# Patient Record
Sex: MALE | Race: WHITE | NOT HISPANIC OR LATINO | Employment: FULL TIME | URBAN - METROPOLITAN AREA
[De-identification: names, ages, dates, MRNs, and addresses within clinical notes are randomized per-mention and may not be internally consistent; named-entity substitution may affect disease eponyms.]

---

## 2019-07-09 ENCOUNTER — TELEPHONE (OUTPATIENT)
Dept: ONCOLOGY | Facility: CLINIC | Age: 49
End: 2019-07-09

## 2019-07-09 NOTE — TELEPHONE ENCOUNTER
ONCOLOGY INTAKE: Records Information      APPT INFORMATION:  Referring provider:  Dr. 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 yes    ADDITIONAL INFORMATION:  Left message for patient, provided hours/phone number.  Will try again on 7/10/2019 if appointment is not scheduled.    Please forward records/referral to Oncology CSS team upon scheduling.

## 2019-07-09 NOTE — TELEPHONE ENCOUNTER
Patient returned call and requested we call his wife to schedule the appointment.  Spoke with patient's wife and she is going to research the doctors we would be scheduling with.  Once they have decided who they would like to see then they will contact our office to schedule.  Clinic number and hours were provided to wife along with physicians names.

## 2019-07-09 NOTE — TELEPHONE ENCOUNTER
ONCOLOGY INTAKE: Records Information      APPT INFORMATION: [id number] Dr. Redding  Referring provider:  Dr. 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 yes    Has patient been seen for any external appt for this diagnosis? Yes    If yes, where? [facility]    Has patient had any imaging or procedures outside of [place] for this condition? Yes      If Yes, where? [facility]    ADDITIONAL INFORMATION:  Records at [facility] were faxed to Onc New Pt Scheduling

## 2019-07-10 NOTE — TELEPHONE ENCOUNTER
RECORDS STATUS - ALL OTHER DIAGNOSIS      RECORDS RECEIVED FROM: Adis  Patrick   DATE RECEIVED:    NOTES STATUS DETAILS   OFFICE NOTE from referring provider Colby Nava, Adis, most recent OV - 7/2/19   OFFICE NOTE from medical oncologist Asif/KENNETH Nava   DISCHARGE SUMMARY from hospital NA    DISCHARGE REPORT from the ER NA    OPERATIVE REPORT CE 6/24/19: Upper GI Endoscopy  5/28/19: Colonoscopy   MEDICATION LIST CE [place]   CLINICAL TRIAL TREATMENTS TO DATE     LABS     PATHOLOGY REPORTS Adis, Report in CE, Requested 5/28/19: [id number]   ANYTHING RELATED TO DIAGNOSIS Epic 7/2/19   GENONOMIC TESTING     TYPE:     IMAGING (NEED IMAGES & REPORT)     CT SCANS NA    MRI NA    MAMMO NA    ULTRASOUND NA    PET Adis, Report in CE, Requested 6/18/19   Upper GI Endoscopy Report in CE, Adis 6/24/19   Colonoscopy Report in CE, [place] 5/28/19

## 2019-07-19 NOTE — TELEPHONE ENCOUNTER
Received a call from  Hematopath [facility] - the slides from [place] have pt's first named spelled Slava with 1 L, we have in Epic with 2 L's.    Called pt, clarified spelling of first name is with 1 L. Epic updated. Advised  Lab of clarification  12:38 PM

## 2019-07-22 LAB — COPATH REPORT: NORMAL

## 2019-07-22 PROCEDURE — 00000346 ZZHCL STATISTIC REVIEW OUTSIDE SLIDES TC 88321: Performed by: INTERNAL MEDICINE

## 2019-08-01 ENCOUNTER — OFFICE VISIT (OUTPATIENT)
Dept: TRANSPLANT | Facility: CLINIC | Age: 49
End: 2019-08-01
Attending: INTERNAL MEDICINE
Payer: COMMERCIAL

## 2019-08-01 ENCOUNTER — PRE VISIT (OUTPATIENT)
Dept: TRANSPLANT | Facility: CLINIC | Age: 49
End: 2019-08-01

## 2019-08-01 VITALS
OXYGEN SATURATION: 98 % | RESPIRATION RATE: 16 BRPM | TEMPERATURE: 97 F | WEIGHT: 168 LBS | HEART RATE: 70 BPM | SYSTOLIC BLOOD PRESSURE: 102 MMHG | DIASTOLIC BLOOD PRESSURE: 66 MMHG

## 2019-08-01 DIAGNOSIS — C83.18 MANTLE CELL LYMPHOMA OF LYMPH NODES OF MULTIPLE SITES (H): Primary | ICD-10-CM

## 2019-08-01 LAB
ALBUMIN SERPL-MCNC: 3.9 G/DL (ref 3.4–5)
ALP SERPL-CCNC: 67 U/L (ref 40–150)
ALT SERPL W P-5'-P-CCNC: 23 U/L (ref 0–70)
ANION GAP SERPL CALCULATED.3IONS-SCNC: 4 MMOL/L (ref 3–14)
AST SERPL W P-5'-P-CCNC: 18 U/L (ref 0–45)
BASOPHILS # BLD AUTO: 0.1 10E9/L (ref 0–0.2)
BASOPHILS NFR BLD AUTO: 1 %
BILIRUB SERPL-MCNC: 0.9 MG/DL (ref 0.2–1.3)
BUN SERPL-MCNC: 18 MG/DL (ref 7–30)
CALCIUM SERPL-MCNC: 9 MG/DL (ref 8.5–10.1)
CHLORIDE SERPL-SCNC: 105 MMOL/L (ref 94–109)
CO2 SERPL-SCNC: 30 MMOL/L (ref 20–32)
CREAT SERPL-MCNC: 1.15 MG/DL (ref 0.66–1.25)
DIFFERENTIAL METHOD BLD: NORMAL
EOSINOPHIL # BLD AUTO: 0.5 10E9/L (ref 0–0.7)
EOSINOPHIL NFR BLD AUTO: 7.3 %
ERYTHROCYTE [DISTWIDTH] IN BLOOD BY AUTOMATED COUNT: 13.2 % (ref 10–15)
GFR SERPL CREATININE-BSD FRML MDRD: 73 ML/MIN/{1.73_M2}
GLUCOSE SERPL-MCNC: 75 MG/DL (ref 70–99)
HCT VFR BLD AUTO: 46.3 % (ref 40–53)
HGB BLD-MCNC: 15.5 G/DL (ref 13.3–17.7)
IMM GRANULOCYTES # BLD: 0 10E9/L (ref 0–0.4)
IMM GRANULOCYTES NFR BLD: 0.3 %
LDH SERPL L TO P-CCNC: 174 U/L (ref 85–227)
LYMPHOCYTES # BLD AUTO: 2.5 10E9/L (ref 0.8–5.3)
LYMPHOCYTES NFR BLD AUTO: 39.9 %
MCH RBC QN AUTO: 31.3 PG (ref 26.5–33)
MCHC RBC AUTO-ENTMCNC: 33.5 G/DL (ref 31.5–36.5)
MCV RBC AUTO: 93 FL (ref 78–100)
MONOCYTES # BLD AUTO: 0.7 10E9/L (ref 0–1.3)
MONOCYTES NFR BLD AUTO: 10.5 %
NEUTROPHILS # BLD AUTO: 2.6 10E9/L (ref 1.6–8.3)
NEUTROPHILS NFR BLD AUTO: 41 %
NRBC # BLD AUTO: 0 10*3/UL
NRBC BLD AUTO-RTO: 0 /100
PLATELET # BLD AUTO: 192 10E9/L (ref 150–450)
POTASSIUM SERPL-SCNC: 4.6 MMOL/L (ref 3.4–5.3)
PROT SERPL-MCNC: 7.6 G/DL (ref 6.8–8.8)
RBC # BLD AUTO: 4.96 10E12/L (ref 4.4–5.9)
SODIUM SERPL-SCNC: 138 MMOL/L (ref 133–144)
WBC # BLD AUTO: 6.3 10E9/L (ref 4–11)

## 2019-08-01 PROCEDURE — 80053 COMPREHEN METABOLIC PANEL: CPT | Performed by: INTERNAL MEDICINE

## 2019-08-01 PROCEDURE — G0463 HOSPITAL OUTPT CLINIC VISIT: HCPCS | Mod: ZF

## 2019-08-01 PROCEDURE — G0463 HOSPITAL OUTPT CLINIC VISIT: HCPCS

## 2019-08-01 PROCEDURE — 83615 LACTATE (LD) (LDH) ENZYME: CPT | Performed by: INTERNAL MEDICINE

## 2019-08-01 PROCEDURE — 36415 COLL VENOUS BLD VENIPUNCTURE: CPT

## 2019-08-01 PROCEDURE — 85025 COMPLETE CBC W/AUTO DIFF WBC: CPT | Performed by: INTERNAL MEDICINE

## 2019-08-01 RX ORDER — SILDENAFIL 100 MG/1
100 TABLET, FILM COATED ORAL DAILY PRN
COMMUNITY
Start: 2019-04-25

## 2019-08-01 ASSESSMENT — PAIN SCALES - GENERAL: PAINLEVEL: NO PAIN (0)

## 2019-08-01 NOTE — NURSING NOTE
Oncology Rooming Note    August 1, 2019 4:13 PM   Slava Lane is a 48 year old male who presents for:    Chief Complaint   Patient presents with     Oncology Clinic Visit     New pt eval- Mantle Cell Lymphoma      Initial Vitals: /66 (BP Location: Right arm, Patient Position: Sitting, Cuff Size: Adult Regular)   Pulse 70   Temp 97  F (36.1  C) (Oral)   Resp 16   Wt 76.2 kg (168 lb)   SpO2 98%  There is no height or weight on file to calculate BMI. There is no height or weight on file to calculate BSA.  No Pain (0) Comment: Data Unavailable   No LMP for male patient.  Allergies reviewed: Yes  Medications reviewed: Yes    Medications: Medication refills not needed today.  Pharmacy name entered into EPIC: Data Unavailable    Clinical concerns: N/A     Machelle Bellamy CMA

## 2019-08-01 NOTE — PROGRESS NOTES
[facility] Consultation       Slava Lane is a 48 year old male referred by Dr. Engel of [facility] for second opinion regarding newly diagnosed mantle cell lymphoma from colon polyps.      Hematologic history:  Mantle cell lymphoma.  Diagnosed on routine screening colonoscopy polyp at age 48 (5/28/2019)  5/5 polyps with evidence of mantle cell lymphoma.  Ki-67 20%. PET CT for initial staging notable for LAD above and below diaphragm in multiple regions.  No splenomegaly. Stage IV due to colonic involvement, no B symptoms or bulky disease at diagnosis.  EGD completed for staging with no additional findings reported.  MIPI at diagnosis 5.6 (low risk).  Bone marrow biopsy and SOCS11/TP53 pending, with plan for observation vs chemotherapy based on molecular results.     Date Treatment Response Toxicities/Complications   N/A                               HPI:  Please see my entry above for hematologic history.  Pt is feeling at baseline currently.  Diagnosed with mantle cell lymphoma incidentally after routine screening colonoscopy noted 5/5 polyps with mantle cell lymphoma.  Pt is at baseline health.  No B-symtpoms.  Did note occasional small LN in cervical and axillary regions.  No fevers, chills, night sweats, nausea, vomiting, CP with exertion or otherwise, palpitations, abdominal pain, early satiety.  No new rashes or wounds. 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  Weight fluctuates seasonally, but notes he is losing weight a little more easily this summer than past.  Energy remains good and denies fatigue. He notes persistent sore throat and cough after EGD in addition to voice hoarseness.  He is intermittently SOB with cough productive of clear sputum only.  He otherwise denies complaints.        ASSESSMENT AND PLAN:    49 y/o previously healthy male with no significant PMH presenting for second opinion regarding incidentally found mantle cell lymphoma after routine  screening colonoscopy.  He is otherwise asymptomatic and has relatively low disease burden.  Of particular concern, though is the presence of 5/5 colon polyps with mantle cell lymphoma present.  We discussed with the patient and his wife the typical presentation of mantle cell lymphoma.  We discussed that treatment is not emergent and based on pending pathologic evaluation, observation may be the best option for him at this time.  We discussed that early versus delayed treatment in mantle cell lymphoma does not change overall survival.  We discussed that his disease would be considered Stage IV and that his disease is potentially curable unlike stage IV in most solid tumors.  During out appointment it was discussed that this disease can often be followed for years without treatment, depending on the rate of growth and molecular studies.  We discussed that we did not have enough information currently to provide a good insight into whether his disease would require observation or treatment.  We discussed the need to obtain a bone marrow biopsy at [facility] in addition to obtaining blood work today and biopsy blocks from [facility] for additional pathologic evaluation/molecular studies. We will see him back in clinic in 1 month after these results are finalized to discuss the next steps in plan.  He and his wife are aware and understood when to contact the care team, including if fevers, chills, night sweats, worsening fatigue, or new LAD develop and then we would see him earlier.  We deferred discussion of chemotherapy side effects and regimens at this time as the treatment plan is pending additional workup.      # Mantle cell lymphoma, Stage IV.    -Obtain blocks from colonoscopy dated 5/28/19 for additional testing: SOX11, TP53 studies.   -Bone marrow biopsy week of 8/5.    -Labs today including: LDH, CBC, CMP  -RTC in 1 month to discuss results and treatment plan.      # tobacco  "abuse. Recommended cessation of chewing tobacco and cigarettes.      # EtOH.  3 beers nightly, every night. Recommended EtOH reduction or cessation.  Pt is hesitant at this time, but will think about it.  Discussed that EtOH can interact with chemotherapy and that 3 drinks daily is considered high risk drinking.      ROS:    10 point ROS neg other than the symptoms noted above in the HPI.        Past Medical History:   Diagnosis Date     Mantle cell lymphoma (H) 06/2019    noted incidentally on colon polyp pathology        Past Surgical History:   Procedure Laterality Date     AS SKIN PINCH GRAFT PROCEDURE <2CM Right 2017     LITHOTRIPSY N/A 06/2019    unknown side       Family History   Problem Relation Age of Onset     Cancer No family hx of      Bleeding Disorder No family hx of      Clotting Disorder No family hx of        Social History     Tobacco Use     Smoking status: Current Every Day Smoker     Packs/day: 0.25     Types: Cigarettes     Smokeless tobacco: Former User     Types: Chew   Substance Use Topics     Alcohol use: Yes     Alcohol/week: 12.6 oz     Types: 21 Cans of beer per week     Drug use: Never          No Known Allergies     Current Outpatient Medications   Medication Sig Dispense Refill     sildenafil (VIAGRA) 100 MG tablet Take 100 mg by mouth           Physical Exam:     Vital Signs: /66 (BP Location: Right arm, Patient Position: Sitting, Cuff Size: Adult Regular)   Pulse 70   Temp 97  F (36.1  C) (Oral)   Resp 16   Wt 76.2 kg (168 lb)   SpO2 98%     Pathology ([facility] 6/5/2019)  Multiple colon polyps with mantle cell lymphoma.    -Colonic mucosa with dense underlying lymphoid infiltrate, small to intermediate midly irregular cells/mature chromatin.  Increased vasculature in background and \"pink histiocytes\"  -Ki-67 proliferative index ~20%  -Positive for Cd20, CD5, , BCL2, CD43, and cyclin D1  -Negative CD10 and BCL6 in neoplastic lymphoid cells.   -CD23 " highlights disrupted follicular dendritic networks    PET [facility] 6/18/19    PET CT, SKULL BASE TO THIGHS    INDICATION: ICD-10 C83.19 Mantle cell lymphoma of extranodal site excluding spleen and other solid organs   Lymphoma Cancer, Initial Staging  COMPARISON: No prior examinations available for comparison.    RADIOPHARMACEUTICAL:  13.3 mCi F-18 FDG intravenously.    TECHNIQUE: Patient blood glucose level at time of injection was 82 mg/dL. Following IV infusion of the above radiopharmaceutical, PET imaging from the skull base to thigh performed. Low-dose noncontrast CT images were also acquired through this area for CT attenuation correction and lesion localization.    Findings:  No gross hypermetabolic activity within the neck.    There are multiple mildly prominent lymph nodes within the axillary region bilaterally with increased metabolic activity and maximum SUV of 3.3 on the right and 3.6 on the left.  Several prominent upper mediastinal lymph nodes also noted.  Enlarged and metabolically active nodes within the AP window and subcarinal regions noted with maximum SUV of 4.8 and 5.6 respectively  Mildly increased metabolic activity associated with the teena.  Metabolically active lymph node within the lower thoracic paraspinal region adjacent the aorta with SUV of 3.1.    Enlarged metabolically active retroperitoneal lymph nodes also noted.  The most pronounced is a left periaortic node with an SUV of 4.2.  Hypermetabolic ilioinguinal nodes noted bilaterally with maximum SUV upwards of 4.6.  Additionally, there is a hypermetabolic perirectal lesion which also likely represents an enlarged lymph node within SUV of 4.2.    IMPRESSION:    Diffuse hypermetabolic lymphadenopathy compatible with lymphoma.    KPS:  0    General Appearance: healthy, alert and no distress  Eyes: PERRL, conjunctiva and lids normal, sclera nonicteric  Ears/Nose/M/Throat: Oral mucosa and posterior oropharynx normal, moist mucous  membranes  Neck supple, non-tender, free range of motion, single subcentimeter posterior cervical LN on L side   Cardio/Vascular:regular rate and rhythm, normal S1 and S2, no murmur  Resp Effort And Auscultation: Reduced air movement diffusely, prolonged expiration phase- Clear to auscultation without rales, rhonchi, or wheezing. Re  GI: soft, nontender, bowel sounds present in all four quadrants, no hepatosplenomegaly  Lymphatics: single L posterior cervical LN as above, single R subcentimeter axillary LN, no supraclavicular or preauricular LAD   Musculoskeletal: Musculoskeletal normal  Edema: none  Skin: Skin color, texture, turgor normal. No rashes or lesions.  Neurologic: Gait normal. CNII-XII grossly intact, normal speech and mentation. Linear thought processes   Psych/Affect: Mood and affect are appropriate.  Vascular Access:  None      Slava and his wife understood the above assessment and recommendations.  Multiple questions answered.  No barriers to learning identified.     Patient seen and discussed with Dr. Redding.    Ari Monson MD  Heme/Onc fellow    The patient was discussed with the clinical fellow, seen and examined by me. All labs and imaging were reviewed. I  I agree with the fellows note and have been responsible for the care plan and interpretation of progress. Any additional information to the fellows note has been documented below.      49 yr old with newly diagnosed MCL Stage IV , overall asymptomatic, discovered during colonoscopy screening. Mr. Bhagat and his wife had multiple questions which were answered. In essence I think we should get a bone marrow and stain for p53 mutations. His Ki67 is 30% (I misquoted this to him as 20% which I will correct during the next visit) in his colon polyps.   Based on these tests the 3 options would be  1. Observation  2. R-CHOP/R-DHAP + ASCT+ R- maintenance  3. BR + R-maintenance    Mark Redding MD  Assistant  Professor          ------------------------------------------------------------------------------------------------------------------------------------------------    Patient Care Team    No active team members.

## 2019-08-09 ENCOUNTER — OFFICE VISIT (OUTPATIENT)
Dept: ONCOLOGY | Facility: CLINIC | Age: 49
End: 2019-08-09
Attending: PHYSICIAN ASSISTANT
Payer: COMMERCIAL

## 2019-08-09 VITALS
SYSTOLIC BLOOD PRESSURE: 104 MMHG | WEIGHT: 166.1 LBS | OXYGEN SATURATION: 96 % | HEART RATE: 62 BPM | RESPIRATION RATE: 16 BRPM | TEMPERATURE: 98 F | DIASTOLIC BLOOD PRESSURE: 64 MMHG

## 2019-08-09 DIAGNOSIS — C83.19 MANTLE CELL LYMPHOMA, EXTRANODAL AND SOLID ORGAN SITES (H): Primary | ICD-10-CM

## 2019-08-09 LAB
BASOPHILS # BLD AUTO: 0 10E9/L (ref 0–0.2)
BASOPHILS NFR BLD AUTO: 0.8 %
DIFFERENTIAL METHOD BLD: NORMAL
EOSINOPHIL # BLD AUTO: 0.4 10E9/L (ref 0–0.7)
EOSINOPHIL NFR BLD AUTO: 7.6 %
ERYTHROCYTE [DISTWIDTH] IN BLOOD BY AUTOMATED COUNT: 13.1 % (ref 10–15)
HCT VFR BLD AUTO: 44.1 % (ref 40–53)
HGB BLD-MCNC: 14.8 G/DL (ref 13.3–17.7)
IMM GRANULOCYTES # BLD: 0 10E9/L (ref 0–0.4)
IMM GRANULOCYTES NFR BLD: 0.4 %
LYMPHOCYTES # BLD AUTO: 2 10E9/L (ref 0.8–5.3)
LYMPHOCYTES NFR BLD AUTO: 37.6 %
MCH RBC QN AUTO: 30.9 PG (ref 26.5–33)
MCHC RBC AUTO-ENTMCNC: 33.6 G/DL (ref 31.5–36.5)
MCV RBC AUTO: 92 FL (ref 78–100)
MONOCYTES # BLD AUTO: 0.6 10E9/L (ref 0–1.3)
MONOCYTES NFR BLD AUTO: 10.4 %
NEUTROPHILS # BLD AUTO: 2.3 10E9/L (ref 1.6–8.3)
NEUTROPHILS NFR BLD AUTO: 43.2 %
NRBC # BLD AUTO: 0 10*3/UL
NRBC BLD AUTO-RTO: 0 /100
PLATELET # BLD AUTO: 170 10E9/L (ref 150–450)
RBC # BLD AUTO: 4.79 10E12/L (ref 4.4–5.9)
WBC # BLD AUTO: 5.3 10E9/L (ref 4–11)

## 2019-08-09 PROCEDURE — G0463 HOSPITAL OUTPT CLINIC VISIT: HCPCS | Mod: ZF

## 2019-08-09 PROCEDURE — 85025 COMPLETE CBC W/AUTO DIFF WBC: CPT | Performed by: PHYSICIAN ASSISTANT

## 2019-08-09 PROCEDURE — 88275 CYTOGENETICS 100-300: CPT | Performed by: PHYSICIAN ASSISTANT

## 2019-08-09 PROCEDURE — 81320 PLCG2 GENE COMMON VARIANTS: CPT | Performed by: INTERNAL MEDICINE

## 2019-08-09 PROCEDURE — 88185 FLOWCYTOMETRY/TC ADD-ON: CPT | Performed by: PHYSICIAN ASSISTANT

## 2019-08-09 PROCEDURE — 88313 SPECIAL STAINS GROUP 2: CPT | Performed by: PHYSICIAN ASSISTANT

## 2019-08-09 PROCEDURE — 36592 COLLECT BLOOD FROM PICC: CPT

## 2019-08-09 PROCEDURE — 88237 TISSUE CULTURE BONE MARROW: CPT | Performed by: PHYSICIAN ASSISTANT

## 2019-08-09 PROCEDURE — 00000058 ZZHCL STATISTIC BONE MARROW ASP PERF TC 38220: Performed by: PHYSICIAN ASSISTANT

## 2019-08-09 PROCEDURE — 88341 IMHCHEM/IMCYTCHM EA ADD ANTB: CPT | Performed by: PHYSICIAN ASSISTANT

## 2019-08-09 PROCEDURE — 81405 MOPATH PROCEDURE LEVEL 6: CPT | Performed by: INTERNAL MEDICINE

## 2019-08-09 PROCEDURE — 88264 CHROMOSOME ANALYSIS 20-25: CPT | Performed by: PHYSICIAN ASSISTANT

## 2019-08-09 PROCEDURE — 38222 DX BONE MARROW BX & ASPIR: CPT | Mod: ZF | Performed by: PHYSICIAN ASSISTANT

## 2019-08-09 PROCEDURE — 40000611 ZZHCL STATISTIC MORPHOLOGY W/INTERP HEMEPATH TC 85060: Performed by: PHYSICIAN ASSISTANT

## 2019-08-09 PROCEDURE — 40000424 ZZHCL STATISTIC BONE MARROW CORE PERF TC 38221: Performed by: PHYSICIAN ASSISTANT

## 2019-08-09 PROCEDURE — 40001004 ZZHCL STATISTIC FLOW INT 9-15 ABY TC 88188: Performed by: PHYSICIAN ASSISTANT

## 2019-08-09 PROCEDURE — 88271 CYTOGENETICS DNA PROBE: CPT | Performed by: PHYSICIAN ASSISTANT

## 2019-08-09 PROCEDURE — 88280 CHROMOSOME KARYOTYPE STUDY: CPT | Performed by: PHYSICIAN ASSISTANT

## 2019-08-09 PROCEDURE — 88311 DECALCIFY TISSUE: CPT | Performed by: PHYSICIAN ASSISTANT

## 2019-08-09 PROCEDURE — 88161 CYTOPATH SMEAR OTHER SOURCE: CPT | Performed by: PHYSICIAN ASSISTANT

## 2019-08-09 PROCEDURE — 00000161 ZZHCL STATISTIC H-SPHEME PROCESS B/S: Performed by: PHYSICIAN ASSISTANT

## 2019-08-09 PROCEDURE — 88184 FLOWCYTOMETRY/ TC 1 MARKER: CPT | Performed by: PHYSICIAN ASSISTANT

## 2019-08-09 PROCEDURE — 81233 BTK GENE COMMON VARIANTS: CPT | Performed by: INTERNAL MEDICINE

## 2019-08-09 PROCEDURE — 88305 TISSUE EXAM BY PATHOLOGIST: CPT | Performed by: PHYSICIAN ASSISTANT

## 2019-08-09 PROCEDURE — 88342 IMHCHEM/IMCYTCHM 1ST ANTB: CPT | Performed by: PHYSICIAN ASSISTANT

## 2019-08-09 PROCEDURE — 40000795 ZZHCL STATISTIC DNA PROCESS AND HOLD: Performed by: PHYSICIAN ASSISTANT

## 2019-08-09 PROCEDURE — 40000951 ZZHCL STATISTIC BONE MARROW INTERP TC 85097: Performed by: PHYSICIAN ASSISTANT

## 2019-08-09 PROCEDURE — 25000128 H RX IP 250 OP 636: Mod: ZF | Performed by: PHYSICIAN ASSISTANT

## 2019-08-09 RX ADMIN — MIDAZOLAM HYDROCHLORIDE 2 MG: 1 INJECTION, SOLUTION INTRAMUSCULAR; INTRAVENOUS at 15:21

## 2019-08-09 ASSESSMENT — PAIN SCALES - GENERAL
PAINLEVEL: NO PAIN (0)
PAINLEVEL: NO PAIN (0)

## 2019-08-09 NOTE — NURSING NOTE
Chief Complaint   Patient presents with     Blood Draw     labs drawn via piv start by rn. vs taken      Labs drawn via PIV start by rn in lab. Flushed with saline. Vitals taken. Pt checked in for next appointment.   Mirian Ghosh RN

## 2019-08-09 NOTE — NURSING NOTE
Oncology Rooming Note    August 9, 2019 2:45 PM   Slava Lane is a 49 year old male who presents for:    Chief Complaint   Patient presents with     Blood Draw     labs drawn via piv start by rn. vs taken      Oncology Clinic Visit     BMBX, Labs      Initial Vitals: /77 (BP Location: Right arm, Patient Position: Sitting, Cuff Size: Adult Regular)   Pulse 55   Temp 98  F (36.7  C) (Oral)   Resp 16   Wt 75.3 kg (166 lb 1.6 oz)   SpO2 100%  There is no height or weight on file to calculate BMI. There is no height or weight on file to calculate BSA.  No Pain (0) Comment: Data Unavailable   No LMP for male patient.  Allergies reviewed: Yes  Medications reviewed: Yes    Medications: Medication refills not needed today.  Pharmacy name entered into EPIC: Data Unavailable    Clinical concerns: no  Tatsumi  was notified.      Ya De Santiago MA

## 2019-08-09 NOTE — NURSING NOTE
BMT Teaching Flowsheet   Teaching Topic: post biopsy instructions    Person(s) involved in teaching: Patient  Motivation Level  Asks Questions: Yes  Eager to Learn: Yes  Cooperative: Yes  Receptive (willing/able to accept information): Yes    Patient demonstrates understanding of the following:   - Reason for the appointment, diagnosis and treatment plan: Yes  - Knowledge of proper use of medications and conditions for which they are ordered (with special attention to potential side effects or drug interactions): Yes  - Which situations necessitate calling provider and whom to contact: Yes    Teaching concerns addressed: reviewed activity restrictions if received premeds, potential for bleeding and actions to take if develops any of the issues below    Proper use and care of (medical equipment, care aids, etc.) Yes  Pain management techniques: Yes  Patient instructed on hand hygiene: Yes  How and/when to access community resources: Yes    Infection Control:  Patient demonstrates understanding of the following:   Surgical procedure site care taught NA  Signs and symptoms of infection taught Yes  Wound care taught Yes  Central venous catheter care taught NA    Instructional Materials Used/Given: verbal, print out of post biopsy instructions.     Time spent with patient: 0 minutes.    Specific Concerns: NA

## 2019-08-09 NOTE — NURSING NOTE
Post biopsy vitals completed while pt laid in supine position, IV removed successfully, pt instructed to leave coban on for 30 minutes, biopsy dressing dried and intact. Pt discharged to caregiver. Pt tolerated well.  Machelle Bellamy MA

## 2019-08-09 NOTE — PROGRESS NOTES
BMT ONC Adult Bone Marrow Biopsy Procedure Note  August 9, 2019  /77 (BP Location: Right arm, Patient Position: Sitting, Cuff Size: Adult Regular)   Pulse 55   Temp 98  F (36.7  C) (Oral)   Resp 16   Wt 75.3 kg (166 lb 1.6 oz)   SpO2 100%      Learning needs assessment complete within 12 months? YES    DIAGNOSIS: Mantle Cell Lymphoma     PROCEDURE: Unilateral Bone Marrow Biopsy and Unilateral Aspirate    LOCATION: [facility] [place] 2nd Floor    Patient s identification was positively verified by verbal identification and invasive procedure safety checklist was completed. Informed consent was obtained. Following the administration of Midazolam as pre-medication, patient was placed in the prone position and prepped and draped in a sterile manner. Approximately 8 cc of 1% Lidocaine was used over the left posterior iliac spine. Following this a 3 mm incision was made. Trephine bone marrow core(s) was (were) obtained from the [facility]. Bone marrow aspirates were obtained from the [facility]. Aspirates were sent for morphology, immunophenotyping, cytogenetics and process and hold. A total of approximately 20 ml of marrow was aspirated. Following this procedure a sterile dressing was applied to the bone marrow biopsy site(s). The patient was placed in the supine position to maintain pressure on the biopsy site. Post-procedure wound care instructions were given.     Complications: NO    Post-procedural pain assessment: 0 out of 10 on the numeric pain rating scale.     Interventions: NO    Length of procedure:21 minutes to 45 minutes    Procedure performed by: Dominique Parekh PA-C

## 2019-08-12 LAB
COPATH REPORT: NORMAL

## 2019-08-20 ENCOUNTER — TELEPHONE (OUTPATIENT)
Dept: ONCOLOGY | Facility: CLINIC | Age: 49
End: 2019-08-20

## 2019-08-20 NOTE — TELEPHONE ENCOUNTER
Pt is wondering if his biopsy results (as well as other tests) can be released to [facility]. OK to release completed tests?

## 2019-08-21 DIAGNOSIS — C83.19 MANTLE CELL LYMPHOMA, EXTRANODAL AND SOLID ORGAN SITES (H): Primary | ICD-10-CM

## 2019-08-22 DIAGNOSIS — C83.19 MANTLE CELL LYMPHOMA, EXTRANODAL AND SOLID ORGAN SITES (H): ICD-10-CM

## 2019-08-29 LAB — COPATH REPORT: NORMAL

## 2019-09-05 LAB — COPATH REPORT: NORMAL

## 2019-09-06 LAB — COPATH REPORT: NORMAL

## 2019-09-10 DIAGNOSIS — C83.18 MANTLE CELL LYMPHOMA OF LYMPH NODES OF MULTIPLE SITES (H): ICD-10-CM

## 2019-09-10 RX ORDER — DIPHENHYDRAMINE HYDROCHLORIDE 50 MG/ML
50 INJECTION INTRAMUSCULAR; INTRAVENOUS
Status: CANCELLED
Start: 2019-09-11

## 2019-09-10 RX ORDER — ALBUTEROL SULFATE 0.83 MG/ML
2.5 SOLUTION RESPIRATORY (INHALATION)
Status: CANCELLED | OUTPATIENT
Start: 2019-09-11

## 2019-09-10 RX ORDER — DOXORUBICIN HYDROCHLORIDE 2 MG/ML
50 INJECTION, SOLUTION INTRAVENOUS ONCE
Status: CANCELLED | OUTPATIENT
Start: 2019-09-11

## 2019-09-10 RX ORDER — METHYLPREDNISOLONE SODIUM SUCCINATE 125 MG/2ML
125 INJECTION, POWDER, LYOPHILIZED, FOR SOLUTION INTRAMUSCULAR; INTRAVENOUS
Status: CANCELLED
Start: 2019-09-11

## 2019-09-10 RX ORDER — ACETAMINOPHEN 325 MG/1
650 TABLET ORAL ONCE
Status: CANCELLED | OUTPATIENT
Start: 2019-09-11

## 2019-09-10 RX ORDER — MEPERIDINE HYDROCHLORIDE 25 MG/ML
25 INJECTION INTRAMUSCULAR; INTRAVENOUS; SUBCUTANEOUS EVERY 30 MIN PRN
Status: CANCELLED | OUTPATIENT
Start: 2019-09-11

## 2019-09-10 RX ORDER — NALOXONE HYDROCHLORIDE 0.4 MG/ML
.1-.4 INJECTION, SOLUTION INTRAMUSCULAR; INTRAVENOUS; SUBCUTANEOUS
Status: CANCELLED | OUTPATIENT
Start: 2019-09-11

## 2019-09-10 RX ORDER — EPINEPHRINE 0.3 MG/.3ML
0.3 INJECTION SUBCUTANEOUS EVERY 5 MIN PRN
Status: CANCELLED | OUTPATIENT
Start: 2019-09-11

## 2019-09-10 RX ORDER — EPINEPHRINE 1 MG/ML
0.3 INJECTION, SOLUTION INTRAMUSCULAR; SUBCUTANEOUS EVERY 5 MIN PRN
Status: CANCELLED | OUTPATIENT
Start: 2019-09-11

## 2019-09-10 RX ORDER — PALONOSETRON 0.05 MG/ML
0.25 INJECTION, SOLUTION INTRAVENOUS ONCE
Status: CANCELLED | OUTPATIENT
Start: 2019-09-11

## 2019-09-10 RX ORDER — ALBUTEROL SULFATE 90 UG/1
1-2 AEROSOL, METERED RESPIRATORY (INHALATION)
Status: CANCELLED
Start: 2019-09-11

## 2019-09-10 RX ORDER — SODIUM CHLORIDE 9 MG/ML
1000 INJECTION, SOLUTION INTRAVENOUS CONTINUOUS PRN
Status: CANCELLED
Start: 2019-09-11

## 2019-09-10 RX ORDER — MEPERIDINE HYDROCHLORIDE 25 MG/ML
25 INJECTION INTRAMUSCULAR; INTRAVENOUS; SUBCUTANEOUS
Status: CANCELLED
Start: 2019-09-11

## 2019-09-10 RX ORDER — LORAZEPAM 2 MG/ML
0.5 INJECTION INTRAMUSCULAR EVERY 4 HOURS PRN
Status: CANCELLED | OUTPATIENT
Start: 2019-09-11

## 2019-09-10 RX ORDER — DIPHENHYDRAMINE HCL 25 MG
50 CAPSULE ORAL ONCE
Status: CANCELLED
Start: 2019-09-11

## 2019-09-11 ENCOUNTER — OFFICE VISIT (OUTPATIENT)
Dept: TRANSPLANT | Facility: CLINIC | Age: 49
End: 2019-09-11
Attending: INTERNAL MEDICINE
Payer: COMMERCIAL

## 2019-09-11 ENCOUNTER — INFUSION THERAPY VISIT (OUTPATIENT)
Dept: ONCOLOGY | Facility: CLINIC | Age: 49
End: 2019-09-11
Attending: INTERNAL MEDICINE
Payer: COMMERCIAL

## 2019-09-11 VITALS
RESPIRATION RATE: 18 BRPM | SYSTOLIC BLOOD PRESSURE: 126 MMHG | HEART RATE: 68 BPM | DIASTOLIC BLOOD PRESSURE: 74 MMHG | BODY MASS INDEX: 26.05 KG/M2 | HEIGHT: 66 IN | WEIGHT: 162.1 LBS | OXYGEN SATURATION: 97 %

## 2019-09-11 VITALS
HEART RATE: 76 BPM | DIASTOLIC BLOOD PRESSURE: 77 MMHG | OXYGEN SATURATION: 97 % | SYSTOLIC BLOOD PRESSURE: 116 MMHG | TEMPERATURE: 97.8 F

## 2019-09-11 DIAGNOSIS — C83.18 MANTLE CELL LYMPHOMA OF LYMPH NODES OF MULTIPLE SITES (H): Primary | ICD-10-CM

## 2019-09-11 LAB
ALBUMIN SERPL-MCNC: 4.2 G/DL (ref 3.4–5)
ALP SERPL-CCNC: 62 U/L (ref 40–150)
ALT SERPL W P-5'-P-CCNC: 21 U/L (ref 0–70)
ANION GAP SERPL CALCULATED.3IONS-SCNC: 4 MMOL/L (ref 3–14)
AST SERPL W P-5'-P-CCNC: 19 U/L (ref 0–45)
BASOPHILS # BLD AUTO: 0.1 10E9/L (ref 0–0.2)
BASOPHILS NFR BLD AUTO: 1.2 %
BILIRUB SERPL-MCNC: 1.6 MG/DL (ref 0.2–1.3)
BUN SERPL-MCNC: 13 MG/DL (ref 7–30)
CALCIUM SERPL-MCNC: 9.3 MG/DL (ref 8.5–10.1)
CHLORIDE SERPL-SCNC: 103 MMOL/L (ref 94–109)
CO2 SERPL-SCNC: 28 MMOL/L (ref 20–32)
CREAT SERPL-MCNC: 0.84 MG/DL (ref 0.66–1.25)
DIFFERENTIAL METHOD BLD: NORMAL
EOSINOPHIL # BLD AUTO: 0.5 10E9/L (ref 0–0.7)
EOSINOPHIL NFR BLD AUTO: 7.2 %
ERYTHROCYTE [DISTWIDTH] IN BLOOD BY AUTOMATED COUNT: 13.5 % (ref 10–15)
GFR SERPL CREATININE-BSD FRML MDRD: >90 ML/MIN/{1.73_M2}
GLUCOSE SERPL-MCNC: 91 MG/DL (ref 70–99)
HCT VFR BLD AUTO: 47.1 % (ref 40–53)
HGB BLD-MCNC: 15.9 G/DL (ref 13.3–17.7)
IMM GRANULOCYTES # BLD: 0 10E9/L (ref 0–0.4)
IMM GRANULOCYTES NFR BLD: 0.1 %
LYMPHOCYTES # BLD AUTO: 2.4 10E9/L (ref 0.8–5.3)
LYMPHOCYTES NFR BLD AUTO: 34.6 %
MCH RBC QN AUTO: 31.3 PG (ref 26.5–33)
MCHC RBC AUTO-ENTMCNC: 33.8 G/DL (ref 31.5–36.5)
MCV RBC AUTO: 93 FL (ref 78–100)
MONOCYTES # BLD AUTO: 0.8 10E9/L (ref 0–1.3)
MONOCYTES NFR BLD AUTO: 11.1 %
NEUTROPHILS # BLD AUTO: 3.1 10E9/L (ref 1.6–8.3)
NEUTROPHILS NFR BLD AUTO: 45.8 %
NRBC # BLD AUTO: 0 10*3/UL
NRBC BLD AUTO-RTO: 0 /100
PLATELET # BLD AUTO: 196 10E9/L (ref 150–450)
POTASSIUM SERPL-SCNC: 4.3 MMOL/L (ref 3.4–5.3)
PROT SERPL-MCNC: 8.1 G/DL (ref 6.8–8.8)
RBC # BLD AUTO: 5.08 10E12/L (ref 4.4–5.9)
SODIUM SERPL-SCNC: 135 MMOL/L (ref 133–144)
WBC # BLD AUTO: 6.9 10E9/L (ref 4–11)

## 2019-09-11 PROCEDURE — G0463 HOSPITAL OUTPT CLINIC VISIT: HCPCS | Mod: 25

## 2019-09-11 PROCEDURE — 96413 CHEMO IV INFUSION 1 HR: CPT

## 2019-09-11 PROCEDURE — 96415 CHEMO IV INFUSION ADDL HR: CPT

## 2019-09-11 PROCEDURE — 93010 ELECTROCARDIOGRAM REPORT: CPT | Mod: 59 | Performed by: INTERNAL MEDICINE

## 2019-09-11 PROCEDURE — 96376 TX/PRO/DX INJ SAME DRUG ADON: CPT

## 2019-09-11 PROCEDURE — 25000128 H RX IP 250 OP 636: Mod: ZF | Performed by: INTERNAL MEDICINE

## 2019-09-11 PROCEDURE — 40000556 ZZH STATISTIC PERIPHERAL IV START W US GUIDANCE: Mod: ZF

## 2019-09-11 PROCEDURE — 93005 ELECTROCARDIOGRAM TRACING: CPT

## 2019-09-11 PROCEDURE — 25800030 ZZH RX IP 258 OP 636: Mod: ZF | Performed by: INTERNAL MEDICINE

## 2019-09-11 PROCEDURE — 80053 COMPREHEN METABOLIC PANEL: CPT | Performed by: INTERNAL MEDICINE

## 2019-09-11 PROCEDURE — 85025 COMPLETE CBC W/AUTO DIFF WBC: CPT | Performed by: INTERNAL MEDICINE

## 2019-09-11 PROCEDURE — 25000125 ZZHC RX 250: Mod: ZF | Performed by: INTERNAL MEDICINE

## 2019-09-11 PROCEDURE — 25000132 ZZH RX MED GY IP 250 OP 250 PS 637: Mod: ZF | Performed by: INTERNAL MEDICINE

## 2019-09-11 PROCEDURE — 96375 TX/PRO/DX INJ NEW DRUG ADDON: CPT

## 2019-09-11 PROCEDURE — 96367 TX/PROPH/DG ADDL SEQ IV INF: CPT

## 2019-09-11 RX ORDER — DOXORUBICIN HYDROCHLORIDE 2 MG/ML
50 INJECTION, SOLUTION INTRAVENOUS ONCE
Status: DISCONTINUED | OUTPATIENT
Start: 2019-09-11 | End: 2019-09-11 | Stop reason: HOSPADM

## 2019-09-11 RX ORDER — PALONOSETRON 0.05 MG/ML
0.25 INJECTION, SOLUTION INTRAVENOUS ONCE
Status: COMPLETED | OUTPATIENT
Start: 2019-09-11 | End: 2019-09-11

## 2019-09-11 RX ORDER — NICOTINE POLACRILEX 4 MG/1
20 GUM, CHEWING ORAL DAILY
Qty: 30 TABLET | Refills: 0 | Status: ON HOLD | OUTPATIENT
Start: 2019-09-11 | End: 2019-10-05

## 2019-09-11 RX ORDER — PREDNISONE 50 MG/1
50 TABLET ORAL 2 TIMES DAILY
Qty: 10 TABLET | Refills: 3 | Status: ON HOLD | OUTPATIENT
Start: 2019-09-11 | End: 2019-10-04

## 2019-09-11 RX ORDER — METHYLPREDNISOLONE SODIUM SUCCINATE 125 MG/2ML
125 INJECTION, POWDER, LYOPHILIZED, FOR SOLUTION INTRAMUSCULAR; INTRAVENOUS
Status: DISCONTINUED | OUTPATIENT
Start: 2019-09-11 | End: 2019-09-11 | Stop reason: HOSPADM

## 2019-09-11 RX ORDER — PROCHLORPERAZINE MALEATE 10 MG
10 TABLET ORAL EVERY 6 HOURS PRN
Qty: 30 TABLET | Refills: 7 | Status: SHIPPED | OUTPATIENT
Start: 2019-09-11 | End: 2019-09-11

## 2019-09-11 RX ORDER — PROCHLORPERAZINE MALEATE 10 MG
10 TABLET ORAL EVERY 6 HOURS PRN
Qty: 30 TABLET | Refills: 7 | Status: ON HOLD | OUTPATIENT
Start: 2019-09-11 | End: 2019-11-16

## 2019-09-11 RX ORDER — ACETAMINOPHEN 325 MG/1
650 TABLET ORAL ONCE
Status: COMPLETED | OUTPATIENT
Start: 2019-09-11 | End: 2019-09-11

## 2019-09-11 RX ORDER — LORAZEPAM 0.5 MG/1
0.5 TABLET ORAL EVERY 4 HOURS PRN
Qty: 30 TABLET | Refills: 5 | Status: ON HOLD | OUTPATIENT
Start: 2019-09-11 | End: 2019-11-15 | Stop reason: SINTOL

## 2019-09-11 RX ORDER — ALLOPURINOL 300 MG/1
300 TABLET ORAL DAILY
Qty: 60 TABLET | Refills: 0 | Status: CANCELLED | OUTPATIENT
Start: 2019-09-11

## 2019-09-11 RX ORDER — EPINEPHRINE 0.3 MG/.3ML
0.3 INJECTION SUBCUTANEOUS EVERY 5 MIN PRN
Status: DISCONTINUED | OUTPATIENT
Start: 2019-09-11 | End: 2019-09-11 | Stop reason: HOSPADM

## 2019-09-11 RX ORDER — DIPHENHYDRAMINE HYDROCHLORIDE 50 MG/ML
50 INJECTION INTRAMUSCULAR; INTRAVENOUS
Status: COMPLETED | OUTPATIENT
Start: 2019-09-11 | End: 2019-09-11

## 2019-09-11 RX ORDER — ALLOPURINOL 300 MG/1
300 TABLET ORAL DAILY
Qty: 60 TABLET | Refills: 0 | Status: ON HOLD | OUTPATIENT
Start: 2019-09-11 | End: 2019-10-05

## 2019-09-11 RX ORDER — ALLOPURINOL 300 MG/1
300 TABLET ORAL DAILY
Qty: 14 TABLET | Refills: 7 | Status: SHIPPED | OUTPATIENT
Start: 2019-09-11 | End: 2019-09-11

## 2019-09-11 RX ORDER — LORAZEPAM 2 MG/ML
0.5 INJECTION INTRAMUSCULAR EVERY 4 HOURS PRN
Status: DISCONTINUED | OUTPATIENT
Start: 2019-09-11 | End: 2019-09-11 | Stop reason: HOSPADM

## 2019-09-11 RX ORDER — PREDNISONE 50 MG/1
50 TABLET ORAL 2 TIMES DAILY
Qty: 10 TABLET | Refills: 0 | Status: SHIPPED | OUTPATIENT
Start: 2019-09-11 | End: 2019-09-11

## 2019-09-11 RX ORDER — DOXORUBICIN HYDROCHLORIDE 2 MG/ML
25 INJECTION, SOLUTION INTRAVENOUS ONCE
Status: CANCELLED | OUTPATIENT
Start: 2019-09-11

## 2019-09-11 RX ORDER — DOXORUBICIN HYDROCHLORIDE 2 MG/ML
50 INJECTION, SOLUTION INTRAVENOUS ONCE
Status: DISCONTINUED | OUTPATIENT
Start: 2019-09-11 | End: 2019-09-11

## 2019-09-11 RX ADMIN — METHYLPREDNISOLONE SODIUM SUCCINATE 125 MG: 125 INJECTION, POWDER, FOR SOLUTION INTRAMUSCULAR; INTRAVENOUS at 12:50

## 2019-09-11 RX ADMIN — DIPHENHYDRAMINE HYDROCHLORIDE 50 MG: 50 INJECTION INTRAMUSCULAR; INTRAVENOUS at 12:52

## 2019-09-11 RX ADMIN — RITUXIMAB 700 MG: 10 INJECTION, SOLUTION INTRAVENOUS at 11:01

## 2019-09-11 RX ADMIN — ACETAMINOPHEN 650 MG: 325 TABLET ORAL at 10:29

## 2019-09-11 RX ADMIN — DIPHENHYDRAMINE HYDROCHLORIDE 50 MG: 50 INJECTION INTRAMUSCULAR; INTRAVENOUS at 10:29

## 2019-09-11 RX ADMIN — SODIUM CHLORIDE 150 MG: 9 INJECTION, SOLUTION INTRAVENOUS at 10:05

## 2019-09-11 RX ADMIN — SODIUM CHLORIDE 250 ML: 9 INJECTION, SOLUTION INTRAVENOUS at 10:02

## 2019-09-11 RX ADMIN — FAMOTIDINE 20 MG: 10 INJECTION INTRAVENOUS at 12:55

## 2019-09-11 RX ADMIN — PALONOSETRON HYDROCHLORIDE 0.25 MG: 0.25 INJECTION, SOLUTION INTRAVENOUS at 10:03

## 2019-09-11 ASSESSMENT — MIFFLIN-ST. JEOR: SCORE: 1533.03

## 2019-09-11 ASSESSMENT — PAIN SCALES - GENERAL: PAINLEVEL: NO PAIN (0)

## 2019-09-11 NOTE — NURSING NOTE
Vitals done, labs drawn by venipuncture.  See doc flow sheets for details.  Linnette Valerio, VIKASH September 11, 2019

## 2019-09-11 NOTE — NURSING NOTE
Slava Patch to start R-CHOP today for Mantle Cell Lymphoma. Written information given to them on R-CHOP. Went over drugs, how they work and possible side effects. Reviewed how chemotherapy works on rapidly dividing cells, thus why it works on cancer cells but why we have the associated side effects. Reviewed neutropenic precautions, including good handwashing, staying away from sick people/crowds and calling temperature over 100.4 day or night.  How/when  we monitor labs. [place] out an cycle and when to expect his labs to fall if they do. Reviewed other side effects including peripheral neuropathy with Vincristine, heart complications with Doxorubicin, bladder irritation with cytoxan and steroid related side effects with prednisone. Reviewed Rituxan and how most of the side effects are experienced when the drug is infused. How it is important to tell your nurse if anything feels different, from a itch to respiratory distress. We have emergency medications to handle any type of reaction. You are premedication to prevent a reaction. The drug is started slowly and then rate is increased every 30 minutes as tolerated. The main side effect is flu-like symptoms.     Answered questions, provided information and support.   White binder given to them with additional resources and information.    Writer will continue to follow.     Slava is interested in a port. Ok 'd by Doctor Redding. Will place immediately prior to next cycle. Information given to them on toño cath.

## 2019-09-11 NOTE — PATIENT INSTRUCTIONS
Contact Numbers    [facility] [place]/[facility]: [phone]    Call with chills and/or temperature greater than or equal to 100.5, uncontrolled nausea/vomiting, diarrhea, constipation, dizziness, shortness of breath, chest pain, bleeding, unexplained bruising, or any new/concerning symptoms, questions/concerns.     If you are having any concerning symptoms or wish to speak to a provider before your next infusion visit, please call your care coordinator or triage to notify them so we can adequately serve you.       After Hours: [phone]    If after hours, weekends, or holidays, call main hospital  and ask for Oncology doctor on call.             Lab Results:  Recent Results (from the past 12 hour(s))   CBC with platelets differential    Collection Time: 09/11/19  8:33 AM   Result Value Ref Range    WBC 6.9 4.0 - 11.0 10e9/L    RBC Count 5.08 4.4 - 5.9 10e12/L    Hemoglobin 15.9 13.3 - 17.7 g/dL    Hematocrit 47.1 40.0 - 53.0 %    MCV 93 78 - 100 fl    MCH 31.3 26.5 - 33.0 pg    MCHC 33.8 31.5 - 36.5 g/dL    RDW 13.5 10.0 - 15.0 %    Platelet Count 196 150 - 450 10e9/L    Diff Method Automated Method     % Neutrophils 45.8 %    % Lymphocytes 34.6 %    % Monocytes 11.1 %    % Eosinophils 7.2 %    % Basophils 1.2 %    % Immature Granulocytes 0.1 %    Nucleated RBCs 0 0 /100    Absolute Neutrophil 3.1 1.6 - 8.3 10e9/L    Absolute Lymphocytes 2.4 0.8 - 5.3 10e9/L    Absolute Monocytes 0.8 0.0 - 1.3 10e9/L    Absolute Eosinophils 0.5 0.0 - 0.7 10e9/L    Absolute Basophils 0.1 0.0 - 0.2 10e9/L    Abs Immature Granulocytes 0.0 0 - 0.4 10e9/L    Absolute Nucleated RBC 0.0    Comprehensive metabolic panel    Collection Time: 09/11/19  8:33 AM   Result Value Ref Range    Sodium 135 133 - 144 mmol/L    Potassium 4.3 3.4 - 5.3 mmol/L    Chloride 103 94 - 109 mmol/L    Carbon Dioxide 28 20 - 32 mmol/L    Anion Gap 4 3 - 14 mmol/L    Glucose 91 70 - 99 mg/dL    Urea Nitrogen 13 7 - 30 mg/dL    Creatinine 0.84 0.66 - 1.25  mg/dL    GFR Estimate >90 >60 mL/min/[1.73_m2]    GFR Estimate If Black >90 >60 mL/min/[1.73_m2]    Calcium 9.3 8.5 - 10.1 mg/dL    Bilirubin Total 1.6 (H) 0.2 - 1.3 mg/dL    Albumin 4.2 3.4 - 5.0 g/dL    Protein Total 8.1 6.8 - 8.8 g/dL    Alkaline Phosphatase 62 40 - 150 U/L    ALT 21 0 - 70 U/L    AST 19 0 - 45 U/L   EKG 12-lead, complete    Collection Time: 09/11/19  9:52 AM   Result Value Ref Range    Interpretation ECG Click View Image link to view waveform and result

## 2019-09-11 NOTE — PROGRESS NOTES
"Infusion Nursing Note:  Slava Lane presents today for Cycle 1, day 1 Rituxan, Adriamycin, Vincristine and Cytoxan.    Patient seen by provider today: Yes: Dr. Redding    Note: Patient new to infusion, received first chemotherapy today.  Pt oriented to infusion room, location of bathrooms, nutrition stations, and call light. New patient teaching done previously.  All medications reviewed prior to administration and all patient's questions answered.  Patient instructed to call triage with chills and/or temperature greater than or equal to 100.5, uncontrolled nausea/vomiting, diarrhea, constipation, dizziness, shortness of breath, chest pain, bleeding, unexplained bruising, or any new/concerning symptoms, questions/concerns.  Pt did not request or require any intervention for pain today.    [facility] Dr. Redding/SOCO Naylor RN: OK to proceed with [facility] today, 9/11, without completing ECHO prior.  Order ECG and page once resulted for review.    Dr. Redding reviewed EKG prior to administration of chemotherapy.  Elevated bilirubin indicated reduced dose of Adriamycin and Vincristine.  Because of time taken to enter and approve new orders, Rituxan administered first.  Approximately 1.75 hours into infusion of Rituxan, at rate of 200cc/hr, pt reports feeling his throat had been \"going crazy\" and \"trying to swell\" for approximately 30 minutes.  Pt also reported feeling chilled and having an upset stomach.  Pt and pt's wife stated did not want to bother nurses as all appeared to be \"running about\".  Re-educated and encouraged pt to call with any questions or concerns.  Re-educated and instructed pt to use call light to report any symptoms during infusion.    Did patient have a hypersensitivity reaction? : Yes  Drug or Product name: Rituxan  Were pre-meds administered?: Yes  If Yes, what pre-meds were administered?: Acetaminophen (Tylenol);Diphenhydramine (Benadryl)  First or Subsequent treatment: First " "time receiving  Rate of infusion when patient had hypersensitivity reaction: 200cc/hr(though pt states has been feeling for 30 minutes; approx 150)  Time the hypersensitivity reaction was first recognized: 1248  Symptoms observed or reported (select all that apply): Chills;Nausea;Other: (Comment)(throat \"going nuts\" \"swelling\")  Interventions/treatment following reaction: Infusion stopped;Hypersensitivity medications administered;Additional observation period added;Reduced rate of medication administration  What hypersensitivity medications were administered?: DiphendydrAMINE (benadryl);Methylprednisolone  Name of provider notified: Dr. Redding  Time provider notified: 1300  Type of notification (select all that apply): Paged/Phone  Was the patient re-challenged today?: Yes - tolerated well    VORB 9/11/2019 1350 Dr. Redding/SOCO Naylor RN: Re-challenge Rituxan starting at half the tolerated rate.  Administer Solu-medrol only if reaction occurs again.  Hold CHOP today.  Will obtain abdominal US and ECHO tomorrow morning and decide if reduced dose or full dose should be administered.      Pt will return to infusion clinic tomorrow following tests for CHOP.  Pt and pt's wife verbalized understanding of POC.    Intravenous Access:  Peripheral IV placed.    Treatment Conditions:  Lab Results   Component Value Date    HGB 15.9 09/11/2019     Lab Results   Component Value Date    WBC 6.9 09/11/2019      Lab Results   Component Value Date    ANEU 3.1 09/11/2019     Lab Results   Component Value Date     09/11/2019      Lab Results   Component Value Date     09/11/2019                   Lab Results   Component Value Date    POTASSIUM 4.3 09/11/2019           No results found for: MAG         Lab Results   Component Value Date    CR 0.84 09/11/2019                   Lab Results   Component Value Date    PIEDAD 9.3 09/11/2019                Lab Results   Component Value Date    BILITOTAL 1.6 09/11/2019       "     Lab Results   Component Value Date    ALBUMIN 4.2 09/11/2019                    Lab Results   Component Value Date    ALT 21 09/11/2019           Lab Results   Component Value Date    AST 19 09/11/2019     Results reviewed, labs MET treatment parameters, ok to proceed with treatment.  Dose adjusted 2/2 elevated bilirubin.    Post Infusion Assessment:  Patient tolerated infusion without incident following re-challenge.  Blood return noted pre and post infusion.    Site patent and intact, free from redness, edema or discomfort.  No evidence of extravasations.  Access discontinued per protocol.    Discharge Plan:   Prescriptions given for Ativan, allopurinol, compazine and prednisone; teaching completed by pharmacist.  Instructed pt and pt's wife to take Prednisone only once chemotherapy (CHOP) is initiated tomorrow or Friday.    Discharge instructions reviewed with: Patient.  Patient and/or family verbalized understanding of discharge instructions and all questions answered.  Copy of AVS reviewed with patient and/or family.  Patient will return 9/12/2019 for next appointment.  Patient discharged in stable condition accompanied by: wife.  Departure Mode: Ambulatory.  Face to Face time: 40 minutes.    Larisa Naylor RN

## 2019-09-12 ENCOUNTER — ANCILLARY PROCEDURE (OUTPATIENT)
Dept: CARDIOLOGY | Facility: CLINIC | Age: 49
End: 2019-09-12
Attending: INTERNAL MEDICINE
Payer: COMMERCIAL

## 2019-09-12 ENCOUNTER — INFUSION THERAPY VISIT (OUTPATIENT)
Dept: ONCOLOGY | Facility: CLINIC | Age: 49
End: 2019-09-12
Attending: INTERNAL MEDICINE
Payer: COMMERCIAL

## 2019-09-12 ENCOUNTER — HOSPITAL ENCOUNTER (OUTPATIENT)
Dept: ULTRASOUND IMAGING | Facility: CLINIC | Age: 49
Discharge: HOME | End: 2019-09-12
Attending: INTERNAL MEDICINE | Admitting: INTERNAL MEDICINE
Payer: COMMERCIAL

## 2019-09-12 VITALS
HEART RATE: 82 BPM | DIASTOLIC BLOOD PRESSURE: 53 MMHG | SYSTOLIC BLOOD PRESSURE: 126 MMHG | TEMPERATURE: 98.1 F | RESPIRATION RATE: 20 BRPM

## 2019-09-12 DIAGNOSIS — C83.18 MANTLE CELL LYMPHOMA OF LYMPH NODES OF MULTIPLE SITES (H): Primary | ICD-10-CM

## 2019-09-12 DIAGNOSIS — C83.18 MANTLE CELL LYMPHOMA OF LYMPH NODES OF MULTIPLE SITES (H): ICD-10-CM

## 2019-09-12 LAB
ALBUMIN SERPL-MCNC: 3.7 G/DL (ref 3.4–5)
ALP SERPL-CCNC: 70 U/L (ref 40–150)
ALT SERPL W P-5'-P-CCNC: 20 U/L (ref 0–70)
ANION GAP SERPL CALCULATED.3IONS-SCNC: 10 MMOL/L (ref 3–14)
AST SERPL W P-5'-P-CCNC: 18 U/L (ref 0–45)
BILIRUB SERPL-MCNC: 0.7 MG/DL (ref 0.2–1.3)
BUN SERPL-MCNC: 14 MG/DL (ref 7–30)
CALCIUM SERPL-MCNC: 8.8 MG/DL (ref 8.5–10.1)
CHLORIDE SERPL-SCNC: 105 MMOL/L (ref 94–109)
CO2 SERPL-SCNC: 24 MMOL/L (ref 20–32)
CREAT SERPL-MCNC: 0.78 MG/DL (ref 0.66–1.25)
GFR SERPL CREATININE-BSD FRML MDRD: >90 ML/MIN/{1.73_M2}
GLUCOSE SERPL-MCNC: 163 MG/DL (ref 70–99)
INTERPRETATION ECG - MUSE: NORMAL
POTASSIUM SERPL-SCNC: 4 MMOL/L (ref 3.4–5.3)
PROT SERPL-MCNC: 7.2 G/DL (ref 6.8–8.8)
SODIUM SERPL-SCNC: 140 MMOL/L (ref 133–144)

## 2019-09-12 PROCEDURE — 25000128 H RX IP 250 OP 636: Mod: ZF | Performed by: INTERNAL MEDICINE

## 2019-09-12 PROCEDURE — 25000125 ZZHC RX 250: Mod: ZF | Performed by: INTERNAL MEDICINE

## 2019-09-12 PROCEDURE — 80053 COMPREHEN METABOLIC PANEL: CPT | Performed by: INTERNAL MEDICINE

## 2019-09-12 PROCEDURE — 25800030 ZZH RX IP 258 OP 636: Mod: ZF | Performed by: INTERNAL MEDICINE

## 2019-09-12 PROCEDURE — G0463 HOSPITAL OUTPT CLINIC VISIT: HCPCS | Mod: 25

## 2019-09-12 PROCEDURE — 96375 TX/PRO/DX INJ NEW DRUG ADDON: CPT

## 2019-09-12 PROCEDURE — 96413 CHEMO IV INFUSION 1 HR: CPT

## 2019-09-12 PROCEDURE — 96411 CHEMO IV PUSH ADDL DRUG: CPT

## 2019-09-12 PROCEDURE — 76705 ECHO EXAM OF ABDOMEN: CPT

## 2019-09-12 RX ORDER — DIPHENHYDRAMINE HYDROCHLORIDE 50 MG/ML
50 INJECTION INTRAMUSCULAR; INTRAVENOUS
Status: CANCELLED
Start: 2019-09-12

## 2019-09-12 RX ORDER — MEPERIDINE HYDROCHLORIDE 25 MG/ML
25 INJECTION INTRAMUSCULAR; INTRAVENOUS; SUBCUTANEOUS
Status: CANCELLED
Start: 2019-09-12

## 2019-09-12 RX ORDER — SODIUM CHLORIDE 9 MG/ML
1000 INJECTION, SOLUTION INTRAVENOUS CONTINUOUS PRN
Status: CANCELLED
Start: 2019-09-12

## 2019-09-12 RX ORDER — ALBUTEROL SULFATE 90 UG/1
1-2 AEROSOL, METERED RESPIRATORY (INHALATION)
Status: CANCELLED
Start: 2019-09-12

## 2019-09-12 RX ORDER — NALOXONE HYDROCHLORIDE 0.4 MG/ML
.1-.4 INJECTION, SOLUTION INTRAMUSCULAR; INTRAVENOUS; SUBCUTANEOUS
Status: CANCELLED | OUTPATIENT
Start: 2019-09-12

## 2019-09-12 RX ORDER — METHYLPREDNISOLONE SODIUM SUCCINATE 125 MG/2ML
125 INJECTION, POWDER, LYOPHILIZED, FOR SOLUTION INTRAMUSCULAR; INTRAVENOUS
Status: CANCELLED
Start: 2019-09-12

## 2019-09-12 RX ORDER — EPINEPHRINE 1 MG/ML
0.3 INJECTION, SOLUTION INTRAMUSCULAR; SUBCUTANEOUS EVERY 5 MIN PRN
Status: CANCELLED | OUTPATIENT
Start: 2019-09-12

## 2019-09-12 RX ORDER — DOXORUBICIN HYDROCHLORIDE 2 MG/ML
90 INJECTION, SOLUTION INTRAVENOUS ONCE
Status: COMPLETED | OUTPATIENT
Start: 2019-09-12 | End: 2019-09-12

## 2019-09-12 RX ORDER — DOXORUBICIN HYDROCHLORIDE 2 MG/ML
25 INJECTION, SOLUTION INTRAVENOUS ONCE
Status: CANCELLED | OUTPATIENT
Start: 2019-09-12

## 2019-09-12 RX ORDER — PALONOSETRON 0.05 MG/ML
0.25 INJECTION, SOLUTION INTRAVENOUS ONCE
Status: CANCELLED | OUTPATIENT
Start: 2019-09-12

## 2019-09-12 RX ORDER — MEPERIDINE HYDROCHLORIDE 25 MG/ML
25 INJECTION INTRAMUSCULAR; INTRAVENOUS; SUBCUTANEOUS EVERY 30 MIN PRN
Status: CANCELLED | OUTPATIENT
Start: 2019-09-12

## 2019-09-12 RX ORDER — EPINEPHRINE 0.3 MG/.3ML
0.3 INJECTION SUBCUTANEOUS EVERY 5 MIN PRN
Status: CANCELLED | OUTPATIENT
Start: 2019-09-12

## 2019-09-12 RX ORDER — ACETAMINOPHEN 325 MG/1
650 TABLET ORAL ONCE
Status: CANCELLED | OUTPATIENT
Start: 2019-09-12

## 2019-09-12 RX ORDER — DOXORUBICIN HYDROCHLORIDE 2 MG/ML
50 INJECTION, SOLUTION INTRAVENOUS ONCE
Status: CANCELLED | OUTPATIENT
Start: 2019-09-12

## 2019-09-12 RX ORDER — LORAZEPAM 2 MG/ML
0.5 INJECTION INTRAMUSCULAR EVERY 4 HOURS PRN
Status: CANCELLED | OUTPATIENT
Start: 2019-09-12

## 2019-09-12 RX ORDER — ALBUTEROL SULFATE 0.83 MG/ML
2.5 SOLUTION RESPIRATORY (INHALATION)
Status: CANCELLED | OUTPATIENT
Start: 2019-09-12

## 2019-09-12 RX ADMIN — VINCRISTINE SULFATE 2 MG: 1 INJECTION, SOLUTION INTRAVENOUS at 14:56

## 2019-09-12 RX ADMIN — DOXORUBICIN HYDROCHLORIDE 90 MG: 2 INJECTION, SOLUTION INTRAVENOUS at 14:46

## 2019-09-12 RX ADMIN — CYCLOPHOSPHAMIDE 1500 MG: 1 INJECTION, POWDER, FOR SOLUTION INTRAVENOUS; ORAL at 15:02

## 2019-09-12 RX ADMIN — FAMOTIDINE 20 MG: 10 INJECTION INTRAVENOUS at 14:43

## 2019-09-12 RX ADMIN — SODIUM CHLORIDE 250 ML: 9 INJECTION, SOLUTION INTRAVENOUS at 14:43

## 2019-09-12 ASSESSMENT — PAIN SCALES - GENERAL: PAINLEVEL: NO PAIN (0)

## 2019-09-12 NOTE — PATIENT INSTRUCTIONS
Contact Numbers    [facility] [place] [facility]: [phone]    Call with chills and/or temperature greater than or equal to 100.5, uncontrolled nausea/vomiting, diarrhea, constipation, dizziness, shortness of breath, chest pain, bleeding, unexplained bruising, or any new/concerning symptoms, questions/concerns.     If you are having any concerning symptoms or wish to speak to a provider before your next infusion visit, please call your care coordinator or triage to notify them so we can adequately serve you.       After Hours: [phone]    If after hours, weekends, or holidays, call main hospital  and ask for Oncology doctor on call.       September 2019 Sunday Monday Tuesday Wednesday Thursday Friday Saturday   1     2     3     4     5     6     7       8     9     10     11    [facility] DRAW   8:30 AM   (15 min.)    [facility] DRAW   [facility] Lab Draw    [facility] ONC INFUSION 360   9:00 AM   (360 min.)    ONCOLOGY INFUSION   [facility] ONC RETURN   9:00 AM   (30 min.)   Mark Redding MD   [facility] 12    US ABDOMEN LIMITED   8:00 AM   (30 min.)   [id number]   [facility] [place], Ultrasound    [facility] ONC INFUSION 120   2:00 PM   (120 min.)    ONCOLOGY INFUSION   [facility]    ECHO COMPLETE   3:30 PM   (60 min.)   [id number]   [facility] 13     14       15     16     17     18     19     20     21       22     23     24     25     26     27     28       29     30                                          October 2019 Sunday Monday Tuesday Wednesday Thursday Friday Saturday             1     2     3     4     5       6     7     8     9     10     11     12       13     14     15     16     17     18     19       20     21     22     23     24     25     26       27     28     29     30     31                               Lab Results:  Recent Results (from the past 12 hour(s))   Comprehensive metabolic  panel    Collection Time: 09/12/19 12:15 PM   Result Value Ref Range    Sodium 140 133 - 144 mmol/L    Potassium 4.0 3.4 - 5.3 mmol/L    Chloride 105 94 - 109 mmol/L    Carbon Dioxide 24 20 - 32 mmol/L    Anion Gap 10 3 - 14 mmol/L    Glucose 163 (H) 70 - 99 mg/dL    Urea Nitrogen 14 7 - 30 mg/dL    Creatinine 0.78 0.66 - 1.25 mg/dL    GFR Estimate >90 >60 mL/min/[1.73_m2]    GFR Estimate If Black >90 >60 mL/min/[1.73_m2]    Calcium 8.8 8.5 - 10.1 mg/dL    Bilirubin Total 0.7 0.2 - 1.3 mg/dL    Albumin 3.7 3.4 - 5.0 g/dL    Protein Total 7.2 6.8 - 8.8 g/dL    Alkaline Phosphatase 70 40 - 150 U/L    ALT 20 0 - 70 U/L    AST 18 0 - 45 U/L

## 2019-09-12 NOTE — PROGRESS NOTES
Infusion Nursing Note:  Slava Lane presents today for Cycle 1, day 2 Adriamycin, Vincristine and Cytoxan--Delayed from day 1.    Patient seen by provider today: No    Note: Patient presents to clinic today feeling well with no questions.  Pt did not request or require any intervention for pain today.    TORB 9/12/2019 1036 Dr. Redding/SOCO Naylor, RN: Proceed with chemotherapy today as ordered.  Ok to complete ECHO following chemotherapy.  Draw CMP prior to chemotherapy and page with results.  TORB 9/12/2019 1043 Dr. Redding via Olive Perrin RNCC/SOCO Naylor, RN: Hold Adriamycin until after ECHO complete, ok to administer Vincristine and Cytoxan prior to ECHO    Bilirubin=0.7 today, LFTs WNL.  Notified MD.  TORB 9/12/2019 1343 Dr. Redding/SOCO Naylor, RN: In conjunction with normal US and asymptomatic pt, will proceed with full dose CHOP today.  Do not redraw CMP once again to confirm.    Coordinated with imaging for ECHO to be complete prior to infusion.  Reviewed by Edyta Caro; TOR 9/12/2019 1350 KEITH Whitfield/SOCO Naylor: ECHO normal    Intravenous Access:  Peripheral IV placed.    Treatment Conditions:  Lab Results   Component Value Date    HGB 15.9 09/11/2019     Lab Results   Component Value Date    WBC 6.9 09/11/2019      Lab Results   Component Value Date    ANEU 3.1 09/11/2019     Lab Results   Component Value Date     09/11/2019      Lab Results   Component Value Date     09/12/2019                   Lab Results   Component Value Date    POTASSIUM 4.0 09/12/2019           No results found for: MAG         Lab Results   Component Value Date    CR 0.78 09/12/2019                   Lab Results   Component Value Date    PIEDAD 8.8 09/12/2019                Lab Results   Component Value Date    BILITOTAL 0.7 09/12/2019           Lab Results   Component Value Date    ALBUMIN 3.7 09/12/2019                    Lab Results   Component Value Date    ALT 20 09/12/2019           Lab  Results   Component Value Date    AST 18 09/12/2019     Results reviewed, labs MET treatment parameters, ok to proceed with treatment.  ECHO/MUGA completed 9/12/2019 EF 60-65%    Post Infusion Assessment:  Patient tolerated infusion without incident.  Blood return noted pre and post infusion.  Blood return noted during Adriamycin administration every 2 cc.  Blood return noted prior to/during/after Vincristine administration.  Site patent and intact, free from redness, edema or discomfort.  No evidence of extravasations.  Access discontinued per protocol.    Discharge Plan:   Patient declined prescription refills.  Discharge instructions reviewed with: Patient and Family.  Patient and/or family verbalized understanding of discharge instructions and all questions answered..  AVS to patient via [employer].  Patient will return in approximately 3 weeks for next appointment.  ABLE Henderson will coordinate follow up, including local labs and provider visit prior to admission for next cycle.  Pt and pt's wife verbalized understanding of POC.  Patient discharged in stable condition accompanied by: wife.  Departure Mode: Ambulatory.  Face to Face time: 60 minutes    Larisa Naylor RN

## 2019-09-13 ENCOUNTER — CARE COORDINATION (OUTPATIENT)
Dept: ONCOLOGY | Facility: CLINIC | Age: 49
End: 2019-09-13

## 2019-09-13 DIAGNOSIS — C83.18 MANTLE CELL LYMPHOMA OF LYMPH NODES OF MULTIPLE SITES (H): Primary | ICD-10-CM

## 2019-09-13 NOTE — PROGRESS NOTES
Writer called Slava Lane today post initial chemotherapy. His main compliant is heartburn. Is taking the Omeprazole as ordered. Better than it had been initially. Encouraged him to take the antiemetics also. Drinking well, eating OK. He has labs next week locally. He has resource numbers to call if needed.     9/20/2019-This weeks labs called to [place]. NA/LM on identified VM. Labs unremarkable, except bili slight elevated  at 1.3    Writers resource number left on VM if questions.     Writer will continue to follow.

## 2019-09-18 NOTE — PROGRESS NOTES
[facility] Consultation       Slava Lane is a 49 year old male referred by Dr. Engel of [facility] for second opinion regarding newly diagnosed mantle cell lymphoma from colon polyps.      Hematologic history:  Mantle cell lymphoma.  Mantle cell lymphoma, Stage IV with colon, BM involvement Ki67 -30%, TP53 mutations - Negative.  MCL MIPI - 5.9- Intermediate   Considering all this a wait and watch approach is not feasible and he is feeling that his tonsils are also growing. Hence a curative approach needs to be employed.    Date Treatment Response Toxicities/Complications   9/18/19 R-CHOP                              HPI:  Please see my entry above for hematologic history.  Pt is feeling at baseline currently.  Diagnosed with mantle cell lymphoma incidentally after routine screening colonoscopy noted 5/5 polyps with mantle cell lymphoma.      Pt is at baseline health.  No B-symtpoms.    No fevers, chills, night sweats, nausea, vomiting, CP with exertion or otherwise, palpitations, abdominal pain, early satiety.  No new rashes or wounds. 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      ASSESSMENT AND PLAN:    49 y/o with MCL Stage IV here for further treatment options.    # Mantle cell lymphoma, Stage IV with colon, BM involvement Ki67 -30%, TP53 mutations - Negative    MCL MIPI - 5.9- Intermediate   Considering all this a wait and watch approach is not feasible and he is feeling that his tonsils are also growing. Hence a curative approach needs to be employed.    Treatment :  Since the patient would meet the criteria for young fit the best course of plan would be  RCHOP*3  alternating with R-DHAP*3  ASCT  Maintenance Rituxan for 2 years.    The rationale for each is explained below :    Initial treatment :  RCHOP/RDHAP*6 -Addition of high-dose cytarabine to immunochemotherapy before autologous stem-cell transplantation in patients aged 65 years or younger with mantle cell lymphoma  (MCL Younger): a randomised, open-label, phase 3 trial of the [employer] [facility].  Of 497 patients (median age 55 years [IQR 49-60]) randomised from July 20, 2004, to March 18, 2010, 234 of 249 in the control group and 232 of 248 in the cytarabine group were included in the primary analysis. After a median follow-up of 6.1 years (95% CI 5.4-6.4), time to treatment failure was significantly longer in the cytarabine group (median 9.1 years [95% CI 6.3-not reached], 5 year rate 65% [95% CI 57-71]) than in the control group (3.9 years [3.2-4.4], 40% [33-46]; hazard ratio 0.56; p=0.038). During induction immunochemotherapy, patients who received high-dose cytarabine had increased grade 3 or 4 haematological toxicity (haemoglobin 71 [29%] of 241m vs 19 [8%] of 227 controls; platelets 176 [73%] of 240 vs 21 [9%] of 225), grade 3 or 4 febrile neutropenia (39 [17%] of 230 vs 19 [8%] of 224), and grade 1 or 2 renal toxicity (creatinine 102 [43%] of 236 vs 22 [10%] of 224). The number of ASCT-related deaths was similar (eight [3.4%]) in both groups.    After 4 cycles, we will do repeat PET/CT scan along with a bone marrow biopsy to determine disease status.     Stem cell mobilization: We will mobilize his autologous stem cells primed with cytarabine, rituximab, as cytarabine is well known to have CNS penetration in MCL. In the era of bendamustine, I still think cytarabine is important as a part of the treatment regimen even though there has been debates about this.    ASCT: Once he achieve a CR, we will proceed with high dose chemotherapy -BEAM regimen (BCNU, etoposide, Carlie?C, and melphalan) followed by auto-transplant. We explained to the patient regarding the benefit of ASCT for patients with mantle cell lymphoma including prolonged remission. Long-term progression-free survival of mantle cell lymphoma after intensive front-line immunochemotherapy with in vivo-purged stem cell rescue: a nonrandomized phase  2 multicenter study by the [facility]. In the 2nd [facility] MCL trial, we treated 160 consecutive, untreated patients younger than 66 years in a phase 2 protocol with dose-intensified induction immunochemotherapy with rituximab (R) + cyclophosphamide, vincristine, doxorubicin, prednisone (maxi-CHOP), alternating with R + high-dose cytarabine. Responders received high-dose chemotherapy with BEAM or BEAC (carmustine, etoposide, cytarabine, and melphalan/cyclophosphamide) with R-in vivo purged autologous stem cell support. Overall and complete response was achieved in 96% and 54%, respectively. The 6-year overall, event-free, and progression-free survival were 70%, 56%, and 66%, respectively, with no relapses occurring after 5 years. Multivariate analysis showed Ki-67 to be the sole independent predictor of event-free survival. The nonrelapse mortality was 5%. The majority of stem cell products and patients assessed with polymerase chain reaction (PCR) after transplantation were negative. Compared with our historical control, the [facility] MCL-1 trial, the event-free, overall, and progression-free survival, the duration of molecular remission, and the proportion of PCR-negative stem cell products were significantly increased (P < .001). Intensive immunochemotherapy with in vivo purged stem cell support can lead to long-term progression-free survival of MCL and perhaps cure.  Recently, a retrospective data showed that mantle cell lymphoma patients who are older than age 65 had good clinical outcomes including prolonged overall survival with median OS 7.9 years. (Blood 2017 130:4536). Age was not associated with DFS, but disease status at transplant dictated clinical outcomes. We also informed the patient regarding the mortality of auto-transplant up to 5%, risks including but not limited to infection, heart, lung, kidney injury form high dose chemotherapy, and diarrhea. We outlined the logistics of  auto-transplantation including hospital stay for chemotherapy followed by stem cell infusion, close monitoring until full count recovery for 2-3 weeks at minimum.    Maintenance: Two studies address this. After ASCT if the patient continues to be in CR, we would recommend maintenance rituximab for 2 years. Rituximab after Autologous Stem-Cell Transplantation in Mantle-Cell Lymphoma. In this Phase 3 trial transplantation was performed in 257 patients under 66 years. The total number of planned doses of rituximab was 23 (4 doses administered with induction therapy, 1 dose with the preparative regimen for transplantation, and 18 doses over the 3 years of maintenance therapy). The median follow-up from randomization after transplantation was 50.2 months (range, 46.4 to 54.2). Starting from randomization, the rate of event-free survival at 4 years was 79% (95% confidence interval [CI], 70 to 86) in the rituximab group versus 61% (95% CI, 51 to 70) in the observation group (P=0.001). The rate of progression-free survival at 4 years was 83% (95% CI, 73 to 88) in the rituximab group versus 64% (95% CI, 55 to 73) in the observation group (P<0.001). The rate of overall survival was 89% (95% CI, 81 to 94) in the rituximab group versus 80% (95% CI, 72 to 88) in the observation group (P=0.04). According to a Barton regression unadjusted analysis, the rate of overall survival at 4 years was higher in the rituximab group than in the observation group (hazard ratio for death, 0.50; 95% CI, 0.26 to 0.99; P=0.04).  Treatment of older patients with mantle-cell lymphoma. In older patients who were not eligible for ASCT again rituxan maintenance showed an OS benefit though the induction chemotherapy was R-CHOP. Of the 560 patients enrolled, 532 were included in the intention-to-treat analysis for response, and 485 in the primary analysis for response. The median age was 70 years. Although complete-remission rates were similar with R-FC and  R-CHOP (40% and 34%, respectively; P=0.10), progressive disease was more frequent with R-FC (14%, vs. 5% with R-CHOP). Overall survival was significantly shorter with R-FC than with R-CHOP (4-year survival rate, 47% vs. 62%; P=0.005), and more patients in the R-FC group  during the first remission (10% vs. 4%). Hematologic toxic effects occurred more frequently in the R-FC group than in the R-CHOP group, but the frequency of grade 3 or 4 infections was balanced (17% and 14%, respectively). In 274 of the 316 patients who were randomly assigned to maintenance therapy, rituximab reduced the risk of progression or death by 45% (in remission after 4 years, 58%, vs. 29% with interferon pamela; hazard ratio for progression or death, 0.55; 95% confidence interval, 0.36 to 0.87; P=0.01). Among patients who had a response to R-CHOP, maintenance therapy with rituximab significantly improved overall survival (4-year survival rate, 87%, vs. 63% with interferon pamela; P=0.005).          ROS:    10 point ROS neg other than the symptoms noted above in the HPI.        Past Medical History:   Diagnosis Date     Mantle cell lymphoma (H) 2019    noted incidentally on colon polyp pathology        Past Surgical History:   Procedure Laterality Date     AS SKIN PINCH GRAFT PROCEDURE <2CM Right 2017     LITHOTRIPSY N/A 2019    unknown side       Family History   Problem Relation Age of Onset     Cancer No family hx of      Bleeding Disorder No family hx of      Clotting Disorder No family hx of        Social History     Tobacco Use     Smoking status: Current Every Day Smoker     Packs/day: 0.25     Types: Cigarettes     Smokeless tobacco: Former User     Types: Chew   Substance Use Topics     Alcohol use: Yes     Alcohol/week: 12.6 oz     Types: 21 Cans of beer per week     Drug use: Never          No Known Allergies     Current Outpatient Medications   Medication Sig Dispense Refill     allopurinol (ZYLOPRIM) 300 MG tablet Take 1  "tablet (300 mg) by mouth daily 60 tablet 0     omeprazole 20 MG tablet Take 1 tablet (20 mg) by mouth daily 30 tablet 0     predniSONE (DELTASONE) 50 MG tablet Take 1 tablet (50 mg) by mouth 2 times daily Take on Days 1 through 5. Take first dose in AM prior to chemotherapy. 10 tablet 3     prochlorperazine (COMPAZINE) 10 MG tablet Take 1 tablet (10 mg) by mouth every 6 hours as needed (Nausea/Vomiting) 30 tablet 7     LORazepam (ATIVAN) 0.5 MG tablet Take 1 tablet (0.5 mg) by mouth every 4 hours as needed (Anxiety, Nausea/Vomiting or Sleep) 30 tablet 5     sildenafil (VIAGRA) 100 MG tablet Take 100 mg by mouth           Physical Exam:     Vital Signs: /77 (BP Location: Left arm, Patient Position: Sitting, Cuff Size: Adult Regular)   Pulse 76   Temp 97.8  F (36.6  C) (Oral)   SpO2 97%     Pathology ([facility] 6/5/2019)  Multiple colon polyps with mantle cell lymphoma.    -Colonic mucosa with dense underlying lymphoid infiltrate, small to intermediate midly irregular cells/mature chromatin.  Increased vasculature in background and \"pink histiocytes\"  -Ki-67 proliferative index -30%  -Positive for Cd20, CD5, , BCL2, CD43, and cyclin D1  -Negative CD10 and BCL6 in neoplastic lymphoid cells.   -CD23 highlights disrupted follicular dendritic networks    PET [facility] 6/18/19    PET CT, SKULL BASE TO THIGHS    INDICATION: ICD-10 C83.19 Mantle cell lymphoma of extranodal site excluding spleen and other solid organs   Lymphoma Cancer, Initial Staging  COMPARISON: No prior examinations available for comparison.    RADIOPHARMACEUTICAL:  13.3 mCi F-18 FDG intravenously.    TECHNIQUE: Patient blood glucose level at time of injection was 82 mg/dL. Following IV infusion of the above radiopharmaceutical, PET imaging from the skull base to thigh performed. Low-dose noncontrast CT images were also acquired through this area for CT attenuation correction and lesion localization.    Findings:  No gross " hypermetabolic activity within the neck.    There are multiple mildly prominent lymph nodes within the axillary region bilaterally with increased metabolic activity and maximum SUV of 3.3 on the right and 3.6 on the left.  Several prominent upper mediastinal lymph nodes also noted.  Enlarged and metabolically active nodes within the AP window and subcarinal regions noted with maximum SUV of 4.8 and 5.6 respectively  Mildly increased metabolic activity associated with the teena.  Metabolically active lymph node within the lower thoracic paraspinal region adjacent the aorta with SUV of 3.1.    Enlarged metabolically active retroperitoneal lymph nodes also noted.  The most pronounced is a left periaortic node with an SUV of 4.2.  Hypermetabolic ilioinguinal nodes noted bilaterally with maximum SUV upwards of 4.6.  Additionally, there is a hypermetabolic perirectal lesion which also likely represents an enlarged lymph node within SUV of 4.2.    IMPRESSION:    Diffuse hypermetabolic lymphadenopathy compatible with lymphoma.    KPS:  0    General Appearance: healthy, alert and no distress  Eyes: PERRL, conjunctiva and lids normal, sclera nonicteric  Ears/Nose/M/Throat: Oral mucosa and posterior oropharynx normal, moist mucous membranes  Neck supple, non-tender, free range of motion, single subcentimeter posterior cervical LN on L side   Cardio/Vascular:regular rate and rhythm, normal S1 and S2, no murmur  Resp Effort And Auscultation: Reduced air movement diffusely, prolonged expiration phase- Clear to auscultation without rales, rhonchi, or wheezing. Re  GI: soft, nontender, bowel sounds present in all four quadrants, no hepatosplenomegaly  Lymphatics: single L posterior cervical LN as above, single R subcentimeter axillary LN, no supraclavicular or preauricular LAD   Musculoskeletal: Musculoskeletal normal  Edema: none  Skin: Skin color, texture, turgor normal. No rashes or lesions.  Neurologic: Gait normal. CNII-XII  grossly intact, normal speech and mentation. Linear thought processes   Psych/Affect: Mood and affect are appropriate.  Vascular Access:  None      Slava and his wife understood the above assessment and recommendations.  Multiple questions answered.  No barriers to learning identified.     Patient seen and discussed with Dr. Redding.    Mark Redding MD  Heme/Onc fellow    The patient was discussed with the clinical fellow, seen and examined by me. All labs and imaging were reviewed. I  I agree with the fellows note and have been responsible for the care plan and interpretation of progress. Any additional information to the fellows note has been documented below.      49 yr old with newly diagnosed MCL Stage IV , overall asymptomatic, discovered during colonoscopy screening. Mr. Bhagat and his wife had multiple questions which were answered. In essence I think we should get a bone marrow and stain for p53 mutations. His Ki67 is 30% (I misquoted this to him as 20% which I will correct during the next visit) in his colon polyps.   Based on these tests the 3 options would be  1. Observation  2. R-CHOP/R-DHAP + ASCT+ R- maintenance  3. BR + R-maintenance    Mark Redding MD            ------------------------------------------------------------------------------------------------------------------------------------------------    Patient Care Team    No active team members.

## 2019-09-23 ENCOUNTER — TELEPHONE (OUTPATIENT)
Dept: ONCOLOGY | Facility: CLINIC | Age: 49
End: 2019-09-23

## 2019-09-23 ENCOUNTER — CARE COORDINATION (OUTPATIENT)
Dept: ONCOLOGY | Facility: CLINIC | Age: 49
End: 2019-09-23

## 2019-09-23 NOTE — PROGRESS NOTES
Slava Lnae reports fevers last night to 99.4/99.9 for a short while. None today. No other symptoms. Last labs good. Is staying active to counteract the affects of the chemotherapy.     He is not sleeping well. Took the Ativan to help this but did not like the dreams he was having with this. Then did not sleep last night due to being worried about a fever. Will ask local oncologist to prescribe something.     He is doing well over-all. Very little nausea. Has not needed much Zofran or Compazine. He states the first days were the worse. Heart burn and all over discomfort.     Sees the local oncologist this week with repeat labs. Will plan of admission next week for R-DHAP.      Writer will continue to follow.

## 2019-09-24 NOTE — TELEPHONE ENCOUNTER
Responded to patient's call regarding fever. He notes fever of 100.9 today. He thinks he had a fever last night as well. He is on his way to the nearest ED, [facility] in [place]. He endorses cough and sore throat over the last week. He denies dysuria, skin rash, back pain, palpitations, headache, neck stiffness. He does not have a port or a PICC. Not neutropenic on recent labs.    I called [facility] and spoke to the ED physician. Recommended infectious workup including CBC, CMP, UA, CXR, blood cultures, influenza. If neutropenic will require admission.     NICK Harp  Hematology, Oncology & Transplantation fellow  Pager: [phone]  9/23/2019

## 2019-09-24 NOTE — PROGRESS NOTES
"When writer called patient to coordinate next cycle and admission and port, wife Ya told writer Brannon was in the hospital. He spiked a fever again last night and went into the ER. His ANC is at 0.0. Labs are in \"Care Everywhere\". Ya says they did a CT scan also, but the results are not back yet. His other counts, Hgb and Platelets are good. She says they called here to the [place] and also is glad that writer and Doctor Miladis is aware of the admission and will follow. She will keep us updated on what is happening.    Writer explained to her that this is normal. Nothing Brannon did to make his ANC 0.0  A side effect, which writer told her about of the chemotherapy.     They gave him of shot of Neupogen, explained to her what this did.     Appreciated he call and concern.     Will follow. Once counts recover can plan for next cycle IP with port prior.        "

## 2019-09-30 ENCOUNTER — CARE COORDINATION (OUTPATIENT)
Dept: ONCOLOGY | Facility: CLINIC | Age: 49
End: 2019-09-30

## 2019-09-30 DIAGNOSIS — C83.18 MANTLE CELL LYMPHOMA OF LYMPH NODES OF MULTIPLE SITES (H): Primary | ICD-10-CM

## 2019-10-02 ENCOUNTER — ONCOLOGY VISIT (OUTPATIENT)
Dept: ONCOLOGY | Facility: CLINIC | Age: 49
End: 2019-10-02
Attending: PHYSICIAN ASSISTANT
Payer: COMMERCIAL

## 2019-10-02 ENCOUNTER — APPOINTMENT (OUTPATIENT)
Dept: LAB | Facility: CLINIC | Age: 49
End: 2019-10-02
Attending: INTERNAL MEDICINE
Payer: COMMERCIAL

## 2019-10-02 ENCOUNTER — ANESTHESIA EVENT (OUTPATIENT)
Dept: SURGERY | Facility: AMBULATORY SURGERY CENTER | Age: 49
End: 2019-10-02

## 2019-10-02 ENCOUNTER — CARE COORDINATION (OUTPATIENT)
Dept: ONCOLOGY | Facility: CLINIC | Age: 49
End: 2019-10-02

## 2019-10-02 VITALS
HEIGHT: 66 IN | BODY MASS INDEX: 25.97 KG/M2 | WEIGHT: 161.6 LBS | TEMPERATURE: 98.3 F | OXYGEN SATURATION: 98 % | HEART RATE: 77 BPM | DIASTOLIC BLOOD PRESSURE: 74 MMHG | RESPIRATION RATE: 20 BRPM | SYSTOLIC BLOOD PRESSURE: 109 MMHG

## 2019-10-02 DIAGNOSIS — C83.18 MANTLE CELL LYMPHOMA OF LYMPH NODES OF MULTIPLE SITES (H): Primary | ICD-10-CM

## 2019-10-02 PROBLEM — H52.6 DISORDER OF REFRACTION AND ACCOMMODATION: Status: ACTIVE | Noted: 2019-10-02

## 2019-10-02 PROBLEM — H17.9 CORNEAL OPACITY: Status: ACTIVE | Noted: 2019-10-02

## 2019-10-02 LAB
ALBUMIN SERPL-MCNC: 3.4 G/DL (ref 3.4–5)
ALP SERPL-CCNC: 63 U/L (ref 40–150)
ALT SERPL W P-5'-P-CCNC: 19 U/L (ref 0–70)
ANION GAP SERPL CALCULATED.3IONS-SCNC: 5 MMOL/L (ref 3–14)
AST SERPL W P-5'-P-CCNC: 12 U/L (ref 0–45)
BASOPHILS # BLD AUTO: 0 10E9/L (ref 0–0.2)
BASOPHILS NFR BLD AUTO: 0.6 %
BILIRUB SERPL-MCNC: 0.3 MG/DL (ref 0.2–1.3)
BUN SERPL-MCNC: 21 MG/DL (ref 7–30)
CALCIUM SERPL-MCNC: 8.9 MG/DL (ref 8.5–10.1)
CHLORIDE SERPL-SCNC: 103 MMOL/L (ref 94–109)
CO2 SERPL-SCNC: 29 MMOL/L (ref 20–32)
CREAT SERPL-MCNC: 0.77 MG/DL (ref 0.66–1.25)
DIFFERENTIAL METHOD BLD: ABNORMAL
EOSINOPHIL # BLD AUTO: 0 10E9/L (ref 0–0.7)
EOSINOPHIL NFR BLD AUTO: 0.5 %
ERYTHROCYTE [DISTWIDTH] IN BLOOD BY AUTOMATED COUNT: 13.3 % (ref 10–15)
GFR SERPL CREATININE-BSD FRML MDRD: >90 ML/MIN/{1.73_M2}
GLUCOSE SERPL-MCNC: 91 MG/DL (ref 70–99)
HCT VFR BLD AUTO: 39.8 % (ref 40–53)
HGB BLD-MCNC: 13.1 G/DL (ref 13.3–17.7)
IMM GRANULOCYTES # BLD: 0.1 10E9/L (ref 0–0.4)
IMM GRANULOCYTES NFR BLD: 1.8 %
LYMPHOCYTES # BLD AUTO: 1.5 10E9/L (ref 0.8–5.3)
LYMPHOCYTES NFR BLD AUTO: 21.9 %
MCH RBC QN AUTO: 30.5 PG (ref 26.5–33)
MCHC RBC AUTO-ENTMCNC: 32.9 G/DL (ref 31.5–36.5)
MCV RBC AUTO: 93 FL (ref 78–100)
MONOCYTES # BLD AUTO: 0.9 10E9/L (ref 0–1.3)
MONOCYTES NFR BLD AUTO: 13 %
NEUTROPHILS # BLD AUTO: 4.1 10E9/L (ref 1.6–8.3)
NEUTROPHILS NFR BLD AUTO: 62.2 %
NRBC # BLD AUTO: 0 10*3/UL
NRBC BLD AUTO-RTO: 0 /100
PLATELET # BLD AUTO: 315 10E9/L (ref 150–450)
POTASSIUM SERPL-SCNC: 4.4 MMOL/L (ref 3.4–5.3)
PROT SERPL-MCNC: 7.8 G/DL (ref 6.8–8.8)
RBC # BLD AUTO: 4.3 10E12/L (ref 4.4–5.9)
SODIUM SERPL-SCNC: 137 MMOL/L (ref 133–144)
WBC # BLD AUTO: 6.6 10E9/L (ref 4–11)

## 2019-10-02 PROCEDURE — 99214 OFFICE O/P EST MOD 30 MIN: CPT | Mod: ZP | Performed by: PHYSICIAN ASSISTANT

## 2019-10-02 PROCEDURE — 36415 COLL VENOUS BLD VENIPUNCTURE: CPT

## 2019-10-02 PROCEDURE — 85025 COMPLETE CBC W/AUTO DIFF WBC: CPT | Performed by: INTERNAL MEDICINE

## 2019-10-02 PROCEDURE — 80053 COMPREHEN METABOLIC PANEL: CPT | Performed by: INTERNAL MEDICINE

## 2019-10-02 PROCEDURE — G0463 HOSPITAL OUTPT CLINIC VISIT: HCPCS | Mod: ZF

## 2019-10-02 ASSESSMENT — MIFFLIN-ST. JEOR: SCORE: 1530.51

## 2019-10-02 ASSESSMENT — PAIN SCALES - GENERAL: PAINLEVEL: NO PAIN (0)

## 2019-10-02 NOTE — NURSING NOTE
Chief Complaint   Patient presents with     Oncology Clinic Visit     Return visit related to Mantle Cell Lymphoma     Blood Draw     Venipuncture labs collected by [facility]. JAKE INR collected.

## 2019-10-02 NOTE — PROGRESS NOTES
Writer received a phone call from Brannon. By the time she connected with him they are already on their way down to [facility] for his scheduled appointments tomorrow. His message was he had developed a rash and a swollen something.     He reports his left ear is swollen. He woke up this morning with it like this. He says that he does have this problem with his left ear at times with sinus issues but reports none now. He denies any fevers, headaches or sinus congestion.     The other symptoms he is reporting is a dull red rash. Rash on both sides from his thighs to his arm pits. Raised, non itching, non painful. Some also on his abdomen. He is on no drugs at present, although he was recently in the hospital for neutropenic fevers and received multiple antibiotics and drugs.     He feels well, offers no complaints. Only thing new for him is that he had his hair cut off by a cox yesterday.    Is due to have a port placement, then see Edyta with labs for C2 chemotherapy iP.     Writer will see if he can be seen today for the above symptoms so port placement can be done.     They are currently in [place], [place] about 3 hours from the [facility]. They will stay overnight at [facility].     Awaiting confirmation of availability of clinic appointment [facility] at 6 PM.

## 2019-10-02 NOTE — PROGRESS NOTES
[facility]  MEDICAL ONCOLOGY PROGRESS NOTE  Oct 2, 2019    CHIEF COMPLAINT: mantle cell, add on for rash, being admitted tomorrow for     ONCOLOGY HPI:   Slava Lane is a 49 year old male with Mantle cell lymphoma.  He has Mantle cell lymphoma, Stage IV with colon, BM involvement Ki67 -30%, TP53 mutations - Negative. MCL MIPI - 5.9- Intermediate   He was on watch and wait though this was not considered feasible since he felt like his tonsils were growing. Met with Dr. Redding and decided to pursue curative intent.     He started R-CHOP/RDHAP on 9/18/19.     Treatment History:  RCHOP: 9/18  RDHAP: (10/3)    INTERVAL HISTORY:   Slava presents today with his wife as an add on for rash and prior to being admitted for RDHAP tomorrow.     This morning he noticed that he had a rash on his abdomen.  He has never had a rash like this previously.  He denies any new or different medications in the last couple days.  He states he got a lot of medications while he was in the hospital last week though he is not sure what.  He denies any new lotions, soaps, detergents or new environments.  He is not eating or drinking anything new.  The rash does not itch or burn.  He has no other symptoms.  Denies any fevers or chills.  Breathing has been good.  He is eating and drinking well.  No nausea vomiting or change in stools.  He feels like himself.  He feels like his tonsils have improved after the last chemo.  Nothing else to report.    ROS: 10 point ROS neg other than the symptoms noted above in the HPI.      No Known Allergies    Current Outpatient Medications   Medication     allopurinol (ZYLOPRIM) 300 MG tablet     LORazepam (ATIVAN) 0.5 MG tablet     omeprazole 20 MG tablet     predniSONE (DELTASONE) 50 MG tablet     prochlorperazine (COMPAZINE) 10 MG tablet     sildenafil (VIAGRA) 100 MG tablet     No current facility-administered medications for this visit.        EXAM:  /74 (BP Location: Right arm, Patient  "Position: Sitting)   Pulse 77   Temp 98.3  F (36.8  C) (Oral)   Resp 20   Ht 1.676 m (5' 5.98\")   Wt 73.3 kg (161 lb 9.6 oz)   SpO2 98%   BMI 26.10 kg/m    Wt Readings from Last 4 Encounters:   10/02/19 73.3 kg (161 lb 9.6 oz)   09/11/19 73.5 kg (162 lb 1.6 oz)   08/09/19 75.3 kg (166 lb 1.6 oz)   08/01/19 76.2 kg (168 lb)      General: No acute distress  HEENT: EOMI, PERRLA, no scleral icterus, mucus membranes moist and no lesions or thrush. Tonsils slightly enlarged and erythematous L>R  Lymph: Neck is supple and shows no nodes in cervical or supraclavicular. Some pain with palpation on the neck.   Heart:  RRR, no murmur, gallop or rub  Lungs: CTA-B, no wheezes, rhonchi or rales  Abdomen: Normal bowel sounds, soft, non tender, not distended, no rebound or guarding, no palpable masses  Extremities: No pitting edema  Skin: Erythematous maculopapular rash on flanks. No warmth or tenderness to palpation (pictured below)  Neuro: CN II-XII are intact          LABS:    10/2/2019 17:24   Sodium 137   Potassium 4.4   Chloride 103   Carbon Dioxide 29   Urea Nitrogen 21   Creatinine 0.77   GFR Estimate >90   GFR Estimate If Black >90   Calcium 8.9   Anion Gap 5   Albumin 3.4   Protein Total 7.8   Bilirubin Total 0.3   Alkaline Phosphatase 63   ALT 19   AST 12   Glucose 91   WBC 6.6   Hemoglobin 13.1 (L)   Hematocrit 39.8 (L)   Platelet Count 315   RBC Count 4.30 (L)   MCV 93   MCH 30.5   MCHC 32.9   RDW 13.3   Diff Method Automated Method   % Neutrophils 62.2   % Lymphocytes 21.9   % Monocytes 13.0   % Eosinophils 0.5   % Basophils 0.6   % Immature Granulocytes 1.8   Nucleated RBCs 0   Absolute Neutrophil 4.1   Absolute Lymphocytes 1.5   Absolute Monocytes 0.9   Absolute Eosinophils 0.0   Absolute Basophils 0.0   Abs Immature Granulocytes 0.1   Absolute Nucleated RBC 0.0     IMAGING:   No new imaging     IMPRESSION/PLAN:  # Mantle cell lymphoma, Stage IV with colon, BM involvement   -plan is for 6 total cycles of " RCHOP/RDHAP followed by ASCT and then maintenance Rituxan for 2 years  -continue allopurinol   -ok for port placement tomorrow  -discussed side effects associated with chemo tomorrow.   -will admit for RDHAP tomorrow. Has baseline hearing loss. Cisplatin vs Carboplatin. Discussed with Dr. Redding. He is inpatient currently and will discuss with patient tomorrow    #Neutropenic Fever  -was admitted on 9/23 for neutropenic fever of unknown origin. Fever workup was negative. Given empiric antibiotics with vancomycin and cefepime. Was also given daily Neupogen x 2  -will need Neulasta after chemo. Discussed use of Claritin     #Rash  -unknown cause. Started today. No pain or pruritis. Otherwise feeling fine. Appears to be allergic/contact dermatitis. No new medications, supplements or detergents/lotions/soap. Only new recent change was the vanc and cefepime, though that was about a week ago. Has not taken any antihistamines or used any topical therapies. Recommended Benadryl tonight and then Zytrec tomorrow and daily until rash resolves. No need for topical steroids at this point as it is asymptomatic, though can use tomorrow if needed    #Elevated Bilirubin  -other LFTs are WNL. U/S was negative. Likely Gilbert's syndrome    Nichelle Ivory PA-C  [facility]  [address] [place] [address]  [phone]

## 2019-10-02 NOTE — LETTER
RE: Slava Lane  [address]  [facility] [address]       [facility]  MEDICAL ONCOLOGY PROGRESS NOTE  Oct 2, 2019    CHIEF COMPLAINT: mantle cell, add on for rash, being admitted tomorrow for     ONCOLOGY HPI:   Slava Lane is a 49 year old male with Mantle cell lymphoma.   He has Mantle cell lymphoma, Stage IV with colon, BM involvement Ki67 -30%, TP53 mutations - Negative. MCL MIPI - 5.9- Intermediate   He was on watch and wait though this was not considered feasible since he felt like his tonsils were growing.  Met with Dr. Redding and decided to pursue curative intent.     He started R-CHOP/RDHAP on 9/18/19.     Treatment History:  RCHOP: 9/18  RDHAP: (10/3)    INTERVAL HISTORY:   Slava presents today with his wife as an add on for rash and prior to being admitted for RDHAP tomorrow.     This morning he noticed that he had a rash on his abdomen.  He has never had a rash like this previously.  He denies any new or different medications in the last couple days.  He states he got a lot of medications while he was in the hospital last week though he is not sure what.  He denies any new lotions, soaps, detergents or new environments.  He is not eating or drinking anything new.  The rash does not itch or burn.  He has no other symptoms.  Denies any fevers or chills.  Breathing has been good.  He is eating and drinking well.  No nausea vomiting or change in stools.  He feels like himself.  He feels like his tonsils have improved after the last chemo.  Nothing else to report.    ROS: 10 point ROS neg other than the symptoms noted above in the HPI.      No Known Allergies    Current Outpatient Medications   Medication     allopurinol (ZYLOPRIM) 300 MG tablet     LORazepam (ATIVAN) 0.5 MG tablet     omeprazole 20 MG tablet     predniSONE (DELTASONE) 50 MG tablet     prochlorperazine (COMPAZINE) 10 MG tablet     sildenafil (VIAGRA) 100 MG tablet     No current facility-administered medications for this  "visit.        EXAM:  /74 (BP Location: Right arm, Patient Position: Sitting)   Pulse 77   Temp 98.3  F (36.8  C) (Oral)   Resp 20   Ht 1.676 m (5' 5.98\")   Wt 73.3 kg (161 lb 9.6 oz)   SpO2 98%   BMI 26.10 kg/m     Wt Readings from Last 4 Encounters:   10/02/19 73.3 kg (161 lb 9.6 oz)   09/11/19 73.5 kg (162 lb 1.6 oz)   08/09/19 75.3 kg (166 lb 1.6 oz)   08/01/19 76.2 kg (168 lb)      General: No acute distress  HEENT: EOMI, PERRLA, no scleral icterus, mucus membranes moist and no lesions or thrush. Tonsils slightly enlarged and erythematous L>R  Lymph: Neck is supple and shows no nodes in cervical or supraclavicular. Some pain with palpation on the neck.   Heart:  RRR, no murmur, gallop or rub  Lungs: CTA-B, no wheezes, rhonchi or rales  Abdomen: Normal bowel sounds, soft, non tender, not distended, no rebound or guarding, no palpable masses  Extremities: No pitting edema  Skin: Erythematous maculopapular rash on flanks. No warmth or tenderness to palpation (pictured below)  Neuro: CN II-XII are intact          LABS:    10/2/2019 17:24   Sodium 137   Potassium 4.4   Chloride 103   Carbon Dioxide 29   Urea Nitrogen 21   Creatinine 0.77   GFR Estimate >90   GFR Estimate If Black >90   Calcium 8.9   Anion Gap 5   Albumin 3.4   Protein Total 7.8   Bilirubin Total 0.3   Alkaline Phosphatase 63   ALT 19   AST 12   Glucose 91   WBC 6.6   Hemoglobin 13.1 (L)   Hematocrit 39.8 (L)   Platelet Count 315   RBC Count 4.30 (L)   MCV 93   MCH 30.5   MCHC 32.9   RDW 13.3   Diff Method Automated Method   % Neutrophils 62.2   % Lymphocytes 21.9   % Monocytes 13.0   % Eosinophils 0.5   % Basophils 0.6   % Immature Granulocytes 1.8   Nucleated RBCs 0   Absolute Neutrophil 4.1   Absolute Lymphocytes 1.5   Absolute Monocytes 0.9   Absolute Eosinophils 0.0   Absolute Basophils 0.0   Abs Immature Granulocytes 0.1   Absolute Nucleated RBC 0.0     IMAGING:   No new imaging     IMPRESSION/PLAN:  # Mantle cell lymphoma, Stage IV " with colon, BM involvement   -plan is for 6 total cycles of RCHOP/RDHAP followed by ASCT and then maintenance Rituxan for 2 years  -continue allopurinol   -ok for port placement tomorrow  -discussed side effects associated with chemo tomorrow.   -will admit for RDHAP tomorrow. Has baseline hearing loss. Cisplatin vs Carboplatin. Discussed with Dr. Redding. He is inpatient currently and will discuss with patient tomorrow    #Neutropenic Fever  -was admitted on 9/23 for neutropenic fever of unknown origin. Fever workup was negative. Given empiric antibiotics with vancomycin and cefepime. Was also given daily Neupogen x 2  -will need Neulasta after chemo. Discussed use of Claritin     #Rash  -unknown cause. Started today. No pain or pruritis. Otherwise feeling fine. Appears to be allergic/contact dermatitis. No new medications, supplements or detergents/lotions/soap. Only new recent change was the vanc and cefepime, though that was about a week ago. Has not taken any antihistamines or used any topical therapies. Recommended Benadryl tonight and then Zytrec tomorrow and daily until rash resolves. No need for topical steroids at this point as it is asymptomatic, though can use tomorrow if needed    #Elevated Bilirubin  -other LFTs are WNL. U/S was negative. Likely Gilbert's syndrome    Nichelle Ivory PA-C  [facility]  [address] [place] [address]  [phone]

## 2019-10-03 ENCOUNTER — ANCILLARY PROCEDURE (OUTPATIENT)
Dept: RADIOLOGY | Facility: AMBULATORY SURGERY CENTER | Age: 49
End: 2019-10-03
Attending: INTERNAL MEDICINE
Payer: COMMERCIAL

## 2019-10-03 ENCOUNTER — ANESTHESIA (OUTPATIENT)
Dept: SURGERY | Facility: AMBULATORY SURGERY CENTER | Age: 49
End: 2019-10-03

## 2019-10-03 ENCOUNTER — HOSPITAL ENCOUNTER (INPATIENT)
Facility: CLINIC | Age: 49
LOS: 2 days | Discharge: HOME | End: 2019-10-05
Attending: INTERNAL MEDICINE | Admitting: INTERNAL MEDICINE
Payer: COMMERCIAL

## 2019-10-03 ENCOUNTER — HOSPITAL ENCOUNTER (OUTPATIENT)
Facility: AMBULATORY SURGERY CENTER | Age: 49
End: 2019-10-03
Attending: PHYSICIAN ASSISTANT
Payer: COMMERCIAL

## 2019-10-03 VITALS
RESPIRATION RATE: 16 BRPM | SYSTOLIC BLOOD PRESSURE: 100 MMHG | DIASTOLIC BLOOD PRESSURE: 68 MMHG | OXYGEN SATURATION: 96 % | TEMPERATURE: 98 F | HEART RATE: 79 BPM

## 2019-10-03 DIAGNOSIS — Z51.11 ADMISSION FOR CHEMOTHERAPY: ICD-10-CM

## 2019-10-03 DIAGNOSIS — C83.18 MANTLE CELL LYMPHOMA OF LYMPH NODES OF MULTIPLE SITES (H): Primary | ICD-10-CM

## 2019-10-03 DIAGNOSIS — R21 RASH: ICD-10-CM

## 2019-10-03 DIAGNOSIS — C83.18 MANTLE CELL LYMPHOMA OF LYMPH NODES OF MULTIPLE SITES (H): ICD-10-CM

## 2019-10-03 LAB
ALBUMIN SERPL-MCNC: 3.1 G/DL (ref 3.4–5)
ALP SERPL-CCNC: 59 U/L (ref 40–150)
ALT SERPL W P-5'-P-CCNC: 16 U/L (ref 0–70)
ANION GAP SERPL CALCULATED.3IONS-SCNC: 7 MMOL/L (ref 3–14)
AST SERPL W P-5'-P-CCNC: 13 U/L (ref 0–45)
BASOPHILS # BLD AUTO: 0.1 10E9/L (ref 0–0.2)
BASOPHILS NFR BLD AUTO: 1.4 %
BILIRUB SERPL-MCNC: 0.3 MG/DL (ref 0.2–1.3)
BUN SERPL-MCNC: 14 MG/DL (ref 7–30)
CALCIUM SERPL-MCNC: 8.6 MG/DL (ref 8.5–10.1)
CHLORIDE SERPL-SCNC: 105 MMOL/L (ref 94–109)
CO2 SERPL-SCNC: 26 MMOL/L (ref 20–32)
CREAT SERPL-MCNC: 0.65 MG/DL (ref 0.66–1.25)
DIFFERENTIAL METHOD BLD: ABNORMAL
EOSINOPHIL # BLD AUTO: 0 10E9/L (ref 0–0.7)
EOSINOPHIL NFR BLD AUTO: 1 %
ERYTHROCYTE [DISTWIDTH] IN BLOOD BY AUTOMATED COUNT: 13.2 % (ref 10–15)
GFR SERPL CREATININE-BSD FRML MDRD: >90 ML/MIN/{1.73_M2}
GLUCOSE SERPL-MCNC: 124 MG/DL (ref 70–99)
HCT VFR BLD AUTO: 40.1 % (ref 40–53)
HGB BLD-MCNC: 13 G/DL (ref 13.3–17.7)
IMM GRANULOCYTES # BLD: 0.1 10E9/L (ref 0–0.4)
IMM GRANULOCYTES NFR BLD: 1.4 %
INR PPP: 1.07 (ref 0.86–1.14)
LYMPHOCYTES # BLD AUTO: 1.3 10E9/L (ref 0.8–5.3)
LYMPHOCYTES NFR BLD AUTO: 31.4 %
MAGNESIUM SERPL-MCNC: 2.1 MG/DL (ref 1.6–2.3)
MCH RBC QN AUTO: 30.3 PG (ref 26.5–33)
MCHC RBC AUTO-ENTMCNC: 32.4 G/DL (ref 31.5–36.5)
MCV RBC AUTO: 94 FL (ref 78–100)
MONOCYTES # BLD AUTO: 0.7 10E9/L (ref 0–1.3)
MONOCYTES NFR BLD AUTO: 15.7 %
NEUTROPHILS # BLD AUTO: 2.1 10E9/L (ref 1.6–8.3)
NEUTROPHILS NFR BLD AUTO: 49.1 %
NRBC # BLD AUTO: 0 10*3/UL
NRBC BLD AUTO-RTO: 0 /100
PHOSPHATE SERPL-MCNC: 3.7 MG/DL (ref 2.5–4.5)
PLATELET # BLD AUTO: 316 10E9/L (ref 150–450)
POTASSIUM SERPL-SCNC: 3.8 MMOL/L (ref 3.4–5.3)
PROT SERPL-MCNC: 7.3 G/DL (ref 6.8–8.8)
RBC # BLD AUTO: 4.29 10E12/L (ref 4.4–5.9)
SODIUM SERPL-SCNC: 138 MMOL/L (ref 133–144)
WBC # BLD AUTO: 4.2 10E9/L (ref 4–11)

## 2019-10-03 PROCEDURE — 80053 COMPREHEN METABOLIC PANEL: CPT | Performed by: PHYSICIAN ASSISTANT

## 2019-10-03 PROCEDURE — 25000132 ZZH RX MED GY IP 250 OP 250 PS 637: Performed by: PHYSICIAN ASSISTANT

## 2019-10-03 PROCEDURE — 84100 ASSAY OF PHOSPHORUS: CPT | Performed by: PHYSICIAN ASSISTANT

## 2019-10-03 PROCEDURE — 25000131 ZZH RX MED GY IP 250 OP 636 PS 637: Performed by: INTERNAL MEDICINE

## 2019-10-03 PROCEDURE — 36415 COLL VENOUS BLD VENIPUNCTURE: CPT | Performed by: PHYSICIAN ASSISTANT

## 2019-10-03 PROCEDURE — 12000001 ZZH R&B MED SURG/OB UMMC

## 2019-10-03 PROCEDURE — 25000128 H RX IP 250 OP 636: Performed by: INTERNAL MEDICINE

## 2019-10-03 PROCEDURE — 3E04305 INTRODUCTION OF OTHER ANTINEOPLASTIC INTO CENTRAL VEIN, PERCUTANEOUS APPROACH: ICD-10-PCS | Performed by: INTERNAL MEDICINE

## 2019-10-03 PROCEDURE — 83735 ASSAY OF MAGNESIUM: CPT | Performed by: PHYSICIAN ASSISTANT

## 2019-10-03 PROCEDURE — 25800030 ZZH RX IP 258 OP 636: Performed by: INTERNAL MEDICINE

## 2019-10-03 PROCEDURE — 25000132 ZZH RX MED GY IP 250 OP 250 PS 637: Performed by: INTERNAL MEDICINE

## 2019-10-03 PROCEDURE — 85025 COMPLETE CBC W/AUTO DIFF WBC: CPT | Performed by: PHYSICIAN ASSISTANT

## 2019-10-03 DEVICE — CATH PORT POWERPORT CLEARVUE SLIM 6FR 5616000: Type: IMPLANTABLE DEVICE | Status: FUNCTIONAL

## 2019-10-03 RX ORDER — GABAPENTIN 300 MG/1
300 CAPSULE ORAL ONCE
Status: COMPLETED | OUTPATIENT
Start: 2019-10-03 | End: 2019-10-03

## 2019-10-03 RX ORDER — PROCHLORPERAZINE MALEATE 10 MG
10 TABLET ORAL EVERY 6 HOURS PRN
Status: DISCONTINUED | OUTPATIENT
Start: 2019-10-03 | End: 2019-10-05 | Stop reason: HOSPADM

## 2019-10-03 RX ORDER — SODIUM CHLORIDE, SODIUM LACTATE, POTASSIUM CHLORIDE, CALCIUM CHLORIDE 600; 310; 30; 20 MG/100ML; MG/100ML; MG/100ML; MG/100ML
INJECTION, SOLUTION INTRAVENOUS CONTINUOUS
Status: DISCONTINUED | OUTPATIENT
Start: 2019-10-03 | End: 2019-10-04 | Stop reason: HOSPADM

## 2019-10-03 RX ORDER — DEXTROSE MONOHYDRATE 25 G/50ML
25-50 INJECTION, SOLUTION INTRAVENOUS
Status: DISCONTINUED | OUTPATIENT
Start: 2019-10-03 | End: 2019-10-04 | Stop reason: HOSPADM

## 2019-10-03 RX ORDER — ALLOPURINOL 300 MG/1
300 TABLET ORAL DAILY
Status: DISCONTINUED | OUTPATIENT
Start: 2019-10-03 | End: 2019-10-03

## 2019-10-03 RX ORDER — HEPARIN SODIUM,PORCINE 10 UNIT/ML
VIAL (ML) INTRAVENOUS PRN
Status: DISCONTINUED | OUTPATIENT
Start: 2019-10-03 | End: 2019-10-03 | Stop reason: HOSPADM

## 2019-10-03 RX ORDER — LIDOCAINE HYDROCHLORIDE 20 MG/ML
INJECTION, SOLUTION INFILTRATION; PERINEURAL PRN
Status: DISCONTINUED | OUTPATIENT
Start: 2019-10-03 | End: 2019-10-03

## 2019-10-03 RX ORDER — HEPARIN SODIUM,PORCINE 10 UNIT/ML
5 VIAL (ML) INTRAVENOUS EVERY 24 HOURS
Status: DISCONTINUED | OUTPATIENT
Start: 2019-10-03 | End: 2019-10-04 | Stop reason: HOSPADM

## 2019-10-03 RX ORDER — ONDANSETRON 8 MG/1
16 TABLET, FILM COATED ORAL ONCE
Status: COMPLETED | OUTPATIENT
Start: 2019-10-03 | End: 2019-10-03

## 2019-10-03 RX ORDER — ONDANSETRON 2 MG/ML
4 INJECTION INTRAMUSCULAR; INTRAVENOUS EVERY 30 MIN PRN
Status: DISCONTINUED | OUTPATIENT
Start: 2019-10-03 | End: 2019-10-04 | Stop reason: HOSPADM

## 2019-10-03 RX ORDER — OXYCODONE HYDROCHLORIDE 5 MG/1
5 TABLET ORAL EVERY 4 HOURS PRN
Status: DISCONTINUED | OUTPATIENT
Start: 2019-10-03 | End: 2019-10-04 | Stop reason: HOSPADM

## 2019-10-03 RX ORDER — ONDANSETRON 8 MG/1
8 TABLET, FILM COATED ORAL EVERY 12 HOURS
Status: COMPLETED | OUTPATIENT
Start: 2019-10-04 | End: 2019-10-05

## 2019-10-03 RX ORDER — DIPHENHYDRAMINE HCL 50 MG
50 CAPSULE ORAL EVERY 6 HOURS PRN
COMMUNITY
End: 2021-09-22

## 2019-10-03 RX ORDER — SODIUM CHLORIDE 9 MG/ML
INJECTION, SOLUTION INTRAVENOUS CONTINUOUS
Status: ACTIVE | OUTPATIENT
Start: 2019-10-03 | End: 2019-10-03

## 2019-10-03 RX ORDER — ALBUTEROL SULFATE 0.83 MG/ML
2.5 SOLUTION RESPIRATORY (INHALATION)
Status: DISCONTINUED | OUTPATIENT
Start: 2019-10-03 | End: 2019-10-05 | Stop reason: HOSPADM

## 2019-10-03 RX ORDER — SODIUM CHLORIDE 9 MG/ML
INJECTION, SOLUTION INTRAVENOUS CONTINUOUS
Status: DISCONTINUED | OUTPATIENT
Start: 2019-10-03 | End: 2019-10-04 | Stop reason: HOSPADM

## 2019-10-03 RX ORDER — CEFAZOLIN SODIUM 2 G/50ML
2 SOLUTION INTRAVENOUS
Status: COMPLETED | OUTPATIENT
Start: 2019-10-03 | End: 2019-10-03

## 2019-10-03 RX ORDER — NALOXONE HYDROCHLORIDE 0.4 MG/ML
.1-.4 INJECTION, SOLUTION INTRAMUSCULAR; INTRAVENOUS; SUBCUTANEOUS
Status: DISCONTINUED | OUTPATIENT
Start: 2019-10-03 | End: 2019-10-03

## 2019-10-03 RX ORDER — ONDANSETRON 4 MG/1
4 TABLET, ORALLY DISINTEGRATING ORAL EVERY 30 MIN PRN
Status: DISCONTINUED | OUTPATIENT
Start: 2019-10-03 | End: 2019-10-04 | Stop reason: HOSPADM

## 2019-10-03 RX ORDER — SODIUM CHLORIDE 9 MG/ML
1000 INJECTION, SOLUTION INTRAVENOUS CONTINUOUS PRN
Status: DISCONTINUED | OUTPATIENT
Start: 2019-10-03 | End: 2019-10-05 | Stop reason: HOSPADM

## 2019-10-03 RX ORDER — METHYLPREDNISOLONE SODIUM SUCCINATE 125 MG/2ML
125 INJECTION, POWDER, LYOPHILIZED, FOR SOLUTION INTRAMUSCULAR; INTRAVENOUS
Status: DISCONTINUED | OUTPATIENT
Start: 2019-10-03 | End: 2019-10-05 | Stop reason: HOSPADM

## 2019-10-03 RX ORDER — ALLOPURINOL 300 MG/1
300 TABLET ORAL DAILY
Status: DISCONTINUED | OUTPATIENT
Start: 2019-10-03 | End: 2019-10-05 | Stop reason: HOSPADM

## 2019-10-03 RX ORDER — EPINEPHRINE 1 MG/ML
0.3 INJECTION, SOLUTION, CONCENTRATE INTRAVENOUS EVERY 5 MIN PRN
Status: DISCONTINUED | OUTPATIENT
Start: 2019-10-03 | End: 2019-10-05 | Stop reason: HOSPADM

## 2019-10-03 RX ORDER — ACETAMINOPHEN 325 MG/1
650 TABLET ORAL ONCE
Status: COMPLETED | OUTPATIENT
Start: 2019-10-03 | End: 2019-10-03

## 2019-10-03 RX ORDER — TRIAMCINOLONE ACETONIDE 1 MG/G
OINTMENT TOPICAL 2 TIMES DAILY PRN
Status: DISCONTINUED | OUTPATIENT
Start: 2019-10-03 | End: 2019-10-05 | Stop reason: HOSPADM

## 2019-10-03 RX ORDER — SODIUM CHLORIDE, SODIUM LACTATE, POTASSIUM CHLORIDE, CALCIUM CHLORIDE 600; 310; 30; 20 MG/100ML; MG/100ML; MG/100ML; MG/100ML
INJECTION, SOLUTION INTRAVENOUS CONTINUOUS PRN
Status: DISCONTINUED | OUTPATIENT
Start: 2019-10-03 | End: 2019-10-03

## 2019-10-03 RX ORDER — MEPERIDINE HYDROCHLORIDE 25 MG/ML
12.5 INJECTION INTRAMUSCULAR; INTRAVENOUS; SUBCUTANEOUS
Status: DISCONTINUED | OUTPATIENT
Start: 2019-10-03 | End: 2019-10-04 | Stop reason: HOSPADM

## 2019-10-03 RX ORDER — NICOTINE POLACRILEX 4 MG
15-30 LOZENGE BUCCAL
Status: DISCONTINUED | OUTPATIENT
Start: 2019-10-03 | End: 2019-10-04 | Stop reason: HOSPADM

## 2019-10-03 RX ORDER — LORAZEPAM 2 MG/ML
.5-1 INJECTION INTRAMUSCULAR EVERY 6 HOURS PRN
Status: DISCONTINUED | OUTPATIENT
Start: 2019-10-03 | End: 2019-10-05 | Stop reason: HOSPADM

## 2019-10-03 RX ORDER — HEPARIN SODIUM (PORCINE) LOCK FLUSH IV SOLN 100 UNIT/ML 100 UNIT/ML
5 SOLUTION INTRAVENOUS
Status: DISCONTINUED | OUTPATIENT
Start: 2019-10-03 | End: 2019-10-04 | Stop reason: HOSPADM

## 2019-10-03 RX ORDER — FENTANYL CITRATE 50 UG/ML
25-50 INJECTION, SOLUTION INTRAMUSCULAR; INTRAVENOUS
Status: DISCONTINUED | OUTPATIENT
Start: 2019-10-03 | End: 2019-10-04 | Stop reason: HOSPADM

## 2019-10-03 RX ORDER — ACETAMINOPHEN 325 MG/1
650 TABLET ORAL EVERY 4 HOURS PRN
Status: DISCONTINUED | OUTPATIENT
Start: 2019-10-03 | End: 2019-10-05 | Stop reason: HOSPADM

## 2019-10-03 RX ORDER — PROPOFOL 10 MG/ML
INJECTION, EMULSION INTRAVENOUS PRN
Status: DISCONTINUED | OUTPATIENT
Start: 2019-10-03 | End: 2019-10-03

## 2019-10-03 RX ORDER — DIPHENHYDRAMINE HCL 50 MG
50 CAPSULE ORAL ONCE
Status: COMPLETED | OUTPATIENT
Start: 2019-10-03 | End: 2019-10-03

## 2019-10-03 RX ORDER — PREDNISOLONE ACETATE 10 MG/ML
2 SUSPENSION/ DROPS OPHTHALMIC 4 TIMES DAILY
Status: DISCONTINUED | OUTPATIENT
Start: 2019-10-04 | End: 2019-10-05 | Stop reason: HOSPADM

## 2019-10-03 RX ORDER — DIPHENHYDRAMINE HYDROCHLORIDE 50 MG/ML
50 INJECTION INTRAMUSCULAR; INTRAVENOUS
Status: DISCONTINUED | OUTPATIENT
Start: 2019-10-03 | End: 2019-10-05 | Stop reason: HOSPADM

## 2019-10-03 RX ORDER — PROPOFOL 10 MG/ML
INJECTION, EMULSION INTRAVENOUS CONTINUOUS PRN
Status: DISCONTINUED | OUTPATIENT
Start: 2019-10-03 | End: 2019-10-03

## 2019-10-03 RX ORDER — LORAZEPAM 0.5 MG/1
.5-1 TABLET ORAL EVERY 6 HOURS PRN
Status: DISCONTINUED | OUTPATIENT
Start: 2019-10-03 | End: 2019-10-05 | Stop reason: HOSPADM

## 2019-10-03 RX ORDER — ALBUTEROL SULFATE 90 UG/1
1-2 AEROSOL, METERED RESPIRATORY (INHALATION)
Status: DISCONTINUED | OUTPATIENT
Start: 2019-10-03 | End: 2019-10-05 | Stop reason: HOSPADM

## 2019-10-03 RX ORDER — NALOXONE HYDROCHLORIDE 0.4 MG/ML
.1-.4 INJECTION, SOLUTION INTRAMUSCULAR; INTRAVENOUS; SUBCUTANEOUS
Status: DISCONTINUED | OUTPATIENT
Start: 2019-10-03 | End: 2019-10-04 | Stop reason: HOSPADM

## 2019-10-03 RX ORDER — MEPERIDINE HYDROCHLORIDE 25 MG/ML
25 INJECTION INTRAMUSCULAR; INTRAVENOUS; SUBCUTANEOUS EVERY 30 MIN PRN
Status: DISCONTINUED | OUTPATIENT
Start: 2019-10-03 | End: 2019-10-05 | Stop reason: HOSPADM

## 2019-10-03 RX ORDER — ACETAMINOPHEN 325 MG/1
975 TABLET ORAL ONCE
Status: COMPLETED | OUTPATIENT
Start: 2019-10-03 | End: 2019-10-03

## 2019-10-03 RX ORDER — DEXAMETHASONE 4 MG/1
40 TABLET ORAL EVERY 24 HOURS
Status: DISCONTINUED | OUTPATIENT
Start: 2019-10-03 | End: 2019-10-05 | Stop reason: HOSPADM

## 2019-10-03 RX ADMIN — CEFAZOLIN SODIUM 2 G: 2 SOLUTION INTRAVENOUS at 09:28

## 2019-10-03 RX ADMIN — GABAPENTIN 300 MG: 300 CAPSULE ORAL at 08:35

## 2019-10-03 RX ADMIN — SODIUM CHLORIDE 150 MG: 900 INJECTION, SOLUTION INTRAVENOUS at 23:36

## 2019-10-03 RX ADMIN — ALLOPURINOL 300 MG: 300 TABLET ORAL at 18:10

## 2019-10-03 RX ADMIN — PROPOFOL 70 MG: 10 INJECTION, EMULSION INTRAVENOUS at 09:30

## 2019-10-03 RX ADMIN — SODIUM CHLORIDE: 9 INJECTION, SOLUTION INTRAVENOUS at 20:10

## 2019-10-03 RX ADMIN — SODIUM CHLORIDE, SODIUM LACTATE, POTASSIUM CHLORIDE, CALCIUM CHLORIDE: 600; 310; 30; 20 INJECTION, SOLUTION INTRAVENOUS at 08:54

## 2019-10-03 RX ADMIN — DIPHENHYDRAMINE HYDROCHLORIDE 50 MG: 50 CAPSULE ORAL at 18:10

## 2019-10-03 RX ADMIN — PROPOFOL 200 MCG/KG/MIN: 10 INJECTION, EMULSION INTRAVENOUS at 09:30

## 2019-10-03 RX ADMIN — RITUXIMAB 700 MG: 10 INJECTION, SOLUTION INTRAVENOUS at 18:52

## 2019-10-03 RX ADMIN — ACETAMINOPHEN 650 MG: 325 TABLET, FILM COATED ORAL at 15:21

## 2019-10-03 RX ADMIN — LIDOCAINE HYDROCHLORIDE 50 MG: 20 INJECTION, SOLUTION INFILTRATION; PERINEURAL at 09:30

## 2019-10-03 RX ADMIN — ONDANSETRON HYDROCHLORIDE 16 MG: 8 TABLET, FILM COATED ORAL at 23:38

## 2019-10-03 RX ADMIN — ACETAMINOPHEN 975 MG: 325 TABLET ORAL at 08:34

## 2019-10-03 RX ADMIN — DEXAMETHASONE 40 MG: 4 TABLET ORAL at 18:09

## 2019-10-03 RX ADMIN — ACETAMINOPHEN 650 MG: 325 TABLET, FILM COATED ORAL at 18:09

## 2019-10-03 ASSESSMENT — ACTIVITIES OF DAILY LIVING (ADL)
ADLS_ACUITY_SCORE: 12
ADLS_ACUITY_SCORE: 16

## 2019-10-03 ASSESSMENT — MIFFLIN-ST. JEOR: SCORE: 1517.75

## 2019-10-03 NOTE — ANESTHESIA PREPROCEDURE EVALUATION
Anesthesia Pre-Procedure Evaluation    Patient: Slava Lane   MRN:     [id number] Gender:   male   Age:    49 year old :      1970        Preoperative Diagnosis: Mantle cell lymphoma of lymph nodes of multiple sites (H) [C83.18]   Procedure(s):  Chest Port Placement     Past Medical History:   Diagnosis Date     Mantle cell lymphoma (H) 2019    noted incidentally on colon polyp pathology      Uncomplicated asthma     When exposed to enviromental allergies      Past Surgical History:   Procedure Laterality Date     AS SKIN PINCH GRAFT PROCEDURE <2CM Right 2017     LITHOTRIPSY N/A 2019    unknown side          Anesthesia Evaluation     .             ROS/MED HX    ENT/Pulmonary:     (+)asthma , . .    Neurologic:  - neg neurologic ROS     Cardiovascular:  - neg cardiovascular ROS       METS/Exercise Tolerance:  >4 METS   Hematologic:  - neg hematologic  ROS       Musculoskeletal:  - neg musculoskeletal ROS       GI/Hepatic:  - neg GI/hepatic ROS       Renal/Genitourinary:  - ROS Renal section negative       Endo:  - neg endo ROS       Psychiatric:  - neg psychiatric ROS       Infectious Disease:  - neg infectious disease ROS       Malignancy:   (+) Malignancy History of Lymphoma/Leukemia  Lymph CA Active status post,         Other:                         PHYSICAL EXAM:   Mental Status/Neuro: A/A/O   Airway: Facies: Feasible  Mallampati: I  Mouth/Opening: Full  TM distance: > 6 cm  Neck ROM: Full   Respiratory: Auscultation: CTAB     Resp. Rate: Normal     Resp. Effort: Normal      CV: Rhythm: Regular  Rate: Age appropriate  Heart: Normal Sounds  Edema: None   Comments:      Dental: Normal Dentition                LABS:  CBC:   Lab Results   Component Value Date    WBC 6.6 10/02/2019    WBC 6.9 2019    HGB 13.1 (L) 10/02/2019    HGB 15.9 2019    HCT 39.8 (L) 10/02/2019    HCT 47.1 2019     10/02/2019     2019     BMP:   Lab Results   Component Value Date      "10/02/2019     09/12/2019    POTASSIUM 4.4 10/02/2019    POTASSIUM 4.0 09/12/2019    CHLORIDE 103 10/02/2019    CHLORIDE 105 09/12/2019    CO2 29 10/02/2019    CO2 24 09/12/2019    BUN 21 10/02/2019    BUN 14 09/12/2019    CR 0.77 10/02/2019    CR 0.78 09/12/2019    GLC 91 10/02/2019     (H) 09/12/2019     COAGS: No results found for: PTT, INR, FIBR  POC: No results found for: BGM, HCG, HCGS  OTHER:   Lab Results   Component Value Date    PIEDAD 8.9 10/02/2019    ALBUMIN 3.4 10/02/2019    PROTTOTAL 7.8 10/02/2019    ALT 19 10/02/2019    AST 12 10/02/2019    ALKPHOS 63 10/02/2019    BILITOTAL 0.3 10/02/2019        Preop Vitals    BP Readings from Last 3 Encounters:   10/02/19 109/74   09/12/19 126/53   09/11/19 (P) 116/70    Pulse Readings from Last 3 Encounters:   10/02/19 77   09/12/19 82   09/11/19 (P) 70      Resp Readings from Last 3 Encounters:   10/02/19 20   09/12/19 20   09/11/19 18    SpO2 Readings from Last 3 Encounters:   10/02/19 98%   09/11/19 (P) 96%   09/11/19 97%      Temp Readings from Last 1 Encounters:   10/02/19 36.8  C (98.3  F) (Oral)    Ht Readings from Last 1 Encounters:   10/02/19 1.676 m (5' 5.98\")      Wt Readings from Last 1 Encounters:   10/02/19 73.3 kg (161 lb 9.6 oz)    Estimated body mass index is 26.1 kg/m  as calculated from the following:    Height as of 10/2/19: 1.676 m (5' 5.98\").    Weight as of 10/2/19: 73.3 kg (161 lb 9.6 oz).     LDA:        Assessment:   ASA SCORE: 3    H&P: History and physical reviewed and following examination; no interval change.   Smoking Status:  Non-Smoker/Unknown   NPO Status: Will be NPO Appropriate at ... 10/3/2019 9:30 AM     Plan:   Anes. Type:  MAC   Pre-Medication: None   Induction:  IV (Standard)   Airway: Native Airway   Access/Monitoring: PIV   Maintenance: Propofol Sedation     Postop Plan:   Postop Pain: None  Postop Sedation/Airway: Not planned     PONV Management:   Adult Risk Factors:, Non-Smoker   Prevention: Ondansetron, " Propofol     CONSENT: Direct conversation   Plan and risks discussed with: Patient                      Presley House MD

## 2019-10-03 NOTE — DISCHARGE INSTRUCTIONS
A collaboration between [facility] and [facility]  Experts in minimally invasive, targeted treatments performed using imaging guidance    Venous Access Device,  Port Catheter or Tunneled or Non-Tunneled Central Line Placement    Today you had a procedure today to install a venous access device; either a tunneled central vein catheter or a subcutaneous port catheter.    One of our Radiology PAs performed this procedure for you today:  ? Amarjit Gupta PA-C  ? DIAMOND Marcus PA-C  ? Joe Garza PA-C  ? July Simpson PA-C   ? Julian Smith PA-C    After you go home:  - Drink plenty of fluids.  Generally 6-8 (8 ounce) glasses a day is recommended.  - Resume your regular diet unless otherwise ordered by a medical provider.  - Keep any applied tape/gauze dressings clean and dry.  Change tape/gauze dressings if they get wet or soiled.  - You may shower the following day after procedure, however cover and protect from moisture any tape/gauze dressings.  You may let water hit and run over dried skin glue, but do not scrub.  Pat the area dry after showering.  - Port placement incisions are closed with absorbable suture, meaning they do not need to be removed at a later date, and a topical skin adhesive (skin glue).  This glue will wear off in 7-14 days.  Do not remove before this time.  If 14 days have passed and residual glue is present, you may gently remove it.  - Do not apply gels, lotions, or ointments to the glue site for the first 10 days as this may cause the glue to prematurely soften and fail.  - Do not perform strenuous activities or lift greater than 10 pounds for the next three days.  - If there is bleeding or oozing from the procedure site, apply firm pressure to the area for 5-10 minutes.  If the bleeding continues seek medical advice at the numbers below.  - Mild procedure site discomfort can be treated with an ice pack and over-the-counter pain  relievers.        For 24 hours after any sedation used:  - Relax and take it easy.  No strenuous activities.  - Do not drive or operate machines at home or at work.  - No alcohol consumption.  - Do not make any important or legal decisions.    Call our Interventional Radiology (IR) service if:  - If you start bleeding from the procedure site.  If you do start to bleed from the site, lie down and hold some pressure on the site.  Our radiology provider can help you decide if you need to return to the hospital.  - If you have new or worsening pain related to the procedure.  - If you have concerning swelling at the procedure site.  - If you develop persistent nausea or vomiting.  - If you develop hives or a rash or any unexplained itching.  - If you have a fever of greater than 100.5  F and chills in the first 5 days after procedure.  - Any other concerns related to your procedure.      [facility]  Interventional Radiology (IR)  [address] [place] [address]    Contact Number:  [phone]  (IR control desk)  - Monday - Friday 8:00 am - 4:30 pm    After hours for urgent concerns:  [phone]  - After 4:30 pm Monday - Friday, Weekends and Holidays.   - Ask for Interventional Radiology on-call.  Someone is available 24 hours a day.  - [facility] toll free number:  [phone]

## 2019-10-03 NOTE — CONSULTS
"[facility] Inpatient Consult Dermatology Note    Impression/Plan:  1. Morbilliform eruption starting 10/2--trunk and proximal extremities. Drug vs. viral most likely. Morbilliform drug eruptions commonly 4-14 days after exposure to the culprit medication.  Allopurinol is a usual offender and was started 21 days prior, which is still in an acceptable timeframe.  Additionally, given cefepime and other medications in hospital 9/23-9/26 (7-10 days prior to eruption).  Given risk/benefit, it may be necessary to \"treat through\" the eruption if allopurinol is again needed.  He would need to be monitored for DRESS/DIHS, which may involve malaise, fever, lymphadenopathy, progressing/worsening rash, facial edema, elevated LFTs, or rising SCr.    Rash currently asymptomatic, so no treatment is necessary at this time.  It may be expected to spread, which is acceptable.    May give Triamcinolone 0.1% ointment BID if needed    Monitor CBC w/ diff, CMP while in hospital    HHV6, CMV, EBV PCRs (ordered)    Follow-up with Dr. Mary Jasso in [place] if further assistance/monitoring needed on discharge    Thank you for the dermatology consultation. Please do not hesitate to contact the dermatology resident/faculty on call for any additional questions or concerns. We will continue to follow.     Jhon Guillermo MD  PGY-4 Dermatology  (p) [phone]    I have seen and examined this patient and agree with the assessment and plan as documented in the resident's note.    Idris Ivory MD  Dermatology Attending      Date of Admission: Sep 30, 2019   Encounter Date: 10/3/2019     Reason for Consultation:   Rash on trunk    History of Present Illness:  Mr. 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 Dermatology was consulted for truncal rash.     Patient has already been through R-CHOP therapy.  On 9/11 " started on allopurinol for 2 weeks (as well as given lorazepam, omeprazole and prochlorperazine).  9/23-9/26 admitted to OSH for neutropenic fever and given cefepime and vancomycin.  Asymptomatic truncal rash noted yesterday.  Denies malaise, facial edema, lymphadenopathy or mucous membrane involvement.  He is admitted today for R-DHAP x 3 days.    Past Medical History:   Patient Active Problem List   Diagnosis     Mantle cell lymphoma of lymph nodes of multiple sites (H)     Corneal opacity     Disorder of refraction and accommodation     Personal history of tobacco use, presenting hazards to health     Admission for chemotherapy     Past Medical History:   Diagnosis Date     Mantle cell lymphoma (H) 06/2019    noted incidentally on colon polyp pathology      Uncomplicated asthma     When exposed to enviromental allergies     Past Surgical History:   Procedure Laterality Date     AS SKIN PINCH GRAFT PROCEDURE <2CM Right 2017     IR CHEST PORT PLACEMENT > 5 YRS OF AGE  10/3/2019     LITHOTRIPSY N/A 06/2019    unknown side         Social History:  Patient reports that he has been smoking cigarettes. He has been smoking about 0.25 packs per day. He has quit using smokeless tobacco.  His smokeless tobacco use included chew. He reports current alcohol use of about 21.0 standard drinks of alcohol per week. He reports that he does not use drugs.    Family History:  Family History   Problem Relation Age of Onset     Cancer No family hx of      Bleeding Disorder No family hx of      Clotting Disorder No family hx of        Medications:  Current Facility-Administered Medications   Medication     - MEDICATION INSTRUCTIONS -     acetaminophen (TYLENOL) tablet 650 mg     acetaminophen (TYLENOL) tablet 650 mg     albuterol (PROAIR HFA/PROVENTIL HFA/VENTOLIN HFA) 108 (90 Base) MCG/ACT inhaler 1-2 puff     albuterol (PROVENTIL) neb solution 2.5 mg     allopurinol (ZYLOPRIM) tablet 300 mg     allopurinol (ZYLOPRIM) tablet 300 mg      Chemotherapy Infusing-Continuous Infusion     CISplatin (PLATINOL) 185 mg, mannitol 25 g in sodium chloride 0.9 % 2,285 mL CHEMOTHERAPY     [START ON 10/4/2019] cytarabine (PF) (CYTOSAR) 3,700 mg in D5W 287 mL CHEMOTHERAPY     dexamethasone (DECADRON) tablet 40 mg     diphenhydrAMINE (BENADRYL) capsule 50 mg     diphenhydrAMINE (BENADRYL) injection 50 mg     enoxaparin (LOVENOX) injection 40 mg     EPINEPHrine PF (ADRENALIN) injection 0.3 mg     [START ON 10/5/2019] filgrastim (NEUPOGEN) injection 480 mcg     fosaprepitant (EMEND) 150 mg in sodium chloride 0.9 % intermittent infusion     LORazepam (ATIVAN) injection 0.5-1 mg     LORazepam (ATIVAN) tablet 0.5-1 mg     Medication Instruction     meperidine (DEMEROL) injection 25 mg     methylPREDNISolone sodium succinate (solu-MEDROL) injection 125 mg     naloxone (NARCAN) injection 0.1-0.4 mg     ondansetron (ZOFRAN) tablet 16 mg     [START ON 10/4/2019] ondansetron (ZOFRAN) tablet 8 mg     [START ON 10/4/2019] prednisoLONE acetate (PRED FORTE) 1 % ophthalmic susp 2 drop     prochlorperazine (COMPAZINE) injection 10 mg     prochlorperazine (COMPAZINE) tablet 10 mg     riTUXimab (RITUXAN) 700 mg in sodium chloride 0.9 % 700 mL     sodium chloride 0.9 % 1,000 mL with potassium chloride 20 mEq/L, magnesium sulfate 8 mEq/L infusion     sodium chloride 0.9% infusion     sodium chloride 0.9% infusion     Facility-Administered Medications Ordered in Other Encounters   Medication     glucose gel 15-30 g    Or     dextrose 50 % injection 25-50 mL    Or     glucagon injection 1 mg     fentaNYL (PF) (SUBLIMAZE) injection 25-50 mcg     heparin 100 UNIT/ML injection 5 mL     heparin lock flush 10 UNIT/ML injection 5 mL     HOLD: AM insulin or oral hypoglycemics [glipiZIDE (GLUCOTROL), glyBURIDE (DIABETA/MICRONASE/GLYNASE), glimepiride (AMARYL), gliclazide, metFORMIN (GLUCOPHAGE), any metFORMIN-containing medication (GLUCOVANCE/METAGLIP/XIGDUO XR), rosiglitazone (AVANDIA),  "pioglitazone (ACTOS),  sitagliptin (JANUVIA), saxagliptin (ONGLYZA) and linagliptin (TRAJENTA)] pre-procedure     lactated ringers infusion     medication instruction     meperidine (DEMEROL) injection 12.5 mg     naloxone (NARCAN) injection 0.1-0.4 mg     ondansetron (ZOFRAN-ODT) ODT tab 4 mg    Or     ondansetron (ZOFRAN) injection 4 mg     ORAL Pain Medications -  may administer as ordered by surgeon for take home use     oxyCODONE (ROXICODONE) tablet 5 mg     prochlorperazine (COMPAZINE) injection 10 mg     sodium chloride (PF) 0.9% PF flush 10-20 mL     sodium chloride (PF) 0.9% PF flush 3 mL     sodium chloride (PF) 0.9% PF flush 3 mL     sodium chloride 0.9% infusion          No Known Allergies      Review of Systems:  -As per HPI      Physical exam:  Vitals: /63 (BP Location: Left arm)   Pulse 79   Temp 97.1  F (36.2  C) (Oral)   Resp 16   Ht 1.676 m (5' 6\")   Wt 72 kg (158 lb 11.7 oz)   SpO2 96%   BMI 25.62 kg/m    GEN: This is a well developed, well-nourished male in no acute distress, in a pleasant mood.    SKIN: Waist-up skin, which includes the head/face, neck, both arms, chest, back, abdomen, digits and/or nails was examined.  -pink papules and plaques over the trunk and proximal extremities with accentuation on the flanks  -no conjunctival injection  -no orolabial abnormality  -No other lesions of concern on areas examined.     Laboratory:  Results for orders placed or performed during the hospital encounter of 10/03/19 (from the past 24 hour(s))   CBC with platelets differential   Result Value Ref Range    WBC 4.2 4.0 - 11.0 10e9/L    RBC Count 4.29 (L) 4.4 - 5.9 10e12/L    Hemoglobin 13.0 (L) 13.3 - 17.7 g/dL    Hematocrit 40.1 40.0 - 53.0 %    MCV 94 78 - 100 fl    MCH 30.3 26.5 - 33.0 pg    MCHC 32.4 31.5 - 36.5 g/dL    RDW 13.2 10.0 - 15.0 %    Platelet Count 316 150 - 450 10e9/L    Diff Method Automated Method     % Neutrophils 49.1 %    % Lymphocytes 31.4 %    % Monocytes 15.7 %    " % Eosinophils 1.0 %    % Basophils 1.4 %    % Immature Granulocytes 1.4 %    Nucleated RBCs 0 0 /100    Absolute Neutrophil 2.1 1.6 - 8.3 10e9/L    Absolute Lymphocytes 1.3 0.8 - 5.3 10e9/L    Absolute Monocytes 0.7 0.0 - 1.3 10e9/L    Absolute Eosinophils 0.0 0.0 - 0.7 10e9/L    Absolute Basophils 0.1 0.0 - 0.2 10e9/L    Abs Immature Granulocytes 0.1 0 - 0.4 10e9/L    Absolute Nucleated RBC 0.0    Comprehensive metabolic panel   Result Value Ref Range    Sodium 138 133 - 144 mmol/L    Potassium 3.8 3.4 - 5.3 mmol/L    Chloride 105 94 - 109 mmol/L    Carbon Dioxide 26 20 - 32 mmol/L    Anion Gap 7 3 - 14 mmol/L    Glucose 124 (H) 70 - 99 mg/dL    Urea Nitrogen 14 7 - 30 mg/dL    Creatinine 0.65 (L) 0.66 - 1.25 mg/dL    GFR Estimate >90 >60 mL/min/[1.73_m2]    GFR Estimate If Black >90 >60 mL/min/[1.73_m2]    Calcium 8.6 8.5 - 10.1 mg/dL    Bilirubin Total 0.3 0.2 - 1.3 mg/dL    Albumin 3.1 (L) 3.4 - 5.0 g/dL    Protein Total 7.3 6.8 - 8.8 g/dL    Alkaline Phosphatase 59 40 - 150 U/L    ALT 16 0 - 70 U/L    AST 13 0 - 45 U/L   Magnesium   Result Value Ref Range    Magnesium 2.1 1.6 - 2.3 mg/dL   Phosphorus   Result Value Ref Range    Phosphorus 3.7 2.5 - 4.5 mg/dL       Dr. Ivory staffed the patient.    Staff Involved:  Resident/Staff

## 2019-10-03 NOTE — ANESTHESIA CARE TRANSFER NOTE
Patient: Slava Patch    Procedure(s):  Chest Port Placement    Diagnosis: Mantle cell lymphoma of lymph nodes of multiple sites (H) [C83.18]  Diagnosis Additional Information: No value filed.    Anesthesia Type:   MAC     Note:  Airway :Room Air  Patient transferred to:Phase II  Comments: VSS/WNL. Responds well.Handoff Report: Identifed the Patient, Identified the Reponsible Provider, Reviewed the pertinent medical history, Discussed the surgical course, Reviewed Intra-OP anesthesia mangement and issues during anesthesia, Set expectations for post-procedure period and Allowed opportunity for questions and acknowledgement of understanding      Vitals: (Last set prior to Anesthesia Care Transfer)    CRNA VITALS  10/3/2019 0930 - 10/3/2019 1004      10/3/2019             NIBP:  103/58    NIBP Mean:  73    Resp Rate (set):  10                Electronically Signed By: BEBO Clement CRNA  October 3, 2019  10:04 AM

## 2019-10-03 NOTE — PROGRESS NOTES
D/I:  Patient admitted to 7D after Port placement.  Chemo orders not written upon admission; waiting for Dr Redding to sign orders.  Port insertion site is tender.  Per patient & his wife, patient reacted to Rituxan with R-CHOP (will get R-DHAP this admission).  Patient is familiar with call light, menu & 7D routines.    A:  Patient appears to be resting comfortably in bed.    P:  Initiate plan of care.  Notify MD with any changes in patient's status.        Per Mobility Level Guideline;  Bed/chair alarm No.  Patient may ambulate Yes  Patient requires the following assistive equipment: N/A    PT/OT consult orders in place No

## 2019-10-03 NOTE — PROVIDER NOTIFICATION
DATE/TIME  (DOT-TD, DOT-NOW) CHEMO CHECK ACTIVITY (REGIMEN & DOSE CHECK, DAY, DOSE #, NAME OF CHEMO #1)  CHEMO DRUG #2  CHEMO DRUG #3 NAME OF RN #1 (USE DOT-ME HERE) NAME OF RN#2 (2ND RN TO LOG IN SEPARATELY)   10/3/2019  4:46 PM   R-DHAP Protocol double check   GIGI Cherry RN     10/03/19  7:22 PM   Rituxan   GIGI Cherry RN       10/3/2019  10:56 PM   Dose #1 of cisplatin/mannitol   GIGI Cherry RN             10/5/2019  12:00 AM Dose #1 HD Cytarabine   GIGI Minor RN     10/5/2019  1:55 PM   Dose #2 HD Cytarabine   Reji Lazaro RN   Viraphet Candace Palmer RN

## 2019-10-03 NOTE — H&P
The patient has been seen and evaluated by me, and I have reviewed today's vital signs, medications, labs, and imaging results independently. I have discussed the patient and plan with the team, and agree with the findings and plan outlined in this note. Key aspects of my evaluation, impression, and plan are as follows.      # Mantle cell lymphoma, Stage IV with colon, BM involvement   -Diagnosed on routine colonoscopy screening 6/2019, follows with Dr. Redding.  His Mantle cell lymphoma is stage IV with colon, BM involvement Ki67 -30%, TP53 mutations - Negative. MCL MIPI - 5.9- Intermediate.    Here for C2 DHAP.  Doing well  Monitor for ototoxicity  Rash - looks like a drug rash , will ask for dermatology consultation  Neulasta as an outpatient.      Mark ROMERO MS  Attending Physician  Pager - [phone]  Email - [email or web address]    History and Physical  Hematology / Oncology     Date of Admission:  10/3/2019    Assessment & Plan      Slava Lane is a 49 year old male with Mantle cell lymphoma discovered on routine colonoscopy screening in June of this year who presents for R-DHAP.    # Mantle cell lymphoma, Stage IV with colon, BM involvement   -Diagnosed on routine colonoscopy screening 6/2019, follows with Dr. Redding.  His Mantle cell lymphoma is stage IV with colon, BM involvement Ki67 -30%, TP53 mutations - Negative. MCL MIPI - 5.9- Intermediate.  He follows with Dr. Redding, treatment plan includes RCHOP*3  alternating with R-DHAP*3, ASCT then maintenance Rituxan for 2 years.   -continue allopurinol   -port was placed this am  -Has baseline hearing loss, discussed with Dr. Redding, will monitor for now, wife and patient advised to let us know if worsens/changes  -patient had mild reaction to Rituxan on 9/11, will plan to pre-treat with benadryl/steroids and infuse slowly     Treatment plan: today is D1  C1B    Premedications  Tylenol 650mg oral  Benadryl 50mg oral  zofran 16mg oral   Emend 150mg IV, give 30-60 minutes before Cisplatin    Chemotherapy  Rituxan 700mg IV, start at rate of 50mg/hour, increase if no reaction by 50mg/hour up to 400mg/hour, starting 30 minutes after treatment start time  Decadron 40mg oral every 24 hours Day 1-4, give 30-60 minutes before Rituxan  Cisplatin 185mg IV, administer over 24 hours  Cytarabine 3,700mg IV every 12 hours starting 28 hours after treatment start time    Supportive care  Allopurinol 300mg daily  Pred forte ophthalmic drops, start befor Cytarabine on day 2  Filgrastim 480 mcg daily starting day 3    #Neutropenic Fever  -was admitted on 9/23-9/26 for neutropenic fever.  Fever workup was negative. Given empiric antibiotics with vancomycin and cefepime. Was also given daily Neupogen x 2  -will need Neulasta after chemo  -he denies any fever or localizing symptoms since admission, he is not currently on any antibiotics as an outpatient     #Rash  -Unknown cause, started yesterday am.  He denies that it itches.  He hasn't had a rash like this before.  He wonders if it could be secondary to medications he received in the hospital last month.  He otherwise feels fine and denies any facial/tongue swelling, wheezing.  He has been using benadryl and zyrtec at home which don't seem to help.  -dermatology consult     #Elevated Bilirubin  Had total bili of 1.6 on 9/11/19, previous level was normal and subsequent levels have been within normal limits.  -other LFTs are WNL and U/S was negative, likely Gilbert's syndrome    Code Status   Full Code    Disposition:  Pending completion of chemotherapy and any associated toxicities, likely 2-3 days.    The plan was staffed with Dr. Redding.    Kaela Torres PA-C      Chief Complaint   Mr. Lane is a 48 yo male with new diagnosis of Mantel cell lymphoma who presents for scheduled chemotherapy.    History of Present Illness      Slava Lane is a 49 year old male with Mantle cell lymphoma discovered on routine colonoscopy screening in June of this year who presents for chemotherapy.  His Mantle cell lymphoma is stage IV with colon, BM involvement Ki67 -30%, TP53 mutations - Negative. MCL MIPI - 5.9- Intermediate.  He follows with Dr. Redding, treatment plan includes RCHOP*3  alternating with R-DHAP*3, ASCT then maintenance Rituxan for 2 years.       He started R-CHOP/RDHAP on 9/18/19, he did develop a reaction to Rituxan that resolved with benadryl and steroids.  He was hospitalized for neutropenic fever at [facility] in [place] 9/23/19-9/26/19.  Work-up including CXR, neck CT, blood cultures, strep screen, influenza and tick-borne illness was negative.  He received cefepime and vancomycin while in the hospital.  He was also given two doses of filgrastim.  He was afebrile the day of discharge.  He has been fever-free since his hospitalization.  He denies any new complaints except for a truncal rash that started yesterday.  It is primarily on his bilateral flanks but started to involve proximal inner upper extremities today.  It does not itch.  He has never had a rash like this before.  He denies any wheezing, sob or facial swelling.  He otherwise feels well.      Past Medical History    I have reviewed this patient's medical history and updated it with pertinent information if needed.   Past Medical History:   Diagnosis Date     Mantle cell lymphoma (H) 06/2019    noted incidentally on colon polyp pathology      Uncomplicated asthma     When exposed to enviromental allergies       Past Surgical History   I have reviewed this patient's surgical history and updated it with pertinent information if needed.  Past Surgical History:   Procedure Laterality Date     AS SKIN PINCH GRAFT PROCEDURE <2CM Right 2017     IR CHEST PORT PLACEMENT > 5 YRS OF AGE  10/3/2019     LITHOTRIPSY N/A 06/2019    unknown side       Prior to Admission  Medications   Prior to Admission Medications   Prescriptions Last Dose Informant Patient Reported? Taking?   LORazepam (ATIVAN) 0.5 MG tablet   No No   Sig: Take 1 tablet (0.5 mg) by mouth every 4 hours as needed (Anxiety, Nausea/Vomiting or Sleep)   Patient not taking: Reported on 10/2/2019   allopurinol (ZYLOPRIM) 300 MG tablet   No No   Sig: Take 1 tablet (300 mg) by mouth daily   Patient not taking: Reported on 10/2/2019   diphenhydrAMINE (BENADRYL) 50 MG capsule   Yes No   Sig: Take 50 mg by mouth every 6 hours as needed for itching or allergies   omeprazole 20 MG tablet   No No   Sig: Take 1 tablet (20 mg) by mouth daily   Patient not taking: Reported on 10/2/2019   predniSONE (DELTASONE) 50 MG tablet   No No   Sig: Take 1 tablet (50 mg) by mouth 2 times daily Take on Days 1 through 5. Take first dose in AM prior to chemotherapy.   Patient not taking: Reported on 10/2/2019   prochlorperazine (COMPAZINE) 10 MG tablet   No No   Sig: Take 1 tablet (10 mg) by mouth every 6 hours as needed (Nausea/Vomiting)   Patient not taking: Reported on 10/2/2019   sildenafil (VIAGRA) 100 MG tablet   Yes No   Sig: Take 100 mg by mouth      Facility-Administered Medications: None     Allergies   No Known Allergies    Social History   I have reviewed this patient's social history and updated it with pertinent information if needed. Slava Lane  reports that he has been smoking cigarettes. He has been smoking about 0.25 packs per day. He has quit using smokeless tobacco.  His smokeless tobacco use included chew. He reports current alcohol use of about 21.0 standard drinks of alcohol per week. He reports that he does not use drugs.    Family History   I have reviewed this patient's family history and updated it with pertinent information if needed.   Family History   Problem Relation Age of Onset     Cancer No family hx of      Bleeding Disorder No family hx of      Clotting Disorder No family hx of        Review of Systems    CONSTITUTIONAL:  positive for  fevers, chills and sweats  HEENT:  negative for  sore mouth, tongue swelling/throat swelling, he does endorse painful tonsils, unchanged  RESPIRATORY:  negative for  dyspnea and wheezing  CARDIOVASCULAR:  negative for  chest pain, edema  GASTROINTESTINAL:  negative for abdominal pain, vomiting, diarrhea  GENITOURINARY:  negative for dysuria  INTEGUMENT/BREAST:  positive for rash  HEMATOLOGIC/LYMPHATIC:  negative for bleeding  ALLERGIC/IMMUNOLOGIC:  negative for anaphylaxis  MUSCULOSKELETAL:  negative for  joint swelling  NEUROLOGICAL:  negative for headaches, dizziness    Physical Exam   Temp: 97.1  F (36.2  C) Temp src: Oral BP: 102/67 Pulse: 79   Resp: 16 SpO2: 95 % O2 Device: None (Room air)    Vital Signs with Ranges  Temp:  [97.1  F (36.2  C)-98.3  F (36.8  C)] 97.1  F (36.2  C)  Pulse:  [77-79] 79  Resp:  [14-20] 16  BP: ()/(64-79) 102/67  SpO2:  [95 %-98 %] 95 %  158 lbs 11.7 oz    Constitutional: very pleasant, in NAD  ENT: mmm, neck is supple  Respiratory: CTAB, normal effort  Cardiovascular: nl s1/s2 no MRGs  GI: abdomen is soft, NT, +BS  Skin: warm, dry, raised erythematous rash on bilateral flanks and proximal upper extremities, no excoriation  Musculoskeletal: no edema bilat lower extremities  Neurologic: awake/alert, speech is clear, answers questions appropriately        Data   Results for orders placed or performed in visit on 10/03/19 (from the past 24 hour(s))   IR Chest Port Placement > 5 Yrs of Age    Narrative    PRE-PROCEDURE DIAGNOSIS:  Mantle cell lymphoma of lymph nodes of  multiple sites     POST-PROCEDURE DIAGNOSIS:  Same    PROCEDURE: Chest port placement    Impression    IMPRESSION: Completed image-guided placement of 6 Maltese, 26.5 cm  single lumen power-injectable central venous port via right internal  jugular vein. Catheter tip in the high right atrium. Aspirates and  flushes freely, heparin locked and is ready for immediate use.  No  complication.    ----------    CLINICAL HISTORY:  Mantle cell lymphoma of lymph nodes of multiple  sites. Chest port placement requested.    PERFORMED BY:  July Simpson PA-C    CONSENT:  The patient understood the limitations, alternatives, and  risks of the procedure and agreed to the procedure.  Written informed  consent was obtained and is documented in the patient record.    SEDATION: Monitored anesthesia care    MEDICATIONS: A 10:1 volume mixture of 1% lidocaine without epinephrine  buffered with 8.4% bicarbonate solution was available for local  anesthesia. The port was heparin locked upon completion of placement.    FLUOROSCOPY TIME: 1.1 minutes    DESCRIPTION:  The right neck and upper chest were prepped and draped  in the usual sterile fashion.      Under ultrasound guidance, the right internal jugular vein was  identified and the overlying skin was anesthetized and skin  dermatotomy was made.  Under ultrasound guidance, right internal  jugular venipuncture was made with needle.  Image saved documenting  venipuncture and patency.    Needle was exchanged over guidewire for a dilator under fluoroscopic  guidance.  Length to right atrium was measured with guidewire.   Guidewire and inner dilator were removed.  Wire was advanced into  inferior vena cava under fluoroscopic guidance and secured.     The anterior right chest skin was anesthetized and incision was made.   A subcutaneous port pocket was created using a combination of sharp  and blunt dissection.  The pocket was irrigated with saline and packed  with gauze to obtain hemostasis.  The gauze was removed.      Port catheter was subcutaneously tunneled from the anterior chest  pocket to the internal jugular venipuncture site after path of tunnel  was anesthetized.  Catheter cut to length. The dilator was exchanged  over guidewire for a peel-away sheath.  Guidewire was removed.  Under  fluoroscopic guidance, the catheter was placed through the  peel-away  sheath and positioned with its tip in the right atrium.  Peel-away  sheath was removed.      Final port and catheter position saved.  Port aspirated and flushed  freely.  The chest incision was closed with three 3-0 Vicryl  interrupted sutures, a running 4-0 Monocryl subcuticular suture, and  topical adhesive.  The skin dermatotomy site overlying the internal  jugular venipuncture was closed with topical adhesive. Port accessed  with 3/4 inch access needle. Aspirated and flushed freely and was  heparin locked before sterile dressing applied.    COMPLICATIONS:  No immediate complication. The patient remained stable  throughout the procedure and tolerated it well.    ESTIMATED BLOOD LOSS:  Minimal    SPECIMENS:  None    HORACIO AUGUSTINE PA-C

## 2019-10-03 NOTE — ANESTHESIA POSTPROCEDURE EVALUATION
Anesthesia POST Procedure Evaluation    Patient: Slava Lane   MRN:     [id number] Gender:   male   Age:    49 year old :      1970        Preoperative Diagnosis: Mantle cell lymphoma of lymph nodes of multiple sites (H) [C83.18]   Procedure(s):  Chest Port Placement   Postop Comments: No value filed.       Anesthesia Type:  Not documented  MAC    Reportable Event: NO     PAIN: Uncomplicated   Sign Out status: Comfortable, Well controlled pain     PONV: No PONV   Sign Out status:  No Nausea or Vomiting     Neuro/Psych: Uneventful perioperative course   Sign Out Status: Preoperative baseline; Age appropriate mentation     Airway/Resp.: Uneventful perioperative course   Sign Out Status: Non labored breathing, age appropriate RR; Resp. Status within EXPECTED Parameters     CV: Uneventful perioperative course   Sign Out status: Appropriate BP and perfusion indices; Appropriate HR/Rhythm     Disposition:   Sign Out in:  PACU  Disposition:  Phase II; Home  Recovery Course: Uneventful  Follow-Up: Not required           Last Anesthesia Record Vitals:  CRNA VITALS  10/3/2019 0930 - 10/3/2019 1030      10/3/2019             Resp Rate (observed):  16          Last PACU Vitals:  Vitals Value Taken Time   BP 98/65 10/3/2019 10:03 AM   Temp 36.8  C (98.3  F) 10/3/2019 10:03 AM   Pulse     Resp 14 10/3/2019 10:03 AM   SpO2 95 % 10/3/2019 10:03 AM   Temp src     NIBP 103/58 10/3/2019 10:01 AM   Pulse 74 10/3/2019 10:00 AM   SpO2 97 % 10/3/2019 10:00 AM   Resp     Temp     Ht Rate 74 10/3/2019  9:57 AM   Temp 2           Electronically Signed By: Presley House MD, October 3, 2019, 2:03 PM

## 2019-10-04 LAB
ALBUMIN SERPL-MCNC: 2.7 G/DL (ref 3.4–5)
ALP SERPL-CCNC: 55 U/L (ref 40–150)
ALT SERPL W P-5'-P-CCNC: 13 U/L (ref 0–70)
ANION GAP SERPL CALCULATED.3IONS-SCNC: 8 MMOL/L (ref 3–14)
AST SERPL W P-5'-P-CCNC: 9 U/L (ref 0–45)
BASOPHILS # BLD AUTO: 0 10E9/L (ref 0–0.2)
BASOPHILS NFR BLD AUTO: 0 %
BILIRUB SERPL-MCNC: 0.4 MG/DL (ref 0.2–1.3)
BUN SERPL-MCNC: 15 MG/DL (ref 7–30)
BURR CELLS BLD QL SMEAR: SLIGHT
CALCIUM SERPL-MCNC: 8.5 MG/DL (ref 8.5–10.1)
CHLORIDE SERPL-SCNC: 109 MMOL/L (ref 94–109)
CMV DNA SPEC NAA+PROBE-ACNC: NORMAL [IU]/ML
CMV DNA SPEC NAA+PROBE-LOG#: NORMAL {LOG_IU}/ML
CO2 SERPL-SCNC: 21 MMOL/L (ref 20–32)
CREAT SERPL-MCNC: 0.62 MG/DL (ref 0.66–1.25)
DIFFERENTIAL METHOD BLD: ABNORMAL
EBV DNA # SPEC NAA+PROBE: NORMAL {COPIES}/ML
EBV DNA SPEC NAA+PROBE-LOG#: NORMAL {LOG_COPIES}/ML
EOSINOPHIL # BLD AUTO: 0 10E9/L (ref 0–0.7)
EOSINOPHIL NFR BLD AUTO: 0 %
ERYTHROCYTE [DISTWIDTH] IN BLOOD BY AUTOMATED COUNT: 13.1 % (ref 10–15)
GFR SERPL CREATININE-BSD FRML MDRD: >90 ML/MIN/{1.73_M2}
GLUCOSE SERPL-MCNC: 185 MG/DL (ref 70–99)
HCT VFR BLD AUTO: 36.8 % (ref 40–53)
HGB BLD-MCNC: 11.9 G/DL (ref 13.3–17.7)
HHV6 DNA # SPEC NAA+PROBE: NORMAL COPIES/ML
HHV6 DNA SPEC NAA+PROBE-LOG#: NORMAL LOG COPIES/ML
LYMPHOCYTES # BLD AUTO: 0.3 10E9/L (ref 0.8–5.3)
LYMPHOCYTES NFR BLD AUTO: 9.6 %
MCH RBC QN AUTO: 30.1 PG (ref 26.5–33)
MCHC RBC AUTO-ENTMCNC: 32.3 G/DL (ref 31.5–36.5)
MCV RBC AUTO: 93 FL (ref 78–100)
MONOCYTES # BLD AUTO: 0 10E9/L (ref 0–1.3)
MONOCYTES NFR BLD AUTO: 0.9 %
NEUTROPHILS # BLD AUTO: 3.1 10E9/L (ref 1.6–8.3)
NEUTROPHILS NFR BLD AUTO: 89.5 %
PHOSPHATE SERPL-MCNC: 2 MG/DL (ref 2.5–4.5)
PLATELET # BLD AUTO: 285 10E9/L (ref 150–450)
POIKILOCYTOSIS BLD QL SMEAR: SLIGHT
POTASSIUM SERPL-SCNC: 4.3 MMOL/L (ref 3.4–5.3)
PROT SERPL-MCNC: 6.5 G/DL (ref 6.8–8.8)
RBC # BLD AUTO: 3.95 10E12/L (ref 4.4–5.9)
SODIUM SERPL-SCNC: 138 MMOL/L (ref 133–144)
SPECIMEN SOURCE: NORMAL
SPECIMEN SOURCE: NORMAL
URATE SERPL-MCNC: 2.6 MG/DL (ref 3.5–7.2)
WBC # BLD AUTO: 3.5 10E9/L (ref 4–11)

## 2019-10-04 PROCEDURE — 25000128 H RX IP 250 OP 636: Performed by: INTERNAL MEDICINE

## 2019-10-04 PROCEDURE — 80053 COMPREHEN METABOLIC PANEL: CPT | Performed by: PHYSICIAN ASSISTANT

## 2019-10-04 PROCEDURE — 84550 ASSAY OF BLOOD/URIC ACID: CPT | Performed by: PHYSICIAN ASSISTANT

## 2019-10-04 PROCEDURE — 25000132 ZZH RX MED GY IP 250 OP 250 PS 637: Performed by: PHYSICIAN ASSISTANT

## 2019-10-04 PROCEDURE — 84100 ASSAY OF PHOSPHORUS: CPT | Performed by: PHYSICIAN ASSISTANT

## 2019-10-04 PROCEDURE — 12000001 ZZH R&B MED SURG/OB UMMC

## 2019-10-04 PROCEDURE — 25800030 ZZH RX IP 258 OP 636: Performed by: INTERNAL MEDICINE

## 2019-10-04 PROCEDURE — 25800030 ZZH RX IP 258 OP 636: Performed by: NURSE PRACTITIONER

## 2019-10-04 PROCEDURE — 25000132 ZZH RX MED GY IP 250 OP 250 PS 637: Performed by: INTERNAL MEDICINE

## 2019-10-04 PROCEDURE — 25000131 ZZH RX MED GY IP 250 OP 636 PS 637: Performed by: INTERNAL MEDICINE

## 2019-10-04 PROCEDURE — 85025 COMPLETE CBC W/AUTO DIFF WBC: CPT | Performed by: PHYSICIAN ASSISTANT

## 2019-10-04 PROCEDURE — 36415 COLL VENOUS BLD VENIPUNCTURE: CPT | Performed by: PHYSICIAN ASSISTANT

## 2019-10-04 PROCEDURE — 87533 HHV-6 DNA QUANT: CPT | Performed by: PHYSICIAN ASSISTANT

## 2019-10-04 PROCEDURE — 87799 DETECT AGENT NOS DNA QUANT: CPT | Performed by: PHYSICIAN ASSISTANT

## 2019-10-04 PROCEDURE — 25000125 ZZHC RX 250: Performed by: INTERNAL MEDICINE

## 2019-10-04 PROCEDURE — 25000125 ZZHC RX 250: Performed by: NURSE PRACTITIONER

## 2019-10-04 RX ORDER — PANTOPRAZOLE SODIUM 40 MG/1
40 TABLET, DELAYED RELEASE ORAL
Status: DISCONTINUED | OUTPATIENT
Start: 2019-10-05 | End: 2019-10-04

## 2019-10-04 RX ORDER — POTASSIUM CHLORIDE 7.45 MG/ML
10 INJECTION INTRAVENOUS
Status: DISCONTINUED | OUTPATIENT
Start: 2019-10-04 | End: 2019-10-05 | Stop reason: HOSPADM

## 2019-10-04 RX ORDER — POTASSIUM CHLORIDE 750 MG/1
20-40 TABLET, EXTENDED RELEASE ORAL
Status: DISCONTINUED | OUTPATIENT
Start: 2019-10-04 | End: 2019-10-05 | Stop reason: HOSPADM

## 2019-10-04 RX ORDER — POTASSIUM CHLORIDE 1.5 G/1.58G
20-40 POWDER, FOR SOLUTION ORAL
Status: DISCONTINUED | OUTPATIENT
Start: 2019-10-04 | End: 2019-10-05 | Stop reason: HOSPADM

## 2019-10-04 RX ORDER — ACYCLOVIR 400 MG/1
400 TABLET ORAL
Qty: 50 TABLET | Refills: 0 | Status: SHIPPED | OUTPATIENT
Start: 2019-10-08 | End: 2019-10-05

## 2019-10-04 RX ORDER — FLUCONAZOLE 200 MG/1
200 TABLET ORAL DAILY
Qty: 10 TABLET | Refills: 0 | Status: SHIPPED | OUTPATIENT
Start: 2019-10-08 | End: 2019-10-14

## 2019-10-04 RX ORDER — CIPROFLOXACIN 500 MG/1
500 TABLET, FILM COATED ORAL 2 TIMES DAILY
Qty: 20 TABLET | Refills: 0 | Status: SHIPPED | OUTPATIENT
Start: 2019-10-08 | End: 2019-10-25

## 2019-10-04 RX ORDER — POTASSIUM CHLORIDE 29.8 MG/ML
20 INJECTION INTRAVENOUS
Status: DISCONTINUED | OUTPATIENT
Start: 2019-10-04 | End: 2019-10-05 | Stop reason: HOSPADM

## 2019-10-04 RX ORDER — POTASSIUM CL/LIDO/0.9 % NACL 10MEQ/0.1L
10 INTRAVENOUS SOLUTION, PIGGYBACK (ML) INTRAVENOUS
Status: DISCONTINUED | OUTPATIENT
Start: 2019-10-04 | End: 2019-10-05 | Stop reason: HOSPADM

## 2019-10-04 RX ORDER — PANTOPRAZOLE SODIUM 40 MG/1
40 TABLET, DELAYED RELEASE ORAL
Status: DISCONTINUED | OUTPATIENT
Start: 2019-10-04 | End: 2019-10-05 | Stop reason: HOSPADM

## 2019-10-04 RX ORDER — TRIAMCINOLONE ACETONIDE 1 MG/G
OINTMENT TOPICAL 2 TIMES DAILY PRN
Qty: 30 G | Refills: 0 | Status: ON HOLD | OUTPATIENT
Start: 2019-10-04 | End: 2019-11-15

## 2019-10-04 RX ORDER — MAGNESIUM SULFATE HEPTAHYDRATE 40 MG/ML
4 INJECTION, SOLUTION INTRAVENOUS EVERY 4 HOURS PRN
Status: DISCONTINUED | OUTPATIENT
Start: 2019-10-04 | End: 2019-10-05 | Stop reason: HOSPADM

## 2019-10-04 RX ADMIN — PREDNISOLONE ACETATE 2 DROP: 10 SUSPENSION/ DROPS OPHTHALMIC at 22:12

## 2019-10-04 RX ADMIN — ACETAMINOPHEN 650 MG: 325 TABLET, FILM COATED ORAL at 00:13

## 2019-10-04 RX ADMIN — POTASSIUM CHLORIDE: 2 INJECTION, SOLUTION, CONCENTRATE INTRAVENOUS at 00:01

## 2019-10-04 RX ADMIN — PANTOPRAZOLE SODIUM 40 MG: 40 TABLET, DELAYED RELEASE ORAL at 10:35

## 2019-10-04 RX ADMIN — CISPLATIN 185 MG: 1 INJECTION, SOLUTION INTRAVENOUS at 00:53

## 2019-10-04 RX ADMIN — POTASSIUM CHLORIDE: 2 INJECTION, SOLUTION, CONCENTRATE INTRAVENOUS at 13:36

## 2019-10-04 RX ADMIN — POTASSIUM CHLORIDE: 2 INJECTION, SOLUTION, CONCENTRATE INTRAVENOUS at 06:57

## 2019-10-04 RX ADMIN — POTASSIUM CHLORIDE: 2 INJECTION, SOLUTION, CONCENTRATE INTRAVENOUS at 20:14

## 2019-10-04 RX ADMIN — DEXAMETHASONE 40 MG: 4 TABLET ORAL at 17:00

## 2019-10-04 RX ADMIN — ALLOPURINOL 300 MG: 300 TABLET ORAL at 09:05

## 2019-10-04 RX ADMIN — SODIUM PHOSPHATE, MONOBASIC, MONOHYDRATE AND SODIUM PHOSPHATE, DIBASIC, ANHYDROUS 15 MMOL: 276; 142 INJECTION, SOLUTION INTRAVENOUS at 13:36

## 2019-10-04 ASSESSMENT — ACTIVITIES OF DAILY LIVING (ADL)
ADLS_ACUITY_SCORE: 12

## 2019-10-04 ASSESSMENT — MIFFLIN-ST. JEOR: SCORE: 1543.01

## 2019-10-04 NOTE — DISCHARGE INSTRUCTIONS
You will be scheduled for Neulasta in the clinic on Monday 10/7.    You will be scheduled for labs in the clinic on Monday and Thursday of next week.    You will be scheduled for a follow-up visit with an BRANDY late next week.    As your immune system is suppressed from chemotherapy you are to take a regimen of medications to prevent infection starting on 10/8 for 10 days: Cipro 500mg twice daily, Acyclovir 400mg twice daily & Fluconzole 200mg per day.

## 2019-10-04 NOTE — PLAN OF CARE
Patient started cisplatinin  after midnight,Continued good UOP.Denies pain and nausea.Rash present and team watching / and derm watching rash/Continue to monitor

## 2019-10-04 NOTE — PROGRESS NOTES
The patient has been seen and evaluated by me, and I have reviewed today's vital signs, medications, labs, and imaging results independently. I have discussed the patient and plan with the team, and agree with the findings and plan outlined in this note. Key aspects of my evaluation, impression, and plan are as follows.        # Mantle cell lymphoma, Stage IV with colon, BM involvement   -Diagnosed on routine colonoscopy screening 2019, follows with Dr. Redding.  His Mantle cell lymphoma is stage IV with colon, BM involvement Ki67 -30%, TP53 mutations - Negative. MCL MIPI - 5.9- Intermediate.     Started C2 DHAP.  Doing well  Monitor for ototoxicity  Rash - likely drug rash ,appreciatedermatology consultation  Neulasta as an outpatient.  discharge home in AM        Mark ROMERO MS  Attending Physician  Pager - [phone]  Email - [email or web address]      Hematology/Oncology Progress Note  Pt. Name/Age/: Slava Lane 49 year old 1970   Med. Record Number: [id number]   Date of admission: 10/3/2019   Hospital Day: 2   Date: 10/4/2019   Author: Aurora Hull CNP   Overnight Events/Interval History:  No acute overnight events    Plan for today:  Continue R-DHAP  Anticipated to discharge 10/5 after complete of chemotherapy    Slava Lane is a 49 year old male with Mantle cell lymphoma discovered on routine colonoscopy screening in  of this year who presents for R-DHAP.     # Mantle cell lymphoma, Stage IV with colon, BM involvement   -Diagnosed on routine colonoscopy screening 2019, follows with Dr. Redding.  His Mantle cell lymphoma is stage IV with colon, BM involvement Ki67 -30%, TP53 mutations - Negative. MCL MIPI - 5.9- Intermediate.  He follows with Dr. Redding, treatment plan includes RCHOP*3  alternating with R-DHAP*3, ASCT then maintenance Rituxan for 2 years.   -continue allopurinol   -port was placed this am  -Has baseline hearing loss, discussed with Dr. Redding, will monitor  for now, wife and patient advised to let us know if worsens/changes  -patient had mild reaction to Rituxan on 9/11, will plan to pre-treat with benadryl/steroids and infuse slowly      Treatment plan: today is D1 C1B     Premedications  Tylenol 650mg oral  Benadryl 50mg oral  zofran 16mg oral   Emend 150mg IV, give 30-60 minutes before Cisplatin     Chemotherapy  Rituxan 700mg IV, start at rate of 50mg/hour, increase if no reaction by 50mg/hour up to 400mg/hour, starting 30 minutes after treatment start time  Decadron 40mg oral every 24 hours Day 1-4, give 30-60 minutes before Rituxan  Cisplatin 185mg IV, administer over 24 hours  Cytarabine 3,700mg IV every 12 hours starting 28 hours after treatment start time     Supportive care  Allopurinol 300mg daily  Pred forte ophthalmic drops, start befor Cytarabine on day 2  Filgrastim 480 mcg daily starting day 3     #Neutropenic Fever  -was admitted on 9/23-9/26 for neutropenic fever.  Fever workup was negative. Given empiric antibiotics with vancomycin and cefepime. Was also given daily Neupogen x 2  -will need Neulasta after chemo  -he denies any fever or localizing symptoms since admission, he is not currently on any antibiotics as an outpatient     #Rash  -Unknown cause, started yesterday am.  He denies that it itches.  He hasn't had a rash like this before.  He wonders if it could be secondary to medications he received in the hospital last month.  He otherwise feels fine and denies any facial/tongue swelling, wheezing.  He has been using benadryl and zyrtec at home which don't seem to help.  -dermatology consult     #Elevated Bilirubin  Had total bili of 1.6 on 9/11/19, previous level was normal and subsequent levels have been within normal limits.  -other LFTs are WNL and U/S was negative, likely Gilbert's syndrome    # Dispo   Anticipated to discharge 10/5. Neulasta requested for Monday.  Labs requested for Monday & Thursday.  BRANDY follow-up requested for  Thursday/Friday of next wee.  Prescriptions sent to pharmacy.        SUBJECTIVE:   Overall doing well. He had some difficulty sleeping overnight due to shared room. No N/V/D. No CP/SOB. No cough. Rash is not itchy or bothersome. All other ROS negative.     OBJECTIVE:   Vital Signs:   Vitals Current 24 Hour Min / Max   Temp 96.7  F (35.9  C) Temp  Min: 95.8  F (35.4  C)  Max: 97.1  F (36.2  C)   /61 BP  Min: 97/57  Max: 105/58   HR 95 Pulse  Min: 70  Max: 95   RR 16 Resp  Min: 16  Max: 16   Sats 95 % on RA SpO2  Min: 94 %  Max: 99 %   Weight 74.5 kg (164 lb 4.8 oz) Body mass index is 26.52 kg/m . Admit: 72 kg (158 lb 11.7 oz)     Intake/Output Summary (Last 24 hours) at 10/4/2019 1355  Last data filed at 10/4/2019 1130  Gross per 24 hour   Intake 4074 ml   Output 3475 ml   Net 599 ml      Exam:   Constitutional: very pleasant, in NAD  ENT: mmm, neck is supple  Respiratory: CTAB, normal effort  Cardiovascular: nl s1/s2 no MRGs  GI: abdomen is soft, NT, +BS  Skin: warm, dry, raised erythematous rash on bilateral flanks and proximal upper extremities, no excoriation  Musculoskeletal: no edema bilat lower extremities  Neurologic: awake/alert, speech is clear, answers questions appropriately  Labs:   Recent Labs   Lab Test 10/04/19  0447 10/03/19  1446 10/03/19  0815 10/02/19  1724 09/11/19  0833 08/09/19  1432   WBC 3.5* 4.2  --  6.6 6.9 5.3   HGB 11.9* 13.0*  --  13.1* 15.9 14.8   HCT 36.8* 40.1  --  39.8* 47.1 44.1    316  --  315 196 170   INR  --   --  1.07  --   --   --       Recent Labs   Lab Test 10/04/19  0447 10/03/19  1446 10/02/19  1724 09/12/19  1215 09/11/19  0833    138 137 140 135   CO2 21 26 29 24 28   BUN 15 14 21 14 13   PHOS 2.0* 3.7  --   --   --       Recent Labs   Lab Test 10/04/19  0447 10/03/19  1446 10/02/19  1724 09/12/19  1215 09/11/19  0833   ALT 13 16 19 20 21   AST 9 13 12 18 19   ALKPHOS 55 59 63 70 62   ALBUMIN 2.7* 3.1* 3.4 3.7 4.2

## 2019-10-04 NOTE — PROGRESS NOTES
Per Mobility Level Guideline;  Bed/chair alarm No.  Patient may ambulate independently  Patient requires the following assistive equipment: none   PT/OT consult orders in place No

## 2019-10-04 NOTE — PLAN OF CARE
[id number]: AVSS on RA. CIVI cisplatin infusing via port with brisk blood return noted q4h, tolerating well. MIVF + KClm + Mg infusing at 150 ml/hr. Denies pain or nausea. Phosphorus replaced, recheck with AM labs. Voiding adequately. Up independently. Will discharge tomorrow evening upon completion of chemo. Continue with POC.

## 2019-10-04 NOTE — PLAN OF CARE
"Nursing Focus: Chemotherapy  D: Positive blood return via Port. Insertion site is clean/dry/intact, dressing intact with no complaints of pain.  Urine output is recorded in intake in Doc Flowsheet.    I: Premedications given per order (see electronic medical administration record). Dose #1 of Rituxan infused over ramp up rate, was able to tolerate and reach max of 400cc/hr until finished. Reviewed pt teaching on chemotherapy side effects.  Pt denies need for further teaching. Chemotherapy double checked per protocol by two chemotherapy competent RN's.   A: Tolerating procedure well. Denies nausea and or pain.   P: Continue to monitor urine output and symptoms of nausea. Screen for symptoms of toxicity.    VSS, afebrile. C/o port site pain (port placed today), tylenol given, w/o much relief; pt \"tolerating\" the discomfort declining further interventions at this time. Rituxan given w/o incident. Premedications given for Cisplatin (PO zofran & Emend); 1L pre-chemo bolus to finish around 0000. Pt resting comfortably. Wife left to [place], will be back in the am. Continue to monitor & w/ POC.    Per Mobility Level Guideline;  Bed/chair alarm No.  Patient may ambulate independently  Patient requires the following assistive equipment: none  PT/OT consult orders in place No    "

## 2019-10-04 NOTE — PROGRESS NOTES
"Brief Dermatology Note:    1. Morbilliform eruption starting 10/2--trunk and proximal extremities. Drug vs. viral most likely. Morbilliform drug eruptions commonly 4-14 days after exposure to the culprit medication.  Allopurinol is a usual offender and was started 21 days prior, which is still in an acceptable timeframe.  Additionally, given cefepime and other medications in hospital 9/23-9/26 (7-10 days prior to eruption).  Given risk/benefit, it may be necessary to \"treat through\" the eruption if allopurinol is again needed.  He would need to be monitored for DRESS/DIHS, which may involve malaise, fever, lymphadenopathy, progressing/worsening rash, facial edema, elevated LFTs, or rising SCr.    Rash currently asymptomatic, so no treatment is necessary at this time.  It may be expected to spread, which is acceptable.    May give Triamcinolone 0.1% ointment BID if needed    Monitor CBC w/ diff, CMP while in hospital    HHV6, CMV, EBV PCRs (pending)    Follow-up with Dr. Mary Jasso in [place] if further assistance/monitoring needed on discharge     S: Patient up and walking the halls.  Not bothered by rash.  No extension of it.  Wife feels like it may be resolving.  O: Similar to yesterday, but may slightly be \"breaking up\" into macules  A/P: As above.  We will monitor remotely over the weekend.  Please do not hesitate to page the on-call dermatologist if any questions arise.     Jhon Guillermo MD  PGY-4 Dermatology  "

## 2019-10-04 NOTE — PROGRESS NOTES
Nursing Focus: Chemotherapy  D: Positive blood return via port. Insertion site is clean/dry/intact, dressing intact with no complaints of pain.  Urine output is recorded in intake in Doc Flowsheet.    I: Premedications given per order (see electronic medical administration record). Dose #1 of cisplatin started to infuse over 24 hours. Reviewed pt teaching on chemotherapy side effects.  Pt denies need for further teaching. Chemotherapy double checked per protocol by two chemotherapy competent RN's.   A: Tolerating procedure well. Denies nausea and or pain.   P: Continue to monitor urine output and symptoms of nausea. Screen for symptoms of toxicity.

## 2019-10-05 VITALS
OXYGEN SATURATION: 96 % | HEIGHT: 66 IN | DIASTOLIC BLOOD PRESSURE: 57 MMHG | RESPIRATION RATE: 16 BRPM | SYSTOLIC BLOOD PRESSURE: 94 MMHG | WEIGHT: 170.7 LBS | TEMPERATURE: 96.1 F | BODY MASS INDEX: 27.43 KG/M2 | HEART RATE: 66 BPM

## 2019-10-05 LAB
ALBUMIN SERPL-MCNC: 2.6 G/DL (ref 3.4–5)
ALP SERPL-CCNC: 49 U/L (ref 40–150)
ALT SERPL W P-5'-P-CCNC: 15 U/L (ref 0–70)
ANION GAP SERPL CALCULATED.3IONS-SCNC: 5 MMOL/L (ref 3–14)
AST SERPL W P-5'-P-CCNC: 10 U/L (ref 0–45)
BASOPHILS # BLD AUTO: 0 10E9/L (ref 0–0.2)
BASOPHILS NFR BLD AUTO: 0 %
BILIRUB SERPL-MCNC: 0.3 MG/DL (ref 0.2–1.3)
BUN SERPL-MCNC: 12 MG/DL (ref 7–30)
CALCIUM SERPL-MCNC: 8 MG/DL (ref 8.5–10.1)
CHLORIDE SERPL-SCNC: 110 MMOL/L (ref 94–109)
CO2 SERPL-SCNC: 24 MMOL/L (ref 20–32)
CREAT SERPL-MCNC: 0.56 MG/DL (ref 0.66–1.25)
DIFFERENTIAL METHOD BLD: ABNORMAL
EOSINOPHIL # BLD AUTO: 0 10E9/L (ref 0–0.7)
EOSINOPHIL NFR BLD AUTO: 0 %
ERYTHROCYTE [DISTWIDTH] IN BLOOD BY AUTOMATED COUNT: 13.6 % (ref 10–15)
GFR SERPL CREATININE-BSD FRML MDRD: >90 ML/MIN/{1.73_M2}
GLUCOSE SERPL-MCNC: 156 MG/DL (ref 70–99)
HCT VFR BLD AUTO: 32.8 % (ref 40–53)
HGB BLD-MCNC: 10.6 G/DL (ref 13.3–17.7)
LYMPHOCYTES # BLD AUTO: 0.2 10E9/L (ref 0.8–5.3)
LYMPHOCYTES NFR BLD AUTO: 1.8 %
MCH RBC QN AUTO: 30.6 PG (ref 26.5–33)
MCHC RBC AUTO-ENTMCNC: 32.3 G/DL (ref 31.5–36.5)
MCV RBC AUTO: 95 FL (ref 78–100)
MONOCYTES # BLD AUTO: 0.1 10E9/L (ref 0–1.3)
MONOCYTES NFR BLD AUTO: 0.9 %
NEUTROPHILS # BLD AUTO: 10.7 10E9/L (ref 1.6–8.3)
NEUTROPHILS NFR BLD AUTO: 97.3 %
PHOSPHATE SERPL-MCNC: 2.9 MG/DL (ref 2.5–4.5)
PLATELET # BLD AUTO: 295 10E9/L (ref 150–450)
PLATELET # BLD EST: ABNORMAL 10*3/UL
POTASSIUM SERPL-SCNC: 4.1 MMOL/L (ref 3.4–5.3)
PROT SERPL-MCNC: 5.7 G/DL (ref 6.8–8.8)
RBC # BLD AUTO: 3.46 10E12/L (ref 4.4–5.9)
RBC MORPH BLD: NORMAL
SODIUM SERPL-SCNC: 139 MMOL/L (ref 133–144)
URATE SERPL-MCNC: 2.1 MG/DL (ref 3.5–7.2)
WBC # BLD AUTO: 11 10E9/L (ref 4–11)

## 2019-10-05 PROCEDURE — 84100 ASSAY OF PHOSPHORUS: CPT | Performed by: PHYSICIAN ASSISTANT

## 2019-10-05 PROCEDURE — 25000131 ZZH RX MED GY IP 250 OP 636 PS 637: Performed by: INTERNAL MEDICINE

## 2019-10-05 PROCEDURE — 25000128 H RX IP 250 OP 636: Performed by: INTERNAL MEDICINE

## 2019-10-05 PROCEDURE — 25000132 ZZH RX MED GY IP 250 OP 250 PS 637: Performed by: PHYSICIAN ASSISTANT

## 2019-10-05 PROCEDURE — 36592 COLLECT BLOOD FROM PICC: CPT | Performed by: PHYSICIAN ASSISTANT

## 2019-10-05 PROCEDURE — 25000132 ZZH RX MED GY IP 250 OP 250 PS 637: Performed by: INTERNAL MEDICINE

## 2019-10-05 PROCEDURE — 84550 ASSAY OF BLOOD/URIC ACID: CPT | Performed by: PHYSICIAN ASSISTANT

## 2019-10-05 PROCEDURE — 85025 COMPLETE CBC W/AUTO DIFF WBC: CPT | Performed by: PHYSICIAN ASSISTANT

## 2019-10-05 PROCEDURE — 25800030 ZZH RX IP 258 OP 636: Performed by: INTERNAL MEDICINE

## 2019-10-05 PROCEDURE — 80053 COMPREHEN METABOLIC PANEL: CPT | Performed by: PHYSICIAN ASSISTANT

## 2019-10-05 PROCEDURE — 25000128 H RX IP 250 OP 636: Performed by: STUDENT IN AN ORGANIZED HEALTH CARE EDUCATION/TRAINING PROGRAM

## 2019-10-05 RX ORDER — HEPARIN SODIUM (PORCINE) LOCK FLUSH IV SOLN 100 UNIT/ML 100 UNIT/ML
5 SOLUTION INTRAVENOUS
Status: DISCONTINUED | OUTPATIENT
Start: 2019-10-05 | End: 2019-10-05 | Stop reason: HOSPADM

## 2019-10-05 RX ORDER — HEPARIN SODIUM,PORCINE 10 UNIT/ML
5-10 VIAL (ML) INTRAVENOUS
Status: DISCONTINUED | OUTPATIENT
Start: 2019-10-05 | End: 2019-10-05 | Stop reason: HOSPADM

## 2019-10-05 RX ORDER — POLYETHYLENE GLYCOL 3350 17 G/17G
17 POWDER, FOR SOLUTION ORAL DAILY
Status: DISCONTINUED | OUTPATIENT
Start: 2019-10-05 | End: 2019-10-05 | Stop reason: HOSPADM

## 2019-10-05 RX ORDER — ACYCLOVIR 400 MG/1
400 TABLET ORAL 2 TIMES DAILY
Qty: 60 TABLET | Refills: 0 | Status: SHIPPED | OUTPATIENT
Start: 2019-10-05 | End: 2019-11-06

## 2019-10-05 RX ORDER — LIDOCAINE 40 MG/G
CREAM TOPICAL
Status: DISCONTINUED | OUTPATIENT
Start: 2019-10-05 | End: 2019-10-05 | Stop reason: HOSPADM

## 2019-10-05 RX ORDER — PREDNISOLONE ACETATE 10 MG/ML
2 SUSPENSION/ DROPS OPHTHALMIC 4 TIMES DAILY
Qty: 1 ML | Refills: 0 | Status: SHIPPED | OUTPATIENT
Start: 2019-10-05 | End: 2019-10-25

## 2019-10-05 RX ORDER — DEXAMETHASONE 4 MG/1
40 TABLET ORAL EVERY 24 HOURS
Qty: 1 TABLET | Refills: 0 | Status: SHIPPED | OUTPATIENT
Start: 2019-10-06 | End: 2019-10-25

## 2019-10-05 RX ORDER — FUROSEMIDE 10 MG/ML
20 INJECTION INTRAMUSCULAR; INTRAVENOUS ONCE
Status: COMPLETED | OUTPATIENT
Start: 2019-10-05 | End: 2019-10-05

## 2019-10-05 RX ORDER — ALLOPURINOL 300 MG/1
300 TABLET ORAL DAILY
Qty: 60 TABLET | Refills: 0 | Status: ON HOLD | OUTPATIENT
Start: 2019-10-05 | End: 2020-01-03

## 2019-10-05 RX ORDER — ONDANSETRON 4 MG/1
4 TABLET, FILM COATED ORAL EVERY 8 HOURS PRN
Qty: 30 TABLET | Refills: 0 | Status: SHIPPED | OUTPATIENT
Start: 2019-10-05 | End: 2019-10-25

## 2019-10-05 RX ORDER — HEPARIN SODIUM,PORCINE 10 UNIT/ML
5-10 VIAL (ML) INTRAVENOUS EVERY 24 HOURS
Status: DISCONTINUED | OUTPATIENT
Start: 2019-10-05 | End: 2019-10-05 | Stop reason: HOSPADM

## 2019-10-05 RX ORDER — NICOTINE POLACRILEX 4 MG/1
20 GUM, CHEWING ORAL DAILY
Qty: 30 TABLET | Refills: 0 | Status: ON HOLD | OUTPATIENT
Start: 2019-10-05 | End: 2019-11-16

## 2019-10-05 RX ADMIN — CYTARABINE 3700 MG: 100 INJECTION, SOLUTION INTRATHECAL; INTRAVENOUS; SUBCUTANEOUS at 14:11

## 2019-10-05 RX ADMIN — POTASSIUM CHLORIDE: 2 INJECTION, SOLUTION, CONCENTRATE INTRAVENOUS at 12:08

## 2019-10-05 RX ADMIN — Medication 5 ML: at 18:09

## 2019-10-05 RX ADMIN — CYTARABINE 3700 MG: 100 INJECTION, SOLUTION INTRATHECAL; INTRAVENOUS; SUBCUTANEOUS at 01:55

## 2019-10-05 RX ADMIN — ONDANSETRON HYDROCHLORIDE 8 MG: 8 TABLET, FILM COATED ORAL at 00:43

## 2019-10-05 RX ADMIN — PREDNISOLONE ACETATE 2 DROP: 10 SUSPENSION/ DROPS OPHTHALMIC at 08:07

## 2019-10-05 RX ADMIN — DEXAMETHASONE 40 MG: 4 TABLET ORAL at 17:08

## 2019-10-05 RX ADMIN — PROCHLORPERAZINE MALEATE 10 MG: 10 TABLET ORAL at 14:06

## 2019-10-05 RX ADMIN — ALLOPURINOL 300 MG: 300 TABLET ORAL at 08:06

## 2019-10-05 RX ADMIN — POTASSIUM CHLORIDE: 2 INJECTION, SOLUTION, CONCENTRATE INTRAVENOUS at 03:08

## 2019-10-05 RX ADMIN — PREDNISOLONE ACETATE 2 DROP: 10 SUSPENSION/ DROPS OPHTHALMIC at 12:11

## 2019-10-05 RX ADMIN — ONDANSETRON HYDROCHLORIDE 8 MG: 8 TABLET, FILM COATED ORAL at 09:30

## 2019-10-05 RX ADMIN — PREDNISOLONE ACETATE 2 DROP: 10 SUSPENSION/ DROPS OPHTHALMIC at 17:08

## 2019-10-05 RX ADMIN — PANTOPRAZOLE SODIUM 40 MG: 40 TABLET, DELAYED RELEASE ORAL at 08:06

## 2019-10-05 RX ADMIN — SODIUM CHLORIDE 500 ML: 9 INJECTION, SOLUTION INTRAVENOUS at 06:16

## 2019-10-05 RX ADMIN — FUROSEMIDE 20 MG: 10 INJECTION, SOLUTION INTRAVENOUS at 12:11

## 2019-10-05 ASSESSMENT — ACTIVITIES OF DAILY LIVING (ADL)
ADLS_ACUITY_SCORE: 12

## 2019-10-05 ASSESSMENT — MIFFLIN-ST. JEOR: SCORE: 1572.04

## 2019-10-05 NOTE — PLAN OF CARE
[id number]: BP 91/48. Asymptomatic. OVSS. 500cc bolus given. Full body rash more prominent and pink this evening per pt's wife. Will continue to monitor. Trace swelling in ankles and feet. HD Cytarabine dose #1 given. See chemo note. No complaints. Continue POC.

## 2019-10-05 NOTE — PROGRESS NOTES
Nursing Focus: Chemotherapy    D: Positive blood return via PICC. Insertion site is clean/dry/intact, dressing intact with no complaints of pain.  Urine output is recorded in intake in Doc Flowsheet.      I: Zofran premedications given per order (see electronic medical administration record). Cerebellar exam WNL. Dose #2 of cytarabine started to infuse over 2 hours. Reviewed pt teaching on chemotherapy side effects.  Pt denies need for further teaching. Chemotherapy double checked per protocol by two chemotherapy competent RN's.     A: Tolerating chemo well. Compazine given for nausea. Denies pain.     P: Continue to monitor urine output and symptoms of nausea. Screen for symptoms of toxicity.

## 2019-10-05 NOTE — DISCHARGE SUMMARY
Discharge Summary  Hematology / Oncology    Date of Admission:  10/3/2019  Date of Discharge:  10/05/2019  Discharging Provider: Idris Aguilar  Date of Service (when I saw the patient): 10/05/2019    Discharge Diagnoses   Mantle cell lymphoma, Stage IV with colon, BM involvement   Morbiliform rash, drug vs viral exanthem    History of Present Illness   Slava Lane is an 49 year old male with history of mantle cell lymphoma, stage IV with colon and bone marrow involvement (Ki67 ~30%, TP53 negative, MCL MIPI 5.9 - intermediate.  He presented for D1 C1B R-DHAP on 10/3/19,    Hospital Course   Slava Lane was admitted on 10/3/2019.  The following problems were addressed during his hospitalization:    # Mantle cell lymphoma, Stage IV with colon, BM involvement   Treatment plan includes RCHOP*3  alternating with R-DHAP*3, ASCT then maintenance Rituxan for 2 years.  Patient tolerated treatment without complication this admission aside from mild rash described below.  Previously did have a mild reaction to rituximab (9/11/19) and so was pre-treated with benadryl/steroids and slower infusion.  He will follow up in oncology clinic 10/7 and 10/11.    # Morbiliform rash, drug vs viral exanthem  Mild asymptomatic diffuse rash that began improving without treatment.  Evaluated by dermatology this admission, felt more likely to be drug eruption.  Please see their note 10/4/19 for detailed recommendations.  If the patient has future rashes (with drugs considered medically necessary) would need to monitor for DRESS/DIHS.      Idris Aguilar MD  Hematology/Oncology  Pager: [phone]    Code Status   Full Code    Primary Care Physician   ADRIANO HUNTER    Physical Exam   Vital Signs with Ranges  Temp:  [95.4  F (35.2  C)-98.6  F (37  C)] 97.1  F (36.2  C)  Pulse:  [83-88] 85  Heart Rate:  [76-78] 78  Resp:  [16] 16  BP: ()/(48-65) 101/56  SpO2:  [93 %-96 %] 94 %  170 lbs 11.2 oz    General Appearance: Sitting up in bed, no  acute distress  Eyes: No jaundice, no injection  HEENT: Head atraumatic, oropharynx unremarkable  Respiratory: Clear to auscultation bilaterally, no crackles/wheezes  Cardiovascular: Regular rate and rhythm, normal S1/S2  GI: Non-tender, non-distended, normoactive bowel sounds  Lymph/Hematologic: No supraclavicular/cervical lymphadenopathy, no bleeding  Skin: Diffuse morbiliform red rash along chest, abdomen, back and forearms.  Musculoskeletal: No lower extremity edema, no visible trauma      Discharge Disposition   Discharged to home  Condition at discharge: Stable    Consultations This Hospital Stay   DERMATOLOGY IP CONSULT    Discharge Orders      CBC with platelets and differential    Last Lab Result: Hemoglobin (g/dL)       Date                     Value                 10/04/2019               11.9 (L)         ----------     **Comprehensive metabolic panel FUTURE anytime     CBC with platelets and differential    Last Lab Result: Hemoglobin (g/dL)       Date                     Value                 10/04/2019               11.9 (L)         ----------     **Comprehensive metabolic panel FUTURE anytime     Magnesium     Phosphorus     Reason for your hospital stay    You were admitted to the hospital for chemotherapy.     Follow Up and recommended labs and tests    You will be scheduled for Neulasta in the clinic on Monday 10/7 as well as for labs on 10/7.  You will be scheduled for labs on 10/10 and a follow-up with an BRANDY on 10/10.     Activity    Your activity upon discharge: activity as tolerated     When to contact your care team    [facility] cancer clinic triage line at [phone] for temp equal to or >100.4, uncontrolled nausea/vomiting/diarrhea/constipation, unrelieved pain, bleeding not relieved with pressure, dizziness, chest pain, shortness of breath, loss of consciousness, and any new or concerning symptoms.     Full Code     Diet    Follow this diet upon discharge: General     Discharge  Medications   Current Discharge Medication List      START taking these medications    Details   acyclovir (ZOVIRAX) 400 MG tablet Take 1 tablet (400 mg) by mouth 5 times daily for 10 days  Qty: 50 tablet, Refills: 0    Associated Diagnoses: Mantle cell lymphoma of lymph nodes of multiple sites (H)      ciprofloxacin (CIPRO) 500 MG tablet Take 1 tablet (500 mg) by mouth 2 times daily for 10 days  Qty: 20 tablet, Refills: 0    Associated Diagnoses: Mantle cell lymphoma of lymph nodes of multiple sites (H)      fluconazole (DIFLUCAN) 200 MG tablet Take 1 tablet (200 mg) by mouth daily for 10 days  Qty: 10 tablet, Refills: 0    Associated Diagnoses: Mantle cell lymphoma of lymph nodes of multiple sites (H)      ondansetron (ZOFRAN) 4 MG tablet Take 1 tablet (4 mg) by mouth every 8 hours as needed for nausea  Qty: 30 tablet, Refills: 0    Associated Diagnoses: Mantle cell lymphoma of lymph nodes of multiple sites (H); Admission for chemotherapy      pegfilgrastim (NEULASTA) 6 MG/0.6ML injection Inject 0.6 mLs (6 mg) Subcutaneous once for 1 dose  Qty: 0.6 mL, Refills: 0    Comments: To be administered in clinic on Monday.  Associated Diagnoses: Mantle cell lymphoma of lymph nodes of multiple sites (H)      triamcinolone (KENALOG) 0.1 % external ointment Apply topically 2 times daily as needed for irritation (rash)  Qty: 30 g, Refills: 0    Associated Diagnoses: Rash         CONTINUE these medications which have NOT CHANGED    Details   diphenhydrAMINE (BENADRYL) 50 MG capsule Take 50 mg by mouth every 6 hours as needed for itching or allergies      LORazepam (ATIVAN) 0.5 MG tablet Take 1 tablet (0.5 mg) by mouth every 4 hours as needed (Anxiety, Nausea/Vomiting or Sleep)  Qty: 30 tablet, Refills: 5    Associated Diagnoses: Mantle cell lymphoma of lymph nodes of multiple sites (H)      omeprazole 20 MG tablet Take 1 tablet (20 mg) by mouth daily  Qty: 30 tablet, Refills: 0    Associated Diagnoses: Mantle cell lymphoma of  lymph nodes of multiple sites (H)      prochlorperazine (COMPAZINE) 10 MG tablet Take 1 tablet (10 mg) by mouth every 6 hours as needed (Nausea/Vomiting)  Qty: 30 tablet, Refills: 7    Associated Diagnoses: Mantle cell lymphoma of lymph nodes of multiple sites (H)      allopurinol (ZYLOPRIM) 300 MG tablet Take 1 tablet (300 mg) by mouth daily  Qty: 60 tablet, Refills: 0    Associated Diagnoses: Mantle cell lymphoma of lymph nodes of multiple sites (H)      sildenafil (VIAGRA) 100 MG tablet Take 100 mg by mouth         STOP taking these medications       predniSONE (DELTASONE) 50 MG tablet Comments:   Reason for Stopping:             Allergies   No Known Allergies    Data   Most Recent 3 CBC's:  Recent Labs   Lab Test 10/05/19  0555 10/04/19  0447 10/03/19  1446   WBC 11.0 3.5* 4.2   HGB 10.6* 11.9* 13.0*   MCV 95 93 94    285 316      Most Recent 3 BMP's:  Recent Labs   Lab Test 10/05/19  0555 10/04/19  0447 10/03/19  1446    138 138   POTASSIUM 4.1 4.3 3.8   CHLORIDE 110* 109 105   CO2 24 21 26   BUN 12 15 14   CR 0.56* 0.62* 0.65*   ANIONGAP 5 8 7   PIEDAD 8.0* 8.5 8.6   * 185* 124*     Most Recent 2 LFT's:  Recent Labs   Lab Test 10/05/19  0555 10/04/19  0447   AST 10 9   ALT 15 13   ALKPHOS 49 55   BILITOTAL 0.3 0.4     Most Recent INR's and Anticoagulation Dosing History:  Anticoagulation Dose History     Recent Dosing and Labs Latest Ref Rng & Units 10/3/2019    INR 0.86 - 1.14 1.07        Most Recent 3 Troponin's:No lab results found.  Most Recent Cholesterol Panel:No lab results found.  Most Recent 6 Bacteria Isolates From Any Culture (See EPIC Reports for Culture Details):No lab results found.  Most Recent TSH, T4 and A1c Labs:No lab results found.

## 2019-10-05 NOTE — PLAN OF CARE
Nursing Focus: Chemotherapy  D: Positive blood return via port. Insertion site is clean/dry/intact, dressing intact with no complaints of pain. Urine output is recorded in intake in Doc Flowsheet.    I: Baseline neuro assessment done. Neuro WNL (pt upright yet unsteady tandem walk at baseline). Zofran given per order (see electronic medical administration record). Dose #1 of HD Cytarabine started to infuse over three hours. Reviewed pt teaching on chemotherapy side effects.  Pt denies need for further teaching. Chemotherapy double checked per protocol by two chemotherapy competent RN's.   A: Tolerating procedure well. Denies nausea and or pain.   P: Continue to monitor urine output and symptoms of nausea. Screen for symptoms of toxicity.

## 2019-10-06 NOTE — PROGRESS NOTES
Discharge    D: Orders for discharge and outpatient medications written. Port de-accessed and locked with high dose heparin flush.     I: Home medications and return to clinic schedule reviewed with patient. Discharge instructions and parameters for calling Health Care Provider reviewed. Sent with thermometer to use while he is at [facility]. Patient left at 1900 accompanied by wife. Security will be taking pt and his wife back to [facility] where they will stay till his appointment on Monday.      A: Patient and his wife verbalized understanding and were ready for discharge. Reviewed new medications. Discussed nausea management with zofran. Pt and his wife aware to begin anti-infective medication on 10/8. Will do last dose of dexamethasone on 10/6. Prednisolone eye drops will continue for the next two days.     P: Patient picked up medications in Pharmacy. Follow up as scheduled for 10/7 for labs and neulasta injection. Will have labs and appointment with Edyta PARKER on 10/11. Discussed flu shot with Dr. Aguilar and decision made to hold flu shot today given active chemo and defer to clinic appointment this week. Please address with him on follow-up.

## 2019-10-06 NOTE — PROGRESS NOTES
7422-0912: BP improved after bolus given on night shift; back to baseline BPs. Experienced nausea and indigestion throughout the day; compazine and zofran given. Body rash improving. Up 12 lbs from admission; lasix given. Constipation resolved. Dose #2 Cytarabine infusing. Continue with plan of care.       Per Mobility Level Guideline;  Bed/chair alarm No.  Patient may ambulate independently  Patient requires the following assistive equipment:none  PT/OT consult orders in place No

## 2019-10-07 ENCOUNTER — APPOINTMENT (OUTPATIENT)
Dept: LAB | Facility: CLINIC | Age: 49
End: 2019-10-07
Attending: INTERNAL MEDICINE
Payer: COMMERCIAL

## 2019-10-07 ENCOUNTER — ALLIED HEALTH/NURSE VISIT (OUTPATIENT)
Dept: ONCOLOGY | Facility: CLINIC | Age: 49
End: 2019-10-07
Attending: INTERNAL MEDICINE
Payer: COMMERCIAL

## 2019-10-07 VITALS
DIASTOLIC BLOOD PRESSURE: 73 MMHG | SYSTOLIC BLOOD PRESSURE: 131 MMHG | WEIGHT: 167 LBS | HEART RATE: 51 BPM | BODY MASS INDEX: 26.84 KG/M2 | TEMPERATURE: 97.4 F | OXYGEN SATURATION: 99 % | HEIGHT: 66 IN | RESPIRATION RATE: 16 BRPM

## 2019-10-07 DIAGNOSIS — C83.18 MANTLE CELL LYMPHOMA OF LYMPH NODES OF MULTIPLE SITES (H): Primary | ICD-10-CM

## 2019-10-07 LAB
ALBUMIN SERPL-MCNC: 3.2 G/DL (ref 3.4–5)
ALP SERPL-CCNC: 47 U/L (ref 40–150)
ALT SERPL W P-5'-P-CCNC: 20 U/L (ref 0–70)
ANION GAP SERPL CALCULATED.3IONS-SCNC: 7 MMOL/L (ref 3–14)
AST SERPL W P-5'-P-CCNC: 25 U/L (ref 0–45)
BASOPHILS # BLD AUTO: 0 10E9/L (ref 0–0.2)
BASOPHILS NFR BLD AUTO: 0 %
BILIRUB SERPL-MCNC: 0.9 MG/DL (ref 0.2–1.3)
BUN SERPL-MCNC: 23 MG/DL (ref 7–30)
CALCIUM SERPL-MCNC: 9 MG/DL (ref 8.5–10.1)
CHLORIDE SERPL-SCNC: 99 MMOL/L (ref 94–109)
CO2 SERPL-SCNC: 28 MMOL/L (ref 20–32)
CREAT SERPL-MCNC: 0.72 MG/DL (ref 0.66–1.25)
DIFFERENTIAL METHOD BLD: ABNORMAL
EOSINOPHIL # BLD AUTO: 0 10E9/L (ref 0–0.7)
EOSINOPHIL NFR BLD AUTO: 0 %
ERYTHROCYTE [DISTWIDTH] IN BLOOD BY AUTOMATED COUNT: 13.2 % (ref 10–15)
GFR SERPL CREATININE-BSD FRML MDRD: >90 ML/MIN/{1.73_M2}
GLUCOSE SERPL-MCNC: 92 MG/DL (ref 70–99)
HCT VFR BLD AUTO: 33.9 % (ref 40–53)
HGB BLD-MCNC: 11.7 G/DL (ref 13.3–17.7)
IMM GRANULOCYTES # BLD: 0 10E9/L (ref 0–0.4)
IMM GRANULOCYTES NFR BLD: 0.8 %
LYMPHOCYTES # BLD AUTO: 0.3 10E9/L (ref 0.8–5.3)
LYMPHOCYTES NFR BLD AUTO: 5.9 %
MAGNESIUM SERPL-MCNC: 2.3 MG/DL (ref 1.6–2.3)
MCH RBC QN AUTO: 31 PG (ref 26.5–33)
MCHC RBC AUTO-ENTMCNC: 34.5 G/DL (ref 31.5–36.5)
MCV RBC AUTO: 90 FL (ref 78–100)
MONOCYTES # BLD AUTO: 0.3 10E9/L (ref 0–1.3)
MONOCYTES NFR BLD AUTO: 5.3 %
NEUTROPHILS # BLD AUTO: 4.2 10E9/L (ref 1.6–8.3)
NEUTROPHILS NFR BLD AUTO: 88 %
NRBC # BLD AUTO: 0 10*3/UL
NRBC BLD AUTO-RTO: 0 /100
PHOSPHATE SERPL-MCNC: 3.9 MG/DL (ref 2.5–4.5)
PLATELET # BLD AUTO: 314 10E9/L (ref 150–450)
POTASSIUM SERPL-SCNC: 4 MMOL/L (ref 3.4–5.3)
PROT SERPL-MCNC: 7 G/DL (ref 6.8–8.8)
RBC # BLD AUTO: 3.77 10E12/L (ref 4.4–5.9)
SODIUM SERPL-SCNC: 134 MMOL/L (ref 133–144)
WBC # BLD AUTO: 4.7 10E9/L (ref 4–11)

## 2019-10-07 PROCEDURE — 25000128 H RX IP 250 OP 636: Mod: ZF | Performed by: PHYSICIAN ASSISTANT

## 2019-10-07 PROCEDURE — 85025 COMPLETE CBC W/AUTO DIFF WBC: CPT | Performed by: INTERNAL MEDICINE

## 2019-10-07 PROCEDURE — 84100 ASSAY OF PHOSPHORUS: CPT | Performed by: NURSE PRACTITIONER

## 2019-10-07 PROCEDURE — 25000128 H RX IP 250 OP 636: Mod: ZF | Performed by: INTERNAL MEDICINE

## 2019-10-07 PROCEDURE — 83735 ASSAY OF MAGNESIUM: CPT | Performed by: NURSE PRACTITIONER

## 2019-10-07 PROCEDURE — 80053 COMPREHEN METABOLIC PANEL: CPT | Performed by: INTERNAL MEDICINE

## 2019-10-07 RX ORDER — HEPARIN SODIUM (PORCINE) LOCK FLUSH IV SOLN 100 UNIT/ML 100 UNIT/ML
5 SOLUTION INTRAVENOUS DAILY PRN
Status: DISCONTINUED | OUTPATIENT
Start: 2019-10-07 | End: 2019-10-07 | Stop reason: HOSPADM

## 2019-10-07 RX ADMIN — PEGFILGRASTIM 6 MG: 6 INJECTION SUBCUTANEOUS at 12:56

## 2019-10-07 RX ADMIN — HEPARIN 5 ML: 100 SYRINGE at 11:52

## 2019-10-07 ASSESSMENT — MIFFLIN-ST. JEOR: SCORE: 1555.26

## 2019-10-07 ASSESSMENT — PAIN SCALES - GENERAL: PAINLEVEL: NO PAIN (0)

## 2019-10-07 NOTE — NURSING NOTE
Patient presents to the [facility] for pegfilgrastim (NEULASTA) injection 6 mg. Order written by Nichelle Ivory PA-C was completed today. Name and  were verified by patient. See MAR for medication details. Patient was asked if they have any new symptoms or questions/concerns. Patient [declined/requested] to speak with an RN today.Medication was given in the following site: Subcutaneous Abdominal Tissue. Patient tolerated injection well and was discharged to home.    Lorna Griffin, CMA

## 2019-10-07 NOTE — NURSING NOTE
Chief Complaint   Patient presents with     Port Draw     Labs drawn via port by RN in lab VS taken.      Labs drawn via Port accessed using 20g gripper needle. Line flushed and Heparin locked. Vital signs taken. Checked into next appointment.       Silvia Dupont RN

## 2019-10-09 ENCOUNTER — CARE COORDINATION (OUTPATIENT)
Dept: ONCOLOGY | Facility: CLINIC | Age: 49
End: 2019-10-09

## 2019-10-10 NOTE — PROGRESS NOTES
Discharged 10-5 after first cycle of R-DHAP. Brannon here in clinic with wife for labs and to [facility]. Last cycle with R-CHOP he went neutropenic with fevers and was hospitalized. He is to have repeat labs with provider appointment this week at [facility]. He is worried about this [place] He will call later in the week to see if there is an alternative plan if he cannot get down. He has all his med's/reviewed. Will start taking the Levaquin and Fluconazole tomorrow, 10/8.     Has resource numbers to call if needed.      Writer will continue to follow.     10/10/19-Labs done locally today due to weather coming in. Labs good. BUN/Bili slightly elevated. Showed Doctor Miladis and decision made not to make Brannon come to the [facility] for his appointments today. He will have labs on Monday and call over the weekend if any problems or concerns. Will decide plan from Mondays labs.

## 2019-10-14 ENCOUNTER — CARE COORDINATION (OUTPATIENT)
Dept: ONCOLOGY | Facility: CLINIC | Age: 49
End: 2019-10-14

## 2019-10-14 DIAGNOSIS — C83.18 MANTLE CELL LYMPHOMA OF LYMPH NODES OF MULTIPLE SITES (H): ICD-10-CM

## 2019-10-18 RX ORDER — FLUCONAZOLE 100 MG/1
200 TABLET ORAL DAILY
Qty: 30 TABLET | Refills: 1 | Status: ON HOLD | COMMUNITY
Start: 2019-10-18 | End: 2019-11-16

## 2019-10-23 DIAGNOSIS — C83.18 MANTLE CELL LYMPHOMA OF LYMPH NODES OF MULTIPLE SITES (H): ICD-10-CM

## 2019-10-23 RX ORDER — EPINEPHRINE 0.3 MG/.3ML
0.3 INJECTION SUBCUTANEOUS EVERY 5 MIN PRN
Status: CANCELLED | OUTPATIENT
Start: 2019-10-25

## 2019-10-23 RX ORDER — MEPERIDINE HYDROCHLORIDE 25 MG/ML
25 INJECTION INTRAMUSCULAR; INTRAVENOUS; SUBCUTANEOUS
Status: CANCELLED
Start: 2019-10-25

## 2019-10-23 RX ORDER — NALOXONE HYDROCHLORIDE 0.4 MG/ML
.1-.4 INJECTION, SOLUTION INTRAMUSCULAR; INTRAVENOUS; SUBCUTANEOUS
Status: CANCELLED | OUTPATIENT
Start: 2019-10-25

## 2019-10-23 RX ORDER — ALBUTEROL SULFATE 90 UG/1
1-2 AEROSOL, METERED RESPIRATORY (INHALATION)
Status: CANCELLED
Start: 2019-10-25

## 2019-10-23 RX ORDER — DIPHENHYDRAMINE HCL 50 MG
50 CAPSULE ORAL ONCE
Status: CANCELLED
Start: 2019-10-25

## 2019-10-23 RX ORDER — DIPHENHYDRAMINE HYDROCHLORIDE 50 MG/ML
50 INJECTION INTRAMUSCULAR; INTRAVENOUS
Status: CANCELLED
Start: 2019-10-25

## 2019-10-23 RX ORDER — EPINEPHRINE 1 MG/ML
0.3 INJECTION, SOLUTION, CONCENTRATE INTRAVENOUS EVERY 5 MIN PRN
Status: CANCELLED | OUTPATIENT
Start: 2019-10-25

## 2019-10-23 RX ORDER — MEPERIDINE HYDROCHLORIDE 25 MG/ML
25 INJECTION INTRAMUSCULAR; INTRAVENOUS; SUBCUTANEOUS EVERY 30 MIN PRN
Status: CANCELLED | OUTPATIENT
Start: 2019-10-25

## 2019-10-23 RX ORDER — ACETAMINOPHEN 325 MG/1
650 TABLET ORAL ONCE
Status: CANCELLED | OUTPATIENT
Start: 2019-10-25

## 2019-10-23 RX ORDER — DOXORUBICIN HYDROCHLORIDE 2 MG/ML
50 INJECTION, SOLUTION INTRAVENOUS ONCE
Status: CANCELLED | OUTPATIENT
Start: 2019-10-25

## 2019-10-23 RX ORDER — SODIUM CHLORIDE 9 MG/ML
1000 INJECTION, SOLUTION INTRAVENOUS CONTINUOUS PRN
Status: CANCELLED
Start: 2019-10-25

## 2019-10-23 RX ORDER — LORAZEPAM 2 MG/ML
0.5 INJECTION INTRAMUSCULAR EVERY 4 HOURS PRN
Status: CANCELLED | OUTPATIENT
Start: 2019-10-25

## 2019-10-23 RX ORDER — PALONOSETRON 0.05 MG/ML
0.25 INJECTION, SOLUTION INTRAVENOUS ONCE
Status: CANCELLED | OUTPATIENT
Start: 2019-10-25

## 2019-10-23 RX ORDER — ALBUTEROL SULFATE 0.83 MG/ML
2.5 SOLUTION RESPIRATORY (INHALATION)
Status: CANCELLED | OUTPATIENT
Start: 2019-10-25

## 2019-10-24 NOTE — PROGRESS NOTES
"[facility]  MEDICAL ONCOLOGY PROGRESS NOTE  October 24, 2019  Oncologist:  Dr. Redding   PA: Edyta Caro  RN: Olive Perrin      CHIEF COMPLAINT: Follow-up mantle cell, here for consideration of cycle 2A R-CHOP    ONCOLOGY HPI:   Slava Lane is a 49 year old male with Mantle cell lymphoma.  He has Mantle cell lymphoma, Stage IV with colon, BM involvement Ki67 -30%, TP53 mutations - Negative. MCL MIPI - 5.9- Intermediate   He was on watch and wait though this was not considered feasible since he felt like his tonsils were growing. Met with Dr. Redding and decided to pursue curative intent treatment.    He started R-CHOP/R-DHAP on 9/18/19.     Treatment History:  1A R-CHOP: 9/18  1B R-DHAP: 10/3  2A R-CHOP: 10/25/19    INTERVAL HISTORY/ROS:   I am seeing Olayinka in the infusion room with his wife. He reports R-DHAP was tougher than R-CHOP in regards to fatigue and nausea. He reports his first R-CHOP went fairly well although he isn't used to being sick and doctoring so that took an adjustment. He noted \"all over\" joint aches, constant 3-5/10. He took claritin for this but nothing else. Nausea was low level, no emesis and he was taking compazine and zofran prn. He did take zofran within in the 72 hours of Aloxi. Ativan gives him disturbing dreams and he doesn't want to take this. The rash has resolved. His wife notices that he twitches a lot at night. Insomnia has been a problem, especially on the steroids. He is interested in medical cannabis. His tonsil swelling/pain is better. No fevers/chills. Takes miralax prn constipation.    ROS: 10 point ROS neg other than the symptoms noted above in the HPI.      No Known Allergies    Current Outpatient Medications   Medication     acyclovir (ZOVIRAX) 400 MG tablet     allopurinol (ZYLOPRIM) 300 MG tablet     dexamethasone (DECADRON) 4 MG tablet     diphenhydrAMINE (BENADRYL) 50 MG capsule     fluconazole (DIFLUCAN) 200 MG tablet     LORazepam (ATIVAN) 0.5 MG " tablet     omeprazole 20 MG tablet     ondansetron (ZOFRAN) 4 MG tablet     pegfilgrastim (NEULASTA) 6 MG/0.6ML injection     prochlorperazine (COMPAZINE) 10 MG tablet     sildenafil (VIAGRA) 100 MG tablet     triamcinolone (KENALOG) 0.1 % external ointment     No current facility-administered medications for this visit.        EXAM:  /79 (BP Location: Left arm, Patient Position: Chair, Cuff Size: Adult Regular)   Pulse 71   Temp 97.3  F (36.3  C) (Oral)   Wt 76.9 kg (169 lb 9.6 oz)   SpO2 99%   BMI 27.37 kg/m    Wt Readings from Last 4 Encounters:   10/25/19 76.9 kg (169 lb 9.6 oz)   10/07/19 75.8 kg (167 lb)   10/05/19 77.4 kg (170 lb 11.2 oz)   10/02/19 73.3 kg (161 lb 9.6 oz)      General: No acute distress  HEENT: EOMI, PERRLA, no scleral icterus, mucus membranes moist and no lesions or thrush.  Lymph: Neck is supple and shows no nodes in cervical or supraclavicular. Some pain with palpation on the neck.   Heart:  RRR, no murmur, gallop or rub  Lungs: CTA-B, no wheezes, rhonchi or rales  Abdomen: Normal bowel sounds, soft, non tender, not distended, no rebound or guarding, no palpable masses  Extremities: No pitting edema  Skin: No rash  Neuro: CN II-XII are intact    LABS:   Results for MIKAYLA WHITFIELD (MRN [id number]) as of 10/25/2019 07:43   Ref. Range 10/25/2019 07:19   WBC Latest Ref Range: 4.0 - 11.0 10e9/L 6.6   Hemoglobin Latest Ref Range: 13.3 - 17.7 g/dL 11.0 (L)   Hematocrit Latest Ref Range: 40.0 - 53.0 % 33.5 (L)   Platelet Count Latest Ref Range: 150 - 450 10e9/L 429   RBC Count Latest Ref Range: 4.4 - 5.9 10e12/L 3.62 (L)   MCV Latest Ref Range: 78 - 100 fl 93   MCH Latest Ref Range: 26.5 - 33.0 pg 30.4   MCHC Latest Ref Range: 31.5 - 36.5 g/dL 32.8   RDW Latest Ref Range: 10.0 - 15.0 % 14.6   Diff Method Unknown Automated Method   % Neutrophils Latest Units: % 59.6   % Lymphocytes Latest Units: % 20.6   % Monocytes Latest Units: % 14.2   % Eosinophils Latest Units: % 3.0   % Basophils  Latest Units: % 1.5   % Immature Granulocytes Latest Units: % 1.1   Nucleated RBCs Latest Ref Range: 0 /100 0   Absolute Neutrophil Latest Ref Range: 1.6 - 8.3 10e9/L 3.9   Absolute Lymphocytes Latest Ref Range: 0.8 - 5.3 10e9/L 1.4   Absolute Monocytes Latest Ref Range: 0.0 - 1.3 10e9/L 0.9   Absolute Eosinophils Latest Ref Range: 0.0 - 0.7 10e9/L 0.2   Absolute Basophils Latest Ref Range: 0.0 - 0.2 10e9/L 0.1   Abs Immature Granulocytes Latest Ref Range: 0 - 0.4 10e9/L 0.1   Absolute Nucleated RBC Unknown 0.0   Results for MIKAYLA WHITFIELD (MRN [id number]) as of 10/25/2019 08:41   Ref. Range 10/25/2019 07:19   Sodium Latest Ref Range: 133 - 144 mmol/L 138   Potassium Latest Ref Range: 3.4 - 5.3 mmol/L 3.8   Chloride Latest Ref Range: 94 - 109 mmol/L 104   Carbon Dioxide Latest Ref Range: 20 - 32 mmol/L 28   Urea Nitrogen Latest Ref Range: 7 - 30 mg/dL 16   Creatinine Latest Ref Range: 0.66 - 1.25 mg/dL 0.76   GFR Estimate Latest Ref Range: >60 mL/min/1.73_m2 >90   GFR Estimate If Black Latest Ref Range: >60 mL/min/1.73_m2 >90   Calcium Latest Ref Range: 8.5 - 10.1 mg/dL 8.6   Anion Gap Latest Ref Range: 3 - 14 mmol/L 6   Albumin Latest Ref Range: 3.4 - 5.0 g/dL 3.1 (L)   Protein Total Latest Ref Range: 6.8 - 8.8 g/dL 6.8   Bilirubin Total Latest Ref Range: 0.2 - 1.3 mg/dL 0.3   Alkaline Phosphatase Latest Ref Range: 40 - 150 U/L 86   ALT Latest Ref Range: 0 - 70 U/L 17   AST Latest Ref Range: 0 - 45 U/L 18     IMAGING:   No new imaging     IMPRESSION/PLAN:  # Mantle cell lymphoma, Stage IV with colon, BM involvement   -plan is for 6 total cycles of RCHOP/RDHAP followed by ASCT and then maintenance Rituxan for 2 years  -Here for cycle 2A R-CHOP today. Feeling well and meets parameters so will proceed. Due to neutropenic fever after cycle 1, neulasta has been added on to the rest of the regimen. He will go home with an onpro today.  -continue allopurinol- will ask Dr. Redding his preferred duration  -EMLA cream  prescribed today for port  -Follow-up in 3 weeks for visit prior to cycle 2B with admission to the hospital. Will ask Dr. Redding his preference for count checking in [place] (weekly with A cycles and twice a week with B cycles?)    # Arthralgias, neulasta vs other drug?   -Asked him to keep a symptom log so we can try to track patterns  -Claritin  -APAP 1 g up to TID prn    #Nausea. He receives aloxi and emend premeds. Advised compazine prn the first 72 hours then adding zofran which was refilled today. Medical cannabis may be something to consider in the future.     #Insomnia. Trial of melatonin.    # Morbilliform eruption, drug vs viral, derm consult. RESOLVED     Molly Curry PA-C  [facility]  [address] [place] [address] [phone]

## 2019-10-25 ENCOUNTER — TELEPHONE (OUTPATIENT)
Dept: ONCOLOGY | Facility: CLINIC | Age: 49
End: 2019-10-25

## 2019-10-25 ENCOUNTER — INFUSION THERAPY VISIT (OUTPATIENT)
Dept: ONCOLOGY | Facility: CLINIC | Age: 49
End: 2019-10-25
Attending: INTERNAL MEDICINE
Payer: COMMERCIAL

## 2019-10-25 ENCOUNTER — ONCOLOGY VISIT (OUTPATIENT)
Dept: ONCOLOGY | Facility: CLINIC | Age: 49
End: 2019-10-25
Attending: PHYSICIAN ASSISTANT
Payer: COMMERCIAL

## 2019-10-25 VITALS
OXYGEN SATURATION: 99 % | WEIGHT: 169.6 LBS | BODY MASS INDEX: 27.37 KG/M2 | HEART RATE: 71 BPM | DIASTOLIC BLOOD PRESSURE: 79 MMHG | SYSTOLIC BLOOD PRESSURE: 119 MMHG | TEMPERATURE: 97.3 F

## 2019-10-25 DIAGNOSIS — C83.18 MANTLE CELL LYMPHOMA OF LYMPH NODES OF MULTIPLE SITES (H): ICD-10-CM

## 2019-10-25 DIAGNOSIS — R11.0 NAUSEA: Primary | ICD-10-CM

## 2019-10-25 DIAGNOSIS — H52.6 DISORDER OF REFRACTION AND ACCOMMODATION: ICD-10-CM

## 2019-10-25 DIAGNOSIS — F19.982 DRUG-INDUCED INSOMNIA (H): ICD-10-CM

## 2019-10-25 DIAGNOSIS — C83.18 MANTLE CELL LYMPHOMA OF LYMPH NODES OF MULTIPLE SITES (H): Primary | ICD-10-CM

## 2019-10-25 DIAGNOSIS — Z51.11 ADMISSION FOR CHEMOTHERAPY: ICD-10-CM

## 2019-10-25 DIAGNOSIS — M25.50 ARTHRALGIA, UNSPECIFIED JOINT: ICD-10-CM

## 2019-10-25 LAB
ALBUMIN SERPL-MCNC: 3.1 G/DL (ref 3.4–5)
ALP SERPL-CCNC: 86 U/L (ref 40–150)
ALT SERPL W P-5'-P-CCNC: 17 U/L (ref 0–70)
ANION GAP SERPL CALCULATED.3IONS-SCNC: 6 MMOL/L (ref 3–14)
AST SERPL W P-5'-P-CCNC: 18 U/L (ref 0–45)
BASOPHILS # BLD AUTO: 0.1 10E9/L (ref 0–0.2)
BASOPHILS NFR BLD AUTO: 1.5 %
BILIRUB SERPL-MCNC: 0.3 MG/DL (ref 0.2–1.3)
BUN SERPL-MCNC: 16 MG/DL (ref 7–30)
CALCIUM SERPL-MCNC: 8.6 MG/DL (ref 8.5–10.1)
CHLORIDE SERPL-SCNC: 104 MMOL/L (ref 94–109)
CO2 SERPL-SCNC: 28 MMOL/L (ref 20–32)
CREAT SERPL-MCNC: 0.76 MG/DL (ref 0.66–1.25)
DIFFERENTIAL METHOD BLD: ABNORMAL
EOSINOPHIL # BLD AUTO: 0.2 10E9/L (ref 0–0.7)
EOSINOPHIL NFR BLD AUTO: 3 %
ERYTHROCYTE [DISTWIDTH] IN BLOOD BY AUTOMATED COUNT: 14.6 % (ref 10–15)
GFR SERPL CREATININE-BSD FRML MDRD: >90 ML/MIN/{1.73_M2}
GLUCOSE SERPL-MCNC: 103 MG/DL (ref 70–99)
HCT VFR BLD AUTO: 33.5 % (ref 40–53)
HGB BLD-MCNC: 11 G/DL (ref 13.3–17.7)
IMM GRANULOCYTES # BLD: 0.1 10E9/L (ref 0–0.4)
IMM GRANULOCYTES NFR BLD: 1.1 %
LYMPHOCYTES # BLD AUTO: 1.4 10E9/L (ref 0.8–5.3)
LYMPHOCYTES NFR BLD AUTO: 20.6 %
MCH RBC QN AUTO: 30.4 PG (ref 26.5–33)
MCHC RBC AUTO-ENTMCNC: 32.8 G/DL (ref 31.5–36.5)
MCV RBC AUTO: 93 FL (ref 78–100)
MONOCYTES # BLD AUTO: 0.9 10E9/L (ref 0–1.3)
MONOCYTES NFR BLD AUTO: 14.2 %
NEUTROPHILS # BLD AUTO: 3.9 10E9/L (ref 1.6–8.3)
NEUTROPHILS NFR BLD AUTO: 59.6 %
NRBC # BLD AUTO: 0 10*3/UL
NRBC BLD AUTO-RTO: 0 /100
PLATELET # BLD AUTO: 429 10E9/L (ref 150–450)
POTASSIUM SERPL-SCNC: 3.8 MMOL/L (ref 3.4–5.3)
PROT SERPL-MCNC: 6.8 G/DL (ref 6.8–8.8)
RBC # BLD AUTO: 3.62 10E12/L (ref 4.4–5.9)
SODIUM SERPL-SCNC: 138 MMOL/L (ref 133–144)
WBC # BLD AUTO: 6.6 10E9/L (ref 4–11)

## 2019-10-25 PROCEDURE — 96415 CHEMO IV INFUSION ADDL HR: CPT

## 2019-10-25 PROCEDURE — 25000132 ZZH RX MED GY IP 250 OP 250 PS 637: Mod: ZF | Performed by: INTERNAL MEDICINE

## 2019-10-25 PROCEDURE — 96375 TX/PRO/DX INJ NEW DRUG ADDON: CPT

## 2019-10-25 PROCEDURE — 99214 OFFICE O/P EST MOD 30 MIN: CPT | Mod: ZP | Performed by: PHYSICIAN ASSISTANT

## 2019-10-25 PROCEDURE — 96417 CHEMO IV INFUS EACH ADDL SEQ: CPT

## 2019-10-25 PROCEDURE — 85025 COMPLETE CBC W/AUTO DIFF WBC: CPT | Performed by: INTERNAL MEDICINE

## 2019-10-25 PROCEDURE — 25000128 H RX IP 250 OP 636: Mod: ZF | Performed by: PHYSICIAN ASSISTANT

## 2019-10-25 PROCEDURE — 96367 TX/PROPH/DG ADDL SEQ IV INF: CPT

## 2019-10-25 PROCEDURE — 96411 CHEMO IV PUSH ADDL DRUG: CPT

## 2019-10-25 PROCEDURE — 25000128 H RX IP 250 OP 636: Mod: ZF | Performed by: INTERNAL MEDICINE

## 2019-10-25 PROCEDURE — 25800030 ZZH RX IP 258 OP 636: Mod: ZF | Performed by: INTERNAL MEDICINE

## 2019-10-25 PROCEDURE — 96413 CHEMO IV INFUSION 1 HR: CPT

## 2019-10-25 PROCEDURE — 96377 APPLICATON ON-BODY INJECTOR: CPT | Mod: 59

## 2019-10-25 PROCEDURE — 80053 COMPREHEN METABOLIC PANEL: CPT | Performed by: INTERNAL MEDICINE

## 2019-10-25 RX ORDER — DIPHENHYDRAMINE HCL 25 MG
50 CAPSULE ORAL ONCE
Status: COMPLETED | OUTPATIENT
Start: 2019-10-25 | End: 2019-10-25

## 2019-10-25 RX ORDER — HEPARIN SODIUM (PORCINE) LOCK FLUSH IV SOLN 100 UNIT/ML 100 UNIT/ML
5 SOLUTION INTRAVENOUS ONCE
Status: COMPLETED | OUTPATIENT
Start: 2019-10-25 | End: 2019-10-25

## 2019-10-25 RX ORDER — PREDNISONE 50 MG/1
50 TABLET ORAL 2 TIMES DAILY
Qty: 10 TABLET | Refills: 0 | Status: ON HOLD | OUTPATIENT
Start: 2019-10-25 | End: 2019-11-16

## 2019-10-25 RX ORDER — LIDOCAINE/PRILOCAINE 2.5 %-2.5%
CREAM (GRAM) TOPICAL PRN
Qty: 30 G | Refills: 1 | Status: SHIPPED | OUTPATIENT
Start: 2019-10-25 | End: 2022-06-14

## 2019-10-25 RX ORDER — DOXORUBICIN HYDROCHLORIDE 2 MG/ML
90 INJECTION, SOLUTION INTRAVENOUS ONCE
Status: COMPLETED | OUTPATIENT
Start: 2019-10-25 | End: 2019-10-25

## 2019-10-25 RX ORDER — PALONOSETRON 0.05 MG/ML
0.25 INJECTION, SOLUTION INTRAVENOUS ONCE
Status: COMPLETED | OUTPATIENT
Start: 2019-10-25 | End: 2019-10-25

## 2019-10-25 RX ORDER — ONDANSETRON 4 MG/1
4 TABLET, FILM COATED ORAL EVERY 8 HOURS PRN
Qty: 30 TABLET | Refills: 0 | Status: ON HOLD | OUTPATIENT
Start: 2019-10-25 | End: 2019-11-16

## 2019-10-25 RX ORDER — HEPARIN SODIUM (PORCINE) LOCK FLUSH IV SOLN 100 UNIT/ML 100 UNIT/ML
500 SOLUTION INTRAVENOUS EVERY 8 HOURS
Status: DISCONTINUED | OUTPATIENT
Start: 2019-10-25 | End: 2019-10-25 | Stop reason: HOSPADM

## 2019-10-25 RX ORDER — ACETAMINOPHEN 325 MG/1
650 TABLET ORAL ONCE
Status: COMPLETED | OUTPATIENT
Start: 2019-10-25 | End: 2019-10-25

## 2019-10-25 RX ADMIN — PALONOSETRON HYDROCHLORIDE 0.25 MG: 0.25 INJECTION, SOLUTION INTRAVENOUS at 08:30

## 2019-10-25 RX ADMIN — ACETAMINOPHEN 650 MG: 325 TABLET ORAL at 09:43

## 2019-10-25 RX ADMIN — HEPARIN 500 UNITS: 100 SYRINGE at 13:57

## 2019-10-25 RX ADMIN — SODIUM CHLORIDE 250 ML: 9 INJECTION, SOLUTION INTRAVENOUS at 08:30

## 2019-10-25 RX ADMIN — DIPHENHYDRAMINE HYDROCHLORIDE 50 MG: 25 CAPSULE ORAL at 09:43

## 2019-10-25 RX ADMIN — PEGFILGRASTIM 6 MG: KIT SUBCUTANEOUS at 10:44

## 2019-10-25 RX ADMIN — DOXORUBICIN HYDROCHLORIDE 90 MG: 2 INJECTION, SOLUTION INTRAVENOUS at 09:21

## 2019-10-25 RX ADMIN — CYCLOPHOSPHAMIDE 1500 MG: 1 INJECTION, POWDER, FOR SOLUTION INTRAVENOUS; ORAL at 09:42

## 2019-10-25 RX ADMIN — HEPARIN 5 ML: 100 SYRINGE at 07:11

## 2019-10-25 RX ADMIN — RITUXIMAB 700 MG: 10 INJECTION, SOLUTION INTRAVENOUS at 10:36

## 2019-10-25 RX ADMIN — VINCRISTINE SULFATE 2 MG: 1 INJECTION, SOLUTION INTRAVENOUS at 09:33

## 2019-10-25 RX ADMIN — SODIUM CHLORIDE 150 MG: 9 INJECTION, SOLUTION INTRAVENOUS at 08:44

## 2019-10-25 ASSESSMENT — PAIN SCALES - GENERAL: PAINLEVEL: NO PAIN (0)

## 2019-10-25 NOTE — PATIENT INSTRUCTIONS
Contact numbers:    Triage/[phone]    Call with chills and/or temperature greater than or equal to 100.5 and questions or concerns.    If after hours, weekends, or holidays, call main hospital  at  [phone] and ask for Oncology doctor on call.

## 2019-10-25 NOTE — PATIENT INSTRUCTIONS
For nausea- The first 72 hours after chemo, take compazine as needed. Then you can take either or both compazine and zofran for nausea.     For joint pains take Tylenol 1g (2 extra strength tabs) up to three times a day    For sleep, try melatonin 5 mg (over the counter) at bedtime. Take the prednisone as early in the morning as possible to interfer less with sleep.

## 2019-10-25 NOTE — LETTER
"10/25/2019      RE: Slava Lane  [address]  [facility] [address]       [facility]  MEDICAL ONCOLOGY PROGRESS NOTE  October 24, 2019  Oncologist:  Dr. Redding   PA: Edyta Caro  RN: Olive Perrin    CHIEF COMPLAINT: Follow-up mantle cell, here for consideration of cycle 2A R-CHOP    ONCOLOGY HPI:   Slava Lane is a 49 year old male with Mantle cell lymphoma.  He has Mantle cell lymphoma, Stage IV with colon, BM involvement Ki67 -30%, TP53 mutations - Negative. MCL MIPI - 5.9- Intermediate   He was on watch and wait though this was not considered feasible since he felt like his tonsils were growing. Met with Dr. Redding and decided to pursue curative intent treatment.    He started R-CHOP/R-DHAP on 9/18/19.     Treatment History:  1A R-CHOP: 9/18  1B R-DHAP: 10/3  2A R-CHOP: 10/25/19    INTERVAL HISTORY/ROS:   I am seeing Olayinka in the infusion room with his wife. He reports R-DHAP was tougher than R-CHOP in regards to fatigue and nausea. He reports his first R-CHOP went fairly well although he isn't used to being sick and doctoring so that took an adjustment. He noted \"all over\" joint aches, constant 3-5/10. He took claritin for this but nothing else. Nausea was low level, no emesis and he was taking compazine and zofran prn. He did take zofran within in the 72 hours of Aloxi. Ativan gives him disturbing dreams and he doesn't want to take this. The rash has resolved. His wife notices that he twitches a lot at night. Insomnia has been a problem, especially on the steroids. He is interested in medical cannabis. His tonsil swelling/pain is better. No fevers/chills. Takes miralax prn constipation.    ROS: 10 point ROS neg other than the symptoms noted above in the HPI.      No Known Allergies    Current Outpatient Medications   Medication     acyclovir (ZOVIRAX) 400 MG tablet     allopurinol (ZYLOPRIM) 300 MG tablet     dexamethasone (DECADRON) 4 MG tablet     diphenhydrAMINE (BENADRYL) 50 MG capsule "     fluconazole (DIFLUCAN) 200 MG tablet     LORazepam (ATIVAN) 0.5 MG tablet     omeprazole 20 MG tablet     ondansetron (ZOFRAN) 4 MG tablet     pegfilgrastim (NEULASTA) 6 MG/0.6ML injection     prochlorperazine (COMPAZINE) 10 MG tablet     sildenafil (VIAGRA) 100 MG tablet     triamcinolone (KENALOG) 0.1 % external ointment     No current facility-administered medications for this visit.        EXAM:  /79 (BP Location: Left arm, Patient Position: Chair, Cuff Size: Adult Regular)   Pulse 71   Temp 97.3  F (36.3  C) (Oral)   Wt 76.9 kg (169 lb 9.6 oz)   SpO2 99%   BMI 27.37 kg/m     Wt Readings from Last 4 Encounters:   10/25/19 76.9 kg (169 lb 9.6 oz)   10/07/19 75.8 kg (167 lb)   10/05/19 77.4 kg (170 lb 11.2 oz)   10/02/19 73.3 kg (161 lb 9.6 oz)      General: No acute distress  HEENT: EOMI, PERRLA, no scleral icterus, mucus membranes moist and no lesions or thrush.  Lymph: Neck is supple and shows no nodes in cervical or supraclavicular. Some pain with palpation on the neck.   Heart:  RRR, no murmur, gallop or rub  Lungs: CTA-B, no wheezes, rhonchi or rales  Abdomen: Normal bowel sounds, soft, non tender, not distended, no rebound or guarding, no palpable masses  Extremities: No pitting edema  Skin: No rash  Neuro: CN II-XII are intact    LABS:   Results for MIKAYLA WHITFIELD (MRN [id number]) as of 10/25/2019 07:43   Ref. Range 10/25/2019 07:19   WBC Latest Ref Range: 4.0 - 11.0 10e9/L 6.6   Hemoglobin Latest Ref Range: 13.3 - 17.7 g/dL 11.0 (L)   Hematocrit Latest Ref Range: 40.0 - 53.0 % 33.5 (L)   Platelet Count Latest Ref Range: 150 - 450 10e9/L 429   RBC Count Latest Ref Range: 4.4 - 5.9 10e12/L 3.62 (L)   MCV Latest Ref Range: 78 - 100 fl 93   MCH Latest Ref Range: 26.5 - 33.0 pg 30.4   MCHC Latest Ref Range: 31.5 - 36.5 g/dL 32.8   RDW Latest Ref Range: 10.0 - 15.0 % 14.6   Diff Method Unknown Automated Method   % Neutrophils Latest Units: % 59.6   % Lymphocytes Latest Units: % 20.6   % Monocytes  Latest Units: % 14.2   % Eosinophils Latest Units: % 3.0   % Basophils Latest Units: % 1.5   % Immature Granulocytes Latest Units: % 1.1   Nucleated RBCs Latest Ref Range: 0 /100 0   Absolute Neutrophil Latest Ref Range: 1.6 - 8.3 10e9/L 3.9   Absolute Lymphocytes Latest Ref Range: 0.8 - 5.3 10e9/L 1.4   Absolute Monocytes Latest Ref Range: 0.0 - 1.3 10e9/L 0.9   Absolute Eosinophils Latest Ref Range: 0.0 - 0.7 10e9/L 0.2   Absolute Basophils Latest Ref Range: 0.0 - 0.2 10e9/L 0.1   Abs Immature Granulocytes Latest Ref Range: 0 - 0.4 10e9/L 0.1   Absolute Nucleated RBC Unknown 0.0   Results for MIKAYLA WHITFIELD (MRN [id number]) as of 10/25/2019 08:41   Ref. Range 10/25/2019 07:19   Sodium Latest Ref Range: 133 - 144 mmol/L 138   Potassium Latest Ref Range: 3.4 - 5.3 mmol/L 3.8   Chloride Latest Ref Range: 94 - 109 mmol/L 104   Carbon Dioxide Latest Ref Range: 20 - 32 mmol/L 28   Urea Nitrogen Latest Ref Range: 7 - 30 mg/dL 16   Creatinine Latest Ref Range: 0.66 - 1.25 mg/dL 0.76   GFR Estimate Latest Ref Range: >60 mL/min/1.73_m2 >90   GFR Estimate If Black Latest Ref Range: >60 mL/min/1.73_m2 >90   Calcium Latest Ref Range: 8.5 - 10.1 mg/dL 8.6   Anion Gap Latest Ref Range: 3 - 14 mmol/L 6   Albumin Latest Ref Range: 3.4 - 5.0 g/dL 3.1 (L)   Protein Total Latest Ref Range: 6.8 - 8.8 g/dL 6.8   Bilirubin Total Latest Ref Range: 0.2 - 1.3 mg/dL 0.3   Alkaline Phosphatase Latest Ref Range: 40 - 150 U/L 86   ALT Latest Ref Range: 0 - 70 U/L 17   AST Latest Ref Range: 0 - 45 U/L 18     IMAGING:   No new imaging     IMPRESSION/PLAN:  # Mantle cell lymphoma, Stage IV with colon, BM involvement   -plan is for 6 total cycles of RCHOP/RDHAP followed by ASCT and then maintenance Rituxan for 2 years  -Here for cycle 2A R-CHOP today. Feeling well and meets parameters so will proceed. Due to neutropenic fever after cycle 1, neulasta has been added on to the rest of the regimen. He will go home with an onpro today.  -continue  allopurinol- will ask Dr. Redding his preferred duration  -EMLA cream prescribed today for port  -Follow-up in 3 weeks for visit prior to cycle 2B with admission to the hospital. Will ask Dr. Redding his preference for count checking in [place] (weekly with A cycles and twice a week with B cycles?)    # Arthralgias, neulasta vs other drug?   -Asked him to keep a symptom log so we can try to track patterns  -Claritin  -APAP 1 g up to TID prn    #Nausea. He receives aloxi and emend premeds. Advised compazine prn the first 72 hours then adding zofran which was refilled today. Medical cannabis may be something to consider in the future.     #Insomnia. Trial of melatonin.    # Morbilliform eruption, drug vs viral, derm consult. RESOLVED     Molly Curry PA-C  [facility]  [address] [place] [address] [phone]

## 2019-10-25 NOTE — PROGRESS NOTES
Infusion Nursing Note:  Slava Lane presents for C2A Rituxan, Adriamycin, Vincristine, Cytoxan, prednisone, neulasta onbody  Met with KEITH Sewell during infusion.    Note: pt had a reaction to rituxan the first cycle and then tolerated it okay inpatient but per MD note, however it was run 'slower.'    Rituxan ran at the following rates today-  50ml/hour X30 minutes  100ml/hour X30 minutes  150ml/hour X30 minutes  200ml/hour X30 minutes  300ml/hour X30 minutes  400ml/hour for the remainder    Pain: denies    Treatment Conditions:  Lab Results   Component Value Date    HGB 11.0 10/25/2019     Lab Results   Component Value Date    WBC 6.6 10/25/2019      Lab Results   Component Value Date    ANEU 3.9 10/25/2019     Lab Results   Component Value Date     10/25/2019      Lab Results   Component Value Date     10/25/2019                   Lab Results   Component Value Date    POTASSIUM 3.8 10/25/2019           Lab Results   Component Value Date    MAG 2.3 10/07/2019            Lab Results   Component Value Date    CR 0.76 10/25/2019                   Lab Results   Component Value Date    PIEDAD 8.6 10/25/2019                Lab Results   Component Value Date    BILITOTAL 0.3 10/25/2019           Lab Results   Component Value Date    ALBUMIN 3.1 10/25/2019                    Lab Results   Component Value Date    ALT 17 10/25/2019           Lab Results   Component Value Date    AST 18 10/25/2019       Results reviewed, labs MET treatment parameters, ok to proceed with treatment.  ECHO/MUGA completed 9/12/19  EF 60-65%.    Intravenous Access:  Implanted Port.    Post Infusion Assessment:  Patient tolerated infusion without incident.  Blood return noted pre and post infusion.  Blood return noted every 2-3cc during administration of adriamycin and vincristine  Site patent and intact, free from redness, edema or discomfort.  No evidence of extravasations.  Access discontinued per protocol.    Neulasta On Pro- On  Body injector applied to patient today at 10:45. Writer discussed with patient when to call, and that injection would start 27 hours after application. Patient's injection scheduled to start at 1:45pm on 10/26. Patient aware that Neulasta On-pro on body should have green flashing light throughout, and if it flashes red to call triage or the on-call number on discharge paperwork. Patient aware to keep electronics 4 inches away from on pro injector and to avoid showering 4 hours prior to injection. Patient given written and verbal instruction on the above and verbalized understanding.   Lot number for Neulasta On Pro: see MAR comments    Discharge Plan:   Prescription refills given for compazine, zofran, prednisone and emla cream.  Discharge instructions reviewed with: Patient and Family.  Patient and/or family verbalized understanding of discharge instructions and all questions answered.  Copy of AVS reviewed with patient and/or family.  Patient will return 11/15 for next appointment.  Patient discharged in stable condition accompanied by: self and wife.    Ruby Shirley, RN, RN

## 2019-10-25 NOTE — NURSING NOTE
Chief Complaint   Patient presents with     Port Draw     labs drawn via port by RN in lab     /79 (BP Location: Left arm, Patient Position: Chair, Cuff Size: Adult Regular)   Pulse 71   Temp 97.3  F (36.3  C) (Oral)   Wt 76.9 kg (169 lb 9.6 oz)   SpO2 99%   BMI 27.37 kg/m      Port accessed by RN in lab. Labs collected and sent. Line flushed with NS & Heparin. Pt tolerated well.   Pt checked in for next appointment.    Guerda Vanegas RN

## 2019-10-25 NOTE — TELEPHONE ENCOUNTER
Prior Authorization Approval    Authorization Effective Date: 10/25/2019  Authorization Expiration Date: 10/25/2020  Medication: Lidocaine-Prilocaine 2.5%-2.5%-PA Approved  Approved Dose/Quantity: 30/30  Reference #: [id number]   Insurance Company: [employer] [place] - Phone [phone] Fax [phone]  Expected CoPay:     $0  CoPay Card Available:    No  Foundation Assistance Needed:  N/A  Which Pharmacy is filling the prescription (Not needed for infusion/clinic administered): [facility] [place], [place] - [address] [place] [address] [phone]  Pharmacy Notified:  YES  Patient Notified:  YES

## 2019-10-25 NOTE — TELEPHONE ENCOUNTER
PA Initiation    Medication: Lidocaine-Prilocaine 2.5%-2.5%  Insurance Company: [employer] [place] - Phone [phone] Fax [phone]  Pharmacy Filling the Rx: [facility] [place], [place] - [address] [place] [address] [phone]  Filling Pharmacy Phone:    Filling Pharmacy Fax:    Start Date: 10/25/2019

## 2019-10-25 NOTE — TELEPHONE ENCOUNTER
Prior Authorization Approval    Authorization Effective Date: 10/25/2019  Authorization Expiration Date: 10/25/2020  Medication: Ondansetron-PA Approved  Approved Dose/Quantity: 30/5ds  Reference #:  Key: [id number]   Insurance Company: [employer] [place] - Phone [phone] Fax [phone]  Expected CoPay:     $0  CoPay Card Available:      Foundation Assistance Needed:    Which Pharmacy is filling the prescription (Not needed for infusion/clinic administered): [facility] [address] [phone]  Pharmacy Notified:  yes  Patient Notified:  Yes

## 2019-10-25 NOTE — TELEPHONE ENCOUNTER
PA Initiation    Medication: Ondansetron-PA initiation  Insurance Company: [employer] [place] - Phone [phone] Fax [phone]  Pharmacy Filling the Rx: [facility] [place], [place] - [address] [place] [address] [phone]  Filling Pharmacy Phone:    Filling Pharmacy Fax:    Start Date: 10/25/2019

## 2019-10-27 RX ORDER — DEXAMETHASONE 4 MG/1
40 TABLET ORAL EVERY 24 HOURS
Status: CANCELLED
Start: 2019-11-15

## 2019-10-27 RX ORDER — ONDANSETRON 8 MG/1
16 TABLET, FILM COATED ORAL ONCE
Status: CANCELLED
Start: 2019-11-15

## 2019-10-27 RX ORDER — MEPERIDINE HYDROCHLORIDE 25 MG/ML
25 INJECTION INTRAMUSCULAR; INTRAVENOUS; SUBCUTANEOUS EVERY 30 MIN PRN
Status: CANCELLED | OUTPATIENT
Start: 2019-11-15

## 2019-10-27 RX ORDER — ALBUTEROL SULFATE 0.83 MG/ML
2.5 SOLUTION RESPIRATORY (INHALATION)
Status: CANCELLED | OUTPATIENT
Start: 2019-11-15

## 2019-10-27 RX ORDER — ONDANSETRON 8 MG/1
8 TABLET, FILM COATED ORAL EVERY 12 HOURS
Status: CANCELLED
Start: 2019-11-16

## 2019-10-27 RX ORDER — DIPHENHYDRAMINE HYDROCHLORIDE 50 MG/ML
50 INJECTION INTRAMUSCULAR; INTRAVENOUS
Status: CANCELLED
Start: 2019-11-15

## 2019-10-27 RX ORDER — ALLOPURINOL 300 MG/1
300 TABLET ORAL DAILY
Status: CANCELLED
Start: 2019-11-15

## 2019-10-27 RX ORDER — ALBUTEROL SULFATE 90 UG/1
1-2 AEROSOL, METERED RESPIRATORY (INHALATION)
Status: CANCELLED
Start: 2019-11-15

## 2019-10-27 RX ORDER — NALOXONE HYDROCHLORIDE 0.4 MG/ML
.1-.4 INJECTION, SOLUTION INTRAMUSCULAR; INTRAVENOUS; SUBCUTANEOUS
Status: CANCELLED | OUTPATIENT
Start: 2019-11-15

## 2019-10-27 RX ORDER — METHYLPREDNISOLONE SODIUM SUCCINATE 125 MG/2ML
125 INJECTION, POWDER, LYOPHILIZED, FOR SOLUTION INTRAMUSCULAR; INTRAVENOUS
Status: CANCELLED
Start: 2019-11-15

## 2019-10-27 RX ORDER — PREDNISOLONE ACETATE 10 MG/ML
2 SUSPENSION/ DROPS OPHTHALMIC 4 TIMES DAILY
Status: CANCELLED
Start: 2019-11-16

## 2019-10-27 RX ORDER — LORAZEPAM 2 MG/ML
.5-1 INJECTION INTRAMUSCULAR EVERY 6 HOURS PRN
Status: CANCELLED | OUTPATIENT
Start: 2019-11-15

## 2019-10-27 RX ORDER — LORAZEPAM 0.5 MG/1
.5-1 TABLET ORAL EVERY 6 HOURS PRN
Status: CANCELLED
Start: 2019-11-15

## 2019-10-27 RX ORDER — SODIUM CHLORIDE 9 MG/ML
INJECTION, SOLUTION INTRAVENOUS CONTINUOUS
Status: CANCELLED
Start: 2019-11-15

## 2019-10-27 RX ORDER — PROCHLORPERAZINE MALEATE 10 MG
10 TABLET ORAL EVERY 6 HOURS PRN
Status: CANCELLED
Start: 2019-11-15

## 2019-10-27 RX ORDER — DIPHENHYDRAMINE HCL 50 MG
50 CAPSULE ORAL ONCE
Status: CANCELLED
Start: 2019-11-15

## 2019-10-27 RX ORDER — EPINEPHRINE 1 MG/ML
0.3 INJECTION, SOLUTION, CONCENTRATE INTRAVENOUS EVERY 5 MIN PRN
Status: CANCELLED | OUTPATIENT
Start: 2019-11-15

## 2019-10-27 RX ORDER — ACETAMINOPHEN 325 MG/1
650 TABLET ORAL ONCE
Status: CANCELLED
Start: 2019-11-15

## 2019-10-27 RX ORDER — SODIUM CHLORIDE 9 MG/ML
1000 INJECTION, SOLUTION INTRAVENOUS CONTINUOUS PRN
Status: CANCELLED
Start: 2019-11-15

## 2019-11-05 ENCOUNTER — HEALTH MAINTENANCE LETTER (OUTPATIENT)
Age: 49
End: 2019-11-05

## 2019-11-06 ENCOUNTER — CARE COORDINATION (OUTPATIENT)
Dept: ONCOLOGY | Facility: CLINIC | Age: 49
End: 2019-11-06

## 2019-11-06 DIAGNOSIS — C83.18 MANTLE CELL LYMPHOMA OF LYMPH NODES OF MULTIPLE SITES (H): ICD-10-CM

## 2019-11-06 RX ORDER — ACYCLOVIR 400 MG/1
400 TABLET ORAL 2 TIMES DAILY
Qty: 60 TABLET | Refills: 3 | Status: SHIPPED | OUTPATIENT
Start: 2019-11-06 | End: 2020-06-16

## 2019-11-08 ENCOUNTER — TELEPHONE (OUTPATIENT)
Dept: ONCOLOGY | Facility: CLINIC | Age: 49
End: 2019-11-08

## 2019-11-08 NOTE — TELEPHONE ENCOUNTER
Tobacco Treatment Program at the [facility] attempted to reach Mr. Lane on 11/8/2019 regarding the tobacco cessation program to help Mr. Lane to quit smoking. We will attempt to reach Mr. Lane another time.     Angelica [facility]icas [facility]  Tobacco Treatment Specialist  PH: [phone]

## 2019-11-15 ENCOUNTER — APPOINTMENT (OUTPATIENT)
Dept: LAB | Facility: CLINIC | Age: 49
End: 2019-11-15
Attending: PHYSICIAN ASSISTANT
Payer: COMMERCIAL

## 2019-11-15 ENCOUNTER — ONCOLOGY VISIT (OUTPATIENT)
Dept: ONCOLOGY | Facility: CLINIC | Age: 49
End: 2019-11-15
Attending: PHYSICIAN ASSISTANT
Payer: COMMERCIAL

## 2019-11-15 ENCOUNTER — HOSPITAL ENCOUNTER (INPATIENT)
Facility: CLINIC | Age: 49
LOS: 2 days | Discharge: HOME | End: 2019-11-17
Attending: INTERNAL MEDICINE | Admitting: INTERNAL MEDICINE
Payer: COMMERCIAL

## 2019-11-15 VITALS
RESPIRATION RATE: 16 BRPM | WEIGHT: 165.1 LBS | SYSTOLIC BLOOD PRESSURE: 111 MMHG | OXYGEN SATURATION: 98 % | DIASTOLIC BLOOD PRESSURE: 71 MMHG | TEMPERATURE: 98 F | BODY MASS INDEX: 26.65 KG/M2 | HEART RATE: 71 BPM

## 2019-11-15 DIAGNOSIS — C83.10 MANTLE CELL LYMPHOMA, UNSPECIFIED BODY REGION (H): ICD-10-CM

## 2019-11-15 DIAGNOSIS — C83.18 MANTLE CELL LYMPHOMA OF LYMPH NODES OF MULTIPLE SITES (H): Primary | ICD-10-CM

## 2019-11-15 DIAGNOSIS — Z51.11 ADMISSION FOR CHEMOTHERAPY: ICD-10-CM

## 2019-11-15 LAB
ALBUMIN SERPL-MCNC: 3.5 G/DL (ref 3.4–5)
ALP SERPL-CCNC: 69 U/L (ref 40–150)
ALT SERPL W P-5'-P-CCNC: 20 U/L (ref 0–70)
ANION GAP SERPL CALCULATED.3IONS-SCNC: 4 MMOL/L (ref 3–14)
AST SERPL W P-5'-P-CCNC: 15 U/L (ref 0–45)
BASOPHILS # BLD AUTO: 0.1 10E9/L (ref 0–0.2)
BASOPHILS NFR BLD AUTO: 1 %
BILIRUB SERPL-MCNC: 0.5 MG/DL (ref 0.2–1.3)
BUN SERPL-MCNC: 16 MG/DL (ref 7–30)
CALCIUM SERPL-MCNC: 9 MG/DL (ref 8.5–10.1)
CHLORIDE SERPL-SCNC: 109 MMOL/L (ref 94–109)
CO2 SERPL-SCNC: 27 MMOL/L (ref 20–32)
CREAT SERPL-MCNC: 0.76 MG/DL (ref 0.66–1.25)
DIFFERENTIAL METHOD BLD: ABNORMAL
EOSINOPHIL # BLD AUTO: 0.1 10E9/L (ref 0–0.7)
EOSINOPHIL NFR BLD AUTO: 2.1 %
ERYTHROCYTE [DISTWIDTH] IN BLOOD BY AUTOMATED COUNT: 18.3 % (ref 10–15)
GFR SERPL CREATININE-BSD FRML MDRD: >90 ML/MIN/{1.73_M2}
GLUCOSE SERPL-MCNC: 83 MG/DL (ref 70–99)
HCT VFR BLD AUTO: 37 % (ref 40–53)
HGB BLD-MCNC: 12.2 G/DL (ref 13.3–17.7)
IMM GRANULOCYTES # BLD: 0 10E9/L (ref 0–0.4)
IMM GRANULOCYTES NFR BLD: 0.4 %
LYMPHOCYTES # BLD AUTO: 1.1 10E9/L (ref 0.8–5.3)
LYMPHOCYTES NFR BLD AUTO: 20 %
MAGNESIUM SERPL-MCNC: 1.9 MG/DL (ref 1.6–2.3)
MCH RBC QN AUTO: 31.7 PG (ref 26.5–33)
MCHC RBC AUTO-ENTMCNC: 33 G/DL (ref 31.5–36.5)
MCV RBC AUTO: 96 FL (ref 78–100)
MONOCYTES # BLD AUTO: 0.8 10E9/L (ref 0–1.3)
MONOCYTES NFR BLD AUTO: 14.3 %
NEUTROPHILS # BLD AUTO: 3.3 10E9/L (ref 1.6–8.3)
NEUTROPHILS NFR BLD AUTO: 62.2 %
NRBC # BLD AUTO: 0 10*3/UL
NRBC BLD AUTO-RTO: 0 /100
PLATELET # BLD AUTO: 260 10E9/L (ref 150–450)
POTASSIUM SERPL-SCNC: 4.2 MMOL/L (ref 3.4–5.3)
PROT SERPL-MCNC: 7 G/DL (ref 6.8–8.8)
RBC # BLD AUTO: 3.85 10E12/L (ref 4.4–5.9)
SODIUM SERPL-SCNC: 140 MMOL/L (ref 133–144)
WBC # BLD AUTO: 5.2 10E9/L (ref 4–11)

## 2019-11-15 PROCEDURE — 36415 COLL VENOUS BLD VENIPUNCTURE: CPT

## 2019-11-15 PROCEDURE — 3E04305 INTRODUCTION OF OTHER ANTINEOPLASTIC INTO CENTRAL VEIN, PERCUTANEOUS APPROACH: ICD-10-PCS | Performed by: INTERNAL MEDICINE

## 2019-11-15 PROCEDURE — 12000001 ZZH R&B MED SURG/OB UMMC

## 2019-11-15 PROCEDURE — 25000128 H RX IP 250 OP 636: Performed by: INTERNAL MEDICINE

## 2019-11-15 PROCEDURE — 85025 COMPLETE CBC W/AUTO DIFF WBC: CPT | Performed by: INTERNAL MEDICINE

## 2019-11-15 PROCEDURE — 80053 COMPREHEN METABOLIC PANEL: CPT | Performed by: INTERNAL MEDICINE

## 2019-11-15 PROCEDURE — 25000132 ZZH RX MED GY IP 250 OP 250 PS 637: Performed by: PHYSICIAN ASSISTANT

## 2019-11-15 PROCEDURE — 25800030 ZZH RX IP 258 OP 636: Performed by: INTERNAL MEDICINE

## 2019-11-15 PROCEDURE — 36592 COLLECT BLOOD FROM PICC: CPT | Performed by: INTERNAL MEDICINE

## 2019-11-15 PROCEDURE — 25000132 ZZH RX MED GY IP 250 OP 250 PS 637: Performed by: INTERNAL MEDICINE

## 2019-11-15 PROCEDURE — 25000128 H RX IP 250 OP 636: Performed by: PHYSICIAN ASSISTANT

## 2019-11-15 PROCEDURE — G0463 HOSPITAL OUTPT CLINIC VISIT: HCPCS | Mod: ZF

## 2019-11-15 PROCEDURE — 83735 ASSAY OF MAGNESIUM: CPT | Performed by: INTERNAL MEDICINE

## 2019-11-15 PROCEDURE — 99215 OFFICE O/P EST HI 40 MIN: CPT | Mod: ZP | Performed by: PHYSICIAN ASSISTANT

## 2019-11-15 PROCEDURE — 25000131 ZZH RX MED GY IP 250 OP 636 PS 637: Performed by: INTERNAL MEDICINE

## 2019-11-15 RX ORDER — AMOXICILLIN 250 MG
2 CAPSULE ORAL 2 TIMES DAILY
Status: DISCONTINUED | OUTPATIENT
Start: 2019-11-15 | End: 2019-11-17 | Stop reason: HOSPADM

## 2019-11-15 RX ORDER — AMOXICILLIN 250 MG
1 CAPSULE ORAL 2 TIMES DAILY
Status: DISCONTINUED | OUTPATIENT
Start: 2019-11-15 | End: 2019-11-17 | Stop reason: HOSPADM

## 2019-11-15 RX ORDER — LORAZEPAM 0.5 MG/1
.5-1 TABLET ORAL EVERY 6 HOURS PRN
Status: DISCONTINUED | OUTPATIENT
Start: 2019-11-15 | End: 2019-11-15

## 2019-11-15 RX ORDER — DIPHENHYDRAMINE HCL 50 MG
50 CAPSULE ORAL ONCE
Status: COMPLETED | OUTPATIENT
Start: 2019-11-15 | End: 2019-11-15

## 2019-11-15 RX ORDER — LIDOCAINE 40 MG/G
CREAM TOPICAL
Status: DISCONTINUED | OUTPATIENT
Start: 2019-11-15 | End: 2019-11-17 | Stop reason: HOSPADM

## 2019-11-15 RX ORDER — LORAZEPAM 2 MG/ML
.5-1 INJECTION INTRAMUSCULAR EVERY 6 HOURS PRN
Status: DISCONTINUED | OUTPATIENT
Start: 2019-11-15 | End: 2019-11-17 | Stop reason: HOSPADM

## 2019-11-15 RX ORDER — SODIUM CHLORIDE 9 MG/ML
1000 INJECTION, SOLUTION INTRAVENOUS CONTINUOUS PRN
Status: DISCONTINUED | OUTPATIENT
Start: 2019-11-15 | End: 2019-11-17 | Stop reason: HOSPADM

## 2019-11-15 RX ORDER — ACETAMINOPHEN 500 MG
1000 TABLET ORAL EVERY 6 HOURS PRN
Status: DISCONTINUED | OUTPATIENT
Start: 2019-11-15 | End: 2019-11-17 | Stop reason: HOSPADM

## 2019-11-15 RX ORDER — SODIUM CHLORIDE 9 MG/ML
INJECTION, SOLUTION INTRAVENOUS CONTINUOUS
Status: ACTIVE | OUTPATIENT
Start: 2019-11-15 | End: 2019-11-15

## 2019-11-15 RX ORDER — ACYCLOVIR 400 MG/1
400 TABLET ORAL 2 TIMES DAILY
Status: DISCONTINUED | OUTPATIENT
Start: 2019-11-15 | End: 2019-11-17 | Stop reason: HOSPADM

## 2019-11-15 RX ORDER — HEPARIN SODIUM (PORCINE) LOCK FLUSH IV SOLN 100 UNIT/ML 100 UNIT/ML
5 SOLUTION INTRAVENOUS
Status: DISCONTINUED | OUTPATIENT
Start: 2019-11-15 | End: 2019-11-17 | Stop reason: HOSPADM

## 2019-11-15 RX ORDER — ACETAMINOPHEN 325 MG/1
650 TABLET ORAL ONCE
Status: COMPLETED | OUTPATIENT
Start: 2019-11-15 | End: 2019-11-15

## 2019-11-15 RX ORDER — METHYLPREDNISOLONE SODIUM SUCCINATE 125 MG/2ML
125 INJECTION, POWDER, LYOPHILIZED, FOR SOLUTION INTRAMUSCULAR; INTRAVENOUS
Status: DISCONTINUED | OUTPATIENT
Start: 2019-11-15 | End: 2019-11-17 | Stop reason: HOSPADM

## 2019-11-15 RX ORDER — EPINEPHRINE 1 MG/ML
0.3 INJECTION, SOLUTION, CONCENTRATE INTRAVENOUS EVERY 5 MIN PRN
Status: DISCONTINUED | OUTPATIENT
Start: 2019-11-15 | End: 2019-11-17 | Stop reason: HOSPADM

## 2019-11-15 RX ORDER — ALBUTEROL SULFATE 0.83 MG/ML
2.5 SOLUTION RESPIRATORY (INHALATION)
Status: DISCONTINUED | OUTPATIENT
Start: 2019-11-15 | End: 2019-11-17 | Stop reason: HOSPADM

## 2019-11-15 RX ORDER — ONDANSETRON 8 MG/1
8 TABLET, ORALLY DISINTEGRATING ORAL EVERY 8 HOURS PRN
Status: DISCONTINUED | OUTPATIENT
Start: 2019-11-15 | End: 2019-11-15

## 2019-11-15 RX ORDER — POTASSIUM CHLORIDE 7.45 MG/ML
10 INJECTION INTRAVENOUS
Status: DISCONTINUED | OUTPATIENT
Start: 2019-11-15 | End: 2019-11-17 | Stop reason: HOSPADM

## 2019-11-15 RX ORDER — POTASSIUM CHLORIDE 1.5 G/1.58G
20-40 POWDER, FOR SOLUTION ORAL
Status: DISCONTINUED | OUTPATIENT
Start: 2019-11-15 | End: 2019-11-17 | Stop reason: HOSPADM

## 2019-11-15 RX ORDER — HEPARIN SODIUM,PORCINE 10 UNIT/ML
5-10 VIAL (ML) INTRAVENOUS EVERY 24 HOURS
Status: DISCONTINUED | OUTPATIENT
Start: 2019-11-15 | End: 2019-11-17 | Stop reason: HOSPADM

## 2019-11-15 RX ORDER — ONDANSETRON 8 MG/1
16 TABLET, FILM COATED ORAL ONCE
Status: COMPLETED | OUTPATIENT
Start: 2019-11-15 | End: 2019-11-15

## 2019-11-15 RX ORDER — DIPHENHYDRAMINE HCL 50 MG
50 CAPSULE ORAL EVERY 6 HOURS PRN
Status: DISCONTINUED | OUTPATIENT
Start: 2019-11-15 | End: 2019-11-17 | Stop reason: HOSPADM

## 2019-11-15 RX ORDER — PREDNISOLONE ACETATE 10 MG/ML
2 SUSPENSION/ DROPS OPHTHALMIC 4 TIMES DAILY
Status: DISCONTINUED | OUTPATIENT
Start: 2019-11-16 | End: 2019-11-17 | Stop reason: HOSPADM

## 2019-11-15 RX ORDER — LORAZEPAM 0.5 MG/1
.5-1 TABLET ORAL EVERY 6 HOURS PRN
Status: DISCONTINUED | OUTPATIENT
Start: 2019-11-15 | End: 2019-11-17 | Stop reason: HOSPADM

## 2019-11-15 RX ORDER — DIPHENHYDRAMINE HYDROCHLORIDE 50 MG/ML
50 INJECTION INTRAMUSCULAR; INTRAVENOUS
Status: DISCONTINUED | OUTPATIENT
Start: 2019-11-15 | End: 2019-11-17 | Stop reason: HOSPADM

## 2019-11-15 RX ORDER — POTASSIUM CL/LIDO/0.9 % NACL 10MEQ/0.1L
10 INTRAVENOUS SOLUTION, PIGGYBACK (ML) INTRAVENOUS
Status: DISCONTINUED | OUTPATIENT
Start: 2019-11-15 | End: 2019-11-17 | Stop reason: HOSPADM

## 2019-11-15 RX ORDER — ONDANSETRON 8 MG/1
8 TABLET, FILM COATED ORAL EVERY 8 HOURS PRN
Status: DISCONTINUED | OUTPATIENT
Start: 2019-11-15 | End: 2019-11-15

## 2019-11-15 RX ORDER — ALLOPURINOL 300 MG/1
300 TABLET ORAL DAILY
Status: DISCONTINUED | OUTPATIENT
Start: 2019-11-16 | End: 2019-11-17 | Stop reason: HOSPADM

## 2019-11-15 RX ORDER — MEPERIDINE HYDROCHLORIDE 25 MG/ML
25 INJECTION INTRAMUSCULAR; INTRAVENOUS; SUBCUTANEOUS EVERY 30 MIN PRN
Status: DISCONTINUED | OUTPATIENT
Start: 2019-11-15 | End: 2019-11-17 | Stop reason: HOSPADM

## 2019-11-15 RX ORDER — PROCHLORPERAZINE MALEATE 10 MG
10 TABLET ORAL EVERY 6 HOURS PRN
Status: DISCONTINUED | OUTPATIENT
Start: 2019-11-15 | End: 2019-11-17 | Stop reason: HOSPADM

## 2019-11-15 RX ORDER — DEXAMETHASONE 4 MG/1
40 TABLET ORAL EVERY 24 HOURS
Status: DISCONTINUED | OUTPATIENT
Start: 2019-11-15 | End: 2019-11-17 | Stop reason: HOSPADM

## 2019-11-15 RX ORDER — ONDANSETRON 2 MG/ML
8 INJECTION INTRAMUSCULAR; INTRAVENOUS EVERY 8 HOURS PRN
Status: DISCONTINUED | OUTPATIENT
Start: 2019-11-15 | End: 2019-11-15

## 2019-11-15 RX ORDER — POTASSIUM CHLORIDE 750 MG/1
20-40 TABLET, EXTENDED RELEASE ORAL
Status: DISCONTINUED | OUTPATIENT
Start: 2019-11-15 | End: 2019-11-17 | Stop reason: HOSPADM

## 2019-11-15 RX ORDER — POTASSIUM CHLORIDE 29.8 MG/ML
20 INJECTION INTRAVENOUS
Status: DISCONTINUED | OUTPATIENT
Start: 2019-11-15 | End: 2019-11-17 | Stop reason: HOSPADM

## 2019-11-15 RX ORDER — HEPARIN SODIUM,PORCINE 10 UNIT/ML
5-10 VIAL (ML) INTRAVENOUS
Status: DISCONTINUED | OUTPATIENT
Start: 2019-11-15 | End: 2019-11-17 | Stop reason: HOSPADM

## 2019-11-15 RX ORDER — ALLOPURINOL 300 MG/1
300 TABLET ORAL DAILY
Status: DISCONTINUED | OUTPATIENT
Start: 2019-11-15 | End: 2019-11-15

## 2019-11-15 RX ORDER — ONDANSETRON 8 MG/1
8 TABLET, FILM COATED ORAL EVERY 12 HOURS
Status: COMPLETED | OUTPATIENT
Start: 2019-11-16 | End: 2019-11-17

## 2019-11-15 RX ORDER — PROCHLORPERAZINE MALEATE 10 MG
10 TABLET ORAL EVERY 6 HOURS PRN
Status: DISCONTINUED | OUTPATIENT
Start: 2019-11-15 | End: 2019-11-15

## 2019-11-15 RX ORDER — ALBUTEROL SULFATE 90 UG/1
1-2 AEROSOL, METERED RESPIRATORY (INHALATION)
Status: DISCONTINUED | OUTPATIENT
Start: 2019-11-15 | End: 2019-11-17 | Stop reason: HOSPADM

## 2019-11-15 RX ORDER — LORAZEPAM 2 MG/ML
.5-1 INJECTION INTRAMUSCULAR EVERY 6 HOURS PRN
Status: DISCONTINUED | OUTPATIENT
Start: 2019-11-15 | End: 2019-11-15

## 2019-11-15 RX ORDER — NALOXONE HYDROCHLORIDE 0.4 MG/ML
.1-.4 INJECTION, SOLUTION INTRAMUSCULAR; INTRAVENOUS; SUBCUTANEOUS
Status: DISCONTINUED | OUTPATIENT
Start: 2019-11-15 | End: 2019-11-17 | Stop reason: HOSPADM

## 2019-11-15 RX ORDER — LORATADINE 10 MG/1
10 CAPSULE, LIQUID FILLED ORAL DAILY PRN
COMMUNITY
End: 2021-09-22

## 2019-11-15 RX ADMIN — ONDANSETRON HYDROCHLORIDE 16 MG: 8 TABLET, FILM COATED ORAL at 21:51

## 2019-11-15 RX ADMIN — FOSAPREPITANT 150 MG: 150 INJECTION, POWDER, LYOPHILIZED, FOR SOLUTION INTRAVENOUS at 21:50

## 2019-11-15 RX ADMIN — CISPLATIN 185 MG: 1 INJECTION, SOLUTION INTRAVENOUS at 22:30

## 2019-11-15 RX ADMIN — SODIUM CHLORIDE: 9 INJECTION, SOLUTION INTRAVENOUS at 17:49

## 2019-11-15 RX ADMIN — DIPHENHYDRAMINE HYDROCHLORIDE 50 MG: 50 CAPSULE ORAL at 17:05

## 2019-11-15 RX ADMIN — SENNOSIDES AND DOCUSATE SODIUM 1 TABLET: 8.6; 5 TABLET ORAL at 19:32

## 2019-11-15 RX ADMIN — DEXAMETHASONE 40 MG: 4 TABLET ORAL at 17:06

## 2019-11-15 RX ADMIN — ACYCLOVIR 400 MG: 400 TABLET ORAL at 19:32

## 2019-11-15 RX ADMIN — ACETAMINOPHEN 650 MG: 325 TABLET, FILM COATED ORAL at 17:06

## 2019-11-15 RX ADMIN — POTASSIUM CHLORIDE: 2 INJECTION, SOLUTION, CONCENTRATE INTRAVENOUS at 22:19

## 2019-11-15 RX ADMIN — OMEPRAZOLE 20 MG: 20 CAPSULE, DELAYED RELEASE ORAL at 16:26

## 2019-11-15 RX ADMIN — Medication 5 ML: at 17:10

## 2019-11-15 RX ADMIN — RITUXIMAB 700 MG: 10 INJECTION, SOLUTION INTRAVENOUS at 17:49

## 2019-11-15 ASSESSMENT — ACTIVITIES OF DAILY LIVING (ADL)
AMBULATION: 0-->INDEPENDENT
ADLS_ACUITY_SCORE: 12
ADLS_ACUITY_SCORE: 11
BATHING: 0-->INDEPENDENT
COGNITION: 0 - NO COGNITION ISSUES REPORTED
TRANSFERRING: 0-->INDEPENDENT
DRESS: 0-->INDEPENDENT
TOILETING: 0-->INDEPENDENT
SWALLOWING: 0-->SWALLOWS FOODS/LIQUIDS WITHOUT DIFFICULTY
FALL_HISTORY_WITHIN_LAST_SIX_MONTHS: NO
RETIRED_COMMUNICATION: 0-->UNDERSTANDS/COMMUNICATES WITHOUT DIFFICULTY
RETIRED_EATING: 0-->INDEPENDENT

## 2019-11-15 ASSESSMENT — MIFFLIN-ST. JEOR: SCORE: 1536.75

## 2019-11-15 ASSESSMENT — PAIN SCALES - GENERAL: PAINLEVEL: NO PAIN (0)

## 2019-11-15 NOTE — PHARMACY-ADMISSION MEDICATION HISTORY
Admission medication history interview status for the 11/15/2019 admission is complete. See Epic admission navigator for allergy information, pharmacy, prior to admission medications and immunization status.     Medication history interview sources:  patient + patient's wife + prescription bottles + pharmacy dispense history.    Changes made to PTA medication list (reason)  Added: none  Deleted:   - Triamcinolone 0.1% ointment - apply topically BID PRN for rash (pt's report)  - Lorazepam 0.5 mg tablet - take 1 tablet by mouth Q4H PRN (anxiety/n/v/ or sleep). The patient said he doesn't like to take lorazepam because it causes him nausea and 'Nils Younger's dreams'     Changed:   - Fluconazole 200 mg tablet - take 200 mg every day. Take when ANC < 1.0 -> fluconazole 100 mg - take 200 mg every day. Take when ANC < 1.0 [the patient said that he had a blood work last week and was advised to stop using fluconazole. He and his wife think they last administered fluconazole 7 days ago]    Additional medication history information (including reliability of information, actions taken by pharmacist):   - The patient and the patient's wife are good historians. They also brought in the prescription bottles.       Prior to Admission medications    Medication Sig Last Dose Taking? Auth Provider   acyclovir (ZOVIRAX) 400 MG tablet Take 1 tablet (400 mg) by mouth 2 times daily 11/15/2019 at AM Yes Mark Redding MD   allopurinol (ZYLOPRIM) 300 MG tablet Take 1 tablet (300 mg) by mouth daily 11/15/2019 at AM Yes Mark Gutierrez MD   diphenhydrAMINE (BENADRYL) 50 MG capsule Take 50 mg by mouth every 6 hours as needed for itching or allergies Past Month Yes Reported, Patient   fluconazole (DIFLUCAN) 100 MG tablet Take 200 mg by mouth daily Take when ANC < 1.0 Past Week Yes Mark Redding MD   lidocaine-prilocaine (EMLA) 2.5-2.5 % external cream Apply topically as needed for moderate pain Apply to port site 30 minutes prior to  port access 11/15/2019 Yes Molly Curry PA-C   loratadine (CLARITIN) 10 MG capsule Take 10 mg by mouth daily as needed (Take 1 day before and daily for 8 days after neulasta administration) Past Month at Unknown time Yes Unknown, Entered By History   omeprazole 20 MG tablet Take 1 tablet (20 mg) by mouth daily 11/14/2019 at AM Yes Mark Gutierrez MD   ondansetron (ZOFRAN) 4 MG tablet Take 1 tablet (4 mg) by mouth every 8 hours as needed for nausea Past Month at Unknown time Yes Molly Curry PA-C   prochlorperazine (COMPAZINE) 10 MG tablet Take 1 tablet (10 mg) by mouth every 6 hours as needed (Nausea/Vomiting)  Yes Mark Redding MD   sildenafil (VIAGRA) 100 MG tablet Take 100 mg by mouth daily as needed  More than a month at Unknown time  Reported, Patient         Medication history completed by: Thank you,     Marlee Franz, 4th year Pharmacy Student.

## 2019-11-15 NOTE — PROGRESS NOTES
[facility]  MEDICAL ONCOLOGY PROGRESS NOTE  Nov 15, 2019  Oncologist:  Dr. Redding   PA: Edyta Caro  RN: Olive Perrin    CHIEF COMPLAINT: Follow-up mantle cell    ONCOLOGY HPI:   Slava Lane is a 49 year old male with Mantle cell lymphoma.  He has Mantle cell lymphoma, Stage IV with colon, BM involvement Ki67 -30%, TP53 mutations - Negative. MCL MIPI - 5.9- Intermediate   He was on watch and wait though this was not considered feasible since he felt like his tonsils were growing. Met with Dr. Redding and decided to pursue curative intent treatment.    He started R-CHOP/R-DHAP on 9/18/19.     Treatment History:  1A R-CHOP: 9/18/2019  1B R-DHAP: 10/3  2A R-CHOP: 10/25  2B R-DHAP: 11/15    INTERVAL HISTORY/ROS:   Slava presents today prior to RDHAP.     Fatigue is better now and he has a lot more energy. He has been very active and feels like he is almost back to normal. He is working FT again. He denies any N/V or stools changes. Eating and drinking well. No f/c. Did have 1 episode of a drenching night sweat last night, otherwise no night sweats. No respiratory complaints. No neuropathy or edema.     His wife has a lot of questions about his diagnosis and treatment.     ROS: 10 point ROS neg other than the symptoms noted above in the HPI.      No Known Allergies    Current Outpatient Medications   Medication     acyclovir (ZOVIRAX) 400 MG tablet     allopurinol (ZYLOPRIM) 300 MG tablet     lidocaine-prilocaine (EMLA) 2.5-2.5 % external cream     dexamethasone (DECADRON) 4 MG tablet     diphenhydrAMINE (BENADRYL) 50 MG capsule     fluconazole (DIFLUCAN) 100 MG tablet     levofloxacin (LEVAQUIN) 250 MG tablet     loratadine (CLARITIN) 10 MG capsule     omeprazole 20 MG tablet     ondansetron (ZOFRAN) 8 MG tablet     prochlorperazine (COMPAZINE) 10 MG tablet     sildenafil (VIAGRA) 100 MG tablet     No current facility-administered medications for this visit.        EXAM:  /71 (BP Location:  Right arm, Patient Position: Sitting, Cuff Size: Adult Regular)   Pulse 71   Temp 98  F (36.7  C) (Oral)   Resp 16   Wt 74.9 kg (165 lb 1.6 oz)   SpO2 98%   BMI 26.65 kg/m    Wt Readings from Last 4 Encounters:   11/17/19 77.4 kg (170 lb 9.6 oz)   11/15/19 74.9 kg (165 lb 1.6 oz)   10/25/19 76.9 kg (169 lb 9.6 oz)   10/07/19 75.8 kg (167 lb)      General: No acute distress  HEENT: EOMI, PERRLA, no scleral icterus, mucus membranes moist and no lesions or thrush.  Lymph: Neck is supple and shows no nodes in cervical or supraclavicular. Some pain with palpation on the neck.   Heart:  RRR, no murmur, gallop or rub  Lungs: CTA-B, no wheezes, rhonchi or rales  Abdomen: Normal bowel sounds, soft, non tender, not distended, no rebound or guarding, no palpable masses  Extremities: No pitting edema  Skin: No rash  Neuro: CN II-XII are intact    LABS:    11/15/2019 10:25   Sodium 140   Potassium 4.2   Chloride 109   Carbon Dioxide 27   Urea Nitrogen 16   Creatinine 0.76   GFR Estimate >90   GFR Estimate If Black >90   Calcium 9.0   Anion Gap 4   Albumin 3.5   Protein Total 7.0   Bilirubin Total 0.5   Alkaline Phosphatase 69   ALT 20   AST 15   Glucose 83   WBC 5.2   Hemoglobin 12.2 (L)   Hematocrit 37.0 (L)   Platelet Count 260   RBC Count 3.85 (L)   MCV 96   MCH 31.7   MCHC 33.0   RDW 18.3 (H)   Diff Method Automated Method   % Neutrophils 62.2   % Lymphocytes 20.0   % Monocytes 14.3   % Eosinophils 2.1   % Basophils 1.0   % Immature Granulocytes 0.4   Nucleated RBCs 0   Absolute Neutrophil 3.3   Absolute Lymphocytes 1.1   Absolute Monocytes 0.8   Absolute Eosinophils 0.1   Absolute Basophils 0.1   Abs Immature Granulocytes 0.0   Absolute Nucleated RBC 0.0       IMAGING:   No new imaging     IMPRESSION/PLAN:  # Mantle cell lymphoma, Stage IV with colon, BM involvement   -plan is for 6 total cycles of RCHOP/RDHAP followed by ASCT and then maintenance Rituxan for 2 years  -added Neulasta due to previous neutropenic  fevers  -currently doing well. Will admit for Cycle 2 of RDHAP today   -continue allopurinol  -will do PET/CT then f/u with Dr. Redding prior to next cycle  -plan is to achieve CR and then proceed with ASCT    Ppx: Continue ACV. No need for fluconazole or levaquin since ANC < 1.0    #Nausea.   -receives aloxi and emend premeds.  -continue zofran and compazine prn. Minimal this last cycle     #Insomnia. Trial of melatonin.    # Morbilliform eruption, drug vs viral, derm consulted. Now resolved and no reoccurrence      #smoking cessation: Cutting back though still smoking. Discussed using patches vs gum. He is going to think about it.     Over 50% of >40 min visit was spent counseling and coordinating care    Nichelle Ivory PA-C  [facility]  [address], [place] [id number] [phone]

## 2019-11-15 NOTE — PROVIDER NOTIFICATION
DATE/TIME  (DOT-TD, DOT-NOW) CHEMO CHECK ACTIVITY (REGIMEN & DOSE CHECK, DAY, DOSE #, NAME OF CHEMO #1)  CHEMO DRUG #2  CHEMO DRUG #3 NAME OF RN #1 (USE DOT-ME HERE) NAME OF RN#2 (2ND RN TO LOG IN SEPARATELY)   11/15/2019  2:35 PM   R-DHAP regimen dose double check.   GIGI Servin RN     11/15/2019  5:14 PM   Rituxan Dose #1 Double Check   GIGI Tinsley RN     11/15/2019  10:09 PM   Cisplatin Dose #1 Double Check   GIGI Tinsley RN   11/16/2019  6:51 PM   Cytarabine Dose #1 Double Check   GIGI Tinsley RN     11/17/2019  10:29 AM   Dose 2 cytarabine dose check   GIGI Madsen

## 2019-11-15 NOTE — H&P
[facility], [place]    History and Physical  Hematology / Oncology     Date of Admission:  (Not on file)  Date of Service (when I saw the patient): 11/15/19    Assessment & Plan   Slava Lane is a 49 year old male with mantle cell lymphoma who presents for cycle 2B R-DHAP.    #Mantle cell lymphoma  Diagnosed on routine screening colonoscopy polyp at age 48 (5/28/2019). Stage IV with colon, BM involvement. Ki67 20%, TP53 negative. Met with Dr. Redding and decided to pursue curative intent treatment. He started R-CHOP/R-DHAP on 9/18/19 with plan for 6 total cycles then ASCT then maintenance Rituxan for 2 years. Last received cycle 2A R-CHOP on 10/25/19. He now presents for cycle 2B R-DHAP.  - Port in place  - Labs stable    Treatment Plan. Cycle 2B R-DHAP. Day 1=11/15/19.  - Rituximab 700 mg D1  - PO Dexamethsone 40 mg D1-4   - Cisplatin 100 mg/m2 D1  - Cytarabine 2 g/m2 q12 D2 x 2 doses  - Pred forte drops QID D2-10   - PRN Zofran/Compazine     Staff note:   I saw Slava Lane on 11-16-19.  Please see my 11-16 note.           #Arthralgias  Secondary to Neulasta.  - Continue Claritin     FEN  - IVF per protocol  - Regular diet    PPx  - VTE: Lovenox  - GI: Omeprazole    Code status: Full code    Dispo: Admission for chemotherapy. Will likely discharge on 11/17 pending completion of chemo.  Follow-up: Scheduled for Neulasta on 11/19 in [place]. Outpatient team to arrange clinic follow-up. Will need scripts for Omeprazole, Compazine, Zofran, Dexamethasone and Pred forte drops on discharge.     Above plan discussed staff physician, Dr. Chet Leggett ([facility]) TACOS Morel  Hematology/Oncology  Pager: [phone]    Code Status   Full Code    Primary Care Physician   ADRIANO HUNTER    Chief Complaint   Admission for chemo    History is obtained from the patient and chart review    History of Present Illness   Slava Lane is a 49 year old male with mantle cell lymphoma who presents  for cycle 2B R-DHAP. He is doing well. No acute complaints. Did have a night sweats night prior to admission. Has remained afebrile. Wife at bedside and supportive. Denies chest pain, SOB, abdominal pain, changes in bowel or bladder, LE edema.     Past Medical History    I have reviewed this patient's medical history and updated it with pertinent information if needed.   Past Medical History:   Diagnosis Date     Mantle cell lymphoma (H) 06/2019    noted incidentally on colon polyp pathology      Uncomplicated asthma     When exposed to enviromental allergies       Past Surgical History   I have reviewed this patient's surgical history and updated it with pertinent information if needed.  Past Surgical History:   Procedure Laterality Date     AS SKIN PINCH GRAFT PROCEDURE <2CM Right 2017     INSERT PORT VASCULAR ACCESS Right 10/3/2019    Procedure: Chest Port Placement;  Surgeon: July Simpson PA-C;  Location: [facility] OR     IR CHEST PORT PLACEMENT > 5 YRS OF AGE  10/3/2019     LITHOTRIPSY N/A 06/2019    unknown side       Prior to Admission Medications   Cannot display prior to admission medications because the patient has not been admitted in this contact.     Allergies   No Known Allergies    Social History   I have reviewed this patient's social history and updated it with pertinent information if needed. Slava Lane  reports that he has been smoking cigarettes. He has been smoking about 0.25 packs per day. He has quit using smokeless tobacco.  His smokeless tobacco use included chew. He reports current alcohol use of about 21.0 standard drinks of alcohol per week. He reports that he does not use drugs.    Family History   I have reviewed this patient's family history and updated it with pertinent information if needed.   Family History   Problem Relation Age of Onset     Cancer No family hx of      Bleeding Disorder No family hx of      Clotting Disorder No family hx of        Review of Systems   The 10 point  Review of Systems is negative other than noted in the HPI or here.     Physical Exam                      Vital Signs with Ranges  Temp:  [98  F (36.7  C)] 98  F (36.7  C)  Pulse:  [71] 71  Resp:  [16] 16  BP: (111)/(71) 111/71  SpO2:  [98 %] 98 %  0 lbs 0 oz    Constitutional: Pleasant male seen laying in bed. No apparent distress, and appears stated age.  Eyes: Lids and lashes normal, sclera clear, conjunctiva normal.  ENT: Normocephalic, oral cavity without erythema or exudates, gums normal and good dentition.   Respiratory: No increased work of breathing, good air exchange, clear to auscultation bilaterally, no crackles or wheezing.  Cardiovascular: Regular rate and rhythm, normal S1 and S2, and no murmur noted.  GI: No masses or scars. +BS. Soft. No tenderness on palpation.  Skin: Limited bruising, no bleeding, no redness, no warmth, no rashes, no lesions, no jaundice.  Extremities: There is no redness, warmth, or swelling of the joints. No lower extremity edema. No cyanosis.  Neurologic: Awake, alert, oriented to name, place and time.    Vascular access: Port    Data   ROUTINE IP LABS (Last four results)  BMP  Recent Labs   Lab 11/15/19  1025      POTASSIUM 4.2   CHLORIDE 109   PIEDAD 9.0   CO2 27   BUN 16   CR 0.76   GLC 83     CBC  Recent Labs   Lab 11/15/19  1025   WBC 5.2   RBC 3.85*   HGB 12.2*   HCT 37.0*   MCV 96   MCH 31.7   MCHC 33.0   RDW 18.3*        INRNo lab results found in last 7 days.

## 2019-11-15 NOTE — LETTER
RE: Slava Lane  [address]  [facility] [address]       [facility]  MEDICAL ONCOLOGY PROGRESS NOTE  Nov 15, 2019  Oncologist:  Dr. Redding   PA: Edyta Caro  RN: Olive Perrin    CHIEF COMPLAINT: Follow-up mantle cell    ONCOLOGY HPI:   Slava Lane is a 49 year old male with Mantle cell lymphoma.  He has Mantle cell lymphoma, Stage IV with colon, BM involvement Ki67 -30%, TP53 mutations - Negative. MCL MIPI - 5.9- Intermediate   He was on watch and wait though this was not considered feasible since he felt like his tonsils were growing. Met with Dr. Redding and decided to pursue curative intent treatment.    He started R-CHOP/R-DHAP on 9/18/19.     Treatment History:  1A R-CHOP: 9/18/2019  1B R-DHAP: 10/3  2A R-CHOP: 10/25  2B R-DHAP: 11/15    INTERVAL HISTORY/ROS:   Slava presents today prior to RDHAP.     Fatigue is better now and he has a lot more energy. He has been very active and feels like he is almost back to normal. He is working FT again. He denies any N/V or stools changes. Eating and drinking well. No f/c. Did have 1 episode of a drenching night sweat last night, otherwise no night sweats. No respiratory complaints. No neuropathy or edema.     His wife has a lot of questions about his diagnosis and treatment.     ROS: 10 point ROS neg other than the symptoms noted above in the HPI.      No Known Allergies    Current Outpatient Medications   Medication     acyclovir (ZOVIRAX) 400 MG tablet     allopurinol (ZYLOPRIM) 300 MG tablet     lidocaine-prilocaine (EMLA) 2.5-2.5 % external cream     dexamethasone (DECADRON) 4 MG tablet     diphenhydrAMINE (BENADRYL) 50 MG capsule     fluconazole (DIFLUCAN) 100 MG tablet     levofloxacin (LEVAQUIN) 250 MG tablet     loratadine (CLARITIN) 10 MG capsule     omeprazole 20 MG tablet     ondansetron (ZOFRAN) 8 MG tablet     prochlorperazine (COMPAZINE) 10 MG tablet     sildenafil (VIAGRA) 100 MG tablet     No current facility-administered  medications for this visit.        EXAM:  /71 (BP Location: Right arm, Patient Position: Sitting, Cuff Size: Adult Regular)   Pulse 71   Temp 98  F (36.7  C) (Oral)   Resp 16   Wt 74.9 kg (165 lb 1.6 oz)   SpO2 98%   BMI 26.65 kg/m     Wt Readings from Last 4 Encounters:   11/17/19 77.4 kg (170 lb 9.6 oz)   11/15/19 74.9 kg (165 lb 1.6 oz)   10/25/19 76.9 kg (169 lb 9.6 oz)   10/07/19 75.8 kg (167 lb)      General: No acute distress  HEENT: EOMI, PERRLA, no scleral icterus, mucus membranes moist and no lesions or thrush.  Lymph: Neck is supple and shows no nodes in cervical or supraclavicular. Some pain with palpation on the neck.   Heart:  RRR, no murmur, gallop or rub  Lungs: CTA-B, no wheezes, rhonchi or rales  Abdomen: Normal bowel sounds, soft, non tender, not distended, no rebound or guarding, no palpable masses  Extremities: No pitting edema  Skin: No rash  Neuro: CN II-XII are intact    LABS:    11/15/2019 10:25   Sodium 140   Potassium 4.2   Chloride 109   Carbon Dioxide 27   Urea Nitrogen 16   Creatinine 0.76   GFR Estimate >90   GFR Estimate If Black >90   Calcium 9.0   Anion Gap 4   Albumin 3.5   Protein Total 7.0   Bilirubin Total 0.5   Alkaline Phosphatase 69   ALT 20   AST 15   Glucose 83   WBC 5.2   Hemoglobin 12.2 (L)   Hematocrit 37.0 (L)   Platelet Count 260   RBC Count 3.85 (L)   MCV 96   MCH 31.7   MCHC 33.0   RDW 18.3 (H)   Diff Method Automated Method   % Neutrophils 62.2   % Lymphocytes 20.0   % Monocytes 14.3   % Eosinophils 2.1   % Basophils 1.0   % Immature Granulocytes 0.4   Nucleated RBCs 0   Absolute Neutrophil 3.3   Absolute Lymphocytes 1.1   Absolute Monocytes 0.8   Absolute Eosinophils 0.1   Absolute Basophils 0.1   Abs Immature Granulocytes 0.0   Absolute Nucleated RBC 0.0       IMAGING:   No new imaging     IMPRESSION/PLAN:  # Mantle cell lymphoma, Stage IV with colon, BM involvement   -plan is for 6 total cycles of RCHOP/RDHAP followed by ASCT and then maintenance  Rituxan for 2 years  -added Neulasta due to previous neutropenic fevers  -currently doing well. Will admit for Cycle 2 of RDHAP today   -continue allopurinol  -will do PET/CT then f/u with Dr. Redding prior to next cycle  -plan is to achieve CR and then proceed with ASCT    Ppx: Continue ACV. No need for fluconazole or levaquin since ANC < 1.0    #Nausea.   -receives aloxi and emend premeds.  -continue zofran and compazine prn. Minimal this last cycle     #Insomnia. Trial of melatonin.    # Morbilliform eruption, drug vs viral, derm consulted. Now resolved and no reoccurrence      #smoking cessation: Cutting back though still smoking. Discussed using patches vs gum. He is going to think about it.     Over 50% of >40 min visit was spent counseling and coordinating care    Nichelle Ivory PA-C  [facility]  [address] [place] [address] [phone]

## 2019-11-15 NOTE — NURSING NOTE
"Oncology Rooming Note    November 15, 2019 10:39 AM   Slava Lane is a 49 year old male who presents for:    Chief Complaint   Patient presents with     Blood Draw     labs drawn with vpt by rn.  vs taken     Oncology Clinic Visit     Return; Mantle Cell Lymphoma     Initial Vitals: /71 (BP Location: Right arm, Patient Position: Sitting, Cuff Size: Adult Regular)   Pulse 71   Temp 98  F (36.7  C) (Oral)   Resp 16   Wt 74.9 kg (165 lb 1.6 oz)   SpO2 98%   BMI 26.65 kg/m   Estimated body mass index is 26.65 kg/m  as calculated from the following:    Height as of 10/7/19: 1.676 m (5' 6\").    Weight as of this encounter: 74.9 kg (165 lb 1.6 oz). Body surface area is 1.87 meters squared.  No Pain (0) Comment: Data Unavailable   No LMP for male patient.  Allergies reviewed: Yes  Medications reviewed: Yes    Medications: Medication refills not needed today.  Pharmacy name entered into [email or web address]:    [facility] - [place], [place] - [address] [facility] [place], [place] - [address] [place] [address] [address]    Clinical concerns: Lab results        Jenifer Greenwood CMA              "

## 2019-11-15 NOTE — NURSING NOTE
Chief Complaint   Patient presents with     Blood Draw     labs drawn with vpt by rn.  vs taken     Labs drawn with vpt by rn.  Pt tolerated well.  VS taken.  Pt checked in for next appt.    Miranda Ricks RN

## 2019-11-16 LAB
ALBUMIN SERPL-MCNC: 3.2 G/DL (ref 3.4–5)
ALP SERPL-CCNC: 67 U/L (ref 40–150)
ALT SERPL W P-5'-P-CCNC: 18 U/L (ref 0–70)
ANION GAP SERPL CALCULATED.3IONS-SCNC: 4 MMOL/L (ref 3–14)
ANISOCYTOSIS BLD QL SMEAR: SLIGHT
AST SERPL W P-5'-P-CCNC: 10 U/L (ref 0–45)
BASOPHILS # BLD AUTO: 0 10E9/L (ref 0–0.2)
BASOPHILS NFR BLD AUTO: 0 %
BILIRUB SERPL-MCNC: 0.4 MG/DL (ref 0.2–1.3)
BUN SERPL-MCNC: 12 MG/DL (ref 7–30)
CALCIUM SERPL-MCNC: 8.6 MG/DL (ref 8.5–10.1)
CHLORIDE SERPL-SCNC: 111 MMOL/L (ref 94–109)
CO2 SERPL-SCNC: 24 MMOL/L (ref 20–32)
CREAT SERPL-MCNC: 0.73 MG/DL (ref 0.66–1.25)
DIFFERENTIAL METHOD BLD: ABNORMAL
EOSINOPHIL # BLD AUTO: 0 10E9/L (ref 0–0.7)
EOSINOPHIL NFR BLD AUTO: 0 %
ERYTHROCYTE [DISTWIDTH] IN BLOOD BY AUTOMATED COUNT: 18.1 % (ref 10–15)
GFR SERPL CREATININE-BSD FRML MDRD: >90 ML/MIN/{1.73_M2}
GLUCOSE SERPL-MCNC: 131 MG/DL (ref 70–99)
HCT VFR BLD AUTO: 33.3 % (ref 40–53)
HGB BLD-MCNC: 10.9 G/DL (ref 13.3–17.7)
LYMPHOCYTES # BLD AUTO: 0.2 10E9/L (ref 0.8–5.3)
LYMPHOCYTES NFR BLD AUTO: 3.5 %
MCH RBC QN AUTO: 31.4 PG (ref 26.5–33)
MCHC RBC AUTO-ENTMCNC: 32.7 G/DL (ref 31.5–36.5)
MCV RBC AUTO: 96 FL (ref 78–100)
MONOCYTES # BLD AUTO: 0.1 10E9/L (ref 0–1.3)
MONOCYTES NFR BLD AUTO: 0.9 %
NEUTROPHILS # BLD AUTO: 6.1 10E9/L (ref 1.6–8.3)
NEUTROPHILS NFR BLD AUTO: 95.6 %
PLATELET # BLD AUTO: 236 10E9/L (ref 150–450)
PLATELET # BLD EST: ABNORMAL 10*3/UL
POTASSIUM SERPL-SCNC: 4.1 MMOL/L (ref 3.4–5.3)
PROT SERPL-MCNC: 6.4 G/DL (ref 6.8–8.8)
RBC # BLD AUTO: 3.47 10E12/L (ref 4.4–5.9)
SODIUM SERPL-SCNC: 139 MMOL/L (ref 133–144)
WBC # BLD AUTO: 6.4 10E9/L (ref 4–11)

## 2019-11-16 PROCEDURE — 25000131 ZZH RX MED GY IP 250 OP 636 PS 637: Performed by: INTERNAL MEDICINE

## 2019-11-16 PROCEDURE — 25800030 ZZH RX IP 258 OP 636: Performed by: INTERNAL MEDICINE

## 2019-11-16 PROCEDURE — 25000128 H RX IP 250 OP 636: Performed by: INTERNAL MEDICINE

## 2019-11-16 PROCEDURE — 99231 SBSQ HOSP IP/OBS SF/LOW 25: CPT | Performed by: INTERNAL MEDICINE

## 2019-11-16 PROCEDURE — 36592 COLLECT BLOOD FROM PICC: CPT | Performed by: PHYSICIAN ASSISTANT

## 2019-11-16 PROCEDURE — 85025 COMPLETE CBC W/AUTO DIFF WBC: CPT | Performed by: PHYSICIAN ASSISTANT

## 2019-11-16 PROCEDURE — 12000001 ZZH R&B MED SURG/OB UMMC

## 2019-11-16 PROCEDURE — 25000132 ZZH RX MED GY IP 250 OP 250 PS 637: Performed by: PHYSICIAN ASSISTANT

## 2019-11-16 PROCEDURE — 80053 COMPREHEN METABOLIC PANEL: CPT | Performed by: PHYSICIAN ASSISTANT

## 2019-11-16 PROCEDURE — 25000125 ZZHC RX 250: Performed by: INTERNAL MEDICINE

## 2019-11-16 RX ORDER — NICOTINE POLACRILEX 4 MG/1
20 GUM, CHEWING ORAL DAILY
Qty: 30 TABLET | Refills: 3 | Status: ON HOLD | OUTPATIENT
Start: 2019-11-16 | End: 2020-01-03

## 2019-11-16 RX ORDER — ONDANSETRON 8 MG/1
8 TABLET, FILM COATED ORAL EVERY 8 HOURS PRN
Qty: 30 TABLET | Refills: 3 | Status: SHIPPED | OUTPATIENT
Start: 2019-11-16 | End: 2022-12-14

## 2019-11-16 RX ORDER — FLUCONAZOLE 100 MG/1
200 TABLET ORAL DAILY
Qty: 30 TABLET | Refills: 1 | COMMUNITY
Start: 2019-11-16 | End: 2020-04-14

## 2019-11-16 RX ORDER — FUROSEMIDE 10 MG/ML
20 INJECTION INTRAMUSCULAR; INTRAVENOUS ONCE
Status: COMPLETED | OUTPATIENT
Start: 2019-11-16 | End: 2019-11-16

## 2019-11-16 RX ORDER — PROCHLORPERAZINE MALEATE 10 MG
10 TABLET ORAL EVERY 6 HOURS PRN
Qty: 30 TABLET | Refills: 3 | Status: SHIPPED | OUTPATIENT
Start: 2019-11-16 | End: 2020-05-11

## 2019-11-16 RX ORDER — LEVOFLOXACIN 250 MG/1
250 TABLET, FILM COATED ORAL DAILY
Qty: 30 TABLET | Refills: 3 | Status: SHIPPED | OUTPATIENT
Start: 2019-11-18 | End: 2020-04-14

## 2019-11-16 RX ORDER — PREDNISOLONE ACETATE 10 MG/ML
2 SUSPENSION/ DROPS OPHTHALMIC 4 TIMES DAILY
Qty: 2 ML | Refills: 0 | Status: ON HOLD
Start: 2019-11-16 | End: 2020-01-03

## 2019-11-16 RX ORDER — DEXAMETHASONE 4 MG/1
40 TABLET ORAL
Qty: 10 TABLET | Refills: 0 | Status: ON HOLD | OUTPATIENT
Start: 2019-11-16 | End: 2020-01-03

## 2019-11-16 RX ADMIN — SENNOSIDES AND DOCUSATE SODIUM 1 TABLET: 8.6; 5 TABLET ORAL at 19:54

## 2019-11-16 RX ADMIN — ONDANSETRON HYDROCHLORIDE 8 MG: 8 TABLET, FILM COATED ORAL at 22:07

## 2019-11-16 RX ADMIN — ACYCLOVIR 400 MG: 400 TABLET ORAL at 08:26

## 2019-11-16 RX ADMIN — CYTARABINE 3700 MG: 100 INJECTION, SOLUTION INTRATHECAL; INTRAVENOUS; SUBCUTANEOUS at 22:55

## 2019-11-16 RX ADMIN — ACYCLOVIR 400 MG: 400 TABLET ORAL at 19:54

## 2019-11-16 RX ADMIN — POTASSIUM CHLORIDE: 2 INJECTION, SOLUTION, CONCENTRATE INTRAVENOUS at 17:58

## 2019-11-16 RX ADMIN — DEXAMETHASONE 40 MG: 4 TABLET ORAL at 16:01

## 2019-11-16 RX ADMIN — ALLOPURINOL 300 MG: 300 TABLET ORAL at 08:26

## 2019-11-16 RX ADMIN — OMEPRAZOLE 20 MG: 20 CAPSULE, DELAYED RELEASE ORAL at 08:26

## 2019-11-16 RX ADMIN — PREDNISOLONE ACETATE 2 DROP: 10 SUSPENSION/ DROPS OPHTHALMIC at 22:05

## 2019-11-16 RX ADMIN — POTASSIUM CHLORIDE: 2 INJECTION, SOLUTION, CONCENTRATE INTRAVENOUS at 09:00

## 2019-11-16 RX ADMIN — POTASSIUM CHLORIDE: 2 INJECTION, SOLUTION, CONCENTRATE INTRAVENOUS at 04:50

## 2019-11-16 RX ADMIN — FUROSEMIDE 20 MG: 10 INJECTION, SOLUTION INTRAVENOUS at 17:08

## 2019-11-16 ASSESSMENT — ACTIVITIES OF DAILY LIVING (ADL)
ADLS_ACUITY_SCORE: 11

## 2019-11-16 ASSESSMENT — MIFFLIN-ST. JEOR
SCORE: 1563.42
SCORE: 1571.59

## 2019-11-16 NOTE — PROGRESS NOTES
Nursing Focus: Chemotherapy    D: Positive blood return via Port. Insertion site is clean/dry/intact, dressing intact with no complaints of pain.  Urine output is recorded in intake in Doc Flowsheet.      I: Premedications given per order (see electronic medical administration record). Dose #1 of Cisplatin started to infuse over 24 hours. Reviewed pt teaching on chemotherapy side effects.  Pt denies need for further teaching. Chemotherapy double checked per protocol by two chemotherapy competent RN's.     A: Tolerating chemo well. Denies nausea and or pain.     P: Continue to monitor urine output and symptoms of nausea. Screen for symptoms of toxicity.

## 2019-11-16 NOTE — PROGRESS NOTES
[facility], [place]    Hematology / Oncology Progress Note    Date of Admission: 11/15/2019     Assessment & Plan   Slava Lane is a 49 year old man with mantle cell lymphoma who is admitted for cycle 2B R-DHAP.    Mantle cell lymphoma  Diagnosed on routine screening colonoscopy poly at age 48 (5/28/19). Stage IV with colon, BM involvement. Ki67 20%, TP53 negative. Met with Dr. Redding and decided to pursue curative intent treatment. He started R-CHOP/R-DHAP on 9/18/19 with plan for 6 total cycles then ASCT then maintenance Rituxan for 2 years. Last received cycle 2A R-CHOP on 10/25/19. He now presents for cycle 2B R-DHAP.    Treatment Plan. Cycle 2B R-DHAP. Day 1=11/15/19.   -Rituximab 700 mg D1  -PO Dexamethsone 40 mg D1-4   -Cisplatin 100 mg/m2 D1   -Cytarabine 2 g/m2 q12 D2 x 2 doses  -Pred forte drops QID D2-10   -PRN Zofran/Compazine     Arthralgias  Felt secondary to neulasta. None at this time. Continue claritin with neulasta if helpful, tylenol PRN.     FEN: IVF per tx plan, PRN lyte replacement, RDAT   Lines: PAC  Prophylaxis: mechanical, PPI  Consults: N/A  Code status: FULL  Disposition: Discharge home tomorrow after chemo completes  Follow-up / Referrals: Scheduled for Neulasta/labs in [place] on 11/19    Darshana Jones DNP, APRN, NP-C  Hematology/Oncology    Interval History   Doing well, tolerating chemo well with no significant side effects. Does feel bloated this AM which makes him reluctant to eat. Says he eats well when he returns home though. Denies fever, SOB, nausea, vomiting, diarrhea, constipation. No other immediate concerns. Reviewed chemo timing with RN and looks like he will be done midday tomorrow.     Physical Exam   Vitals:    11/15/19 1420 11/16/19 0851   Weight: 73.9 kg (162 lb 14.7 oz) 76.6 kg (168 lb 12.8 oz)     Vital Signs with Ranges  Temp:  [95.7  F (35.4  C)-98.5  F (36.9  C)] 96.7  F (35.9  C)  Pulse:  [77-81] 81  Heart Rate:  [74-81] 78  Resp:   [16-20] 20  BP: ()/(59-69) 95/59  SpO2:  [94 %-97 %] 96 %  I/O last 3 completed shifts:  In: 2561.6 [P.O.:400; I.V.:1400; IV Piggyback:761.6]  Out: 500 [Urine:500]    Constitutional: Pleasant man seen resting comfortably in bed in NAD. Alert and interactive.   HEENT: NCAT. Anicteric sclera. Oral mucosa pink and moist with no lesions or thrush.  Respiratory: Non-labored breathing, good air exchange, lungs clear to auscultation bilaterally. No cough or wheeze noted.   Cardiovascular: Regular rate and rhythm. No murmur or rub.   GI: Normoactive bowel sounds. Abdomen soft, slightly full/distended, non-tender.   Skin: Warm and dry. No concerning lesions or rash on exposed surfaces.  Musculoskeletal: Extremities grossly normal, non-tender, trace sock line edema. Strong peripheral pulses. Good strength and ROM in bed.   Neurologic: Alert, oriented, speech normal. No focal deficits.   Neuropsychiatric: Mentation and affect appear normal/appropriate.  Vascular Access: Chest PAC site CDI.     Medications   Current Facility-Administered Medications   Medication     - MEDICATION INSTRUCTIONS -     acetaminophen (TYLENOL) tablet 1,000 mg     acyclovir (ZOVIRAX) tablet 400 mg     albuterol (PROAIR HFA/PROVENTIL HFA/VENTOLIN HFA) 108 (90 Base) MCG/ACT inhaler 1-2 puff     albuterol (PROVENTIL) neb solution 2.5 mg     allopurinol (ZYLOPRIM) tablet 300 mg     Chemotherapy Infusing-Continuous Infusion     CISplatin (PLATINOL) 185 mg, mannitol 25 g in sodium chloride 0.9 % 2,285 mL infusion     cytarabine (PF) (CYTOSAR) 3,700 mg in D5W 312 mL infusion     dexamethasone (DECADRON) tablet 40 mg     diphenhydrAMINE (BENADRYL) capsule 50 mg     diphenhydrAMINE (BENADRYL) injection 50 mg     enoxaparin ANTICOAGULANT (LOVENOX) injection 40 mg     EPINEPHrine PF (ADRENALIN) injection 0.3 mg     [START ON 11/17/2019] filgrastim (NEUPOGEN) injection 480 mcg     heparin 100 UNIT/ML injection 5 mL     heparin lock flush 10 UNIT/ML  injection 5-10 mL     heparin lock flush 10 UNIT/ML injection 5-10 mL     influenza recomb quadrivalent PF (FLUBLOK) injection 0.5 mL     lidocaine (LMX4) cream     lidocaine 1 % 0.1-1 mL     LORazepam (ATIVAN) tablet 0.5-1 mg    Or     LORazepam (ATIVAN) injection 0.5-1 mg     Medication Instruction     meperidine (DEMEROL) injection 25 mg     methylPREDNISolone sodium succinate (solu-MEDROL) injection 125 mg     naloxone (NARCAN) injection 0.1-0.4 mg     omeprazole (priLOSEC) CR capsule 20 mg     ondansetron (ZOFRAN) tablet 8 mg     potassium chloride (KLOR-CON) Packet 20-40 mEq     potassium chloride 10 mEq in 100 mL intermittent infusion with 10 mg lidocaine     potassium chloride 10 mEq in 100 mL sterile water intermittent infusion (premix)     potassium chloride 20 mEq in 50 mL intermittent infusion     potassium chloride ER (K-DUR/KLOR-CON M) CR tablet 20-40 mEq     prednisoLONE acetate (PRED FORTE) 1 % ophthalmic susp 2 drop     prochlorperazine (COMPAZINE) tablet 10 mg    Or     prochlorperazine (COMPAZINE) injection 10 mg     senna-docusate (SENOKOT-S/PERICOLACE) 8.6-50 MG per tablet 1 tablet    Or     senna-docusate (SENOKOT-S/PERICOLACE) 8.6-50 MG per tablet 2 tablet     sodium chloride (PF) 0.9% PF flush 10-20 mL     sodium chloride (PF) 0.9% PF flush 10-20 mL     sodium chloride 0.9 % 1,000 mL with potassium chloride 20 mEq/L, magnesium sulfate 8 mEq/L infusion     sodium chloride 0.9% infusion       Data   CBC  Recent Labs   Lab 11/16/19  0628 11/15/19  1025   WBC 6.4 5.2   RBC 3.47* 3.85*   HGB 10.9* 12.2*   HCT 33.3* 37.0*   MCV 96 96   MCH 31.4 31.7   MCHC 32.7 33.0   RDW 18.1* 18.3*    260     CMP  Recent Labs   Lab 11/16/19  0628 11/15/19  1714 11/15/19  1025     --  140   POTASSIUM 4.1  --  4.2   CHLORIDE 111*  --  109   CO2 24  --  27   ANIONGAP 4  --  4   *  --  83   BUN 12  --  16   CR 0.73  --  0.76   GFRESTIMATED >90  --  >90   GFRESTBLACK >90  --  >90   PIEDAD 8.6  --  9.0  "  MAG  --  1.9  --    PROTTOTAL 6.4*  --  7.0   ALBUMIN 3.2*  --  3.5   BILITOTAL 0.4  --  0.5   ALKPHOS 67  --  69   AST 10  --  15   ALT 18  --  20     INRNo lab results found in last 7 days.    STAFF NOTE:  Slava Lane is a 49 year old man with mantle cell lymphoma who is admitted for cycle 2B R-DHAP.    Mantle cell lymphoma was diagnosed on routine screening colonoscopy poly at age 48 (5/28/19). Stage IV with colon, BM involvement. Ki67 20%, TP53 negative. Met with Dr. Redding and decided to pursue curative intent treatment. He started R-CHOP/R-DHAP on 9/18/19 with plan for 6 total cycles then ASCT then maintenance Rituxan for 2 years. Last received cycle 2A R-CHOP on 10/25/19. He now presents for cycle 2B R-DHAP.    Doing well, tolerating chemo well with no significant side effects. Does feel bloated this AM which makes him reluctant to eat. Says he eats well when he returns home though. Denies fever, SOB, nausea, vomiting, diarrhea, constipation. No other immediate concerns. Reviewed chemo timing with RN and looks like he will be done midday tomorrow.     ROS: No pain, fatigue, depression or anxiety.  The remainder of a 10 point review of systems was negative.    /63 (BP Location: Right arm)   Pulse 90   Temp 96.1  F (35.6  C) (Oral)   Resp 20   Ht 1.676 m (5' 6\")   Wt 77.4 kg (170 lb 9.6 oz)   SpO2 96%   BMI 27.54 kg/m    HEENT: Alopecia  Lymph nodes: No cervical supraclavicular subclavicular or axillary lymphadenopathy.  No inguinal lymphadenopathy.  Lungs are clear to percussion and auscultation.  Regular rate and rhythm S1-S2   Abdomen soft nontender without hepatosplenomegaly  Extremities were without edema.     Imaging and labs reviewed.     ASSESSMENT AND PLAN:  1.  Slava Lane is a 49 year old man with mantle cell lymphoma who is admitted for cycle 2B R-DHAP.    2.  Reviewed chemo timing with RN and looks like he will be done midday tomorrow.     Treatment Plan. Cycle 2B R-DHAP. Day " 1=11/15/19.   -Rituximab 700 mg D1  -PO Dexamethsone 40 mg D1-4   -Cisplatin 100 mg/m2 D1   -Cytarabine 2 g/m2 q12 D2 x 2 doses  -Pred forte drops QID D2-10   -PRN Zofran/Compazine     I spent 20 minutes with the patient more than 50% of which was in counseling and coordination of care.

## 2019-11-16 NOTE — PROGRESS NOTES
Nursing Focus: Chemotherapy    D: Positive blood return via port. Insertion site is clean/dry/intact, dressing intact with no complaints of pain.  Urine output is recorded in intake in Doc Flowsheet.      I: Premedications given per order (see electronic medical administration record). Dose #1 of Rituxan started to infuse using ramp-up method. Reviewed pt teaching on chemotherapy side effects.  Pt denies need for further teaching. Chemotherapy double checked per protocol by two chemotherapy competent RN's.     A: Tolerating chemo well. Denies nausea and or pain.     P: Continue to monitor urine output and symptoms of nausea. Screen for symptoms of toxicity.

## 2019-11-16 NOTE — PLAN OF CARE
Day1 civi  cisplatin civi without problem. No c/o n/v. No appetite.day2 cytarabine this elenita. Discharge to home tomorrow after chemo completion.up ad ruba. Afev.vss.Per Mobility Level Guideline;  Bed/chair alarm No.  Patient may ambulate independently  Patient requires the following assistive equipment:    PT/OT consult orders in place No

## 2019-11-16 NOTE — PLAN OF CARE
VSS. Afebrile. Denied pain, nausea and vomiting. CIVI Cisplatin infusing with good blood return noted Q4h. UOP adequate. Slept between cares. Today is Day 2. Patient will receive Dose #1 of Cytarabine on evening shift. Discharge Sunday pending completion of chemotherapy. Continue with POC.     Per Mobility Level Guideline;  Bed/chair alarm: No  Patient may ambulate independently.  Patient requires the following assistive equipment: Nothing  PT/OT consult orders in place: No

## 2019-11-17 VITALS
OXYGEN SATURATION: 95 % | HEART RATE: 74 BPM | SYSTOLIC BLOOD PRESSURE: 95 MMHG | RESPIRATION RATE: 20 BRPM | TEMPERATURE: 96.9 F | BODY MASS INDEX: 27.42 KG/M2 | DIASTOLIC BLOOD PRESSURE: 50 MMHG | HEIGHT: 66 IN | WEIGHT: 170.6 LBS

## 2019-11-17 LAB
ALBUMIN SERPL-MCNC: 2.8 G/DL (ref 3.4–5)
ALP SERPL-CCNC: 50 U/L (ref 40–150)
ALT SERPL W P-5'-P-CCNC: 16 U/L (ref 0–70)
ANION GAP SERPL CALCULATED.3IONS-SCNC: 4 MMOL/L (ref 3–14)
AST SERPL W P-5'-P-CCNC: 11 U/L (ref 0–45)
BASOPHILS # BLD AUTO: 0 10E9/L (ref 0–0.2)
BASOPHILS NFR BLD AUTO: 0.1 %
BILIRUB SERPL-MCNC: 0.3 MG/DL (ref 0.2–1.3)
BUN SERPL-MCNC: 11 MG/DL (ref 7–30)
CALCIUM SERPL-MCNC: 7.9 MG/DL (ref 8.5–10.1)
CHLORIDE SERPL-SCNC: 112 MMOL/L (ref 94–109)
CO2 SERPL-SCNC: 25 MMOL/L (ref 20–32)
CREAT SERPL-MCNC: 0.71 MG/DL (ref 0.66–1.25)
DIFFERENTIAL METHOD BLD: ABNORMAL
EOSINOPHIL # BLD AUTO: 0 10E9/L (ref 0–0.7)
EOSINOPHIL NFR BLD AUTO: 0 %
ERYTHROCYTE [DISTWIDTH] IN BLOOD BY AUTOMATED COUNT: 18.6 % (ref 10–15)
GFR SERPL CREATININE-BSD FRML MDRD: >90 ML/MIN/{1.73_M2}
GLUCOSE SERPL-MCNC: 135 MG/DL (ref 70–99)
HCT VFR BLD AUTO: 29.6 % (ref 40–53)
HGB BLD-MCNC: 9.6 G/DL (ref 13.3–17.7)
IMM GRANULOCYTES # BLD: 0.1 10E9/L (ref 0–0.4)
IMM GRANULOCYTES NFR BLD: 0.8 %
LYMPHOCYTES # BLD AUTO: 0.4 10E9/L (ref 0.8–5.3)
LYMPHOCYTES NFR BLD AUTO: 3 %
MCH RBC QN AUTO: 31.8 PG (ref 26.5–33)
MCHC RBC AUTO-ENTMCNC: 32.4 G/DL (ref 31.5–36.5)
MCV RBC AUTO: 98 FL (ref 78–100)
MONOCYTES # BLD AUTO: 0.1 10E9/L (ref 0–1.3)
MONOCYTES NFR BLD AUTO: 1 %
NEUTROPHILS # BLD AUTO: 12.2 10E9/L (ref 1.6–8.3)
NEUTROPHILS NFR BLD AUTO: 95.1 %
NRBC # BLD AUTO: 0 10*3/UL
NRBC BLD AUTO-RTO: 0 /100
PLATELET # BLD AUTO: 214 10E9/L (ref 150–450)
POTASSIUM SERPL-SCNC: 4.2 MMOL/L (ref 3.4–5.3)
PROT SERPL-MCNC: 5.6 G/DL (ref 6.8–8.8)
RBC # BLD AUTO: 3.02 10E12/L (ref 4.4–5.9)
SODIUM SERPL-SCNC: 141 MMOL/L (ref 133–144)
WBC # BLD AUTO: 12.8 10E9/L (ref 4–11)

## 2019-11-17 PROCEDURE — 25000132 ZZH RX MED GY IP 250 OP 250 PS 637: Performed by: PHYSICIAN ASSISTANT

## 2019-11-17 PROCEDURE — 25000128 H RX IP 250 OP 636: Performed by: PHYSICIAN ASSISTANT

## 2019-11-17 PROCEDURE — 25000128 H RX IP 250 OP 636: Performed by: NURSE PRACTITIONER

## 2019-11-17 PROCEDURE — 80053 COMPREHEN METABOLIC PANEL: CPT | Performed by: PHYSICIAN ASSISTANT

## 2019-11-17 PROCEDURE — 25000131 ZZH RX MED GY IP 250 OP 636 PS 637: Performed by: INTERNAL MEDICINE

## 2019-11-17 PROCEDURE — 85025 COMPLETE CBC W/AUTO DIFF WBC: CPT | Performed by: PHYSICIAN ASSISTANT

## 2019-11-17 PROCEDURE — 25000128 H RX IP 250 OP 636: Performed by: INTERNAL MEDICINE

## 2019-11-17 PROCEDURE — 99238 HOSP IP/OBS DSCHRG MGMT 30/<: CPT | Performed by: INTERNAL MEDICINE

## 2019-11-17 PROCEDURE — 25800030 ZZH RX IP 258 OP 636: Performed by: INTERNAL MEDICINE

## 2019-11-17 PROCEDURE — 36592 COLLECT BLOOD FROM PICC: CPT | Performed by: PHYSICIAN ASSISTANT

## 2019-11-17 RX ORDER — FUROSEMIDE 10 MG/ML
40 INJECTION INTRAMUSCULAR; INTRAVENOUS ONCE
Status: COMPLETED | OUTPATIENT
Start: 2019-11-17 | End: 2019-11-17

## 2019-11-17 RX ADMIN — PREDNISOLONE ACETATE 2 DROP: 10 SUSPENSION/ DROPS OPHTHALMIC at 12:23

## 2019-11-17 RX ADMIN — ALLOPURINOL 300 MG: 300 TABLET ORAL at 08:33

## 2019-11-17 RX ADMIN — DEXAMETHASONE 40 MG: 4 TABLET ORAL at 13:22

## 2019-11-17 RX ADMIN — OMEPRAZOLE 20 MG: 20 CAPSULE, DELAYED RELEASE ORAL at 08:33

## 2019-11-17 RX ADMIN — POTASSIUM CHLORIDE: 2 INJECTION, SOLUTION, CONCENTRATE INTRAVENOUS at 07:24

## 2019-11-17 RX ADMIN — Medication 5 ML: at 05:00

## 2019-11-17 RX ADMIN — POTASSIUM CHLORIDE: 2 INJECTION, SOLUTION, CONCENTRATE INTRAVENOUS at 00:36

## 2019-11-17 RX ADMIN — PREDNISOLONE ACETATE 2 DROP: 10 SUSPENSION/ DROPS OPHTHALMIC at 08:33

## 2019-11-17 RX ADMIN — ONDANSETRON HYDROCHLORIDE 8 MG: 8 TABLET, FILM COATED ORAL at 08:33

## 2019-11-17 RX ADMIN — CYTARABINE 3700 MG: 100 INJECTION, SOLUTION INTRATHECAL; INTRAVENOUS; SUBCUTANEOUS at 10:10

## 2019-11-17 RX ADMIN — ACYCLOVIR 400 MG: 400 TABLET ORAL at 08:33

## 2019-11-17 RX ADMIN — HEPARIN 5 ML: 100 SYRINGE at 13:21

## 2019-11-17 RX ADMIN — FUROSEMIDE 40 MG: 10 INJECTION, SOLUTION INTRAVENOUS at 10:04

## 2019-11-17 ASSESSMENT — ACTIVITIES OF DAILY LIVING (ADL)
ADLS_ACUITY_SCORE: 11

## 2019-11-17 ASSESSMENT — MIFFLIN-ST. JEOR: SCORE: 1571.59

## 2019-11-17 NOTE — DISCHARGE SUMMARY
[facility], [place]    Discharge Summary  Hematology / Oncology    Date of Admission:  11/15/2019  Date of Discharge:  11/17/2019  Discharging Provider: Darshana Jones DNP, APRN, CNP  Primary Oncologist: Dr. Redding     Discharge Diagnoses      Mantle cell lymphoma of lymph nodes of multiple sites (H)  Admission for chemotherapy    History of Present Illness   Slava Lane is a 49 year old man with mantle cell lymphoma who was admitted for cycle 2B RDHAP.     Hospital Course   Slava Lane was admitted on 11/15/2019.  The following problems were addressed during his hospitalization:    Mantle cell lymphoma  Diagnosed on routine screening colonoscopy poly at age 48 (5/28/19). Stage IV with colon, BM involvement. Ki67 20%, TP53 negative. Met with Dr. Redding and decided to pursue curative intent treatment. He started R-CHOP/R-DHAP on 9/18/19 with plan for 6 total cycles then ASCT then maintenance Rituxan for 2 years. Last received cycle 2A R-CHOP on 10/25/19. He now presents for cycle 2B R-DHAP. He is tolerating this cycle very well so far; no acute events during hospitalization.     ID prophylaxis  -Continue ACV.  -Instructed to hold off on fluconazole and levofloxacin for now since not currently neutropenic. Labs at f/u appt.     Arthralgias  Felt secondary to neulasta. None at this time. Continue claritin with neulasta if helpful, tylenol PRN.     Disposition: Discharge to [facility] then home.   Follow-up / Referrals: Scheduled for Neulasta/labs in [place] on 11/19/19.     Darshana Jones DNP, APRN, NP-C  Hematology/Oncology    Significant Results and Procedures   Results for orders placed or performed in visit on 10/03/19   IR Chest Port Placement > 5 Yrs of Age    Narrative    PRE-PROCEDURE DIAGNOSIS:  Mantle cell lymphoma of lymph nodes of  multiple sites     POST-PROCEDURE DIAGNOSIS:  Same    PROCEDURE: Chest port placement    Impression    IMPRESSION: Completed image-guided  placement of 6 Green[place]ic, 26.5 cm  single lumen power-injectable central venous port via right internal  jugular vein. Catheter tip in the high right atrium. Aspirates and  flushes freely, heparin locked and is ready for immediate use. No  complication.    ----------    CLINICAL HISTORY:  Mantle cell lymphoma of lymph nodes of multiple  sites. Chest port placement requested.    PERFORMED BY:  July Simpson PA-C    CONSENT:  The patient understood the limitations, alternatives, and  risks of the procedure and agreed to the procedure.  Written informed  consent was obtained and is documented in the patient record.    SEDATION: Monitored anesthesia care    MEDICATIONS: A 10:1 volume mixture of 1% lidocaine without epinephrine  buffered with 8.4% bicarbonate solution was available for local  anesthesia. The port was heparin locked upon completion of placement.    FLUOROSCOPY TIME: 1.1 minutes    DESCRIPTION:  The right neck and upper chest were prepped and draped  in the usual sterile fashion.      Under ultrasound guidance, the right internal jugular vein was  identified and the overlying skin was anesthetized and skin  dermatotomy was made.  Under ultrasound guidance, right internal  jugular venipuncture was made with needle.  Image saved documenting  venipuncture and patency.    Needle was exchanged over guidewire for a dilator under fluoroscopic  guidance.  Length to right atrium was measured with guidewire.   Guidewire and inner dilator were removed.  Wire was advanced into  inferior vena cava under fluoroscopic guidance and secured.     The anterior right chest skin was anesthetized and incision was made.   A subcutaneous port pocket was created using a combination of sharp  and blunt dissection.  The pocket was irrigated with saline and packed  with gauze to obtain hemostasis.  The gauze was removed.      Port catheter was subcutaneously tunneled from the anterior chest  pocket to the internal jugular venipuncture  site after path of tunnel  was anesthetized.  Catheter cut to length. The dilator was exchanged  over guidewire for a peel-away sheath.  Guidewire was removed.  Under  fluoroscopic guidance, the catheter was placed through the peel-away  sheath and positioned with its tip in the right atrium.  Peel-away  sheath was removed.      Final port and catheter position saved.  Port aspirated and flushed  freely.  The chest incision was closed with three 3-0 Vicryl  interrupted sutures, a running 4-0 Monocryl subcuticular suture, and  topical adhesive.  The skin dermatotomy site overlying the internal  jugular venipuncture was closed with topical adhesive. Port accessed  with 3/4 inch access needle. Aspirated and flushed freely and was  heparin locked before sterile dressing applied.    COMPLICATIONS:  No immediate complication. The patient remained stable  throughout the procedure and tolerated it well.    ESTIMATED BLOOD LOSS:  Minimal    SPECIMENS:  None    HORACIO AUGUSTINE PA-C       Unresulted Labs Ordered in the Past 30 Days of this Admission     No orders found from 10/16/2019 to 11/16/2019.          Code Status   Full Code    Physical Exam   Temp: 96.3  F (35.7  C) Temp src: Oral BP: 102/52 Pulse: 76   Resp: 20 SpO2: 94 % O2 Device: None (Room air)    Vitals:    11/16/19 0851 11/16/19 1533 11/17/19 0800   Weight: 76.6 kg (168 lb 12.8 oz) 77.4 kg (170 lb 9.6 oz) 77.4 kg (170 lb 9.6 oz)     Vital Signs with Ranges  Temp:  [96.1  F (35.6  C)-96.7  F (35.9  C)] 96.3  F (35.7  C)  Pulse:  [72-90] 76  Resp:  [20] 20  BP: ()/(52-68) 102/52  SpO2:  [94 %-96 %] 94 %  I/O last 3 completed shifts:  In: 5955.2 [P.O.:500; I.V.:3620; IV Piggyback:1835.2]  Out: 6125 [Urine:6125]    Constitutional: Pleasant man seen resting comfortably in bed in NAD. Alert and interactive.   HEENT: NCAT. Anicteric sclera. Oral mucosa pink and moist with no lesions or thrush.  Respiratory: Non-labored breathing, good air exchange, lungs clear to  auscultation bilaterally. No cough or wheeze noted.   Cardiovascular: Regular rate and rhythm. No murmur or rub.   GI: Normoactive bowel sounds. Abdomen soft, slightly full/distended, non-tender.   Skin: Warm and dry. No concerning lesions or rash on exposed surfaces.  Musculoskeletal: Extremities grossly normal, non-tender, trace sock line and trace to 1+ pedal edema. Strong peripheral pulses. Good strength and ROM in bed.   Neurologic: Alert, oriented, speech normal. Moves all extremities independently. No focal deficits.   Neuropsychiatric: Mentation and affect appear normal/appropriate.  Vascular Access: Chest PAC site CDI.     Discharge Disposition   Discharged to home  Condition at discharge: Stable    Consultations This Hospital Stay   None    Discharge Orders      Reason for your hospital stay    You were admitted for cycle 2B R-DHAP chemo for mantle cell lymphoma     Follow Up and recommended labs and tests    Labs and Neulasta in [place] on 11/19/19     Activity    Your activity upon discharge: Activity as tolerated. No driving or strenuous activity while taking narcotics, if having headaches/dizziness/vision changes, or if feeling generally weak or unwell.     When to contact your care team    Call the [facility] 24-hour triage line at [phone] or [phone] for temp >100.4, uncontrolled nausea/vomiting/diarrhea/constipation, unrelieved pain, bleeding not relieved with pressure, dizziness, chest pain, shortness of breath, loss of consciousness, and any new or concerning symptoms. Go to your local emergency room if you are having acute or severe symptoms that cannot wait for evaluation.     Call [phone] and ask for the care coordinator that works with your oncologist if you have questions about scans, appointments, hospital follow-up, or other concerns.     Full Code     Diet    Follow this diet upon discharge: Regular diet as tolerated. Be sure to drink plenty of non-caffeinated  beverages. Supplement your diet with high-calorie snacks or nutritional supplements (e.g. Boost, Ensure, Resource Breeze) if needed to ensure adequate calorie intake. Notify your primary provider if you have a poor appetite, are not eating well, and/or have been losing weight unintentionally.     Discharge Medications   Current Discharge Medication List      START taking these medications    Details   dexamethasone (DECADRON) 4 MG tablet Take 10 tablets (40 mg) by mouth daily (with breakfast) for 1 day  Qty: 10 tablet, Refills: 0    Associated Diagnoses: Mantle cell lymphoma of lymph nodes of multiple sites (H)      levofloxacin (LEVAQUIN) 250 MG tablet Take 1 tablet (250 mg) by mouth daily . Take until ANC >1000.  Qty: 30 tablet, Refills: 3    Associated Diagnoses: Mantle cell lymphoma, unspecified body region (H)      prednisoLONE acetate (PRED FORTE) 1 % ophthalmic suspension Place 2 drops into both eyes 4 times daily for 5 days  Qty: 2 mL, Refills: 0    Comments: Relabel inpatient bottle for outpatient use  Associated Diagnoses: Mantle cell lymphoma of lymph nodes of multiple sites (H)         CONTINUE these medications which have CHANGED    Details   fluconazole (DIFLUCAN) 100 MG tablet Take 2 tablets (200 mg) by mouth daily . Take until ANC >1000.  Qty: 30 tablet, Refills: 1    Associated Diagnoses: Mantle cell lymphoma of lymph nodes of multiple sites (H)      omeprazole 20 MG tablet Take 1 tablet (20 mg) by mouth daily  Qty: 30 tablet, Refills: 3    Associated Diagnoses: Mantle cell lymphoma of lymph nodes of multiple sites (H)      ondansetron (ZOFRAN) 8 MG tablet Take 1 tablet (8 mg) by mouth every 8 hours as needed for nausea or vomiting  Qty: 30 tablet, Refills: 3    Associated Diagnoses: Mantle cell lymphoma of lymph nodes of multiple sites (H); Admission for chemotherapy      prochlorperazine (COMPAZINE) 10 MG tablet Take 1 tablet (10 mg) by mouth every 6 hours as needed for nausea or vomiting  Qty: 30  tablet, Refills: 3    Associated Diagnoses: Mantle cell lymphoma of lymph nodes of multiple sites (H)         CONTINUE these medications which have NOT CHANGED    Details   acyclovir (ZOVIRAX) 400 MG tablet Take 1 tablet (400 mg) by mouth 2 times daily  Qty: 60 tablet, Refills: 3    Associated Diagnoses: Mantle cell lymphoma of lymph nodes of multiple sites (H)      allopurinol (ZYLOPRIM) 300 MG tablet Take 1 tablet (300 mg) by mouth daily  Qty: 60 tablet, Refills: 0    Associated Diagnoses: Mantle cell lymphoma of lymph nodes of multiple sites (H)      diphenhydrAMINE (BENADRYL) 50 MG capsule Take 50 mg by mouth every 6 hours as needed for itching or allergies      lidocaine-prilocaine (EMLA) 2.5-2.5 % external cream Apply topically as needed for moderate pain Apply to port site 30 minutes prior to port access  Qty: 30 g, Refills: 1    Associated Diagnoses: Mantle cell lymphoma of lymph nodes of multiple sites (H)      loratadine (CLARITIN) 10 MG capsule Take 10 mg by mouth daily as needed (Take 1 day before and daily for 8 days after neulasta administration)      sildenafil (VIAGRA) 100 MG tablet Take 100 mg by mouth daily as needed          STOP taking these medications       predniSONE (DELTASONE) 50 MG tablet Comments:   Reason for Stopping:             Allergies   No Known Allergies  Data   CBC  Recent Labs   Lab 11/17/19  0505 11/16/19  0628 11/15/19  1025   WBC 12.8* 6.4 5.2   RBC 3.02* 3.47* 3.85*   HGB 9.6* 10.9* 12.2*   HCT 29.6* 33.3* 37.0*   MCV 98 96 96   MCH 31.8 31.4 31.7   MCHC 32.4 32.7 33.0   RDW 18.6* 18.1* 18.3*    236 260     CMP  Recent Labs   Lab 11/17/19  0505 11/16/19  0628 11/15/19  1714 11/15/19  1025    139  --  140   POTASSIUM 4.2 4.1  --  4.2   CHLORIDE 112* 111*  --  109   CO2 25 24  --  27   ANIONGAP 4 4  --  4   * 131*  --  83   BUN 11 12  --  16   CR 0.71 0.73  --  0.76   GFRESTIMATED >90 >90  --  >90   GFRESTBLACK >90 >90  --  >90   PIEDAD 7.9* 8.6  --  9.0   MAG   "--   --  1.9  --    PROTTOTAL 5.6* 6.4*  --  7.0   ALBUMIN 2.8* 3.2*  --  3.5   BILITOTAL 0.3 0.4  --  0.5   ALKPHOS 50 67  --  69   AST 11 10  --  15   ALT 16 18  --  20     INRNo lab results found in last 7 days.    STAFF NOTE:  Slava Lane is a 49 year old man with mantle cell lymphoma who is admitted for cycle 2B R-DHAP.     Mantle cell lymphoma was diagnosed on routine screening colonoscopy poly at age 48 (5/28/19). Stage IV with colon, BM involvement. Ki67 20%, TP53 negative. Met with Dr. Redding and decided to pursue curative intent treatment. He started R-CHOP/R-DHAP on 9/18/19 with plan for 6 total cycles then ASCT then maintenance Rituxan for 2 years. Last received cycle 2A R-CHOP on 10/25/19. He now presents for cycle 2B R-DHAP.     Doing well, tolerating chemo well with no significant side effects. Does feel bloated this AM which makes him reluctant to eat. Says he eats well when he returns home though. Denies fever, SOB, nausea, vomiting, diarrhea, constipation. No other immediate concerns. Reviewed chemo timing with RN and looks like he will be done midday tomorrow.      ROS: No pain, fatigue, depression or anxiety.  The remainder of a 10 point review of systems was negative.    BP 95/50   Pulse 74   Temp 96.9  F (36.1  C) (Oral)   Resp 20   Ht 1.676 m (5' 6\")   Wt 77.4 kg (170 lb 9.6 oz)   SpO2 95%   BMI 27.54 kg/m    HEENT: Alopecia  Lymph nodes: No cervical supraclavicular subclavicular or axillary lymphadenopathy.  No inguinal lymphadenopathy.  Lungs are clear to percussion and auscultation.  Regular rate and rhythm S1-S2   Abdomen soft nontender without hepatosplenomegaly  Extremities were without edema.      Imaging and labs reviewed.      ASSESSMENT AND PLAN:  1.  Slava Lane is a 49 year old man with mantle cell lymphoma who is admitted for cycle 2B R-DHAP.    2.  Reviewed chemo timing with RN and looks like he will be done today.     Treatment Plan. Cycle 2B R-DHAP. Day 1=11/15/19. "   -Rituximab 700 mg D1  -PO Dexamethsone 40 mg D1-4   -Cisplatin 100 mg/m2 D1   -Cytarabine 2 g/m2 q12 D2 x 2 doses  -Pred forte drops QID D2-10   -PRN Zofran/Compazine   3.  Discharge today follow up with Dr. Redding.      I spent 15 minutes with the patient more than 50% of which was in counseling and coordination of care.

## 2019-11-17 NOTE — PLAN OF CARE
"AVSS on RA, afebrile. Denies pain, n/v, SOB. CIVI cisplatin completed this shift, pt now receiving dose 1 of 2 of Cytarabine. Neuros intact. Pt c/o feeling \"puffy\", weight has increased 8lbs since yesterday AM. One time dose of IV lasix given w/ ~2L of urine output. Plan on discharge tomorrow pending completion of chemo. Continue to monitor.     Per Mobility Level Guideline;  Bed/chair alarm No.  Patient may ambulate independently  Patient requires the following assistive equipment: N/A  PT/OT consult orders in place No    Nursing Focus: Chemotherapy  D: Positive blood return via Port. Insertion site is clean/dry/intact, dressing intact with no complaints of pain.  Urine output is recorded in intake in Doc Flowsheet.    I: Premedications given per order (see electronic medical administration record). Dose #1 of Cytarabine started to infuse over 3 hours. Reviewed pt teaching on chemotherapy side effects.  Pt denies need for further teaching. Chemotherapy double checked per protocol by two chemotherapy competent RN's.   A: Tolerating chemo well. Denies nausea and or pain. Neuros intact.   P: Continue to monitor urine output and symptoms of nausea. Screen for symptoms of toxicity.  "

## 2019-11-17 NOTE — PLAN OF CARE
AVSS, afebrile. Denies pain, nausea. Voiding well. Cytarabine tolerated well. Pt sleeping between cares. Plan to discharge after chemo this afternoon. Cont POC.  Per Mobility Level Guideline;  Bed/chair alarm No.  Patient may ambulate independently  Patient requires the following assistive equipment:    PT/OT consult orders in place No

## 2019-11-17 NOTE — PLAN OF CARE
Pt recived dose 2 cytarabine infusion over 3 hrs.good blood returned viaa port a cath before chemo infusion.neuro check intact. No c/o n/v.good uo.pt discharged to home with wife. Pt and wife understands meds and clinic appointment.

## 2019-11-19 ENCOUNTER — CARE COORDINATION (OUTPATIENT)
Dept: ONCOLOGY | Facility: CLINIC | Age: 49
End: 2019-11-19

## 2019-11-19 NOTE — PROGRESS NOTES
"Writer called Slava Patch with today's labs. BUN/Bili elevated. Bili has fady elevated in the past post chemotherapy and comes back down quickly. Encouraged Brannon to drink more water. He admits he went into his lab appointment \"quite dry\" today. He hs been drinking water since. He feels good. Got the Neulasta shot today. Will repeat labs on Friday of this week.    Has resource numbers to call if needed.       "

## 2019-11-26 ENCOUNTER — CARE COORDINATION (OUTPATIENT)
Dept: ONCOLOGY | Facility: CLINIC | Age: 49
End: 2019-11-26

## 2019-11-26 NOTE — PROGRESS NOTES
Writer called Brannon to go over labs and plan. He states this round has been rough, he has really felt different with this round. His bones hurt. He feels full all the time. Not really nauseated, no emesis, verge of berpy. Good bowel movements.     Plts are 22, other labs stable, no signes of bleeding except petechiae. Reviewed bleeding precautions. No fevers. [place].    12/3/2019-Slava calls to report he feels better. Friday labs are stable. He has his energy back. Looking to see what is next. Would like to see if next cycle could be next week so he could get some work in this weekend. Wrier will call him back after looking into the above.

## 2019-11-29 ENCOUNTER — TELEPHONE (OUTPATIENT)
Dept: ONCOLOGY | Facility: CLINIC | Age: 49
End: 2019-11-29

## 2019-11-29 NOTE — TELEPHONE ENCOUNTER
Olayinka called asking to speak with Nichelle to go over blood work he just had done.    Would like a call to go over them.

## 2019-12-02 NOTE — TELEPHONE ENCOUNTER
Patient states he is doing well on his own and has quit smoking. He reports working with other providers.     Tobacco Treatment Program at the [facility] attempted to reach Mr. Lane on 12/2/2019 regarding the tobacco cessation program to help Mr. Lane to quit smoking.     Angelica Chicas, [facility]  Tobacco Treatment Specialist  PH: [phone]

## 2019-12-06 ENCOUNTER — TELEPHONE (OUTPATIENT)
Dept: CARE COORDINATION | Facility: CLINIC | Age: 49
End: 2019-12-06

## 2019-12-06 NOTE — TELEPHONE ENCOUNTER
Per Patient's request,  completed and faxed [employer] request for lodging dates 12/11/2019 - 12/13/2019. [employer] will contact Patient for confirmation of reservation.  will continue to provide support as needed.    Amanda Scott [facility]  Outpatient Specialty Clinics  Direct Phone: [phone]  Pager:  [phone]

## 2019-12-10 ENCOUNTER — CARE COORDINATION (OUTPATIENT)
Dept: ONCOLOGY | Facility: CLINIC | Age: 49
End: 2019-12-10

## 2019-12-10 DIAGNOSIS — C83.18 MANTLE CELL LYMPHOMA OF LYMPH NODES OF MULTIPLE SITES (H): ICD-10-CM

## 2019-12-10 NOTE — PROGRESS NOTES
.  Today's labs called to Brannon. Look good, OK to come down for treatment later this week. Plan is for a PET on Thursday, next cycle of R-CHOP on Friday.

## 2019-12-12 ENCOUNTER — HOSPITAL ENCOUNTER (OUTPATIENT)
Dept: PET IMAGING | Facility: CLINIC | Age: 49
Discharge: HOME | End: 2019-12-12
Attending: INTERNAL MEDICINE | Admitting: INTERNAL MEDICINE
Payer: COMMERCIAL

## 2019-12-12 DIAGNOSIS — C83.18 MANTLE CELL LYMPHOMA OF LYMPH NODES OF MULTIPLE SITES (H): ICD-10-CM

## 2019-12-12 PROCEDURE — 78816 PET IMAGE W/CT FULL BODY: CPT | Mod: PS

## 2019-12-12 PROCEDURE — 34300033 ZZH RX 343: Performed by: INTERNAL MEDICINE

## 2019-12-12 PROCEDURE — 25000128 H RX IP 250 OP 636: Performed by: INTERNAL MEDICINE

## 2019-12-12 PROCEDURE — A9552 F18 FDG: HCPCS | Performed by: INTERNAL MEDICINE

## 2019-12-12 RX ADMIN — FLUDEOXYGLUCOSE F-18 10.17 MCI.: 500 INJECTION, SOLUTION INTRAVENOUS at 08:25

## 2019-12-12 RX ADMIN — Medication 500 UNITS: at 08:28

## 2019-12-13 ENCOUNTER — INFUSION THERAPY VISIT (OUTPATIENT)
Dept: ONCOLOGY | Facility: CLINIC | Age: 49
End: 2019-12-13
Attending: INTERNAL MEDICINE
Payer: COMMERCIAL

## 2019-12-13 ENCOUNTER — APPOINTMENT (OUTPATIENT)
Dept: LAB | Facility: CLINIC | Age: 49
End: 2019-12-13
Attending: INTERNAL MEDICINE
Payer: COMMERCIAL

## 2019-12-13 VITALS
HEART RATE: 65 BPM | WEIGHT: 167.7 LBS | BODY MASS INDEX: 27.07 KG/M2 | DIASTOLIC BLOOD PRESSURE: 67 MMHG | TEMPERATURE: 98.5 F | OXYGEN SATURATION: 99 % | SYSTOLIC BLOOD PRESSURE: 103 MMHG | RESPIRATION RATE: 16 BRPM

## 2019-12-13 DIAGNOSIS — C83.18 MANTLE CELL LYMPHOMA OF LYMPH NODES OF MULTIPLE SITES (H): Primary | ICD-10-CM

## 2019-12-13 LAB
ALBUMIN SERPL-MCNC: 3.6 G/DL (ref 3.4–5)
ALP SERPL-CCNC: 63 U/L (ref 40–150)
ALT SERPL W P-5'-P-CCNC: 23 U/L (ref 0–70)
ANION GAP SERPL CALCULATED.3IONS-SCNC: 4 MMOL/L (ref 3–14)
ANISOCYTOSIS BLD QL SMEAR: ABNORMAL
AST SERPL W P-5'-P-CCNC: 20 U/L (ref 0–45)
BASOPHILS # BLD AUTO: 0.1 10E9/L (ref 0–0.2)
BASOPHILS NFR BLD AUTO: 2.6 %
BILIRUB SERPL-MCNC: 0.6 MG/DL (ref 0.2–1.3)
BUN SERPL-MCNC: 17 MG/DL (ref 7–30)
CALCIUM SERPL-MCNC: 8.4 MG/DL (ref 8.5–10.1)
CHLORIDE SERPL-SCNC: 108 MMOL/L (ref 94–109)
CO2 SERPL-SCNC: 26 MMOL/L (ref 20–32)
CREAT SERPL-MCNC: 0.72 MG/DL (ref 0.66–1.25)
DACRYOCYTES BLD QL SMEAR: SLIGHT
DIFFERENTIAL METHOD BLD: ABNORMAL
EOSINOPHIL # BLD AUTO: 0.2 10E9/L (ref 0–0.7)
EOSINOPHIL NFR BLD AUTO: 7 %
ERYTHROCYTE [DISTWIDTH] IN BLOOD BY AUTOMATED COUNT: 19.5 % (ref 10–15)
GFR SERPL CREATININE-BSD FRML MDRD: >90 ML/MIN/{1.73_M2}
GLUCOSE SERPL-MCNC: 100 MG/DL (ref 70–99)
HCT VFR BLD AUTO: 34.4 % (ref 40–53)
HGB BLD-MCNC: 11.4 G/DL (ref 13.3–17.7)
LYMPHOCYTES # BLD AUTO: 1.4 10E9/L (ref 0.8–5.3)
LYMPHOCYTES NFR BLD AUTO: 38.6 %
MACROCYTES BLD QL SMEAR: PRESENT
MCH RBC QN AUTO: 33.3 PG (ref 26.5–33)
MCHC RBC AUTO-ENTMCNC: 33.1 G/DL (ref 31.5–36.5)
MCV RBC AUTO: 101 FL (ref 78–100)
MONOCYTES # BLD AUTO: 0.5 10E9/L (ref 0–1.3)
MONOCYTES NFR BLD AUTO: 14.1 %
NEUTROPHILS # BLD AUTO: 1.3 10E9/L (ref 1.6–8.3)
NEUTROPHILS NFR BLD AUTO: 37.7 %
PLATELET # BLD AUTO: 326 10E9/L (ref 150–450)
PLATELET # BLD EST: ABNORMAL 10*3/UL
POIKILOCYTOSIS BLD QL SMEAR: SLIGHT
POTASSIUM SERPL-SCNC: 4 MMOL/L (ref 3.4–5.3)
PROT SERPL-MCNC: 6.8 G/DL (ref 6.8–8.8)
RBC # BLD AUTO: 3.42 10E12/L (ref 4.4–5.9)
SODIUM SERPL-SCNC: 138 MMOL/L (ref 133–144)
WBC # BLD AUTO: 3.5 10E9/L (ref 4–11)

## 2019-12-13 PROCEDURE — 80053 COMPREHEN METABOLIC PANEL: CPT | Performed by: INTERNAL MEDICINE

## 2019-12-13 PROCEDURE — 96411 CHEMO IV PUSH ADDL DRUG: CPT

## 2019-12-13 PROCEDURE — 25800030 ZZH RX IP 258 OP 636: Mod: ZF | Performed by: INTERNAL MEDICINE

## 2019-12-13 PROCEDURE — 96367 TX/PROPH/DG ADDL SEQ IV INF: CPT

## 2019-12-13 PROCEDURE — 96377 APPLICATON ON-BODY INJECTOR: CPT | Mod: 59

## 2019-12-13 PROCEDURE — 25000132 ZZH RX MED GY IP 250 OP 250 PS 637: Mod: ZF | Performed by: INTERNAL MEDICINE

## 2019-12-13 PROCEDURE — 25000128 H RX IP 250 OP 636: Mod: ZF | Performed by: INTERNAL MEDICINE

## 2019-12-13 PROCEDURE — 85025 COMPLETE CBC W/AUTO DIFF WBC: CPT | Performed by: INTERNAL MEDICINE

## 2019-12-13 PROCEDURE — 96375 TX/PRO/DX INJ NEW DRUG ADDON: CPT

## 2019-12-13 PROCEDURE — 96413 CHEMO IV INFUSION 1 HR: CPT

## 2019-12-13 PROCEDURE — 96415 CHEMO IV INFUSION ADDL HR: CPT

## 2019-12-13 PROCEDURE — 96417 CHEMO IV INFUS EACH ADDL SEQ: CPT

## 2019-12-13 RX ORDER — DIPHENHYDRAMINE HYDROCHLORIDE 50 MG/ML
50 INJECTION INTRAMUSCULAR; INTRAVENOUS
Status: CANCELLED
Start: 2019-12-13

## 2019-12-13 RX ORDER — PREDNISONE 50 MG/1
50 TABLET ORAL 2 TIMES DAILY
Qty: 10 TABLET | Refills: 0 | Status: ON HOLD | OUTPATIENT
Start: 2019-12-13 | End: 2020-01-03

## 2019-12-13 RX ORDER — ACETAMINOPHEN 325 MG/1
650 TABLET ORAL ONCE
Status: CANCELLED | OUTPATIENT
Start: 2019-12-13

## 2019-12-13 RX ORDER — DIPHENHYDRAMINE HCL 25 MG
50 CAPSULE ORAL ONCE
Status: CANCELLED
Start: 2019-12-13

## 2019-12-13 RX ORDER — HEPARIN SODIUM (PORCINE) LOCK FLUSH IV SOLN 100 UNIT/ML 100 UNIT/ML
5 SOLUTION INTRAVENOUS ONCE
Status: COMPLETED | OUTPATIENT
Start: 2019-12-13 | End: 2019-12-13

## 2019-12-13 RX ORDER — DOXORUBICIN HYDROCHLORIDE 2 MG/ML
50 INJECTION, SOLUTION INTRAVENOUS ONCE
Status: CANCELLED | OUTPATIENT
Start: 2019-12-13

## 2019-12-13 RX ORDER — DIPHENHYDRAMINE HCL 25 MG
50 CAPSULE ORAL ONCE
Status: COMPLETED | OUTPATIENT
Start: 2019-12-13 | End: 2019-12-13

## 2019-12-13 RX ORDER — EPINEPHRINE 1 MG/ML
0.3 INJECTION, SOLUTION INTRAMUSCULAR; SUBCUTANEOUS EVERY 5 MIN PRN
Status: CANCELLED | OUTPATIENT
Start: 2019-12-13

## 2019-12-13 RX ORDER — HEPARIN SODIUM (PORCINE) LOCK FLUSH IV SOLN 100 UNIT/ML 100 UNIT/ML
500 SOLUTION INTRAVENOUS EVERY 8 HOURS
Status: CANCELLED
Start: 2019-12-13

## 2019-12-13 RX ORDER — PALONOSETRON 0.05 MG/ML
0.25 INJECTION, SOLUTION INTRAVENOUS ONCE
Status: COMPLETED | OUTPATIENT
Start: 2019-12-13 | End: 2019-12-13

## 2019-12-13 RX ORDER — METHYLPREDNISOLONE SODIUM SUCCINATE 125 MG/2ML
125 INJECTION, POWDER, LYOPHILIZED, FOR SOLUTION INTRAMUSCULAR; INTRAVENOUS
Status: CANCELLED
Start: 2019-12-13

## 2019-12-13 RX ORDER — EPINEPHRINE 0.3 MG/.3ML
0.3 INJECTION SUBCUTANEOUS EVERY 5 MIN PRN
Status: CANCELLED | OUTPATIENT
Start: 2019-12-13

## 2019-12-13 RX ORDER — NALOXONE HYDROCHLORIDE 0.4 MG/ML
.1-.4 INJECTION, SOLUTION INTRAMUSCULAR; INTRAVENOUS; SUBCUTANEOUS
Status: CANCELLED | OUTPATIENT
Start: 2019-12-13

## 2019-12-13 RX ORDER — HEPARIN SODIUM (PORCINE) LOCK FLUSH IV SOLN 100 UNIT/ML 100 UNIT/ML
500 SOLUTION INTRAVENOUS EVERY 8 HOURS
Status: DISCONTINUED | OUTPATIENT
Start: 2019-12-13 | End: 2019-12-13 | Stop reason: HOSPADM

## 2019-12-13 RX ORDER — DOXORUBICIN HYDROCHLORIDE 2 MG/ML
90 INJECTION, SOLUTION INTRAVENOUS ONCE
Status: COMPLETED | OUTPATIENT
Start: 2019-12-13 | End: 2019-12-13

## 2019-12-13 RX ORDER — SODIUM CHLORIDE 9 MG/ML
1000 INJECTION, SOLUTION INTRAVENOUS CONTINUOUS PRN
Status: CANCELLED
Start: 2019-12-13

## 2019-12-13 RX ORDER — ALBUTEROL SULFATE 0.83 MG/ML
2.5 SOLUTION RESPIRATORY (INHALATION)
Status: CANCELLED | OUTPATIENT
Start: 2019-12-13

## 2019-12-13 RX ORDER — LORAZEPAM 2 MG/ML
0.5 INJECTION INTRAMUSCULAR EVERY 4 HOURS PRN
Status: CANCELLED | OUTPATIENT
Start: 2019-12-13

## 2019-12-13 RX ORDER — ALBUTEROL SULFATE 90 UG/1
1-2 AEROSOL, METERED RESPIRATORY (INHALATION)
Status: CANCELLED
Start: 2019-12-13

## 2019-12-13 RX ORDER — PALONOSETRON 0.05 MG/ML
0.25 INJECTION, SOLUTION INTRAVENOUS ONCE
Status: CANCELLED | OUTPATIENT
Start: 2019-12-13

## 2019-12-13 RX ORDER — ACETAMINOPHEN 325 MG/1
650 TABLET ORAL ONCE
Status: COMPLETED | OUTPATIENT
Start: 2019-12-13 | End: 2019-12-13

## 2019-12-13 RX ORDER — MEPERIDINE HYDROCHLORIDE 25 MG/ML
25 INJECTION INTRAMUSCULAR; INTRAVENOUS; SUBCUTANEOUS EVERY 30 MIN PRN
Status: CANCELLED | OUTPATIENT
Start: 2019-12-13

## 2019-12-13 RX ORDER — MEPERIDINE HYDROCHLORIDE 25 MG/ML
25 INJECTION INTRAMUSCULAR; INTRAVENOUS; SUBCUTANEOUS
Status: CANCELLED
Start: 2019-12-13

## 2019-12-13 RX ORDER — HEPARIN SODIUM (PORCINE) LOCK FLUSH IV SOLN 100 UNIT/ML 100 UNIT/ML
500 SOLUTION INTRAVENOUS ONCE
Status: COMPLETED | OUTPATIENT
Start: 2019-12-13 | End: 2019-12-13

## 2019-12-13 RX ORDER — PREDNISONE 50 MG/1
50 TABLET ORAL 2 TIMES DAILY
Qty: 10 TABLET | Refills: 0 | Status: CANCELLED | OUTPATIENT
Start: 2019-12-13

## 2019-12-13 RX ADMIN — CYCLOPHOSPHAMIDE 1500 MG: 1 INJECTION, POWDER, FOR SOLUTION INTRAVENOUS; ORAL at 11:13

## 2019-12-13 RX ADMIN — PALONOSETRON HYDROCHLORIDE 0.25 MG: 0.25 INJECTION, SOLUTION INTRAVENOUS at 10:10

## 2019-12-13 RX ADMIN — SODIUM CHLORIDE 250 ML: 9 INJECTION, SOLUTION INTRAVENOUS at 10:30

## 2019-12-13 RX ADMIN — DOXORUBICIN HYDROCHLORIDE 90 MG: 2 INJECTION, SOLUTION INTRAVENOUS at 10:56

## 2019-12-13 RX ADMIN — PEGFILGRASTIM 6 MG: KIT SUBCUTANEOUS at 12:05

## 2019-12-13 RX ADMIN — FOSAPREPITANT 150 MG: 150 INJECTION, POWDER, LYOPHILIZED, FOR SOLUTION INTRAVENOUS at 10:11

## 2019-12-13 RX ADMIN — DIPHENHYDRAMINE HYDROCHLORIDE 50 MG: 25 CAPSULE ORAL at 10:09

## 2019-12-13 RX ADMIN — RITUXIMAB 700 MG: 10 INJECTION, SOLUTION INTRAVENOUS at 12:04

## 2019-12-13 RX ADMIN — HEPARIN 500 UNITS: 100 SYRINGE at 14:35

## 2019-12-13 RX ADMIN — Medication 5 ML: at 07:48

## 2019-12-13 RX ADMIN — VINCRISTINE SULFATE 2 MG: 1 INJECTION, SOLUTION INTRAVENOUS at 11:06

## 2019-12-13 RX ADMIN — ACETAMINOPHEN 650 MG: 325 TABLET ORAL at 10:10

## 2019-12-13 ASSESSMENT — PAIN SCALES - GENERAL: PAINLEVEL: NO PAIN (0)

## 2019-12-13 NOTE — PROGRESS NOTES
Infusion Nursing Note:  Slava Lane presents today for C3D1 Rituxan/Adriamycin/ Vincristine/Cylophosphamide/OnBody Neulasta.    Patient seen by provider today: No   present during visit today: Not Applicable.    Note: Patient states feeling anxious due to scan result yesterday. Seen by Dr. Al at bedside today. No new concerns made. Otherwise well.    Appointment is not made by the time patient left the facility. Instructed patient if unable to see next few days to call . Verbalized understanding.     Intravenous Access:  Implanted Port.    Treatment Conditions:  Lab Results   Component Value Date    HGB 11.4 12/13/2019     Lab Results   Component Value Date    WBC 3.5 12/13/2019      Lab Results   Component Value Date    ANEU 1.3 12/13/2019     Lab Results   Component Value Date     12/13/2019      Lab Results   Component Value Date     12/13/2019                   Lab Results   Component Value Date    POTASSIUM 4.0 12/13/2019           Lab Results   Component Value Date    MAG 1.9 11/15/2019            Lab Results   Component Value Date    CR 0.72 12/13/2019                   Lab Results   Component Value Date    PIEDAD 8.4 12/13/2019                Lab Results   Component Value Date    BILITOTAL 0.6 12/13/2019           Lab Results   Component Value Date    ALBUMIN 3.6 12/13/2019                    Lab Results   Component Value Date    ALT 23 12/13/2019           Lab Results   Component Value Date    AST 20 12/13/2019       Results reviewed, labs MET treatment parameters, ok to proceed with treatment.  ECHO/MUGA completed 9/12/19  EF 60-65%.    TORB: 12/13/19/0930H/ Mark Al MD/Dalia Serra RN/ To proceed with treatment as planned. Please let the patient know will review his scan later. No need to refill Allopurinol.    Neulasta Onpro On-Body injector applied to 12/13/19 1200h at left lower abdomen with light facing belly button.  Writer discussed Neulasta  injection would start on 12/14/19 at 1500h, approximately 27 hours after application applied today.  Verbal instruction reviewed with patient.  Pt instructed when the dose delivery starts, it will take about 45 minutes to complete.  Pt aware Neulasta Onpro On-Body should have green flashing light and to call triage or on-call MD if injector flashes red or appears to be leaking. Pt aware to keep Onpro On-Body Neulasta 4 inches away from electrical equipment and to avoid showering 4 hours prior to injection.   Neulasta Onpro Lot number: See MAR          Post Infusion Assessment:  Patient tolerated infusion without incident.  Patient tolerated injection without incident.  Blood return noted pre and post infusion.  Blood return noted during administration every 2 cc.  Site patent and intact, free from redness, edema or discomfort.  No evidence of extravasations.  Access discontinued per protocol.       Discharge Plan:   Prescription refills given for Compazine, Zofran and Dexamethasone.  Discharge instructions reviewed with: Patient and Family.  Patient and/or family verbalized understanding of discharge instructions and all questions answered.  AVS to patient via IOCST.  Patient will return in 3 weeks for next appointment.   Patient discharged in stable condition accompanied by: self and wife.  Departure Mode: Ambulatory.  Face to Face time: 5.    ROBERTO MONROY RN    Doxorubicin stopped time: 1105H

## 2019-12-13 NOTE — NURSING NOTE
"Chief Complaint   Patient presents with     Port Draw     labs drawn via port by rn. vs taken      Port accessed by RN with 20 G 3/4\" gripper needle, labs drawn from port in lab. Flushed with saline and heparin. VS taken. Pt checked into next appointment.   Mirian Ghosh, GIGI     "

## 2019-12-13 NOTE — PATIENT INSTRUCTIONS
Contact Numbers  [facility]: [phone]    After Hours:  [phone]  Triage: [phone]    Please call the [facility] Triage line if you experience a temperature greater than or equal to 100.5, shaking chills, have uncontrolled nausea, vomiting and/or diarrhea, dizziness, shortness of breath, chest pain, bleeding, unexplained bruising, or if you have any other new/concerning symptoms, questions or concerns.     If it is after hours, weekends, or holidays, please call the main hospital  at  [phone] and ask to speak to the Oncology doctor on call.     If you are having any concerning symptoms or wish to speak to a provider before your next infusion visit, please call your care coordinator or triage to notify them so we can adequately serve you.     If you need a refill on a narcotic prescription or other medication, please call triage before your infusion appointment.

## 2019-12-19 ENCOUNTER — CARE COORDINATION (OUTPATIENT)
Dept: ONCOLOGY | Facility: CLINIC | Age: 49
End: 2019-12-19

## 2019-12-19 NOTE — PROGRESS NOTES
Today's labs from [facility] called to [facility] at 0.8, he will begin Levaquin and Fluconazole. Other labs ok, BUN slightly up, encouraged him to drink more fluids. He is feeling pretty punk today, little energy. No temps, Reviewed neutropenic precautions, including good handwashing, staying away from sick people/crowds and calling temperature over 100.4 day or night. Will have repeat labs on Monday, 12/23. Writer will continue to follow.

## 2019-12-23 ENCOUNTER — CARE COORDINATION (OUTPATIENT)
Dept: ONCOLOGY | Facility: CLINIC | Age: 49
End: 2019-12-23

## 2019-12-23 NOTE — PROGRESS NOTES
Elsi called Slava Lane with today's labs, they are recovering. He is no longer neutropenic, he can stop his prophy med's. He feels better than last week.   WBC 5.6 ANC 2.5  Hgb 12.5  Plt 145   repeat labs next Monday. 12/30    Plan will be to admit for next cycle right after the New Year. 1/3/2020, Appointments requested.

## 2019-12-30 ENCOUNTER — CARE COORDINATION (OUTPATIENT)
Dept: ONCOLOGY | Facility: CLINIC | Age: 49
End: 2019-12-30

## 2019-12-30 NOTE — PROGRESS NOTES
"Slava Lane called to go over today's labs and plan for later this week. [place] look great. Brannon says he is feeing great. His only complaint is some different feeling in his feet. Left heel and scattered toes on both  feet. Denies numbness or tingling. Just more sensitive, \"feels like there is a blister coming-like when you get new boots and they are not fitting right\", although his boots are not new. He has done a lot of walking. Some slight discoloration. Good sensation, no fevers. Affected toes slight cool to touch. Talked over the above with Nichelle Ivory. She does not feel it is anything we are doing to him at this point. Last PET shows improvement, counts are good. There would be no reason for his uric acid to increase.     If it get worse or he feels he needs to have it evaluated he should see a local doctor/PCP. He is scheduled to see us here this Friday with admit for his next cycle of chemotherapy.    Above called to [facility] [place] on identified VM. Will try to reach again later this afternoon.     Called Brannon back he received my message. He expresses good understanding of the above. He will be seen locally if anything changes.     Writer requested \"[place]\" for 1/2 through 1/6.  "

## 2020-01-02 NOTE — PROGRESS NOTES
"HEMATOLOGY/ONCOLOGY PROGRESS NOTE  Berry 3, 2020    REASON FOR VISIT: follow-up of mantle cell lymphoma    DIAGNOSIS:   Slava Lane is a 49-year-old male with mantle cell lymphoma. 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 Ki67 20%, TP53 negative. He met with Dr. Redding and decided to pursue curative intent treatment with R-CHOP/R-DHAP on 9/18/2019, with plan to complete 6 cycles, followed by ASCT, followed by maintenance Rituxan for 2 years.    Treatment summary:  1A R-CHOP 9/18/2019  1B R-DHAP 10/3/2019  2A R-CHOP 10/25/2019   2B R-DHAP 11/15/2019  3A R-CHOP 12/13/2019    INTERVAL HISTORY:   Brannon is here with his wife prior to admit for cycle 3B R-DHAP.    Overall doing well. In the last week, he developed painful swelling of his third toes bilaterally, L > R, with some associated erythema. It felt like a \"blister\" without actual blister formation, had some burning, tender to touch. No other symptoms suggestive of neuropathy. It is improving on its own. He denies new shoes but reports he had been very active s/p R-CHOP given he was overall feeling well. Has a chronic dry cough from smoking, unchanged. No SOB. Reports usually a lot of nausea after R-DHAP cycles, uses compazine, did better with scheduled use but still usually has a lot of nausea. Right now, no nausea. Remainder of ROS negative as below.     Otherwise, ROS negative for: fever, chills, sweats, weight loss, weight gain, appetite change, rashes, change in vision or hearing, shortness of breath, chest pain, abdominal pain, nausea, vomiting, diarrhea, weakness, easy bleeding or bruising    Current Outpatient Medications   Medication Sig Dispense Refill     acyclovir (ZOVIRAX) 400 MG tablet Take 1 tablet (400 mg) by mouth 2 times daily 60 tablet 3     allopurinol (ZYLOPRIM) 300 MG tablet Take 1 tablet (300 mg) by mouth daily 60 tablet 0     " dexamethasone (DECADRON) 4 MG tablet Take 10 tablets (40 mg) by mouth daily (with breakfast) for 1 day 10 tablet 0     diphenhydrAMINE (BENADRYL) 50 MG capsule Take 50 mg by mouth every 6 hours as needed for itching or allergies       fluconazole (DIFLUCAN) 100 MG tablet Take 2 tablets (200 mg) by mouth daily . Take until ANC >1000. 30 tablet 1     levofloxacin (LEVAQUIN) 250 MG tablet Take 1 tablet (250 mg) by mouth daily . Take until ANC >1000. 30 tablet 3     lidocaine-prilocaine (EMLA) 2.5-2.5 % external cream Apply topically as needed for moderate pain Apply to port site 30 minutes prior to port access 30 g 1     loratadine (CLARITIN) 10 MG capsule Take 10 mg by mouth daily as needed (Take 1 day before and daily for 8 days after neulasta administration)       omeprazole 20 MG tablet Take 1 tablet (20 mg) by mouth daily 30 tablet 3     ondansetron (ZOFRAN) 8 MG tablet Take 1 tablet (8 mg) by mouth every 8 hours as needed for nausea or vomiting 30 tablet 3     prochlorperazine (COMPAZINE) 10 MG tablet Take 1 tablet (10 mg) by mouth every 6 hours as needed for nausea or vomiting 30 tablet 3     sildenafil (VIAGRA) 100 MG tablet Take 100 mg by mouth daily as needed           No Known Allergies    PHYSICAL EXAMINATION  /52   Pulse 80   Temp 97.9  F (36.6  C)   Resp 16   Wt 74.8 kg (165 lb)   SpO2 97%   BMI 26.63 kg/m    Constitutional: Alert, oriented male in no visible distress.  Eyes: PERRL. Anicteric sclerae.  ENT/Mouth: OM moist and pink without lesions or thrush.  CV: RRR, no murmurs appreciated.  Resp: CTAB throughout  Abdomen: Soft, non-tender, non-distended. Bowel sounds present. No masses appreciated. No organomegaly noted.  Extremities: No lower extremity edema appreciated.  Skin: Warm, dry. No bruising or petechiae noted.  Lymph: No cervical or supraclavicular lymphadenopathy appreciated.   Neuro: CN II-XII grossly intact.    LABS:  Results for MIKAYLA WHITFIELD (MRN [id number]) as of 1/3/2020  08:27   Ref. Range 12/13/2019 07:50 1/3/2020 08:08   WBC Latest Ref Range: 4.0 - 11.0 10e9/L 3.5 (L) 3.8 (L)   Hemoglobin Latest Ref Range: 13.3 - 17.7 g/dL 11.4 (L) 12.0 (L)   Hematocrit Latest Ref Range: 40.0 - 53.0 % 34.4 (L) 35.9 (L)   Platelet Count Latest Ref Range: 150 - 450 10e9/L 326 219   Results for SLAVA LANE (MRN [id number]) as of 1/3/2020 09:05   Ref. Range 1/3/2020 08:08   Sodium Latest Ref Range: 133 - 144 mmol/L 142   Potassium Latest Ref Range: 3.4 - 5.3 mmol/L 3.8   Chloride Latest Ref Range: 94 - 109 mmol/L 105   Carbon Dioxide Latest Ref Range: 20 - 32 mmol/L 27   Urea Nitrogen Latest Ref Range: 7 - 30 mg/dL 15   Creatinine Latest Ref Range: 0.66 - 1.25 mg/dL 0.79   GFR Estimate Latest Ref Range: >60 mL/min/1.73_m2 >90   GFR Estimate If Black Latest Ref Range: >60 mL/min/1.73_m2 >90   Calcium Latest Ref Range: 8.5 - 10.1 mg/dL 8.8   Anion Gap Latest Ref Range: 3 - 14 mmol/L 11   Albumin Latest Ref Range: 3.4 - 5.0 g/dL 3.6   Protein Total Latest Ref Range: 6.8 - 8.8 g/dL 6.9   Bilirubin Total Latest Ref Range: 0.2 - 1.3 mg/dL 0.5   Alkaline Phosphatase Latest Ref Range: 40 - 150 U/L 53   ALT Latest Ref Range: 0 - 70 U/L 28   AST Latest Ref Range: 0 - 45 U/L 27   Glucose Latest Ref Range: 70 - 99 mg/dL 110 (H)     IMAGING  PET 12/12/2019  IMPRESSION: In this patient with history of mantle cell lymphoma  currently undergoing treatment with RDHAP: Partial response by [place]  criteria:  1. Persistent area of hypermetabolic ([place] 4) soft tissue  thickening along the anterior aspect of the ascending colon. (On July  colonoscopy, cecal lesions are not reported several other polyps  proved to be lymphoma.)  2. Decrease in size and metabolic activity of mediastinal, axillary,  and superficial inguinal lymph nodes.   b. Stable enlarged hypermetabolic retroperitoneal and perirectal lymph  nodes.  c. Findings consistent with red marrow reactivation.    IMPRESSION/PLAN:  Slava Lane is a 49 year old  male with stage IV mantle cell lymphoma with BM involvement, currently undergoing curative intent treatment with R-CHOP/R-DHAP.    Mantle cell lymphoma: Initiated R-CHOP/R-DHAP on 9/18/2019, with plan to complete 6 cycles, followed by ASCT, followed by maintenance Rituxan for 2 years. PET after 2 cycles showing partial response to therapy. Here today for consideration of cycle 3B, admission for R-DHAP. Plan will likely be to repeat PET, possible colonoscopy 4-6 weeks after completion of chemo, though will need to confirm plan with Dr. Redding, currently on vacation. Our RNCC is assisting with finalizing his plan with Dr. Redding. RNCC coordinating Neulasta and labs at discharge.    ID  # Prophylaxis. Continue prophylaxis with ACV daily. Start fluconazole, Levaquin if ANC < 1.0    GI  # Nausea. Has Zofran, compazine available PRN for nausea. This historically is poorly managed after R-DHAP. Did discuss use of Zyprexa for a few days post discharge; touched base with inpatient team with this recommendation.    Psych  # Smoking cessation. Will message Angelica Chicas - she had attempted to call him back in November, but since that itme patient has resumed smoking and is open to speaking with her. Message sent.    MSK  # Pain, swelling of 3rd digit bilaterally: checking uric acid today. This is improving with minimal edema on exam. The fact that this is symmetric makes me question if this was related to ill-fitted shoes/increased activity. Will check uric acid today. No color changes or cold-induced symptoms to suggest Raynaud's, though he is at risk from this due to the vincristine, smoking. Do not think this this neuropathy given the edema and report of palpable tenderness. Asked him to monitor. If symptoms worsen again, could try warm epsom soaks.    I spent >40 minutes with the patient, with over 50% of the time spent counseling or coordinating their care as described above.      Edyta Caro PA-C  Hematology,  Oncology, and Transplant  [facility]  [address], [place] [address] [phone]

## 2020-01-03 ENCOUNTER — ONCOLOGY VISIT (OUTPATIENT)
Dept: ONCOLOGY | Facility: CLINIC | Age: 50
End: 2020-01-03
Attending: INTERNAL MEDICINE
Payer: COMMERCIAL

## 2020-01-03 ENCOUNTER — APPOINTMENT (OUTPATIENT)
Dept: LAB | Facility: CLINIC | Age: 50
End: 2020-01-03
Attending: INTERNAL MEDICINE
Payer: COMMERCIAL

## 2020-01-03 ENCOUNTER — HOSPITAL ENCOUNTER (INPATIENT)
Facility: CLINIC | Age: 50
LOS: 2 days | Discharge: HOME | End: 2020-01-05
Attending: INTERNAL MEDICINE | Admitting: INTERNAL MEDICINE
Payer: COMMERCIAL

## 2020-01-03 VITALS
RESPIRATION RATE: 16 BRPM | BODY MASS INDEX: 26.63 KG/M2 | SYSTOLIC BLOOD PRESSURE: 103 MMHG | TEMPERATURE: 97.9 F | OXYGEN SATURATION: 97 % | WEIGHT: 165 LBS | HEART RATE: 80 BPM | DIASTOLIC BLOOD PRESSURE: 52 MMHG

## 2020-01-03 DIAGNOSIS — C83.18 MANTLE CELL LYMPHOMA OF LYMPH NODES OF MULTIPLE SITES (H): ICD-10-CM

## 2020-01-03 DIAGNOSIS — C83.18 MANTLE CELL LYMPHOMA OF LYMPH NODES OF MULTIPLE SITES (H): Primary | ICD-10-CM

## 2020-01-03 DIAGNOSIS — C83.10 MANTLE CELL LYMPHOMA, UNSPECIFIED BODY REGION (H): ICD-10-CM

## 2020-01-03 LAB
ALBUMIN SERPL-MCNC: 3.6 G/DL (ref 3.4–5)
ALP SERPL-CCNC: 53 U/L (ref 40–150)
ALT SERPL W P-5'-P-CCNC: 28 U/L (ref 0–70)
ANION GAP SERPL CALCULATED.3IONS-SCNC: 11 MMOL/L (ref 3–14)
AST SERPL W P-5'-P-CCNC: 27 U/L (ref 0–45)
BASOPHILS # BLD AUTO: 0 10E9/L (ref 0–0.2)
BASOPHILS NFR BLD AUTO: 0.8 %
BILIRUB SERPL-MCNC: 0.5 MG/DL (ref 0.2–1.3)
BUN SERPL-MCNC: 15 MG/DL (ref 7–30)
CALCIUM SERPL-MCNC: 8.8 MG/DL (ref 8.5–10.1)
CHLORIDE SERPL-SCNC: 105 MMOL/L (ref 94–109)
CO2 SERPL-SCNC: 27 MMOL/L (ref 20–32)
CREAT SERPL-MCNC: 0.79 MG/DL (ref 0.66–1.25)
DIFFERENTIAL METHOD BLD: ABNORMAL
EOSINOPHIL # BLD AUTO: 0.2 10E9/L (ref 0–0.7)
EOSINOPHIL NFR BLD AUTO: 3.9 %
ERYTHROCYTE [DISTWIDTH] IN BLOOD BY AUTOMATED COUNT: 15.9 % (ref 10–15)
GFR SERPL CREATININE-BSD FRML MDRD: >90 ML/MIN/{1.73_M2}
GLUCOSE SERPL-MCNC: 110 MG/DL (ref 70–99)
HCT VFR BLD AUTO: 35.9 % (ref 40–53)
HGB BLD-MCNC: 12 G/DL (ref 13.3–17.7)
IMM GRANULOCYTES # BLD: 0 10E9/L (ref 0–0.4)
IMM GRANULOCYTES NFR BLD: 0.5 %
LYMPHOCYTES # BLD AUTO: 1.2 10E9/L (ref 0.8–5.3)
LYMPHOCYTES NFR BLD AUTO: 31.9 %
MCH RBC QN AUTO: 33.7 PG (ref 26.5–33)
MCHC RBC AUTO-ENTMCNC: 33.4 G/DL (ref 31.5–36.5)
MCV RBC AUTO: 101 FL (ref 78–100)
MONOCYTES # BLD AUTO: 0.6 10E9/L (ref 0–1.3)
MONOCYTES NFR BLD AUTO: 16 %
NEUTROPHILS # BLD AUTO: 1.8 10E9/L (ref 1.6–8.3)
NEUTROPHILS NFR BLD AUTO: 46.9 %
NRBC # BLD AUTO: 0 10*3/UL
NRBC BLD AUTO-RTO: 0 /100
PLATELET # BLD AUTO: 219 10E9/L (ref 150–450)
POTASSIUM SERPL-SCNC: 3.8 MMOL/L (ref 3.4–5.3)
PROT SERPL-MCNC: 6.9 G/DL (ref 6.8–8.8)
RBC # BLD AUTO: 3.56 10E12/L (ref 4.4–5.9)
SODIUM SERPL-SCNC: 142 MMOL/L (ref 133–144)
URATE SERPL-MCNC: 5.2 MG/DL (ref 3.5–7.2)
WBC # BLD AUTO: 3.8 10E9/L (ref 4–11)

## 2020-01-03 PROCEDURE — 25000132 ZZH RX MED GY IP 250 OP 250 PS 637: Performed by: STUDENT IN AN ORGANIZED HEALTH CARE EDUCATION/TRAINING PROGRAM

## 2020-01-03 PROCEDURE — 99215 OFFICE O/P EST HI 40 MIN: CPT | Mod: ZP | Performed by: PHYSICIAN ASSISTANT

## 2020-01-03 PROCEDURE — 85025 COMPLETE CBC W/AUTO DIFF WBC: CPT | Performed by: INTERNAL MEDICINE

## 2020-01-03 PROCEDURE — 84550 ASSAY OF BLOOD/URIC ACID: CPT | Performed by: INTERNAL MEDICINE

## 2020-01-03 PROCEDURE — 25000131 ZZH RX MED GY IP 250 OP 636 PS 637: Performed by: PHYSICIAN ASSISTANT

## 2020-01-03 PROCEDURE — 25000128 H RX IP 250 OP 636: Mod: ZF | Performed by: PHYSICIAN ASSISTANT

## 2020-01-03 PROCEDURE — 3E04305 INTRODUCTION OF OTHER ANTINEOPLASTIC INTO CENTRAL VEIN, PERCUTANEOUS APPROACH: ICD-10-PCS | Performed by: INTERNAL MEDICINE

## 2020-01-03 PROCEDURE — 25000132 ZZH RX MED GY IP 250 OP 250 PS 637: Performed by: PHYSICIAN ASSISTANT

## 2020-01-03 PROCEDURE — 25800030 ZZH RX IP 258 OP 636: Performed by: PHYSICIAN ASSISTANT

## 2020-01-03 PROCEDURE — 25000125 ZZHC RX 250: Performed by: STUDENT IN AN ORGANIZED HEALTH CARE EDUCATION/TRAINING PROGRAM

## 2020-01-03 PROCEDURE — 80053 COMPREHEN METABOLIC PANEL: CPT | Performed by: INTERNAL MEDICINE

## 2020-01-03 PROCEDURE — 12000001 ZZH R&B MED SURG/OB UMMC

## 2020-01-03 PROCEDURE — G0463 HOSPITAL OUTPT CLINIC VISIT: HCPCS | Mod: ZF

## 2020-01-03 PROCEDURE — 25000128 H RX IP 250 OP 636: Performed by: PHYSICIAN ASSISTANT

## 2020-01-03 RX ORDER — ONDANSETRON 8 MG/1
16 TABLET, FILM COATED ORAL ONCE
Status: CANCELLED
Start: 2020-01-03

## 2020-01-03 RX ORDER — LORAZEPAM 2 MG/ML
.5-1 INJECTION INTRAMUSCULAR EVERY 6 HOURS PRN
Status: CANCELLED | OUTPATIENT
Start: 2020-01-03

## 2020-01-03 RX ORDER — LORAZEPAM 0.5 MG/1
.5-1 TABLET ORAL EVERY 6 HOURS PRN
Status: CANCELLED
Start: 2020-01-03

## 2020-01-03 RX ORDER — HEPARIN SODIUM (PORCINE) LOCK FLUSH IV SOLN 100 UNIT/ML 100 UNIT/ML
5 SOLUTION INTRAVENOUS EVERY 8 HOURS
Status: DISCONTINUED | OUTPATIENT
Start: 2020-01-03 | End: 2020-01-03 | Stop reason: HOSPADM

## 2020-01-03 RX ORDER — ONDANSETRON 8 MG/1
8 TABLET, FILM COATED ORAL EVERY 12 HOURS
Status: CANCELLED
Start: 2020-01-04

## 2020-01-03 RX ORDER — EPINEPHRINE 1 MG/ML
0.3 INJECTION, SOLUTION INTRAMUSCULAR; SUBCUTANEOUS EVERY 5 MIN PRN
Status: CANCELLED | OUTPATIENT
Start: 2020-01-03

## 2020-01-03 RX ORDER — LIDOCAINE HYDROCHLORIDE 20 MG/ML
JELLY TOPICAL EVERY 4 HOURS PRN
Status: DISCONTINUED | OUTPATIENT
Start: 2020-01-03 | End: 2020-01-05 | Stop reason: HOSPADM

## 2020-01-03 RX ORDER — PREDNISOLONE ACETATE 10 MG/ML
2 SUSPENSION/ DROPS OPHTHALMIC 4 TIMES DAILY
Status: CANCELLED
Start: 2020-01-04

## 2020-01-03 RX ORDER — PROCHLORPERAZINE MALEATE 10 MG
10 TABLET ORAL EVERY 6 HOURS PRN
Status: DISCONTINUED | OUTPATIENT
Start: 2020-01-03 | End: 2020-01-05 | Stop reason: HOSPADM

## 2020-01-03 RX ORDER — DIPHENHYDRAMINE HCL 50 MG
50 CAPSULE ORAL ONCE
Status: COMPLETED | OUTPATIENT
Start: 2020-01-03 | End: 2020-01-03

## 2020-01-03 RX ORDER — ONDANSETRON 8 MG/1
16 TABLET, FILM COATED ORAL ONCE
Status: COMPLETED | OUTPATIENT
Start: 2020-01-03 | End: 2020-01-03

## 2020-01-03 RX ORDER — NALOXONE HYDROCHLORIDE 0.4 MG/ML
.1-.4 INJECTION, SOLUTION INTRAMUSCULAR; INTRAVENOUS; SUBCUTANEOUS
Status: DISCONTINUED | OUTPATIENT
Start: 2020-01-03 | End: 2020-01-05 | Stop reason: HOSPADM

## 2020-01-03 RX ORDER — HEPARIN SODIUM,PORCINE 10 UNIT/ML
5-10 VIAL (ML) INTRAVENOUS
Status: DISCONTINUED | OUTPATIENT
Start: 2020-01-03 | End: 2020-01-05 | Stop reason: HOSPADM

## 2020-01-03 RX ORDER — SODIUM CHLORIDE 9 MG/ML
INJECTION, SOLUTION INTRAVENOUS CONTINUOUS
Status: CANCELLED
Start: 2020-01-03

## 2020-01-03 RX ORDER — ALBUTEROL SULFATE 90 UG/1
1-2 AEROSOL, METERED RESPIRATORY (INHALATION)
Status: DISCONTINUED | OUTPATIENT
Start: 2020-01-03 | End: 2020-01-05 | Stop reason: HOSPADM

## 2020-01-03 RX ORDER — ALBUTEROL SULFATE 90 UG/1
1-2 AEROSOL, METERED RESPIRATORY (INHALATION)
Status: CANCELLED
Start: 2020-01-03

## 2020-01-03 RX ORDER — HEPARIN SODIUM (PORCINE) LOCK FLUSH IV SOLN 100 UNIT/ML 100 UNIT/ML
5 SOLUTION INTRAVENOUS
Status: DISCONTINUED | OUTPATIENT
Start: 2020-01-03 | End: 2020-01-05 | Stop reason: HOSPADM

## 2020-01-03 RX ORDER — EPINEPHRINE 1 MG/ML
0.3 INJECTION, SOLUTION, CONCENTRATE INTRAVENOUS EVERY 5 MIN PRN
Status: DISCONTINUED | OUTPATIENT
Start: 2020-01-03 | End: 2020-01-05 | Stop reason: HOSPADM

## 2020-01-03 RX ORDER — METHYLPREDNISOLONE SODIUM SUCCINATE 125 MG/2ML
125 INJECTION, POWDER, LYOPHILIZED, FOR SOLUTION INTRAMUSCULAR; INTRAVENOUS
Status: CANCELLED
Start: 2020-01-03

## 2020-01-03 RX ORDER — ALLOPURINOL 300 MG/1
300 TABLET ORAL DAILY
Status: CANCELLED
Start: 2020-01-03

## 2020-01-03 RX ORDER — HEPARIN SODIUM,PORCINE 10 UNIT/ML
5-10 VIAL (ML) INTRAVENOUS EVERY 24 HOURS
Status: DISCONTINUED | OUTPATIENT
Start: 2020-01-03 | End: 2020-01-05 | Stop reason: HOSPADM

## 2020-01-03 RX ORDER — SODIUM CHLORIDE 9 MG/ML
INJECTION, SOLUTION INTRAVENOUS CONTINUOUS
Status: ACTIVE | OUTPATIENT
Start: 2020-01-03 | End: 2020-01-03

## 2020-01-03 RX ORDER — ACETAMINOPHEN 325 MG/1
650 TABLET ORAL ONCE
Status: COMPLETED | OUTPATIENT
Start: 2020-01-03 | End: 2020-01-03

## 2020-01-03 RX ORDER — MEPERIDINE HYDROCHLORIDE 25 MG/ML
25 INJECTION INTRAMUSCULAR; INTRAVENOUS; SUBCUTANEOUS EVERY 30 MIN PRN
Status: DISCONTINUED | OUTPATIENT
Start: 2020-01-03 | End: 2020-01-05 | Stop reason: HOSPADM

## 2020-01-03 RX ORDER — ACYCLOVIR 400 MG/1
400 TABLET ORAL 2 TIMES DAILY
Status: DISCONTINUED | OUTPATIENT
Start: 2020-01-03 | End: 2020-01-05 | Stop reason: HOSPADM

## 2020-01-03 RX ORDER — SODIUM CHLORIDE 9 MG/ML
1000 INJECTION, SOLUTION INTRAVENOUS CONTINUOUS PRN
Status: CANCELLED
Start: 2020-01-03

## 2020-01-03 RX ORDER — ACETAMINOPHEN 325 MG/1
650 TABLET ORAL ONCE
Status: CANCELLED
Start: 2020-01-03

## 2020-01-03 RX ORDER — DIPHENHYDRAMINE HCL 25 MG
50 CAPSULE ORAL ONCE
Status: CANCELLED
Start: 2020-01-03

## 2020-01-03 RX ORDER — LORAZEPAM 2 MG/ML
.5-1 INJECTION INTRAMUSCULAR EVERY 6 HOURS PRN
Status: DISCONTINUED | OUTPATIENT
Start: 2020-01-03 | End: 2020-01-05 | Stop reason: HOSPADM

## 2020-01-03 RX ORDER — DIPHENHYDRAMINE HYDROCHLORIDE 50 MG/ML
50 INJECTION INTRAMUSCULAR; INTRAVENOUS
Status: CANCELLED
Start: 2020-01-03

## 2020-01-03 RX ORDER — DEXAMETHASONE 4 MG/1
40 TABLET ORAL EVERY 24 HOURS
Status: CANCELLED
Start: 2020-01-03

## 2020-01-03 RX ORDER — PROCHLORPERAZINE MALEATE 10 MG
10 TABLET ORAL EVERY 6 HOURS PRN
Status: CANCELLED
Start: 2020-01-03

## 2020-01-03 RX ORDER — MEPERIDINE HYDROCHLORIDE 25 MG/ML
25 INJECTION INTRAMUSCULAR; INTRAVENOUS; SUBCUTANEOUS EVERY 30 MIN PRN
Status: CANCELLED | OUTPATIENT
Start: 2020-01-03

## 2020-01-03 RX ORDER — ALBUTEROL SULFATE 0.83 MG/ML
2.5 SOLUTION RESPIRATORY (INHALATION)
Status: DISCONTINUED | OUTPATIENT
Start: 2020-01-03 | End: 2020-01-05 | Stop reason: HOSPADM

## 2020-01-03 RX ORDER — LIDOCAINE 40 MG/G
CREAM TOPICAL
Status: COMPLETED | OUTPATIENT
Start: 2020-01-03 | End: 2020-01-03

## 2020-01-03 RX ORDER — ALBUTEROL SULFATE 0.83 MG/ML
2.5 SOLUTION RESPIRATORY (INHALATION)
Status: CANCELLED | OUTPATIENT
Start: 2020-01-03

## 2020-01-03 RX ORDER — PREDNISOLONE ACETATE 10 MG/ML
2 SUSPENSION/ DROPS OPHTHALMIC 4 TIMES DAILY
Status: DISCONTINUED | OUTPATIENT
Start: 2020-01-04 | End: 2020-01-05 | Stop reason: HOSPADM

## 2020-01-03 RX ORDER — METHYLPREDNISOLONE SODIUM SUCCINATE 125 MG/2ML
125 INJECTION, POWDER, LYOPHILIZED, FOR SOLUTION INTRAMUSCULAR; INTRAVENOUS
Status: DISCONTINUED | OUTPATIENT
Start: 2020-01-03 | End: 2020-01-05 | Stop reason: HOSPADM

## 2020-01-03 RX ORDER — ONDANSETRON 8 MG/1
8 TABLET, FILM COATED ORAL EVERY 12 HOURS
Status: COMPLETED | OUTPATIENT
Start: 2020-01-04 | End: 2020-01-05

## 2020-01-03 RX ORDER — SODIUM CHLORIDE 9 MG/ML
1000 INJECTION, SOLUTION INTRAVENOUS CONTINUOUS PRN
Status: DISCONTINUED | OUTPATIENT
Start: 2020-01-03 | End: 2020-01-05 | Stop reason: HOSPADM

## 2020-01-03 RX ORDER — DIPHENHYDRAMINE HCL 50 MG
50 CAPSULE ORAL EVERY 6 HOURS PRN
Status: DISCONTINUED | OUTPATIENT
Start: 2020-01-03 | End: 2020-01-05 | Stop reason: HOSPADM

## 2020-01-03 RX ORDER — ALLOPURINOL 300 MG/1
300 TABLET ORAL DAILY
Status: DISCONTINUED | OUTPATIENT
Start: 2020-01-03 | End: 2020-01-05 | Stop reason: HOSPADM

## 2020-01-03 RX ORDER — NALOXONE HYDROCHLORIDE 0.4 MG/ML
.1-.4 INJECTION, SOLUTION INTRAMUSCULAR; INTRAVENOUS; SUBCUTANEOUS
Status: CANCELLED | OUTPATIENT
Start: 2020-01-03

## 2020-01-03 RX ORDER — OLANZAPINE 5 MG/1
5 TABLET ORAL EVERY 12 HOURS PRN
Status: DISCONTINUED | OUTPATIENT
Start: 2020-01-03 | End: 2020-01-05 | Stop reason: HOSPADM

## 2020-01-03 RX ORDER — DIPHENHYDRAMINE HYDROCHLORIDE 50 MG/ML
50 INJECTION INTRAMUSCULAR; INTRAVENOUS
Status: DISCONTINUED | OUTPATIENT
Start: 2020-01-03 | End: 2020-01-05 | Stop reason: HOSPADM

## 2020-01-03 RX ORDER — DEXAMETHASONE 4 MG/1
40 TABLET ORAL EVERY 24 HOURS
Status: DISCONTINUED | OUTPATIENT
Start: 2020-01-03 | End: 2020-01-05 | Stop reason: HOSPADM

## 2020-01-03 RX ORDER — LORAZEPAM 0.5 MG/1
.5-1 TABLET ORAL EVERY 6 HOURS PRN
Status: DISCONTINUED | OUTPATIENT
Start: 2020-01-03 | End: 2020-01-05 | Stop reason: HOSPADM

## 2020-01-03 RX ADMIN — OLANZAPINE 5 MG: 5 TABLET, FILM COATED ORAL at 21:03

## 2020-01-03 RX ADMIN — FOSAPREPITANT 150 MG: 150 INJECTION, POWDER, LYOPHILIZED, FOR SOLUTION INTRAVENOUS at 19:46

## 2020-01-03 RX ADMIN — ONDANSETRON HYDROCHLORIDE 16 MG: 8 TABLET, FILM COATED ORAL at 19:49

## 2020-01-03 RX ADMIN — LIDOCAINE: 40 CREAM TOPICAL at 15:01

## 2020-01-03 RX ADMIN — Medication 5 ML: at 08:18

## 2020-01-03 RX ADMIN — ACETAMINOPHEN 650 MG: 325 TABLET, FILM COATED ORAL at 15:06

## 2020-01-03 RX ADMIN — ALLOPURINOL 300 MG: 300 TABLET ORAL at 15:06

## 2020-01-03 RX ADMIN — CISPLATIN 185 MG: 1 INJECTION, SOLUTION INTRAVENOUS at 20:58

## 2020-01-03 RX ADMIN — SODIUM CHLORIDE: 9 INJECTION, SOLUTION INTRAVENOUS at 15:00

## 2020-01-03 RX ADMIN — PROCHLORPERAZINE MALEATE 10 MG: 10 TABLET ORAL at 15:06

## 2020-01-03 RX ADMIN — ACYCLOVIR 400 MG: 400 TABLET ORAL at 19:49

## 2020-01-03 RX ADMIN — POTASSIUM CHLORIDE: 2 INJECTION, SOLUTION, CONCENTRATE INTRAVENOUS at 20:58

## 2020-01-03 RX ADMIN — DEXAMETHASONE 40 MG: 4 TABLET ORAL at 15:06

## 2020-01-03 RX ADMIN — RITUXIMAB 700 MG: 10 INJECTION, SOLUTION INTRAVENOUS at 15:47

## 2020-01-03 RX ADMIN — DIPHENHYDRAMINE HYDROCHLORIDE 50 MG: 50 CAPSULE ORAL at 15:06

## 2020-01-03 ASSESSMENT — MIFFLIN-ST. JEOR: SCORE: 1553.44

## 2020-01-03 ASSESSMENT — PAIN SCALES - GENERAL: PAINLEVEL: NO PAIN (0)

## 2020-01-03 ASSESSMENT — ACTIVITIES OF DAILY LIVING (ADL)
ADLS_ACUITY_SCORE: 11
ADLS_ACUITY_SCORE: 11

## 2020-01-03 NOTE — NURSING NOTE
"Oncology Rooming Note    January 3, 2020 8:31 AM   Slava Lane is a 49 year old male who presents for:    Chief Complaint   Patient presents with     Port Draw     Gripper needle. heparin locked, vitals checked     Oncology Clinic Visit     Return - Mantle cell lymphoma of lymph nodes of multiple sites      Initial Vitals: /52   Pulse 80   Temp 97.9  F (36.6  C)   Resp 16   Wt 74.8 kg (165 lb)   SpO2 97%   BMI 26.63 kg/m   Estimated body mass index is 26.63 kg/m  as calculated from the following:    Height as of 11/15/19: 1.676 m (5' 6\").    Weight as of this encounter: 74.8 kg (165 lb). Body surface area is 1.87 meters squared.  No Pain (0) Comment: Data Unavailable   No LMP for male patient.  Allergies reviewed: Yes  Medications reviewed: Yes    Medications: MEDICATION REFILLS NEEDED TODAY. Provider was notified.  Pharmacy name entered into Appticles:    [facility] - [place], [place] - [address] [facility] - [place], [place] - [address] [place] [address] [address]    Clinical concerns: PET Scan results with emphasis on the stomach tumor, Lab Results and concerns about burning sensation on his feet.    Questions about plan moving forward.       Nils Landry, EMT              "

## 2020-01-03 NOTE — PROGRESS NOTES
Nursing Focus: Admission  D: Arrived at 1215 from [facility]. Patient accompanied by wife. Admitted for cycle 3B R-DHAP. No complaints at this time.      I: Admission process began.  Patient oriented to room, enviroment, call light.  MD notified of patient's arrival on unit. Chemotherapy protocol double-checked by 2 RNs.    A: Vital signs stable, afebrile.  Patient stable at this time.      P: Implement plan of care when available. Continue to monitor patient. Nursing interventions as appropriate. Notify MD with changes in pt status.    Per Mobility Level Guideline;  Bed/chair alarm No.  Patient may ambulate independently  Patient requires the following assistive equipment: none   PT/OT consult orders in place No

## 2020-01-03 NOTE — LETTER
"    RE: Slava Lane  [address]  [facility] [address]       HEMATOLOGY/ONCOLOGY PROGRESS NOTE  Berry 3, 2020    REASON FOR VISIT: follow-up of mantle cell lymphoma    DIAGNOSIS:   Slava Lane is a 49-year-old male with mantle cell lymphoma. 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 Ki67 20%, TP53 negative. He met with Dr. Redding and decided to pursue curative intent treatment with R-CHOP/R-DHAP on 9/18/2019, with plan to complete 6 cycles, followed by ASCT, followed by maintenance Rituxan for 2 years.    Treatment summary:  1A R-CHOP 9/18/2019  1B R-DHAP 10/3/2019  2A R-CHOP 10/25/2019   2B R-DHAP 11/15/2019  3A R-CHOP 12/13/2019    INTERVAL HISTORY:   Brannon is here with his wife prior to admit for cycle 3B R-DHAP.    Overall doing well. In the last week, he developed painful swelling of his third toes bilaterally, L > R, with some associated erythema. It felt like a \"blister\" without actual blister formation, had some burning, tender to touch. No other symptoms suggestive of neuropathy. It is improving on its own. He denies new shoes but reports he had been very active s/p R-CHOP given he was overall feeling well. Has a chronic dry cough from smoking, unchanged. No SOB. Reports usually a lot of nausea after R-DHAP cycles, uses compazine, did better with scheduled use but still usually has a lot of nausea. Right now, no nausea. Remainder of ROS negative as below.     Otherwise, ROS negative for: fever, chills, sweats, weight loss, weight gain, appetite change, rashes, change in vision or hearing, shortness of breath, chest pain, abdominal pain, nausea, vomiting, diarrhea, weakness, easy bleeding or bruising    Current Outpatient Medications   Medication Sig Dispense Refill     acyclovir (ZOVIRAX) 400 MG tablet Take 1 tablet (400 mg) by mouth 2 times daily 60 tablet 3     allopurinol (ZYLOPRIM) 300 MG tablet Take " 1 tablet (300 mg) by mouth daily 60 tablet 0     dexamethasone (DECADRON) 4 MG tablet Take 10 tablets (40 mg) by mouth daily (with breakfast) for 1 day 10 tablet 0     diphenhydrAMINE (BENADRYL) 50 MG capsule Take 50 mg by mouth every 6 hours as needed for itching or allergies       fluconazole (DIFLUCAN) 100 MG tablet Take 2 tablets (200 mg) by mouth daily . Take until ANC >1000. 30 tablet 1     levofloxacin (LEVAQUIN) 250 MG tablet Take 1 tablet (250 mg) by mouth daily . Take until ANC >1000. 30 tablet 3     lidocaine-prilocaine (EMLA) 2.5-2.5 % external cream Apply topically as needed for moderate pain Apply to port site 30 minutes prior to port access 30 g 1     loratadine (CLARITIN) 10 MG capsule Take 10 mg by mouth daily as needed (Take 1 day before and daily for 8 days after neulasta administration)       omeprazole 20 MG tablet Take 1 tablet (20 mg) by mouth daily 30 tablet 3     ondansetron (ZOFRAN) 8 MG tablet Take 1 tablet (8 mg) by mouth every 8 hours as needed for nausea or vomiting 30 tablet 3     prochlorperazine (COMPAZINE) 10 MG tablet Take 1 tablet (10 mg) by mouth every 6 hours as needed for nausea or vomiting 30 tablet 3     sildenafil (VIAGRA) 100 MG tablet Take 100 mg by mouth daily as needed           No Known Allergies    PHYSICAL EXAMINATION  /52   Pulse 80   Temp 97.9  F (36.6  C)   Resp 16   Wt 74.8 kg (165 lb)   SpO2 97%   BMI 26.63 kg/m     Constitutional: Alert, oriented male in no visible distress.  Eyes: PERRL. Anicteric sclerae.  ENT/Mouth: OM moist and pink without lesions or thrush.  CV: RRR, no murmurs appreciated.  Resp: CTAB throughout  Abdomen: Soft, non-tender, non-distended. Bowel sounds present. No masses appreciated. No organomegaly noted.  Extremities: No lower extremity edema appreciated.  Skin: Warm, dry. No bruising or petechiae noted.  Lymph: No cervical or supraclavicular lymphadenopathy appreciated.   Neuro: CN II-XII grossly intact.    LABS:  Results for  MIKAYLA WHITFIELD (MRN [id number]) as of 1/3/2020 08:27   Ref. Range 12/13/2019 07:50 1/3/2020 08:08   WBC Latest Ref Range: 4.0 - 11.0 10e9/L 3.5 (L) 3.8 (L)   Hemoglobin Latest Ref Range: 13.3 - 17.7 g/dL 11.4 (L) 12.0 (L)   Hematocrit Latest Ref Range: 40.0 - 53.0 % 34.4 (L) 35.9 (L)   Platelet Count Latest Ref Range: 150 - 450 10e9/L 326 219   Results for MIKAYLA WHITFIELD (MRN [id number]) as of 1/3/2020 09:05   Ref. Range 1/3/2020 08:08   Sodium Latest Ref Range: 133 - 144 mmol/L 142   Potassium Latest Ref Range: 3.4 - 5.3 mmol/L 3.8   Chloride Latest Ref Range: 94 - 109 mmol/L 105   Carbon Dioxide Latest Ref Range: 20 - 32 mmol/L 27   Urea Nitrogen Latest Ref Range: 7 - 30 mg/dL 15   Creatinine Latest Ref Range: 0.66 - 1.25 mg/dL 0.79   GFR Estimate Latest Ref Range: >60 mL/min/1.73_m2 >90   GFR Estimate If Black Latest Ref Range: >60 mL/min/1.73_m2 >90   Calcium Latest Ref Range: 8.5 - 10.1 mg/dL 8.8   Anion Gap Latest Ref Range: 3 - 14 mmol/L 11   Albumin Latest Ref Range: 3.4 - 5.0 g/dL 3.6   Protein Total Latest Ref Range: 6.8 - 8.8 g/dL 6.9   Bilirubin Total Latest Ref Range: 0.2 - 1.3 mg/dL 0.5   Alkaline Phosphatase Latest Ref Range: 40 - 150 U/L 53   ALT Latest Ref Range: 0 - 70 U/L 28   AST Latest Ref Range: 0 - 45 U/L 27   Glucose Latest Ref Range: 70 - 99 mg/dL 110 (H)     IMAGING  PET 12/12/2019  IMPRESSION: In this patient with history of mantle cell lymphoma  currently undergoing treatment with RDHAP: Partial response by [place]  criteria:  1. Persistent area of hypermetabolic (Deauville 4) soft tissue  thickening along the anterior aspect of the ascending colon. (On July  colonoscopy, cecal lesions are not reported several other polyps  proved to be lymphoma.)  2. Decrease in size and metabolic activity of mediastinal, axillary,  and superficial inguinal lymph nodes.   b. Stable enlarged hypermetabolic retroperitoneal and perirectal lymph  nodes.  c. Findings consistent with red marrow  reactivation.    IMPRESSION/PLAN:  Slava Lane is a 49 year old male with stage IV mantle cell lymphoma with BM involvement, currently undergoing curative intent treatment with R-CHOP/R-DHAP.    Mantle cell lymphoma: Initiated R-CHOP/R-DHAP on 9/18/2019, with plan to complete 6 cycles, followed by ASCT, followed by maintenance Rituxan for 2 years. PET after 2 cycles showing partial response to therapy. Here today for consideration of cycle 3B, admission for R-DHAP. Plan will likely be to repeat PET, possible colonoscopy 4-6 weeks after completion of chemo, though will need to confirm plan with Dr. Redding, currently on vacation. Our RNCC is assisting with finalizing his plan with Dr. Redding. RNCC coordinating Neulasta and labs at discharge.    ID  # Prophylaxis. Continue prophylaxis with ACV daily. Start fluconazole, Levaquin if ANC < 1.0    GI  # Nausea. Has Zofran, compazine available PRN for nausea. This historically is poorly managed after R-DHAP. Did discuss use of Zyprexa for a few days post discharge; touched base with inpatient team with this recommendation.    Psych  # Smoking cessation. Will message Angelica Chicas - she had attempted to call him back in November, but since that itme patient has resumed smoking and is open to speaking with her. Message sent.    MSK  # Pain, swelling of 3rd digit bilaterally: checking uric acid today. This is improving with minimal edema on exam. The fact that this is symmetric makes me question if this was related to ill-fitted shoes/increased activity. Will check uric acid today. No color changes or cold-induced symptoms to suggest Raynaud's, though he is at risk from this due to the vincristine, smoking. Do not think this this neuropathy given the edema and report of palpable tenderness. Asked him to monitor. If symptoms worsen again, could try warm epsom soaks.    I spent >40 minutes with the patient, with over 50% of the time spent counseling or coordinating their care  as described above.    Edyta Caro PA-C  Hematology, Oncology, and Transplant  [facility] [address] [address] [phone]

## 2020-01-03 NOTE — PROVIDER NOTIFICATION
DATE/TIME  (DOT-TD, DOT-NOW) CHEMO CHECK ACTIVITY (REGIMEN & DOSE CHECK, DAY, DOSE #, NAME OF CHEMO #1)  CHEMO DRUG #2  CHEMO DRUG #3 NAME OF RN #1 (USE DOT-ME HERE) NAME OF RN#2 (2ND RN TO LOG IN SEPARATELY)   1/3/2020  1:05 PM   Cycle 3B R-DHAP protocol check   GIGI Nielsen, GIGI     1/3/2020  3:45 PM   Rituxan double check   GIGI Nielsen, RN     1/3/2020  8:33PM Cisplatin dose #1 double check   Edie Castillo, GIGI King, GIGI     01/04/20  9:05 PM   HD cytarabine dose # 1 double check   Sarahi Pereyra, GIGI Victor, GIGI     1/5/2020  9:27 AM   HD cytarabine dose #2 double check   GIGI Nielsen RN

## 2020-01-03 NOTE — H&P
[facility]    HEMATOLOGY & ONCOLOGY  H&P    PATIENT NAME: Slava Lane MRN # [id number]  DATE OF VISIT: January 3, 2020 YOB: 1970    SUMMARY  Slava Lane is a 49-year-old male with mantle cell lymphoma. 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 Ki67 20%, TP53 negative. He met with Dr. Redding and decided to pursue curative intent treatment with R-CHOP/R-DHAP on 9/18/2019, with plan to complete 6 cycles, followed by ASCT, followed by maintenance Rituxan for 2 years.     Treatment summary:  1A R-CHOP 9/18/2019  1B R-DHAP 10/3/2019  2A R-CHOP 10/25/2019   2B R-DHAP 11/15/2019  3A R-CHOP 12/13/2019    SUBJECTIVE  Pt here for planned 3rd and last cycle of R-DHAP. Pt states that overall he feels ok. Cycle of chemo have been taking more and more out of him. DHAP cycles tend to be worse with more fatigue. Nausea has been an issue recently that has been incompletely controlled with his PRNs at home. Denies f/c/s or vomiting /diarrhea. Did develop pain in his toes with some swelling that appears to be resolving spontaneously. Possibly related to his activity level has he has been working quite a bit since last cycle of R-CHOP.     PAST MEDICAL HISTORY  Past Medical History:   Diagnosis Date     Mantle cell lymphoma (H) 06/2019    noted incidentally on colon polyp pathology      Uncomplicated asthma     When exposed to enviromental allergies       FAMILY HISTORY  Family History   Problem Relation Age of Onset     Cancer No family hx of      Bleeding Disorder No family hx of      Clotting Disorder No family hx of        SOCIAL HISTORY  Social History     Socioeconomic History     Marital status:      Spouse name: Not on file     Number of children: Not on file     Years of education: Not on file     Highest education level: Not on file   Occupational History     Not on file   Social Needs  "    Financial resource strain: Not on file     Food insecurity:     Worry: Not on file     Inability: Not on file     Transportation needs:     Medical: Not on file     Non-medical: Not on file   Tobacco Use     Smoking status: Former Smoker     Packs/day: 0.25     Types: Cigarettes     Smokeless tobacco: Former User     Types: Chew     Tobacco comment: A couple of cigarettes a month   Substance and Sexual Activity     Alcohol use: Yes     Alcohol/week: 21.0 standard drinks     Types: 21 Cans of beer per week     Comment: Rare     Drug use: Never     Sexual activity: Not on file   Lifestyle     Physical activity:     Days per week: Not on file     Minutes per session: Not on file     Stress: Not on file   Relationships     Social connections:     Talks on phone: Not on file     Gets together: Not on file     Attends Latter-day service: Not on file     Active member of club or organization: Not on file     Attends meetings of clubs or organizations: Not on file     Relationship status: Not on file     Intimate partner violence:     Fear of current or ex partner: Not on file     Emotionally abused: Not on file     Physically abused: Not on file     Forced sexual activity: Not on file   Other Topics Concern     Not on file   Social History Narrative     Not on file       CURRENT OUTPATIENT MEDICATIONS  No current outpatient medications on file.       ALLERGIES  No Known Allergies    REVIEW OF SYSTEMS  A complete ROS was performed and was negative except as mentioned in HPI    PHYSICAL EXAM  /71   Temp 96.7  F (35.9  C) (Oral)   Resp 16   Ht 1.676 m (5' 6\")   Wt 75.6 kg (166 lb 9.6 oz)   SpO2 99%   BMI 26.89 kg/m    @LASTSAO2(4)@  Wt Readings from Last 3 Encounters:   01/03/20 75.6 kg (166 lb 9.6 oz)   01/03/20 74.8 kg (165 lb)   12/13/19 76.1 kg (167 lb 11.2 oz)       Gen: alert, pleasant and conversational, NAD  HEET: NC/AT, EOMI w/ PERRL, anicteric sclera. OP clear. MMM.   Neck: supple no JVP  CV: normal " S1,S2 with RRR no m/r/g  Resp: lungs CTA bilaterally with adequate air movement to bases. No wheezes or crackles  Abd: soft NTND no organomegaly or masses. BS normoactive.   Ext: WWP no edema or cyanosis. Very faint swelling of 3rd and 4th digits of left foot. No erythema or pain with flexion  Skin: no concerning lesions or rashes  Neuro: A&Ox4, no lateralizing sx. Grossly nonfocal.      LABORATORY AND IMAGING STUDIES    Recent Labs   Lab Test 01/03/20  0808 12/13/19  0750  11/15/19  1714  10/07/19  1158 10/05/19  0555    138   < >  --    < > 134 139   POTASSIUM 3.8 4.0   < >  --    < > 4.0 4.1   CHLORIDE 105 108   < >  --    < > 99 110*   CO2 27 26   < >  --    < > 28 24   ANIONGAP 11 4   < >  --    < > 7 5   BUN 15 17   < >  --    < > 23 12   CR 0.79 0.72   < >  --    < > 0.72 0.56*   * 100*   < >  --    < > 92 156*   PIEDAD 8.8 8.4*   < >  --    < > 9.0 8.0*   MAG  --   --   --  1.9  --  2.3  --    PHOS  --   --   --   --   --  3.9 2.9    < > = values in this interval not displayed.     Recent Labs   Lab Test 01/03/20  0808 12/13/19  0750 11/17/19  0505   WBC 3.8* 3.5* 12.8*   HGB 12.0* 11.4* 9.6*    326 214   * 101* 98   NEUTROPHIL 46.9 37.7 95.1     Recent Labs   Lab Test 01/03/20  0808 12/13/19  0750 11/17/19  0505  08/01/19  1741   BILITOTAL 0.5 0.6 0.3   < > 0.9   ALKPHOS 53 63 50   < > 67   ALT 28 23 16   < > 23   AST 27 20 11   < > 18   ALBUMIN 3.6 3.6 2.8*   < > 3.9   LDH  --   --   --   --  174    < > = values in this interval not displayed.              ASSESSMENT AND PLAN  Slava Lane is a 49 year old male with stage IV mantle cell lymphoma with BM involvement, currently undergoing curative intent treatment with R-CHOP/R-DHAP.     Mantle cell lymphoma: Initiated R-CHOP/R-DHAP on 9/18/2019, with plan to complete 6 cycles, followed by ASCT, followed by maintenance Rituxan for 2 years. PET after 2 cycles showing partial response to therapy. Here today for admission for R-DHAP  cycle 3B.    - proceed with chemotherapy today  - D1: Ritxuan, Cisplatin and Dexamethasone  - IVF with regimen  - nausea PRN with compazine, zofran. Will add Zyprexa to regimen 5mg q12h PRN perla at night. EKG checked and QTc 412.   - will need neulasta + labs and f/u scheduled after discharge.     Plan for after discharge per outpatient note:  repeat PET, possible colonoscopy 4-6 weeks after completion of chemo, though will need to confirm plan with Dr. Redding, currently on vacation. Our RNCC is assisting with finalizing his plan with Dr. Redding.      ID  # Prophylaxis. Continue prophylaxis with ACV daily. Start fluconazole, Levaquin if ANC < 1.0     GI  # Nausea. Has Zofran, compazine available PRN for nausea. This historically is poorly managed after R-DHAP.  - zyprexa as above     MSK  # Pain, swelling of 3rd digit bilaterally. Believe this is either MSK pain from ambulating or painful neuropathy from chemo. No significant swelling or erythema today to suggest infxn or gout. Uric acid 5.4 and has no history of it.   - continue to monitor    FEN/GI: IVF per protocol. Regular diet  Code: FULL  PPx: Lovenox  Dispo: Inpatient for R-DHAP. Expected discharge to home in 2 days    Pt was seen and discussed with Dr. Lori Kramer MD  Hematology Oncology Fellow  [facility]      Patient has been seen and evaluated by me. I have reviewed today's vital signs, medications, labs and imaging results. I have discussed the plan with the team and agree with the findings and plan in this note.    Readmitted for next maximiliano of chemotherpay for mantle NHL  As noted ; no new signs of infection or bleeding  Some pain in both feet s rash.  Exam reviewed and agree with above findings.    No new cardiopulmonay or abd findings that modify chemo plans    Orders reviewed.  Idris Sandoval MD

## 2020-01-04 LAB
ANION GAP SERPL CALCULATED.3IONS-SCNC: 6 MMOL/L (ref 3–14)
BASOPHILS # BLD AUTO: 0 10E9/L (ref 0–0.2)
BASOPHILS NFR BLD AUTO: 0.2 %
BUN SERPL-MCNC: 10 MG/DL (ref 7–30)
CALCIUM SERPL-MCNC: 8.2 MG/DL (ref 8.5–10.1)
CHLORIDE SERPL-SCNC: 112 MMOL/L (ref 94–109)
CO2 SERPL-SCNC: 23 MMOL/L (ref 20–32)
CREAT SERPL-MCNC: 0.58 MG/DL (ref 0.66–1.25)
DIFFERENTIAL METHOD BLD: ABNORMAL
EOSINOPHIL # BLD AUTO: 0 10E9/L (ref 0–0.7)
EOSINOPHIL NFR BLD AUTO: 0 %
ERYTHROCYTE [DISTWIDTH] IN BLOOD BY AUTOMATED COUNT: 15.7 % (ref 10–15)
GFR SERPL CREATININE-BSD FRML MDRD: >90 ML/MIN/{1.73_M2}
GLUCOSE SERPL-MCNC: 218 MG/DL (ref 70–99)
HCT VFR BLD AUTO: 32.2 % (ref 40–53)
HGB BLD-MCNC: 10.8 G/DL (ref 13.3–17.7)
IMM GRANULOCYTES # BLD: 0 10E9/L (ref 0–0.4)
IMM GRANULOCYTES NFR BLD: 0.9 %
LYMPHOCYTES # BLD AUTO: 0.6 10E9/L (ref 0.8–5.3)
LYMPHOCYTES NFR BLD AUTO: 13.4 %
MCH RBC QN AUTO: 33.9 PG (ref 26.5–33)
MCHC RBC AUTO-ENTMCNC: 33.5 G/DL (ref 31.5–36.5)
MCV RBC AUTO: 101 FL (ref 78–100)
MONOCYTES # BLD AUTO: 0 10E9/L (ref 0–1.3)
MONOCYTES NFR BLD AUTO: 0.7 %
NEUTROPHILS # BLD AUTO: 3.9 10E9/L (ref 1.6–8.3)
NEUTROPHILS NFR BLD AUTO: 84.8 %
NRBC # BLD AUTO: 0 10*3/UL
NRBC BLD AUTO-RTO: 0 /100
PLATELET # BLD AUTO: 205 10E9/L (ref 150–450)
POTASSIUM SERPL-SCNC: 4.2 MMOL/L (ref 3.4–5.3)
RBC # BLD AUTO: 3.19 10E12/L (ref 4.4–5.9)
SODIUM SERPL-SCNC: 140 MMOL/L (ref 133–144)
WBC # BLD AUTO: 4.6 10E9/L (ref 4–11)

## 2020-01-04 PROCEDURE — 25800030 ZZH RX IP 258 OP 636: Performed by: PHYSICIAN ASSISTANT

## 2020-01-04 PROCEDURE — 25000132 ZZH RX MED GY IP 250 OP 250 PS 637: Performed by: STUDENT IN AN ORGANIZED HEALTH CARE EDUCATION/TRAINING PROGRAM

## 2020-01-04 PROCEDURE — 25000128 H RX IP 250 OP 636: Performed by: PHYSICIAN ASSISTANT

## 2020-01-04 PROCEDURE — 93005 ELECTROCARDIOGRAM TRACING: CPT

## 2020-01-04 PROCEDURE — 93010 ELECTROCARDIOGRAM REPORT: CPT | Performed by: INTERNAL MEDICINE

## 2020-01-04 PROCEDURE — 80048 BASIC METABOLIC PNL TOTAL CA: CPT | Performed by: STUDENT IN AN ORGANIZED HEALTH CARE EDUCATION/TRAINING PROGRAM

## 2020-01-04 PROCEDURE — 12000001 ZZH R&B MED SURG/OB UMMC

## 2020-01-04 PROCEDURE — 85025 COMPLETE CBC W/AUTO DIFF WBC: CPT | Performed by: STUDENT IN AN ORGANIZED HEALTH CARE EDUCATION/TRAINING PROGRAM

## 2020-01-04 PROCEDURE — 36415 COLL VENOUS BLD VENIPUNCTURE: CPT | Performed by: STUDENT IN AN ORGANIZED HEALTH CARE EDUCATION/TRAINING PROGRAM

## 2020-01-04 PROCEDURE — 25000132 ZZH RX MED GY IP 250 OP 250 PS 637: Performed by: INTERNAL MEDICINE

## 2020-01-04 PROCEDURE — 25000125 ZZHC RX 250: Performed by: PHYSICIAN ASSISTANT

## 2020-01-04 PROCEDURE — 25000131 ZZH RX MED GY IP 250 OP 636 PS 637: Performed by: PHYSICIAN ASSISTANT

## 2020-01-04 PROCEDURE — 25000132 ZZH RX MED GY IP 250 OP 250 PS 637: Performed by: PHYSICIAN ASSISTANT

## 2020-01-04 RX ORDER — POLYETHYLENE GLYCOL 3350 17 G/17G
17 POWDER, FOR SOLUTION ORAL DAILY PRN
Status: DISCONTINUED | OUTPATIENT
Start: 2020-01-04 | End: 2020-01-05 | Stop reason: HOSPADM

## 2020-01-04 RX ORDER — AMOXICILLIN 250 MG
1 CAPSULE ORAL 2 TIMES DAILY PRN
Status: DISCONTINUED | OUTPATIENT
Start: 2020-01-04 | End: 2020-01-04

## 2020-01-04 RX ORDER — AMOXICILLIN 250 MG
1-2 CAPSULE ORAL 2 TIMES DAILY PRN
Status: DISCONTINUED | OUTPATIENT
Start: 2020-01-04 | End: 2020-01-05 | Stop reason: HOSPADM

## 2020-01-04 RX ADMIN — CYTARABINE 3700 MG: 100 INJECTION, SOLUTION INTRATHECAL; INTRAVENOUS; SUBCUTANEOUS at 21:53

## 2020-01-04 RX ADMIN — POTASSIUM CHLORIDE: 2 INJECTION, SOLUTION, CONCENTRATE INTRAVENOUS at 22:00

## 2020-01-04 RX ADMIN — DEXAMETHASONE 40 MG: 4 TABLET ORAL at 15:15

## 2020-01-04 RX ADMIN — ACYCLOVIR 400 MG: 400 TABLET ORAL at 21:00

## 2020-01-04 RX ADMIN — ACYCLOVIR 400 MG: 400 TABLET ORAL at 08:24

## 2020-01-04 RX ADMIN — POTASSIUM CHLORIDE: 2 INJECTION, SOLUTION, CONCENTRATE INTRAVENOUS at 16:42

## 2020-01-04 RX ADMIN — POTASSIUM CHLORIDE: 2 INJECTION, SOLUTION, CONCENTRATE INTRAVENOUS at 09:49

## 2020-01-04 RX ADMIN — POLYETHYLENE GLYCOL 3350 17 G: 17 POWDER, FOR SOLUTION ORAL at 15:15

## 2020-01-04 RX ADMIN — ALLOPURINOL 300 MG: 300 TABLET ORAL at 08:24

## 2020-01-04 RX ADMIN — ONDANSETRON HYDROCHLORIDE 8 MG: 8 TABLET, FILM COATED ORAL at 21:00

## 2020-01-04 RX ADMIN — SENNOSIDES AND DOCUSATE SODIUM 1 TABLET: 8.6; 5 TABLET ORAL at 21:01

## 2020-01-04 RX ADMIN — PREDNISOLONE ACETATE 2 DROP: 10 SUSPENSION/ DROPS OPHTHALMIC at 22:40

## 2020-01-04 RX ADMIN — SENNOSIDES AND DOCUSATE SODIUM 1 TABLET: 8.6; 5 TABLET ORAL at 12:43

## 2020-01-04 RX ADMIN — POTASSIUM CHLORIDE: 2 INJECTION, SOLUTION, CONCENTRATE INTRAVENOUS at 03:00

## 2020-01-04 RX ADMIN — PREDNISOLONE ACETATE 2 DROP: 10 SUSPENSION/ DROPS OPHTHALMIC at 18:34

## 2020-01-04 ASSESSMENT — ACTIVITIES OF DAILY LIVING (ADL)
ADLS_ACUITY_SCORE: 11

## 2020-01-04 ASSESSMENT — MIFFLIN-ST. JEOR: SCORE: 1559.79

## 2020-01-04 NOTE — PROGRESS NOTES
"[facility]    HEMATOLOGY & ONCOLOGY  H&P    PATIENT NAME: Slava Lane MRN # [id number]  DATE OF VISIT: January 3, 2020 YOB: 1970      SUBJECTIVE  Pt here for planned 3rd and last cycle of R-DHAP. Pt states that overall he feels ok. Cycle of chemo have been taking more and more out of him. DHAP cycles tend to be worse with more fatigue. Nausea has been an issue recently that has been incompletely controlled with his PRNs at home. Denies f/c/s or vomiting /diarrhea. Did develop pain in his toes with some swelling that appears to be resolving spontaneously. Possibly related to his activity level has he has been working quite a bit since last cycle of R-CHOP.       PHYSICAL EXAM  /67 (BP Location: Left arm)   Pulse 84   Temp 96.6  F (35.9  C) (Oral)   Resp 18   Ht 1.676 m (5' 6\")   Wt 76.2 kg (168 lb)   SpO2 98%   BMI 27.12 kg/m    @LASTSAO2(4)@  Wt Readings from Last 3 Encounters:   01/04/20 76.2 kg (168 lb)   01/03/20 74.8 kg (165 lb)   12/13/19 76.1 kg (167 lb 11.2 oz)       Gen: sleeping  HEET: NC/AT  Resp: snoring. No increased work of breathing on room air  Ext: WWP no edema or cyanosis.   Skin: no concerning lesions or rashes on visible areas      LABORATORY AND IMAGING STUDIES    Recent Labs   Lab Test 01/03/20  0808 12/13/19  0750  11/15/19  1714  10/07/19  1158 10/05/19  0555    138   < >  --    < > 134 139   POTASSIUM 3.8 4.0   < >  --    < > 4.0 4.1   CHLORIDE 105 108   < >  --    < > 99 110*   CO2 27 26   < >  --    < > 28 24   ANIONGAP 11 4   < >  --    < > 7 5   BUN 15 17   < >  --    < > 23 12   CR 0.79 0.72   < >  --    < > 0.72 0.56*   * 100*   < >  --    < > 92 156*   PIEDAD 8.8 8.4*   < >  --    < > 9.0 8.0*   MAG  --   --   --  1.9  --  2.3  --    PHOS  --   --   --   --   --  3.9 2.9    < > = values in this interval not displayed.     Recent Labs   Lab Test 01/03/20  0808 12/13/19  0750 11/17/19  0505   WBC 3.8* 3.5* 12.8*   HGB 12.0* 11.4* 9.6* "    326 214   * 101* 98   NEUTROPHIL 46.9 37.7 95.1     Recent Labs   Lab Test 01/03/20  0808 12/13/19  0750 11/17/19  0505  08/01/19  1741   BILITOTAL 0.5 0.6 0.3   < > 0.9   ALKPHOS 53 63 50   < > 67   ALT 28 23 16   < > 23   AST 27 20 11   < > 18   ALBUMIN 3.6 3.6 2.8*   < > 3.9   LDH  --   --   --   --  174    < > = values in this interval not displayed.              ASSESSMENT AND PLAN  Slava Lane is a 49 year old male with stage IV mantle cell lymphoma with BM involvement, currently undergoing curative intent treatment with R-CHOP/R-DHAP.    SUMMARY  He was initially diagnosed on routine screening colonoscopy in May 2019, found to have stage IV mantle cell lymphoma with bone marrow involvement. Ki67 20%, TP53 negative. He met with Dr. Redding and decided to pursue curative intent treatment with R-CHOP/R-DHAP on 9/18/2019, with plan to complete 6 cycles, followed by ASCT, followed by maintenance Rituxan for 2 years.     Treatment summary:  1A R-CHOP 9/18/2019  1B R-DHAP 10/3/2019  2A R-CHOP 10/25/2019   2B R-DHAP 11/15/2019  3A R-CHOP 12/13/2019     Mantle cell lymphoma: Initiated R-CHOP/R-DHAP on 9/18/2019, with plan to complete 6 cycles, followed by ASCT, followed by maintenance Rituxan for 2 years. PET after 2 cycles showing partial response to therapy. Here today for admission for R-DHAP cycle 3B.    - proceed with chemotherapy today  - D1: Ritxuan, Cisplatin and Dexamethasone  - IVF with regimen  - nausea PRN with compazine, zofran. Added Zyprexa to regimen 5mg q12h PRN perla at night. EKG checked and QTc 412.   - will need neulasta + labs and f/u scheduled after discharge.     Plan for after discharge per outpatient note:  repeat PET, possible colonoscopy 4-6 weeks after completion of chemo, though will need to confirm plan with Dr. Redding, currently on vacation. Our RNCC is assisting with finalizing his plan with Dr. Redding.      ID  # Prophylaxis. Continue prophylaxis with ACV  daily. Start fluconazole, Levaquin if ANC < 1.0     GI  # Nausea. Has Zofran, compazine available PRN for nausea. This historically is poorly managed after R-DHAP.  - zyprexa as above  - PRN      MSK  # Pain, swelling of 3rd digit bilaterally. Believe this is either MSK pain from ambulating or painful neuropathy from chemo. No significant swelling or erythema today to suggest infxn or gout. Uric acid 5.4 and has no history of it.   - continue to monitor    FEN/GI: IVF per protocol. Regular diet  Code: FULL  PPx: Lovenox  Dispo: Inpatient for R-DHAP. Expected discharge to home in 2 days    Pt was seen and discussed with Dr. Elier Lorenz MD/PhD  Fellow, Division of Hematology/Oncology and Bone Marrow Transplantation  [facility]  [phone]    Physician Attestation   I, Arti Thapa MD, saw this patient with the resident and agree with the resident/fellow's findings and plan of care as documented in the note.      I personally reviewed vital signs, medications, labs and imaging.    Key findings:   48 yo M with mantle cell lympoma, here for C6 of DHAP. Day 2. Nausea, got Zyprexa. Tolerating chemo well so far. Will likely need lasix tomorrow. Likely discharge tomorrow. Needs Neulasta and labs (weekly) after discharge.       Arti Thapa MD   of Medicine  Hematology, Oncology and Transplantation   Pager: [phone]    Date of Service (when I saw the patient): 01/04/20

## 2020-01-04 NOTE — PLAN OF CARE
[id number]    Patient felt nauseated despite receiving scheduled Zofran and Emend, Zyprexa given an effective. Cisplatin started tonight infusing via port w/ good blood return. Denies pain and SOB. LS clear, on room air. Feeling fatigued. Inflammation on toes are feeling much better. UpAdLib.     [id number]    VSS. Afebrile. Chemo continues to infuse w/ good blood return, should finish in the evening around 2100. UOP adequate. Denies pain and nausea. Will continue with POC.

## 2020-01-04 NOTE — PLAN OF CARE
[id number]: AVSS on RA. Rituxan infused without incident. Pre-meds and flush given for CIVI cisplatin. Denies pain or nausea. Port patent, re-accessed and dressed. Voiding adequately. Up independently. Pt's wife at bedside, supportive. Continue with POC.

## 2020-01-04 NOTE — PROGRESS NOTES
Nursing Focus: Chemotherapy    D: Positive blood return via Port. Insertion site is clean/dry/intact, dressing intact with no complaints of pain.  Urine output is recorded in intake in Doc Flowsheet.      I: Premedications given per order (see electronic medical administration record). Dose #1 of 1 of CisPlatin started to infuse over 24 hours. Reviewed pt teaching on chemotherapy side effects.  Pt denies need for further teaching. Chemotherapy double checked per protocol by two chemotherapy competent RN's.     A: Tolerating chemotherapy well. Denies nausea and or pain.     P: Continue to monitor urine output and symptoms of nausea. Screen for symptoms of toxicity.

## 2020-01-04 NOTE — PROGRESS NOTES
Nursing Focus: Chemotherapy  D: Positive blood return via port. Insertion site is clean/dry/intact, dressing intact with no complaints of pain.  Urine output is recorded in intake in Doc Flowsheet.    I: Premedications given per order (see electronic medical administration record). Dose #1 of Rituxan started to infuse per slow infusion protocol. Reviewed pt teaching on chemotherapy side effects.  Pt denies need for further teaching. Chemotherapy double checked per protocol by two chemotherapy competent RN's.   A: Tolerating infusion well. Denies nausea and or pain.   P: Continue to monitor urine output and symptoms of nausea. Screen for symptoms of toxicity.

## 2020-01-05 VITALS
TEMPERATURE: 96 F | SYSTOLIC BLOOD PRESSURE: 115 MMHG | DIASTOLIC BLOOD PRESSURE: 61 MMHG | RESPIRATION RATE: 16 BRPM | WEIGHT: 171.7 LBS | BODY MASS INDEX: 27.59 KG/M2 | HEART RATE: 76 BPM | OXYGEN SATURATION: 97 % | HEIGHT: 66 IN

## 2020-01-05 LAB
ANION GAP SERPL CALCULATED.3IONS-SCNC: 4 MMOL/L (ref 3–14)
BASOPHILS # BLD AUTO: 0 10E9/L (ref 0–0.2)
BASOPHILS NFR BLD AUTO: 0.1 %
BUN SERPL-MCNC: 12 MG/DL (ref 7–30)
CALCIUM SERPL-MCNC: 8.3 MG/DL (ref 8.5–10.1)
CHLORIDE SERPL-SCNC: 113 MMOL/L (ref 94–109)
CO2 SERPL-SCNC: 24 MMOL/L (ref 20–32)
CREAT SERPL-MCNC: 0.67 MG/DL (ref 0.66–1.25)
DIFFERENTIAL METHOD BLD: ABNORMAL
EOSINOPHIL # BLD AUTO: 0 10E9/L (ref 0–0.7)
EOSINOPHIL NFR BLD AUTO: 0 %
ERYTHROCYTE [DISTWIDTH] IN BLOOD BY AUTOMATED COUNT: 16.3 % (ref 10–15)
GFR SERPL CREATININE-BSD FRML MDRD: >90 ML/MIN/{1.73_M2}
GLUCOSE SERPL-MCNC: 120 MG/DL (ref 70–99)
HCT VFR BLD AUTO: 33 % (ref 40–53)
HGB BLD-MCNC: 10.8 G/DL (ref 13.3–17.7)
IMM GRANULOCYTES # BLD: 0.1 10E9/L (ref 0–0.4)
IMM GRANULOCYTES NFR BLD: 0.9 %
LYMPHOCYTES # BLD AUTO: 0.7 10E9/L (ref 0.8–5.3)
LYMPHOCYTES NFR BLD AUTO: 4.5 %
MCH RBC QN AUTO: 34.2 PG (ref 26.5–33)
MCHC RBC AUTO-ENTMCNC: 32.7 G/DL (ref 31.5–36.5)
MCV RBC AUTO: 104 FL (ref 78–100)
MONOCYTES # BLD AUTO: 0.5 10E9/L (ref 0–1.3)
MONOCYTES NFR BLD AUTO: 3.2 %
NEUTROPHILS # BLD AUTO: 13.5 10E9/L (ref 1.6–8.3)
NEUTROPHILS NFR BLD AUTO: 91.3 %
NRBC # BLD AUTO: 0 10*3/UL
NRBC BLD AUTO-RTO: 0 /100
PLATELET # BLD AUTO: 211 10E9/L (ref 150–450)
POTASSIUM SERPL-SCNC: 4.3 MMOL/L (ref 3.4–5.3)
RBC # BLD AUTO: 3.16 10E12/L (ref 4.4–5.9)
SODIUM SERPL-SCNC: 140 MMOL/L (ref 133–144)
WBC # BLD AUTO: 14.8 10E9/L (ref 4–11)

## 2020-01-05 PROCEDURE — 80048 BASIC METABOLIC PNL TOTAL CA: CPT | Performed by: STUDENT IN AN ORGANIZED HEALTH CARE EDUCATION/TRAINING PROGRAM

## 2020-01-05 PROCEDURE — 85025 COMPLETE CBC W/AUTO DIFF WBC: CPT | Performed by: STUDENT IN AN ORGANIZED HEALTH CARE EDUCATION/TRAINING PROGRAM

## 2020-01-05 PROCEDURE — 25000132 ZZH RX MED GY IP 250 OP 250 PS 637: Performed by: STUDENT IN AN ORGANIZED HEALTH CARE EDUCATION/TRAINING PROGRAM

## 2020-01-05 PROCEDURE — 25000128 H RX IP 250 OP 636: Performed by: STUDENT IN AN ORGANIZED HEALTH CARE EDUCATION/TRAINING PROGRAM

## 2020-01-05 PROCEDURE — 25000132 ZZH RX MED GY IP 250 OP 250 PS 637: Performed by: PHYSICIAN ASSISTANT

## 2020-01-05 PROCEDURE — 25000131 ZZH RX MED GY IP 250 OP 636 PS 637: Performed by: PHYSICIAN ASSISTANT

## 2020-01-05 PROCEDURE — 25000128 H RX IP 250 OP 636: Performed by: PHYSICIAN ASSISTANT

## 2020-01-05 PROCEDURE — 25000132 ZZH RX MED GY IP 250 OP 250 PS 637: Performed by: INTERNAL MEDICINE

## 2020-01-05 PROCEDURE — 25800030 ZZH RX IP 258 OP 636: Performed by: PHYSICIAN ASSISTANT

## 2020-01-05 PROCEDURE — 36592 COLLECT BLOOD FROM PICC: CPT | Performed by: STUDENT IN AN ORGANIZED HEALTH CARE EDUCATION/TRAINING PROGRAM

## 2020-01-05 RX ORDER — ALLOPURINOL 300 MG/1
300 TABLET ORAL DAILY
Qty: 30 TABLET | Refills: 0 | Status: SHIPPED | OUTPATIENT
Start: 2020-01-05 | End: 2020-05-15

## 2020-01-05 RX ORDER — PANTOPRAZOLE SODIUM 40 MG/1
40 TABLET, DELAYED RELEASE ORAL ONCE
Status: COMPLETED | OUTPATIENT
Start: 2020-01-05 | End: 2020-01-05

## 2020-01-05 RX ORDER — PREDNISOLONE ACETATE 10 MG/ML
2 SUSPENSION/ DROPS OPHTHALMIC 4 TIMES DAILY
Qty: 5 ML | Refills: 0 | Status: SHIPPED | OUTPATIENT
Start: 2020-01-05 | End: 2020-04-14

## 2020-01-05 RX ORDER — OLANZAPINE 5 MG/1
5 TABLET ORAL 2 TIMES DAILY
Qty: 30 TABLET | Refills: 0 | Status: SHIPPED | OUTPATIENT
Start: 2020-01-05 | End: 2020-04-14

## 2020-01-05 RX ADMIN — PROCHLORPERAZINE MALEATE 10 MG: 10 TABLET ORAL at 12:44

## 2020-01-05 RX ADMIN — PREDNISOLONE ACETATE 2 DROP: 10 SUSPENSION/ DROPS OPHTHALMIC at 08:05

## 2020-01-05 RX ADMIN — POTASSIUM CHLORIDE: 2 INJECTION, SOLUTION, CONCENTRATE INTRAVENOUS at 12:05

## 2020-01-05 RX ADMIN — PANTOPRAZOLE SODIUM 40 MG: 40 TABLET, DELAYED RELEASE ORAL at 15:43

## 2020-01-05 RX ADMIN — PREDNISOLONE ACETATE 2 DROP: 10 SUSPENSION/ DROPS OPHTHALMIC at 12:44

## 2020-01-05 RX ADMIN — ONDANSETRON HYDROCHLORIDE 8 MG: 8 TABLET, FILM COATED ORAL at 09:20

## 2020-01-05 RX ADMIN — DEXAMETHASONE 40 MG: 4 TABLET ORAL at 15:56

## 2020-01-05 RX ADMIN — Medication 5 ML: at 15:48

## 2020-01-05 RX ADMIN — ALLOPURINOL 300 MG: 300 TABLET ORAL at 08:04

## 2020-01-05 RX ADMIN — ACYCLOVIR 400 MG: 400 TABLET ORAL at 08:05

## 2020-01-05 RX ADMIN — POTASSIUM CHLORIDE: 2 INJECTION, SOLUTION, CONCENTRATE INTRAVENOUS at 04:03

## 2020-01-05 RX ADMIN — CYTARABINE 3700 MG: 100 INJECTION, SOLUTION INTRATHECAL; INTRAVENOUS; SUBCUTANEOUS at 10:16

## 2020-01-05 ASSESSMENT — MIFFLIN-ST. JEOR: SCORE: 1576.58

## 2020-01-05 ASSESSMENT — ACTIVITIES OF DAILY LIVING (ADL)
ADLS_ACUITY_SCORE: 11

## 2020-01-05 NOTE — PROGRESS NOTES
Nursing Focus: Chemotherapy  D: Positive blood return via port. Insertion site is clean/dry/intact, dressing intact with no complaints of pain.  Urine output is recorded in intake in Doc Flowsheet.    I: Premedications given per order (see eMAR). Cerebellar assessment intact. Dose #2 of HD Cytarabine started to infuse over 3 hours. Reviewed pt teaching on chemotherapy side effects.  Pt denies need for further teaching. Chemotherapy double checked per protocol by two chemotherapy competent RN's.   A: Tolerating infusion well. Denies nausea and or pain.   P: Continue to monitor urine output and symptoms of nausea. Screen for symptoms of toxicity.

## 2020-01-05 NOTE — PLAN OF CARE
[id number]: AVSS on RA. Denies pain or nausea. CIVI cisplatin infusing via port with brisk blood return noted q4h, tolerating well. Will complete around 2100. MIVF + KCl + Mg infusing at 150 ml/hr. Reports abdominal fullness, constipation. Senna given x1, miralax given x1. Voiding adequately. Up independently, ambulating frequently. Wife Ya at bedside, supportive. Continue with POC.    Per Mobility Level Guideline;  Bed/chair alarm No.  Patient may ambulate independently  Patient requires the following assistive equipment: none   PT/OT consult orders in place No

## 2020-01-05 NOTE — PLAN OF CARE
[id number]: AVSS. Denied pain and N/V. Cisplatin infusion finished. Receiving dose #1 of HD cytarabine. Will get dose #2 of cytarabine tomorrow morning and then discharge in the evening 24 hours after the completion of cisplatin. Would like zyprexa when his cytarabine infusion is complete. Continue with POC.    Per Mobility Level Guideline;  Bed/chair alarm No.  Patient may ambulate independently  Patient requires the following assistive equipment: none   PT/OT consult orders in place No

## 2020-01-05 NOTE — PLAN OF CARE
[id number]: AVSS on RA. Denies pain. Nausea managed with compazine x1. Completed dose #2 HD cytarabine without incident. MIVF completed, pt ok'd for discharge. Voiding adequately. BM x2. Protonix given prior to discharge for reflux symptoms.     Discharge  D: Orders for discharge and outpatient medications written.  I: Home medications and return to clinic schedule reviewed with patient. Discharge instructions and parameters for calling Health Care Provider reviewed. Patient left at 1610 accompanied by wife, Ya.   A: Patient/family verbalized understanding and was ready for discharge.   P: Patient instructed to  medications in discharge pharmacy. Follow up as scheduled in clinic on 01/21.

## 2020-01-05 NOTE — PLAN OF CARE
[id number]    Cytarabine finished infusing, uneventful. MIVF continues to infuse at 150cc via Port.     BP soft mid-shift, asymptomatic, not within parameters to notify. Denies pain and SOB. Nausea is intermittent, declined any intervention. Continues to feel fatigued. UpAdLib. Plan is to get his second dose of Cytarabine around 1000. Will be able to discharge post 24 hrs of Cisplatin (about 2130).

## 2020-01-05 NOTE — PROGRESS NOTES
Nursing Focus: Chemotherapy    D: Positive blood return via port. Insertion site is clean/dry/intact, dressing intact with no complaints of pain.  Urine output is recorded in intake in Doc Flowsheet.  Baseline neuro assessed and WNL.     I: Premedications given per order (see electronic medical administration record). Dose #1 of HD cytarabine started to infuse over 3 hours. Reviewed pt teaching on chemotherapy side effects.  Pt denies need for further teaching. Chemotherapy double checked per protocol by two chemotherapy competent RN's.     A: Tolerating infusion well. Denies nausea and or pain.     P: Continue to monitor urine output and symptoms of nausea. Screen for symptoms of toxicity.

## 2020-01-05 NOTE — DISCHARGE SUMMARY
[facility], [place]    Discharge Summary  Hematology / Oncology    Date of Admission:  1/3/2020  Date of Discharge:  1/5/2020  Discharging Provider: Dieter Lorenz  Date of Service (when I saw the patient): 1/5/2020    Discharge Diagnoses   Active Problems:    Mantle cell lymphoma (H)      History of Present Illness   Slava Lane is a 49 year old man with mantle cell lymphoma who was admitted for cycle 3B RDHAP.     Hospital Course   Slava Lane was admitted on 01/03/2019.  The following problems were addressed during his hospitalization:     Mantle cell lymphoma  Diagnosed on routine screening colonoscopy poly at age 48 (5/28/19). Stage IV with colon, BM involvement. Ki67 20%, TP53 negative. Met with Dr. Redding and decided to pursue curative intent treatment. He started R-CHOP/R-DHAP on 9/18/19 with plan for 6 total cycles then ASCT then maintenance Rituxan for 2 years. Last received cycle 3A R-CHOP on 12/13/19. He now presents for cycle 3B R-DHAP. He is tolerating this cycle very well so far; no acute events during hospitalization.      ID prophylaxis  -Continue ACV.  -Instructed to hold off on fluconazole and levofloxacin for now since not currently neutropenic. Labs at f/u appt.      Arthralgias  Felt secondary to neulasta. None at this time. Continue claritin with neulasta if helpful, tylenol PRN.      Disposition: Discharge to [facility] then home.   Follow-up / Referrals: Scheduled for Neulasta/labs in [place]    Dieter Lorenz MD/PhD  Fellow, Division of Hematology/Oncology and Bone Marrow Transplantation  [facility]  [id number]      Significant Results and Procedures   None    Code Status   Full Code    Primary Care Physician   ADRIANO HUNTER    Physical Exam   Temp: 96  F (35.6  C) Temp src: Oral BP: 115/61 Pulse: 76 Heart Rate: 70 Resp: 16 SpO2: 97 % O2 Device: None (Room air)    Vitals:    01/03/20 1220 01/04/20 1000 01/05/20 1047   Weight: 75.6 kg (166 lb  9.6 oz) 76.2 kg (168 lb) 77.9 kg (171 lb 11.2 oz)     Vital Signs with Ranges  Temp:  [95.3  F (35.2  C)-96.7  F (35.9  C)] 96  F (35.6  C)  Pulse:  [69-76] 76  Heart Rate:  [68-74] 70  Resp:  [16-18] 16  BP: ()/(50-65) 115/61  SpO2:  [95 %-97 %] 97 %  I/O last 3 completed shifts:  In: 5951.6 [P.O.:1080; I.V.:3560; IV Piggyback:1311.6]  Out: 5000 [Urine:5000]    Constitutional: NAD, feeling well  Eyes: PERRLA, EOMI  ENT: Nares patent, no discharge, moist oral mucosa without lesion  Respiratory: CTAB, no increased WOB  Cardiovascular: RRR, normal S1/S2, no MRGs  GI: normoactive bowel sounds, non-tender, non-distended  Lymph/Hematologic: No cervical, supraclavicular, or axillary LAD  Skin: No visible rashes or lesions  Musculoskeletal: Moving all 4 extremities, normal muscle tone and bulk  Neurologic: A+Ox3  Neuropsychiatric: Appropriate affect      Discharge Disposition   Discharged to home  Condition at discharge: Stable    Consultations This Hospital Stay   None    Discharge Orders      Reason for your hospital stay    R-DHAP Cycle 3B chemotherapy     Activity    Your activity upon discharge: activity as tolerated     Reason for your hospital stay    R-DHAP cycle 3B chemotherapy     Adult [facility] Follow-up and recommended labs and tests    Appointments on [place] and/or [facility] (with [facility] or [facility] provider or service). Call [phone] if you haven't heard regarding these appointments within 7 days of discharge.     Full Code     Diet    Follow this diet upon discharge: Orders Placed This Encounter      Snacks/Supplements Adult: Boost Shake; With Meals      Regular Diet Adult     Discharge Medications   Current Discharge Medication List      START taking these medications    Details   allopurinol (ZYLOPRIM) 300 MG tablet Take 1 tablet (300 mg) by mouth daily  Qty: 30 tablet, Refills: 0    Associated Diagnoses: Mantle cell lymphoma, unspecified body region (H)      OLANZapine (ZYPREXA) 5 MG tablet  Take 1 tablet (5 mg) by mouth 2 times daily for 2 days  Qty: 30 tablet, Refills: 0    Associated Diagnoses: Mantle cell lymphoma, unspecified body region (H)      prednisoLONE acetate (PRED FORTE) 1 % ophthalmic suspension Place 2 drops into both eyes 4 times daily for 7 days.  Qty: 5 mL, Refills: 0    Associated Diagnoses: Mantle cell lymphoma of lymph nodes of multiple sites (H)         CONTINUE these medications which have NOT CHANGED    Details   acyclovir (ZOVIRAX) 400 MG tablet Take 1 tablet (400 mg) by mouth 2 times daily  Qty: 60 tablet, Refills: 3    Associated Diagnoses: Mantle cell lymphoma of lymph nodes of multiple sites (H)      fluconazole (DIFLUCAN) 100 MG tablet Take 2 tablets (200 mg) by mouth daily . Take until ANC >1000.  Qty: 30 tablet, Refills: 1    Associated Diagnoses: Mantle cell lymphoma of lymph nodes of multiple sites (H)      levofloxacin (LEVAQUIN) 250 MG tablet Take 1 tablet (250 mg) by mouth daily . Take until ANC >1000.  Qty: 30 tablet, Refills: 3    Associated Diagnoses: Mantle cell lymphoma, unspecified body region (H)      lidocaine-prilocaine (EMLA) 2.5-2.5 % external cream Apply topically as needed for moderate pain Apply to port site 30 minutes prior to port access  Qty: 30 g, Refills: 1    Associated Diagnoses: Mantle cell lymphoma of lymph nodes of multiple sites (H)      loratadine (CLARITIN) 10 MG capsule Take 10 mg by mouth daily as needed (Take 1 day before and daily for 8 days after neulasta administration)      ondansetron (ZOFRAN) 8 MG tablet Take 1 tablet (8 mg) by mouth every 8 hours as needed for nausea or vomiting  Qty: 30 tablet, Refills: 3    Associated Diagnoses: Mantle cell lymphoma of lymph nodes of multiple sites (H); Admission for chemotherapy      prochlorperazine (COMPAZINE) 10 MG tablet Take 1 tablet (10 mg) by mouth every 6 hours as needed for nausea or vomiting  Qty: 30 tablet, Refills: 3    Associated Diagnoses: Mantle cell lymphoma of lymph nodes of  multiple sites (H)      diphenhydrAMINE (BENADRYL) 50 MG capsule Take 50 mg by mouth every 6 hours as needed for itching or allergies      sildenafil (VIAGRA) 100 MG tablet Take 100 mg by mouth daily as needed            Allergies   No Known Allergies  Data   Most Recent 3 CBC's:  Recent Labs   Lab Test 01/05/20  0644 01/04/20  0532 01/03/20  0808   WBC 14.8* 4.6 3.8*   HGB 10.8* 10.8* 12.0*   * 101* 101*    205 219      Most Recent 3 BMP's:  Recent Labs   Lab Test 01/05/20  0644 01/04/20  0532 01/03/20  0808    140 142   POTASSIUM 4.3 4.2 3.8   CHLORIDE 113* 112* 105   CO2 24 23 27   BUN 12 10 15   CR 0.67 0.58* 0.79   ANIONGAP 4 6 11   PIEDAD 8.3* 8.2* 8.8   * 218* 110*     Most Recent 2 LFT's:  Recent Labs   Lab Test 01/03/20  0808 12/13/19  0750   AST 27 20   ALT 28 23   ALKPHOS 53 63   BILITOTAL 0.5 0.6

## 2020-01-06 LAB — INTERPRETATION ECG - MUSE: NORMAL

## 2020-01-13 ENCOUNTER — CARE COORDINATION (OUTPATIENT)
Dept: ONCOLOGY | Facility: CLINIC | Age: 50
End: 2020-01-13

## 2020-01-13 NOTE — PROGRESS NOTES
Writer called Slava Patch with today's labs.     WBC 1.7/ANC at 0.1  Plts at 23  Hgb 10.7  CMP normal    Reviewed neutropenic/bleeding precautions, including good handwashing, staying away from sick people/crowds and calling temperature over 100.4 day or night. He will restart his ppx Levaquin and Fluconazole. [place]    Brannon expressed good understanding of the above.

## 2020-01-15 ENCOUNTER — CARE COORDINATION (OUTPATIENT)
Dept: ONCOLOGY | Facility: CLINIC | Age: 50
End: 2020-01-15

## 2020-01-15 NOTE — PROGRESS NOTES
Slava Patch had labs up [place]. Labs good. ANC now normal(6.1), Platelets at 30, Hgb stable, CMP normal. Brannon had a bout of night sweats last night otherwise offers no complaints, no fevers. Instructed him to stop his Levaquin and Fluconazole. He had an appointment here next week. He usually does not come here mid cycle for follow-ups, this appointment will be cancelled. He has no other appointments scheduled. Plan will be for a PET scan and possible colonoscopy in 4-6 weeks per notes. Will confirm with Doctor Miladis.     Brannon will have repeat  labs early next week up [place]. Will call sooner if any problems or questions.     Writer will continue to follow.

## 2020-01-20 ENCOUNTER — CARE COORDINATION (OUTPATIENT)
Dept: ONCOLOGY | Facility: CLINIC | Age: 50
End: 2020-01-20

## 2020-01-20 DIAGNOSIS — C83.18 MANTLE CELL LYMPHOMA OF LYMPH NODES OF MULTIPLE SITES (H): Primary | ICD-10-CM

## 2020-01-24 ENCOUNTER — PATIENT OUTREACH (OUTPATIENT)
Dept: CARE COORDINATION | Facility: CLINIC | Age: 50
End: 2020-01-24

## 2020-01-24 NOTE — PROGRESS NOTES
Per Patient's request,  completed and faxed [facility] [place] request for lodging dates 1/27/2020- 1/30/2020. [facility] will contact Patient for confirmation of reservation.  will continue to provide support as needed.    Azra BONE, [facility]  - Oncology  Phone : [phone]  Pager: [phone]     testicular pain

## 2020-01-28 ENCOUNTER — HOSPITAL ENCOUNTER (OUTPATIENT)
Dept: PET IMAGING | Facility: CLINIC | Age: 50
Discharge: HOME | End: 2020-01-28
Attending: INTERNAL MEDICINE | Admitting: INTERNAL MEDICINE
Payer: COMMERCIAL

## 2020-01-28 DIAGNOSIS — C83.18 MANTLE CELL LYMPHOMA OF LYMPH NODES OF MULTIPLE SITES (H): ICD-10-CM

## 2020-01-28 PROCEDURE — 34300033 ZZH RX 343: Performed by: INTERNAL MEDICINE

## 2020-01-28 PROCEDURE — A9552 F18 FDG: HCPCS | Performed by: INTERNAL MEDICINE

## 2020-01-28 PROCEDURE — 78816 PET IMAGE W/CT FULL BODY: CPT | Mod: PS

## 2020-01-28 PROCEDURE — 25000128 H RX IP 250 OP 636: Performed by: INTERNAL MEDICINE

## 2020-01-28 RX ADMIN — Medication 500 UNITS: at 07:23

## 2020-01-28 RX ADMIN — FLUDEOXYGLUCOSE F-18 10.17 MCI.: 500 INJECTION, SOLUTION INTRAVENOUS at 07:23

## 2020-01-28 NOTE — PROGRESS NOTES
[facility] Follow-Up      Slava Lane is a 49 year old man with mantle cell lymphoma from colon polyps.      Hematologic history:  Mantle cell lymphoma.  Mantle cell lymphoma, Stage IV with colon, BM involvement Ki67 -30%, TP53 mutations - Negative.  MCL MIPI - 5.9- Intermediate   .    Date Treatment Response Toxicities/Complications   9/18/19 R-CHOP/R-DHAP x3 each [place]                             HPI:  Please see my entry above for hematologic history.  Pt is feeling at baseline currently.  Diagnosed with mantle cell lymphoma incidentally after routine screening colonoscopy noted 5/5 polyps with mantle cell lymphoma.     Today the pt is here with his wife. He is feeling great and has no symptoms. He worked 80 hours last week as a . He is not entirely sure he wants a bone marrow transplant. His PET/CT done yesterday shows stable FDG-avid areas in the colon, a perirectal node, and a periaortic node.     ASSESSMENT AND PLAN:    50 y/o with MCL Stage IV s/p completion of the Nordic regimen. His repeat PET scan again demonstrates some FDG avid areas in the colon, a perirectal node, and a paraortic node. These are unchanged from the prior PET. It is unclear if this is remaining disease or an inflammatory/healing process. We will obtain a biopsy of the colon via colonoscopy and will sample one of the lymph nodes through IR. If both are negative, we will proceed to ASCT as planned. If not, we will discuss clinical trial options. If he is able to undergo ASCT, we will give rituxan maintenance afterwards. ( See below for rationale for these treatments)    Patient seen and discussed with Dr. Redding.    Katia Snowden MD  Heme/Onc fellow    The patient was discussed with the clinical fellow, seen and examined by me. All labs and imaging were reviewed. I  I agree with the fellows note and have been responsible for the care plan and interpretation of progress. Any additional information to the fellows note  has been documented below.    After 6 alternating cycles of R-CHOP, R-DHAP his PET still shows a hypermetabolic areas. I think we should start with a colonoscopy to identify whether this represents persistent lymphoma. I will see Olayinka after his colonscopy and if negative IR guided biopsy of the para aortic node.      Mark ROMERO MS  Attending Physician  Pager - [phone]  Email - [email or web address]          ROS:    10 point ROS neg other than the symptoms noted above in the HPI.        Past Medical History:   Diagnosis Date     Mantle cell lymphoma (H) 06/2019    noted incidentally on colon polyp pathology      Uncomplicated asthma     When exposed to enviromental allergies       Past Surgical History:   Procedure Laterality Date     AS SKIN PINCH GRAFT PROCEDURE <2CM Right 2017     INSERT PORT VASCULAR ACCESS Right 10/3/2019    Procedure: Chest Port Placement;  Surgeon: July Simpson PA-C;  Location: [facility] OR     IR CHEST PORT PLACEMENT > 5 YRS OF AGE  10/3/2019     LITHOTRIPSY N/A 06/2019    unknown side       Family History   Problem Relation Age of Onset     Cancer No family hx of      Bleeding Disorder No family hx of      Clotting Disorder No family hx of        Social History     Tobacco Use     Smoking status: Former Smoker     Packs/day: 0.25     Types: Cigarettes     Smokeless tobacco: Former User     Types: Chew     Tobacco comment: A couple of cigarettes a month   Substance Use Topics     Alcohol use: Yes     Alcohol/week: 21.0 standard drinks     Types: 21 Cans of beer per week     Comment: Rare     Drug use: Never          No Known Allergies     Current Outpatient Medications   Medication Sig Dispense Refill     acyclovir (ZOVIRAX) 400 MG tablet Take 1 tablet (400 mg) by mouth 2 times daily 60 tablet 3     allopurinol (ZYLOPRIM) 300 MG tablet Take 1 tablet (300 mg) by mouth daily 30 tablet 0     diphenhydrAMINE (BENADRYL) 50 MG capsule Take 50 mg by mouth every 6 hours as needed for itching  or allergies       fluconazole (DIFLUCAN) 100 MG tablet Take 2 tablets (200 mg) by mouth daily . Take until ANC >1000. 30 tablet 1     levofloxacin (LEVAQUIN) 250 MG tablet Take 1 tablet (250 mg) by mouth daily . Take until ANC >1000. 30 tablet 3     lidocaine-prilocaine (EMLA) 2.5-2.5 % external cream Apply topically as needed for moderate pain Apply to port site 30 minutes prior to port access 30 g 1     loratadine (CLARITIN) 10 MG capsule Take 10 mg by mouth daily as needed (Take 1 day before and daily for 8 days after neulasta administration)       OLANZapine (ZYPREXA) 5 MG tablet Take 1 tablet (5 mg) by mouth 2 times daily for 2 days 30 tablet 0     ondansetron (ZOFRAN) 8 MG tablet Take 1 tablet (8 mg) by mouth every 8 hours as needed for nausea or vomiting 30 tablet 3     prednisoLONE acetate (PRED FORTE) 1 % ophthalmic suspension Place 2 drops into both eyes 4 times daily for 7 days. 5 mL 0     prochlorperazine (COMPAZINE) 10 MG tablet Take 1 tablet (10 mg) by mouth every 6 hours as needed for nausea or vomiting 30 tablet 3     sildenafil (VIAGRA) 100 MG tablet Take 100 mg by mouth daily as needed            Physical Exam:     Vital Signs: /68 (BP Location: Right arm, Patient Position: Sitting, Cuff Size: Adult Regular)   Pulse 68   Temp 97.6  F (36.4  C) (Oral)   Resp 16   Wt 77 kg (169 lb 11.2 oz)   SpO2 99%   BMI 27.39 kg/m      KPS:  0    General Appearance: healthy, alert and no distress  Eyes:conjunctiva and lids normal, sclera nonicteric  Resp Effort And Auscultation: nonlabored respirations  Neurologic: Gait normal.Alert and conversant  Psych/Affect: Mood and affect are appropriate.        Lab Results   Component Value Date    WBC 3.5 01/29/2020     Lab Results   Component Value Date    RBC 3.32 01/29/2020     Lab Results   Component Value Date    HGB 11.6 01/29/2020     Lab Results   Component Value Date    HCT 35.4 01/29/2020     No components found for: MCT  Lab Results   Component  Value Date     01/29/2020     Lab Results   Component Value Date    MCH 34.9 01/29/2020     Lab Results   Component Value Date    MCHC 32.8 01/29/2020     Lab Results   Component Value Date    RDW 15.5 01/29/2020     Lab Results   Component Value Date     01/29/2020         PET scan 1/28/2020  IMPRESSION: In this patient with history of mantle cell lymphoma there  has been no change in size and metabolic activity of known lesions:  1. Unchanged focal FDG avid wall thickening in the ascending colon .  2. Unchanged FDG avid left periaortic 2.1 x 2.8 cm left perirectal 2.6  x 1.9 cm javon masses. No new lesions.

## 2020-01-29 ENCOUNTER — OFFICE VISIT (OUTPATIENT)
Dept: TRANSPLANT | Facility: CLINIC | Age: 50
End: 2020-01-29
Attending: INTERNAL MEDICINE
Payer: COMMERCIAL

## 2020-01-29 ENCOUNTER — APPOINTMENT (OUTPATIENT)
Dept: LAB | Facility: CLINIC | Age: 50
End: 2020-01-29
Attending: INTERNAL MEDICINE
Payer: COMMERCIAL

## 2020-01-29 VITALS
WEIGHT: 169.7 LBS | RESPIRATION RATE: 16 BRPM | TEMPERATURE: 97.6 F | DIASTOLIC BLOOD PRESSURE: 68 MMHG | HEART RATE: 68 BPM | SYSTOLIC BLOOD PRESSURE: 113 MMHG | BODY MASS INDEX: 27.39 KG/M2 | OXYGEN SATURATION: 99 %

## 2020-01-29 DIAGNOSIS — C83.18 MANTLE CELL LYMPHOMA OF LYMPH NODES OF MULTIPLE SITES (H): ICD-10-CM

## 2020-01-29 LAB
ALBUMIN SERPL-MCNC: 3.7 G/DL (ref 3.4–5)
ALP SERPL-CCNC: 65 U/L (ref 40–150)
ALT SERPL W P-5'-P-CCNC: 21 U/L (ref 0–70)
ANION GAP SERPL CALCULATED.3IONS-SCNC: 3 MMOL/L (ref 3–14)
AST SERPL W P-5'-P-CCNC: 19 U/L (ref 0–45)
BASOPHILS # BLD AUTO: 0.1 10E9/L (ref 0–0.2)
BASOPHILS NFR BLD AUTO: 1.4 %
BILIRUB SERPL-MCNC: 0.3 MG/DL (ref 0.2–1.3)
BUN SERPL-MCNC: 18 MG/DL (ref 7–30)
CALCIUM SERPL-MCNC: 8.9 MG/DL (ref 8.5–10.1)
CHLORIDE SERPL-SCNC: 109 MMOL/L (ref 94–109)
CO2 SERPL-SCNC: 28 MMOL/L (ref 20–32)
CREAT SERPL-MCNC: 0.8 MG/DL (ref 0.66–1.25)
DIFFERENTIAL METHOD BLD: ABNORMAL
EOSINOPHIL # BLD AUTO: 0.2 10E9/L (ref 0–0.7)
EOSINOPHIL NFR BLD AUTO: 6.3 %
ERYTHROCYTE [DISTWIDTH] IN BLOOD BY AUTOMATED COUNT: 15.5 % (ref 10–15)
GFR SERPL CREATININE-BSD FRML MDRD: >90 ML/MIN/{1.73_M2}
GLUCOSE SERPL-MCNC: 103 MG/DL (ref 70–99)
HCT VFR BLD AUTO: 35.4 % (ref 40–53)
HGB BLD-MCNC: 11.6 G/DL (ref 13.3–17.7)
IMM GRANULOCYTES # BLD: 0 10E9/L (ref 0–0.4)
IMM GRANULOCYTES NFR BLD: 0.3 %
LYMPHOCYTES # BLD AUTO: 1.2 10E9/L (ref 0.8–5.3)
LYMPHOCYTES NFR BLD AUTO: 34 %
MCH RBC QN AUTO: 34.9 PG (ref 26.5–33)
MCHC RBC AUTO-ENTMCNC: 32.8 G/DL (ref 31.5–36.5)
MCV RBC AUTO: 107 FL (ref 78–100)
MONOCYTES # BLD AUTO: 0.8 10E9/L (ref 0–1.3)
MONOCYTES NFR BLD AUTO: 23.1 %
NEUTROPHILS # BLD AUTO: 1.2 10E9/L (ref 1.6–8.3)
NEUTROPHILS NFR BLD AUTO: 34.9 %
NRBC # BLD AUTO: 0 10*3/UL
NRBC BLD AUTO-RTO: 0 /100
PLATELET # BLD AUTO: 273 10E9/L (ref 150–450)
PLATELET # BLD EST: ABNORMAL 10*3/UL
POTASSIUM SERPL-SCNC: 4.4 MMOL/L (ref 3.4–5.3)
PROT SERPL-MCNC: 6.9 G/DL (ref 6.8–8.8)
RBC # BLD AUTO: 3.32 10E12/L (ref 4.4–5.9)
SODIUM SERPL-SCNC: 140 MMOL/L (ref 133–144)
WBC # BLD AUTO: 3.5 10E9/L (ref 4–11)

## 2020-01-29 PROCEDURE — 36415 COLL VENOUS BLD VENIPUNCTURE: CPT

## 2020-01-29 PROCEDURE — 80053 COMPREHEN METABOLIC PANEL: CPT | Performed by: INTERNAL MEDICINE

## 2020-01-29 PROCEDURE — 85025 COMPLETE CBC W/AUTO DIFF WBC: CPT | Performed by: INTERNAL MEDICINE

## 2020-01-29 PROCEDURE — G0463 HOSPITAL OUTPT CLINIC VISIT: HCPCS | Mod: ZF

## 2020-01-29 ASSESSMENT — PAIN SCALES - GENERAL: PAINLEVEL: NO PAIN (0)

## 2020-01-29 NOTE — NURSING NOTE
"Oncology Rooming Note    January 29, 2020 12:45 PM   Slava Lane is a 49 year old male who presents for:    Chief Complaint   Patient presents with     Blood Draw     labs drawn with vpt by rn.  vs taken     RECHECK     Return: Mantle cell lymphoma     Initial Vitals: /68 (BP Location: Right arm, Patient Position: Sitting, Cuff Size: Adult Regular)   Pulse 68   Temp 97.6  F (36.4  C) (Oral)   Resp 16   Wt 77 kg (169 lb 11.2 oz)   SpO2 99%   BMI 27.39 kg/m   Estimated body mass index is 27.39 kg/m  as calculated from the following:    Height as of 1/3/20: 1.676 m (5' 6\").    Weight as of this encounter: 77 kg (169 lb 11.2 oz). Body surface area is 1.89 meters squared.  No Pain (0) Comment: Data Unavailable   No LMP for male patient.  Allergies reviewed: Yes  Medications reviewed: Yes    Medications: Medication refills not needed today.  Pharmacy name entered into [employer]:    [facility] - [place], [place] - [address] [facility] [place], [place] - [address] [place] [address] [address]    Clinical concerns: HUMZA Hickman CMA              "

## 2020-01-31 NOTE — PROGRESS NOTES
Late entry-Slava Lane recently hospitalized [facility] for neutropenic fevers, discharged 9/26. Has appointments here at clinic 10/2 with Nichelle with labs. Called to check on status post discharge.

## 2020-02-06 ENCOUNTER — TELEPHONE (OUTPATIENT)
Dept: GASTROENTEROLOGY | Facility: OUTPATIENT CENTER | Age: 50
End: 2020-02-06

## 2020-02-06 DIAGNOSIS — Z12.11 ENCOUNTER FOR SCREENING COLONOSCOPY: Primary | ICD-10-CM

## 2020-02-06 NOTE — TELEPHONE ENCOUNTER
Patient taking any blood thinners ? No      Heart disease ? Denies      Lung disease ? Denies      Sleep apnea ? Denies      Diabetic ? Denies      Kidney disease ? Denies      Electronic implanted medical devices ? denies      Are you taking any narcotic pain medication ? N/a      PTSD ? N/a      Prep instructions reviewed with patient ? Patient declined review.  policy, MAC sedation plan reviewed. Advised patient to have someone stay with him post exam     Wife    Pharmacy :  [place]     Indication for procedure : Mantle cell lymphoma of lymph nodes of multiple sites (H     Referring provider : Mark Redding MD,Christa Nagel      Arrival Time : 6:30 AM

## 2020-02-07 ENCOUNTER — CARE COORDINATION (OUTPATIENT)
Dept: ONCOLOGY | Facility: CLINIC | Age: 50
End: 2020-02-07

## 2020-02-13 ENCOUNTER — TRANSFERRED RECORDS (OUTPATIENT)
Dept: HEALTH INFORMATION MANAGEMENT | Facility: CLINIC | Age: 50
End: 2020-02-13

## 2020-02-13 ENCOUNTER — DOCUMENTATION ONLY (OUTPATIENT)
Dept: GASTROENTEROLOGY | Facility: OUTPATIENT CENTER | Age: 50
End: 2020-02-13
Payer: COMMERCIAL

## 2020-02-13 ENCOUNTER — MEDICAL CORRESPONDENCE (OUTPATIENT)
Dept: HEALTH INFORMATION MANAGEMENT | Facility: CLINIC | Age: 50
End: 2020-02-13

## 2020-02-13 DIAGNOSIS — C83.18 LYMPHOMA, MANTLE CELL, MULTIPLE SITES (H): Primary | ICD-10-CM

## 2020-02-13 DIAGNOSIS — C83.18 LYMPHOMA, MANTLE CELL, MULTIPLE SITES (H): ICD-10-CM

## 2020-02-13 LAB — COPATH REPORT: NORMAL

## 2020-02-14 LAB — COPATH REPORT: NORMAL

## 2020-02-20 ENCOUNTER — CARE COORDINATION (OUTPATIENT)
Dept: ONCOLOGY | Facility: CLINIC | Age: 50
End: 2020-02-20

## 2020-02-20 ENCOUNTER — MYC MEDICAL ADVICE (OUTPATIENT)
Dept: TRANSPLANT | Facility: CLINIC | Age: 50
End: 2020-02-20

## 2020-02-20 DIAGNOSIS — C83.18 MANTLE CELL LYMPHOMA OF LYMPH NODES OF MULTIPLE SITES (H): Primary | ICD-10-CM

## 2020-02-20 NOTE — PROGRESS NOTES
"Writer called Slava Lane to go over colonoscopy results. Per doctor Miladis, no lymphoma found. Next step is to do a biopsy of the lymph nodes seen in the PET scan. This has already been discussed with Brannon by Doctor Redding. Writer explained that this would be done in IR. It is a two part appointment. First is to explain the procedure and see what area is best to biopsy. Second is the actual biopsy. Brannon said the sooner the better as the wait for all this is difficult and he is trying to plan things that need his attention.     Doctor Redding will be available next week to answer more of his questions. He is out this week at a Medical conference.     Per Brannon's request, PET results released to \"my chart\". Colonoscopy results are not, as they were not done here  In our system.   "

## 2020-02-21 ENCOUNTER — TELEPHONE (OUTPATIENT)
Dept: GASTROENTEROLOGY | Facility: CLINIC | Age: 50
End: 2020-02-21

## 2020-02-21 NOTE — TELEPHONE ENCOUNTER
Able to reach Mr. Lane today via phone to discuss biopsy results from his recent colonoscopy.     We reviewed that I would be in touch with his cancer care team, but had not yet heard back from Dr. Redding or his clinic. Mr. Lane shared that Dr. Redding was out-of-town at a conference.     Reviewed that the biopsy results were not consistent with lymphoma.     The mass biopsies revealed a very advanced polyp (tubulovillous adenoma) but given it's size and the superficiality of colonoscopic biopsies that I could not guarantee that cancer cells weren't present.     Mr. Lane shared that there has been discussion of pursuing LN biopsies of the positive nodes seen on PET scan.     I discussed that we'll need to come up with a comprehensive treatment plan, but that removal of this lesion would likely be indicated. I discussed that Dr. Redding and I would touch base and be sure to inform Mr. Lane of any updates.     Questions answered to the patient's satisfaction and understanding was confirmed at the end of our conversation.

## 2020-02-24 DIAGNOSIS — C83.18 MANTLE CELL LYMPHOMA OF LYMPH NODES OF MULTIPLE SITES (H): Primary | ICD-10-CM

## 2020-02-25 DIAGNOSIS — R59.9 ENLARGED LYMPH NODES: Primary | ICD-10-CM

## 2020-02-25 DIAGNOSIS — C83.18 MANTLE CELL LYMPHOMA OF LYMPH NODES OF MULTIPLE SITES (H): ICD-10-CM

## 2020-02-25 NOTE — PROGRESS NOTES
Olayinka is a 50 yo with Mantle cell lymphoma, Stage IV with colon, BM involvement Ki67 -30%, TP53 mutations - Negative.  Patient was treated with R-CHOP/R-DHAP x3 each.    Repeat PET scan again demonstrates some FDG avid areas in the colon, a perirectal node, and a paraortic node. These are unchanged from the prior PET.  It is unclear if this is remaining disease or an inflammatory/healing process.  Biopsy is requested to evaluate the status of the disease.  Dr. Shaggy Romeo from Interventional Radiology reviewed the imaging and approved the biopsy.  Left para-aortic lymph node, series 604, image 133.  SUV max of 4.9, measuring 2.1 x 2.8 centimeters     Shelley Cordoba MS, APRN, CNS, CRN  Clinical Nurse Specialist  Interventional Radiology  [phone] (voice mail)  [phone] (pager)

## 2020-02-27 NOTE — TELEPHONE ENCOUNTER
DIAGNOSIS: Consult lymph node bx:    DATE RECEIVED: 3.3.20   NOTES STATUS DETAILS   OFFICE NOTE from referring provider Internal 2.20.20, 1.29.20  Dr. Redding   OFFICE NOTE from other specialist N/A    DISCHARGE SUMMARY from hospital Internal 1.3-1.5.20 [facility] Dr. Thapa     DISCHARGE REPORT from the ER N/A    OPERATIVE REPORT N/A    MEDICATION LIST Internal    XRAYS (IMAGES & REPORTS) Internal PET- 1.28.20, 12.12.19  IR chest- 10.3.19  US Abd- 9.12.19   PATHOLOGY  Slides & report N/A

## 2020-02-28 ENCOUNTER — PATIENT OUTREACH (OUTPATIENT)
Dept: CARE COORDINATION | Facility: CLINIC | Age: 50
End: 2020-02-28

## 2020-02-28 NOTE — PROGRESS NOTES
Per Patient's request,  completed and faxed [employer] [place] request for lodging dates 03/01/2020 - 03/03/2020. [facility] [place] will contact Patient for confirmation of reservation.  will continue to provide support as needed.    Azra BONE, [facility]  - Oncology  Phone : [phone]  Pager: [phone]

## 2020-03-02 ENCOUNTER — TELEPHONE (OUTPATIENT)
Dept: INTERVENTIONAL RADIOLOGY/VASCULAR | Facility: CLINIC | Age: 50
End: 2020-03-02

## 2020-03-03 ENCOUNTER — PRE VISIT (OUTPATIENT)
Dept: INTERVENTIONAL RADIOLOGY/VASCULAR | Facility: CLINIC | Age: 50
End: 2020-03-03

## 2020-03-03 ENCOUNTER — OFFICE VISIT (OUTPATIENT)
Dept: INTERVENTIONAL RADIOLOGY/VASCULAR | Facility: CLINIC | Age: 50
End: 2020-03-03
Payer: COMMERCIAL

## 2020-03-03 ENCOUNTER — HOSPITAL ENCOUNTER (OUTPATIENT)
Facility: CLINIC | Age: 50
Discharge: HOME | End: 2020-03-03
Attending: INTERNAL MEDICINE | Admitting: INTERNAL MEDICINE
Payer: COMMERCIAL

## 2020-03-03 ENCOUNTER — APPOINTMENT (OUTPATIENT)
Dept: INTERVENTIONAL RADIOLOGY/VASCULAR | Facility: CLINIC | Age: 50
End: 2020-03-03
Attending: CLINICAL NURSE SPECIALIST
Payer: COMMERCIAL

## 2020-03-03 ENCOUNTER — APPOINTMENT (OUTPATIENT)
Dept: MEDSURG UNIT | Facility: CLINIC | Age: 50
End: 2020-03-03
Attending: INTERNAL MEDICINE
Payer: COMMERCIAL

## 2020-03-03 VITALS
RESPIRATION RATE: 18 BRPM | HEART RATE: 72 BPM | DIASTOLIC BLOOD PRESSURE: 74 MMHG | SYSTOLIC BLOOD PRESSURE: 114 MMHG | OXYGEN SATURATION: 96 % | HEIGHT: 67 IN | TEMPERATURE: 98.1 F | BODY MASS INDEX: 25.72 KG/M2

## 2020-03-03 VITALS
DIASTOLIC BLOOD PRESSURE: 69 MMHG | BODY MASS INDEX: 26.12 KG/M2 | SYSTOLIC BLOOD PRESSURE: 113 MMHG | OXYGEN SATURATION: 99 % | HEART RATE: 73 BPM | WEIGHT: 161.8 LBS

## 2020-03-03 DIAGNOSIS — C83.18 MANTLE CELL LYMPHOMA OF LYMPH NODES OF MULTIPLE SITES (H): ICD-10-CM

## 2020-03-03 DIAGNOSIS — R59.9 ENLARGED LYMPH NODES: ICD-10-CM

## 2020-03-03 LAB
BASOPHILS # BLD AUTO: 0 10E9/L (ref 0–0.2)
BASOPHILS NFR BLD AUTO: 0.7 %
DIFFERENTIAL METHOD BLD: ABNORMAL
EOSINOPHIL # BLD AUTO: 0.4 10E9/L (ref 0–0.7)
EOSINOPHIL NFR BLD AUTO: 7 %
ERYTHROCYTE [DISTWIDTH] IN BLOOD BY AUTOMATED COUNT: 13.2 % (ref 10–15)
HCT VFR BLD AUTO: 41 % (ref 40–53)
HGB BLD-MCNC: 13.4 G/DL (ref 13.3–17.7)
IMM GRANULOCYTES # BLD: 0 10E9/L (ref 0–0.4)
IMM GRANULOCYTES NFR BLD: 0.2 %
INR PPP: 1.08 (ref 0.86–1.14)
LYMPHOCYTES # BLD AUTO: 1.5 10E9/L (ref 0.8–5.3)
LYMPHOCYTES NFR BLD AUTO: 26.5 %
MCH RBC QN AUTO: 33.5 PG (ref 26.5–33)
MCHC RBC AUTO-ENTMCNC: 32.7 G/DL (ref 31.5–36.5)
MCV RBC AUTO: 103 FL (ref 78–100)
MONOCYTES # BLD AUTO: 0.6 10E9/L (ref 0–1.3)
MONOCYTES NFR BLD AUTO: 10.3 %
NEUTROPHILS # BLD AUTO: 3.2 10E9/L (ref 1.6–8.3)
NEUTROPHILS NFR BLD AUTO: 55.3 %
NRBC # BLD AUTO: 0 10*3/UL
NRBC BLD AUTO-RTO: 0 /100
PLATELET # BLD AUTO: 169 10E9/L (ref 150–450)
RBC # BLD AUTO: 4 10E12/L (ref 4.4–5.9)
WBC # BLD AUTO: 5.8 10E9/L (ref 4–11)

## 2020-03-03 PROCEDURE — 88342 IMHCHEM/IMCYTCHM 1ST ANTB: CPT | Performed by: INTERNAL MEDICINE

## 2020-03-03 PROCEDURE — 25800030 ZZH RX IP 258 OP 636: Performed by: RADIOLOGY

## 2020-03-03 PROCEDURE — 99153 MOD SED SAME PHYS/QHP EA: CPT

## 2020-03-03 PROCEDURE — 77012 CT SCAN FOR NEEDLE BIOPSY: CPT

## 2020-03-03 PROCEDURE — 88185 FLOWCYTOMETRY/TC ADD-ON: CPT | Performed by: INTERNAL MEDICINE

## 2020-03-03 PROCEDURE — 88341 IMHCHEM/IMCYTCHM EA ADD ANTB: CPT | Performed by: INTERNAL MEDICINE

## 2020-03-03 PROCEDURE — 25000128 H RX IP 250 OP 636: Performed by: STUDENT IN AN ORGANIZED HEALTH CARE EDUCATION/TRAINING PROGRAM

## 2020-03-03 PROCEDURE — 25000128 H RX IP 250 OP 636: Performed by: RADIOLOGY

## 2020-03-03 PROCEDURE — 85610 PROTHROMBIN TIME: CPT | Performed by: RADIOLOGY

## 2020-03-03 PROCEDURE — 27210909 ZZH NEEDLE CR5

## 2020-03-03 PROCEDURE — 88184 FLOWCYTOMETRY/ TC 1 MARKER: CPT | Performed by: INTERNAL MEDICINE

## 2020-03-03 PROCEDURE — 88333 PATH CONSLTJ SURG CYTO XM 1: CPT | Performed by: INTERNAL MEDICINE

## 2020-03-03 PROCEDURE — 88305 TISSUE EXAM BY PATHOLOGIST: CPT | Performed by: INTERNAL MEDICINE

## 2020-03-03 PROCEDURE — 99152 MOD SED SAME PHYS/QHP 5/>YRS: CPT

## 2020-03-03 PROCEDURE — 40001005 ZZHCL STATISTIC FLOW >15 ABY TC 88189: Performed by: INTERNAL MEDICINE

## 2020-03-03 PROCEDURE — 40000172 ZZH STATISTIC PROCEDURE PREP ONLY

## 2020-03-03 PROCEDURE — 85025 COMPLETE CBC W/AUTO DIFF WBC: CPT | Performed by: RADIOLOGY

## 2020-03-03 RX ORDER — HEPARIN SODIUM (PORCINE) LOCK FLUSH IV SOLN 100 UNIT/ML 100 UNIT/ML
500 SOLUTION INTRAVENOUS EVERY 8 HOURS
Status: DISCONTINUED | OUTPATIENT
Start: 2020-03-03 | End: 2020-03-03 | Stop reason: HOSPADM

## 2020-03-03 RX ORDER — NICOTINE POLACRILEX 4 MG
15-30 LOZENGE BUCCAL
Status: DISCONTINUED | OUTPATIENT
Start: 2020-03-03 | End: 2020-03-03 | Stop reason: HOSPADM

## 2020-03-03 RX ORDER — DEXTROSE MONOHYDRATE 25 G/50ML
25-50 INJECTION, SOLUTION INTRAVENOUS
Status: DISCONTINUED | OUTPATIENT
Start: 2020-03-03 | End: 2020-03-03 | Stop reason: HOSPADM

## 2020-03-03 RX ORDER — FENTANYL CITRATE 50 UG/ML
25-50 INJECTION, SOLUTION INTRAMUSCULAR; INTRAVENOUS EVERY 5 MIN PRN
Status: DISCONTINUED | OUTPATIENT
Start: 2020-03-03 | End: 2020-03-03 | Stop reason: HOSPADM

## 2020-03-03 RX ORDER — FLUMAZENIL 0.1 MG/ML
0.2 INJECTION, SOLUTION INTRAVENOUS
Status: DISCONTINUED | OUTPATIENT
Start: 2020-03-03 | End: 2020-03-03 | Stop reason: HOSPADM

## 2020-03-03 RX ORDER — NALOXONE HYDROCHLORIDE 0.4 MG/ML
.1-.4 INJECTION, SOLUTION INTRAMUSCULAR; INTRAVENOUS; SUBCUTANEOUS
Status: DISCONTINUED | OUTPATIENT
Start: 2020-03-03 | End: 2020-03-03 | Stop reason: HOSPADM

## 2020-03-03 RX ORDER — SODIUM CHLORIDE 9 MG/ML
INJECTION, SOLUTION INTRAVENOUS CONTINUOUS
Status: DISCONTINUED | OUTPATIENT
Start: 2020-03-03 | End: 2020-03-03 | Stop reason: HOSPADM

## 2020-03-03 RX ORDER — ACETAMINOPHEN 325 MG/1
650 TABLET ORAL EVERY 4 HOURS PRN
Status: DISCONTINUED | OUTPATIENT
Start: 2020-03-03 | End: 2020-03-03 | Stop reason: HOSPADM

## 2020-03-03 RX ORDER — LIDOCAINE 40 MG/G
CREAM TOPICAL
Status: DISCONTINUED | OUTPATIENT
Start: 2020-03-03 | End: 2020-03-03 | Stop reason: HOSPADM

## 2020-03-03 RX ADMIN — FENTANYL CITRATE 150 MCG: 50 INJECTION, SOLUTION INTRAMUSCULAR; INTRAVENOUS at 13:45

## 2020-03-03 RX ADMIN — MIDAZOLAM 6 MG: 1 INJECTION INTRAMUSCULAR; INTRAVENOUS at 13:45

## 2020-03-03 RX ADMIN — SODIUM CHLORIDE: 9 INJECTION, SOLUTION INTRAVENOUS at 12:36

## 2020-03-03 RX ADMIN — Medication 500 UNITS: at 16:19

## 2020-03-03 NOTE — DISCHARGE INSTRUCTIONS
[facility]    Interventional Radiology  Patient Instructions Following CT Guided Left Retroperitoneal  Biopsy    AFTER YOU GO HOME  ? If you were given sedation DO NOT drive or operate machinery at home or at work for at least 24 hours  ? DO relax and take it easy for 48 hours, no strenuous activity for 24 hours  ? DO drink plenty of fluids  ? DO resume your regular diet, unless otherwise instructed by your Primary Physician  ? Keep the dressing dry and in place for 24 hours.  ? DO NOT SMOKE FOR AT LEAST 24 HOURS, if you have been given any medications that were to help you relax or sedate you during your procedure  ? DO NOT drink alcoholic beverages the day of your procedure  ? DO NOT do any strenuous exercise or lifting (> 10 lbs) for at least 3 days following your procedure  ? DO NOT take a bath or shower for at least 24 hours following your procedure  ? Remove dressing after shower the next day. Replace with Band aid for 2 days.  Never leave a wet dressing in place.  ? DO NOT make any important or legal decisions for 24 hours following your procedure  ? There should be minimum drainage from the biopsy site    CALL THE PHYSICIAN IF:  ? You start bleeding from the procedure site.  If you do start to bleed from that site,  hold pressure on the site for a minimum of 10 minutes.  Your physician will tell you if you need to return to the hospital  ? You develop nausea or vomiting  ? You have excessive swelling, redness, or tenderness at the site  ? You have drainage that looks like it is infected.  ? You experience severe pain  ? You develop hives or a rash or unexplained itching  ? You develop a temperature of 101 degrees F or greater    [facility] INTERVENTIONAL RADIOLOGY DEPARTMENT  Procedure Physician:         Dr. Milian              Date of procedure: March 3, 2020  Telephone Numbers: [phone] Monday-Friday 8:00 am to 4:30 pm  [phone] After 4:30 pm Monday-Friday, Weekends & Holidays.   Ask  for the Interventional Radiologist on call.  Someone is on call 24 hrs/day  [facility] toll free number: [phone] Monday-Friday 8:00 am to 4:30 pm  [facility] Emergency Dept: [phone]

## 2020-03-03 NOTE — PROGRESS NOTES
Pt tolerated oral intake. Discharge instructions reviewed, copy given to pt. Pt tolerated ambulation. Voided. Port flushed with normal saline and heparin and de-accessed.  Pt discharged home accompanied by wife.

## 2020-03-03 NOTE — PROGRESS NOTES
"1435:  Received from IR s/p retroperitoneal biopsy. Left lower back dressing clean, flat, dry, and intact. Denies pain, refuses orals. Patient states he\"wants to sleep for a little while.\"  1451:  Spouse at bedside.   "

## 2020-03-03 NOTE — PROGRESS NOTES
Patient Name: Slava Lane  Medical Record Number: [id number]  Today's Date: 3/3/2020    Procedure: left retroperitoneal biopsy   Proceduralist: Dr.Young Alfaro     Sedation start time: 1345  Sedation end time: 1425  Sedation medications administered: 6mg versed 150mcg fentanyl     Procedure start time: 1400  Procedure end time: 1425    Report given to: 2A RN     Pt arrived to IR room CT-2  from . Consent reviewed. Pt denies any questions or concerns regarding procedure. Pt positioned prone and monitored per protocol. Pt tolerated procedure without any noted complications. Pt transferred back to .    Pathology present at 1400 and responsible for samples obtained.

## 2020-03-03 NOTE — LETTER
3/3/2020       RE: Slava Lane  [address]  [facility] [address]     Dear Colleague,    Thank you for referring your patient, Slava Lane, to the [facility] INTERVENTIONAL RADIOLOGY at [facility]. Please see a copy of my visit note below.    First Name: Slava   Age: 49 year old   Referring Physician: Dr. Redding   REASON FOR REFERRAL: Consultation for lymph node biopsy     Assessment:   Slava is a 49 year old year old male who has a hx of mantle cell lymphoma. He is here today as he has been referred by Dr. Redding to have a biopsy of the left para aortic lymph node to evaluate for persistence of disease.    Plan:   Procedure: CT guided biopsy of left para aortic lymph node  Lab: 1/29/2020 - hemoglobin 11.6, platelets 273, INR and CBC orders have been signed and held by Dr. 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 This was initially found after biopsying colon polyps. His repeat PET scan again demonstrated some FDG avid areas in the colon, a perirectal node, and a para aortic node that were unchanged from the prior PET. It was unclear if this was remaining disease or an inflammatory/healing process. Therefore biopsies via colonoscopy was again performed. He is here today for a consultation for a biopsy as his most recent PET showed no change of the para aortic node.    Dr. Miranda reviewed the imaging and approved the biopsy, Series 604 Image 133        Past Medical History:   Diagnosis Date     Mantle cell lymphoma (H) 06/2019    noted incidentally on colon polyp pathology      Uncomplicated asthma     When exposed to enviromental allergies     Past Surgical History:   Procedure Laterality Date     AS SKIN PINCH GRAFT PROCEDURE <2CM Right 2017     INSERT PORT VASCULAR ACCESS Right 10/3/2019    Procedure: Chest Port Placement;  Surgeon: Tatiana  July SANTOS PA-C;  Location: [facility] OR     IR CHEST PORT PLACEMENT > 5 YRS OF AGE  10/3/2019     LITHOTRIPSY N/A 06/2019    unknown side     Family History   Problem Relation Age of Onset     Cancer No family hx of      Bleeding Disorder No family hx of      Clotting Disorder No family hx of      Social History     Tobacco Use     Smoking status: Former Smoker     Packs/day: 0.25     Types: Cigarettes     Smokeless tobacco: Former User     Types: Chew     Tobacco comment: A couple of cigarettes a month   Substance Use Topics     Alcohol use: Yes     Alcohol/week: 21.0 standard drinks     Types: 21 Cans of beer per week     Comment: Rare     Patient Active Problem List    Diagnosis Date Noted     Mantle cell lymphoma (H) 11/15/2019     Priority: Medium     Admission for chemotherapy 10/03/2019     Priority: Medium     Corneal opacity 10/02/2019     Priority: Medium     Disorder of refraction and accommodation 10/02/2019     Priority: Medium     Mantle cell lymphoma of lymph nodes of multiple sites (H) 09/10/2019     Priority: Medium     Personal history of tobacco use, presenting hazards to health 11/25/2003     Priority: Medium     Prescription Medications as of 3/3/2020       Rx Number Disp Refills Start End Last Dispensed Date Next Fill Date Owning Pharmacy    acyclovir (ZOVIRAX) 400 MG tablet  60 tablet 3 11/6/2019    [facility] [place], [place] - [address]    Sig: Take 1 tablet (400 mg) by mouth 2 times daily    Class: E-Prescribe    Route: Oral    allopurinol (ZYLOPRIM) 300 MG tablet  30 tablet 0 1/5/2020    [facility] [place], [place] - [address]    Sig: Take 1 tablet (300 mg) by mouth daily    Class: E-Prescribe    Route: Oral    diphenhydrAMINE (BENADRYL) 50 MG capsule            Sig: Take 50 mg by mouth every 6 hours as needed for itching or allergies    Class: Historical    Route: Oral    fluconazole (DIFLUCAN) 100 MG tablet  30 tablet 1 11/16/2019     "[address]    Sig: Take 2 tablets (200 mg) by mouth daily . Take until ANC >1000.    Class: Historical    Route: Oral    levofloxacin (LEVAQUIN) 250 MG tablet  30 tablet 3 11/18/2019    [address]    Sig: Take 1 tablet (250 mg) by mouth daily . Take until ANC >1000.    Class: E-Prescribe    Route: Oral    lidocaine-prilocaine (EMLA) 2.5-2.5 % external cream  30 g 1 10/25/2019    [facility] [address] [place] [address] 1-830    Sig: Apply topically as needed for moderate pain Apply to port site 30 minutes prior to port access    Class: E-Prescribe    Route: Topical    loratadine (CLARITIN) 10 MG capsule            Sig: Take 10 mg by mouth daily as needed (Take 1 day before and daily for 8 days after neulasta administration)    Class: Historical    Route: Oral    OLANZapine (ZYPREXA) 5 MG tablet  30 tablet 0 1/5/2020 1/7/2020   [address]    Sig: Take 1 tablet (5 mg) by mouth 2 times daily for 2 days    Class: E-Prescribe    Route: Oral    Renewals     Renewal requests to authorizing provider (Dieter Lorenz MD) <b>prohibited</b>          ondansetron (ZOFRAN) 8 MG tablet  30 tablet 3 11/16/2019    [address]    Sig: Take 1 tablet (8 mg) by mouth every 8 hours as needed for nausea or vomiting    Class: E-Prescribe    Route: Oral    polyethylene glycol (GOLYTELY/NULYTELY) 236 g suspension (Ended)  4000 mL 0 2/6/2020 2/6/2020   [place], [place] - [address] [place] [address] [address]    Sig: Take 4,000 mLs (4 L) by mouth once for 1 dose Refer to \"Getting Ready for a Colonoscopy\" instruction handout    Class: E-Prescribe    Route: Oral    prednisoLONE acetate (PRED FORTE) 1 % ophthalmic suspension  5 mL 0 1/5/2020    [facility] - [place], [place] - [address] [place] [address]    Sig: Place 2 drops into both eyes 4 times daily for 7 days.    Class: E-Prescribe    Route: Both Eyes    prochlorperazine (COMPAZINE) 10 MG tablet  30 tablet 3 11/16/2019    [facility] - [place], [place] - [address] [place] [address]    Sig: Take 1 tablet (10 mg) by mouth every 6 hours as needed for nausea or vomiting    Class: E-Prescribe    Route: Oral    Non-formulary Exception Code: Specific indication for non-formulary alternative    sildenafil (VIAGRA) 100 MG tablet    4/25/2019        Sig: Take 100 mg by mouth daily as needed     Class: Historical    Route: Oral        Allergies:  Patient has no known allergies.  Vital Signs:  VS w/ IP 3/3/2020   SYSTOLIC 113   DIASTOLIC 69   PULSE 73   TEMPERATURE    RESPIRATIONS    Wt.(Lbs.) 161.8   Wt. (Kg) 73.392 kg   Ht. (Feet./Inches)    Ht. (Cm)    BMI    O2 Sat 99     ROS:  Skin: negative  Eyes: negative  Ears/Nose/Throat: negative  Respiratory: No shortness of breath, dyspnea on exertion, cough, or hemoptysis  Cardiovascular: negative  Gastrointestinal: negative  Genitourinary: negative  Musculoskeletal: negative  Neurologic: negative  Psychiatric: negative  Hematologic/Lymphatic/Immunologic: negative  Endocrine: negative      Physical Examination: Vital signs reviewed and are Stable  Constitutional: healthy, alert and no distress  Cardiovascular: negative, no lifts, heaves, or thrills. RRR. No murmurs, clicks gallops or rub  Respiratory: Good diaphragmatic excursion. Lungs clear throughout  Musculoskeletal: extremities normal- no gross deformities noted, gait normal and normal muscle tone  Neurologic: Gait normal.  Psychiatric: affect normal/bright and mentation appears normal.      BMP:   Lab Results   Component Value Date     01/29/2020    POTASSIUM 4.4 01/29/2020    CHLORIDE 109 01/29/2020    CO2 28 01/29/2020    ANIONGAP 3 01/29/2020     (H) 01/29/2020    BUN 18 01/29/2020    CR 0.80  01/29/2020    GFRESTIMATED >90 01/29/2020    GFRESTBLACK >90 01/29/2020    PIEDAD 8.9 01/29/2020      CBC:  Lab Results   Component Value Date    WBC 3.5 (L) 01/29/2020    RBC 3.32 (L) 01/29/2020    HGB 11.6 (L) 01/29/2020    HCT 35.4 (L) 01/29/2020     (H) 01/29/2020    MCH 34.9 (H) 01/29/2020    MCHC 32.8 01/29/2020    RDW 15.5 (H) 01/29/2020     01/29/2020     INR:  Lab Results   Component Value Date    INR 1.07 10/03/2019       Diagnostic studies:   PET 1/28/2020    PROVIDER NOTE:  BEBO Pina, CNS  Explained the procedure to Slava and his wife.  This included:    Preparing for the procedure, the actual procedure, and recovery.  This included the risk of the biopsy:  Bleeding, infection and pain related to the procedure.  Explanation that the results usually return after four to seven business days.    Explanation that he would be contacted by Dr. Redding's office to determine a future plan.  Thank you for involving us in the care of your patient.    Multiple concerns/questions were addressed at today's appointment. Including clarification of colonoscopy results, where to go for a second opinion regarding the colonoscopy results, why this para aortic lymph node is being biopsied instead of the khadra-rectal lymph node, what today's biopsy is going to be tested for, his right to choose to have maintainence therapy instead of a BMT.      Sam Bolivar, RADHAN, RN  DNP AG-CNS Student  [facility]  [employer]     Time spent with patient 60 minutes, 55 minutes spent on counseling.   Attending CNS (Shelley Cordoba MS, APRN, CNS, CRN) :  I performed a history and physical exam of the patient and discussed the patient's management with the CNS student. I reviewed the student's note and agree with the documented findings and plan of care.    Shelley Cordoba MS, BEBO, CNS, CRN  Clinical Nurse Specialist  Interventional Radiology  Voice mail [phone]  Pager  [phone]      Patient Care Team:  Christa Nagel as PCP - General (Family Practice)  Olive Perrin, RN as Specialty Care Coordinator (Hematology & Oncology)  Mark Redding MD as MD (Hematology & Oncology)  MARK REDDING

## 2020-03-03 NOTE — IP AVS SNAPSHOT
MRN:[id number]                      After Visit Summary   3/3/2020    Slava Patch    MRN: [id number]           Visit Information        Department      3/3/2020 10:33 AM [address] [facility]          Review of your medicines      UNREVIEWED medicines. Ask your doctor about these medicines       Dose / Directions   acyclovir 400 MG tablet  Commonly known as:  ZOVIRAX  Used for:  Mantle cell lymphoma of lymph nodes of multiple sites (H)      Dose:  400 mg  Take 1 tablet (400 mg) by mouth 2 times daily  Quantity:  60 tablet  Refills:  3     allopurinol 300 MG tablet  Commonly known as:  ZYLOPRIM  Used for:  Mantle cell lymphoma, unspecified body region (H)      Dose:  300 mg  Take 1 tablet (300 mg) by mouth daily  Quantity:  30 tablet  Refills:  0     Claritin 10 MG capsule  Generic drug:  loratadine      Dose:  10 mg  Take 10 mg by mouth daily as needed (Take 1 day before and daily for 8 days after neulasta administration)  Refills:  0     diphenhydrAMINE 50 MG capsule  Commonly known as:  BENADRYL      Dose:  50 mg  Take 50 mg by mouth every 6 hours as needed for itching or allergies  Refills:  0     fluconazole 100 MG tablet  Commonly known as:  DIFLUCAN  Used for:  Mantle cell lymphoma of lymph nodes of multiple sites (H)      Dose:  200 mg  Take 2 tablets (200 mg) by mouth daily . Take until ANC >1000.  Quantity:  30 tablet  Refills:  1     levofloxacin 250 MG tablet  Commonly known as:  LEVAQUIN  Used for:  Mantle cell lymphoma, unspecified body region (H)      Dose:  250 mg  Take 1 tablet (250 mg) by mouth daily . Take until ANC >1000.  Quantity:  30 tablet  Refills:  3     lidocaine-prilocaine 2.5-2.5 % external cream  Commonly known as:  EMLA  Used for:  Mantle cell lymphoma of lymph nodes of multiple sites (H)      Apply topically as needed for moderate pain Apply to port site 30 minutes prior to port access  Quantity:  30 g  Refills:  1     OLANZapine 5 MG tablet  Commonly known as:   zyPREXA  Used for:  Mantle cell lymphoma, unspecified body region (H)      Dose:  5 mg  Take 1 tablet (5 mg) by mouth 2 times daily for 2 days  Quantity:  30 tablet  Refills:  0     ondansetron 8 MG tablet  Commonly known as:  ZOFRAN  Used for:  Mantle cell lymphoma of lymph nodes of multiple sites (H), Admission for chemotherapy      Dose:  8 mg  Take 1 tablet (8 mg) by mouth every 8 hours as needed for nausea or vomiting  Quantity:  30 tablet  Refills:  3     prednisoLONE acetate 1 % ophthalmic suspension  Commonly known as:  PRED FORTE  Used for:  Mantle cell lymphoma of lymph nodes of multiple sites (H)      Dose:  2 drop  Place 2 drops into both eyes 4 times daily for 7 days.  Quantity:  5 mL  Refills:  0     prochlorperazine 10 MG tablet  Commonly known as:  COMPAZINE  Used for:  Mantle cell lymphoma of lymph nodes of multiple sites (H)      Dose:  10 mg  Take 1 tablet (10 mg) by mouth every 6 hours as needed for nausea or vomiting  Quantity:  30 tablet  Refills:  3     sildenafil 100 MG tablet  Commonly known as:  VIAGRA      Dose:  100 mg  Take 100 mg by mouth daily as needed  Refills:  0              Protect others around you: Learn how to safely use, store and throw away your medicines at www.disposemymeds.org.       Follow-ups after your visit       Care Instructions       Further instructions from your care team       [facility]    Interventional Radiology  Patient Instructions Following CT Guided Left Retroperitoneal  Biopsy    AFTER YOU GO HOME  ? If you were given sedation DO NOT drive or operate machinery at home or at work for at least 24 hours  ? DO relax and take it easy for 48 hours, no strenuous activity for 24 hours  ? DO drink plenty of fluids  ? DO resume your regular diet, unless otherwise instructed by your Primary Physician  ? Keep the dressing dry and in place for 24 hours.  ? DO NOT SMOKE FOR AT LEAST 24 HOURS, if you have been given any medications that were to help  you relax or sedate you during your procedure  ? DO NOT drink alcoholic beverages the day of your procedure  ? DO NOT do any strenuous exercise or lifting (> 10 lbs) for at least 3 days following your procedure  ? DO NOT take a bath or shower for at least 24 hours following your procedure  ? Remove dressing after shower the next day. Replace with Band aid for 2 days.  Never leave a wet dressing in place.  ? DO NOT make any important or legal decisions for 24 hours following your procedure  ? There should be minimum drainage from the biopsy site    CALL THE PHYSICIAN IF:  ? You start bleeding from the procedure site.  If you do start to bleed from that site,  hold pressure on the site for a minimum of 10 minutes.  Your physician will tell you if you need to return to the hospital  ? You develop nausea or vomiting  ? You have excessive swelling, redness, or tenderness at the site  ? You have drainage that looks like it is infected.  ? You experience severe pain  ? You develop hives or a rash or unexplained itching  ? You develop a temperature of 101 degrees F or greater    [facility] INTERVENTIONAL RADIOLOGY DEPARTMENT  Procedure Physician:         Dr. Milian              Date of procedure: March 3, 2020  Telephone Numbers: [phone] Monday-Friday 8:00 am to 4:30 pm  [phone] After 4:30 pm Monday-Friday, Weekends & Holidays.   Ask for the Interventional Radiologist on call.  Someone is on call 24 hrs/day  [facility] toll free number: [phone] Monday-Friday 8:00 am to 4:30 pm  [facility] Emergency Dept: [phone]          Additional Information About Your Visit       [employer] Information    [employer] gives you secure access to your electronic health record. If you see a primary care provider, you can also send messages to your care team and make appointments. If you have questions, please call your primary care clinic.  If you do not have a primary care provider, please call [phone] and they will assist you.       Care  "EveryWhere ID    This is your Care EveryWhere ID. This could be used by other organizations to access your [facility] medical records  [id number]       Your Vitals Were  Most recent update: 3/3/2020  3:24 PM    Blood Pressure   99/59          Pulse   72          Temperature   98.1  F (36.7  C) (Oral)          Respirations   16          Height   1.689 m (5' 6.5\")             Pulse Oximetry   96%    BMI (Body Mass Index)   25.72 kg/m           Primary Care Provider Office Phone # Fax #    Christa Nagel [phone]      Equal Access to Services    [facility]: Hadii aad ku hadasho Soomaali, waaxda luqadaha, qaybta kaalmada adeegyada, lucero montanez adejo vega . So [facility] [phone].    ATENCIÓN: Si habla español, tiene a rutledge disposición servicios gratuitos de asistencia lingüística. [facility] [phone].    We comply with applicable federal and state civil rights laws, including the [place] Human Rights Act. We do not discriminate on the basis of race, color, creed, Rastafarian, national origin, marital status, age, disability, sex, sexual orientation, or gender identity.       Thank you!    Thank you for choosing [facility] for your care. Our goal is always to provide you with excellent care. Hearing back from our patients is one way we can continue to improve our services. Please take a few minutes to complete the written survey that you may receive in the mail after you visit with us. Thank you!            Medication List      ASK your doctor about these medications          Morning Afternoon Evening Bedtime As Needed    acyclovir 400 MG tablet  Also known as:  ZOVIRAX  INSTRUCTIONS:  Take 1 tablet (400 mg) by mouth 2 times daily                     allopurinol 300 MG tablet  Also known as:  ZYLOPRIM  INSTRUCTIONS:  Take 1 tablet (300 mg) by mouth daily                     Claritin 10 MG capsule  INSTRUCTIONS:  Take 10 mg by mouth daily as needed (Take 1 day before and daily for 8 days " after neulasta administration)  Generic drug:  loratadine                     diphenhydrAMINE 50 MG capsule  Also known as:  BENADRYL  INSTRUCTIONS:  Take 50 mg by mouth every 6 hours as needed for itching or allergies                     fluconazole 100 MG tablet  Also known as:  DIFLUCAN  INSTRUCTIONS:  Take 2 tablets (200 mg) by mouth daily . Take until ANC >1000.                     levofloxacin 250 MG tablet  Also known as:  LEVAQUIN  INSTRUCTIONS:  Take 1 tablet (250 mg) by mouth daily . Take until ANC >1000.                     lidocaine-prilocaine 2.5-2.5 % external cream  Also known as:  EMLA  INSTRUCTIONS:  Apply topically as needed for moderate pain Apply to port site 30 minutes prior to port access                     OLANZapine 5 MG tablet  Also known as:  zyPREXA  INSTRUCTIONS:  Take 1 tablet (5 mg) by mouth 2 times daily for 2 days                     ondansetron 8 MG tablet  Also known as:  ZOFRAN  INSTRUCTIONS:  Take 1 tablet (8 mg) by mouth every 8 hours as needed for nausea or vomiting                     prednisoLONE acetate 1 % ophthalmic suspension  Also known as:  PRED FORTE  INSTRUCTIONS:  Place 2 drops into both eyes 4 times daily for 7 days.                     prochlorperazine 10 MG tablet  Also known as:  COMPAZINE  INSTRUCTIONS:  Take 1 tablet (10 mg) by mouth every 6 hours as needed for nausea or vomiting                     sildenafil 100 MG tablet  Also known as:  VIAGRA  INSTRUCTIONS:  Take 100 mg by mouth daily as needed

## 2020-03-03 NOTE — PROCEDURES
[facility], [place]    Procedure: IR Procedure Note  Date/Time: 3/3/2020 2:36 PM  Performed by: Cinthia Steward MD  Authorized by: Ronal Milian MD   IR Fellow Physician: Shaggy Romeo  Radiology Resident Physician: Cinthia Steward  Other(s) attending procedure: Dr. Milian    UNIVERSAL PROTOCOL   Site Marked: Yes  Prior Images Obtained and Reviewed:  Yes  Required items: Required blood products, implants, devices and special equipment available    Patient identity confirmed:  Verbally with patient  Patient was reevaluated immediately before administering moderate or deep sedation or anesthesia  Confirmation Checklist:  Patient's identity using two indicators  Time out: Immediately prior to the procedure a time out was called    Universal Protocol: the Joint Commission Universal Protocol was followed    Preparation: Patient was prepped and draped in usual sterile fashion    ESBL (mL):  1         ANESTHESIA    Anesthesia: See MAR for details  Local Anesthetic:  Lidocaine 1% without epinephrine  Anesthetic Total (mL):  10      SEDATION    Patient Sedated: Yes    Sedation:  Fentanyl and midazolam  Vital signs: Vital signs monitored during sedation    Findings: Stable    Specimens: core needle biopsy specimens sent for pathological analysis    Complications: None    Condition: Stable    Plan: Bedrest for 2 hours, discharge to home if stable    PROCEDURE   Patient Tolerance:  Patient tolerated the procedure well with no immediate complications  Describe Procedure: Under CT guidance, 5 core samples were obtained and sent to pathology  Length of time physician/provider present for 1:1 monitoring during sedation: 25

## 2020-03-03 NOTE — IP AVS SNAPSHOT
[address] [address]                                    After Visit Summary   3/3/2020    Slava Lane    MRN: [id number]           After Visit Summary Signature Page    I have received my discharge instructions, and my questions have been answered. I have discussed any challenges I see with this plan with the nurse or doctor.    ..........................................................................................................................................  Patient/Patient Representative Signature      ..........................................................................................................................................  Patient Representative Print Name and Relationship to Patient    ..................................................               ................................................  Date                                   Time    ..........................................................................................................................................  Reviewed by Signature/Title    ...................................................              ..............................................  Date                                               Time          22EPIC Rev 08/18

## 2020-03-03 NOTE — PROGRESS NOTES
First Name: Slava   Age: 49 year old   Referring Physician: Dr. Redding   REASON FOR REFERRAL: Consultation for lymph node biopsy     Assessment:   Slava is a 49 year old year old male who has a hx of mantle cell lymphoma. He is here today as he has been referred by Dr. Redding to have a biopsy of the left para aortic lymph node to evaluate for persistence of disease.    Plan:   Procedure: CT guided biopsy of left para aortic lymph node  Lab: 1/29/2020 - hemoglobin 11.6, platelets 273, INR and CBC orders have been signed and held by Dr. 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 This was initially found after biopsying colon polyps. His repeat PET scan again demonstrated some FDG avid areas in the colon, a perirectal node, and a para aortic node that were unchanged from the prior PET. It was unclear if this was remaining disease or an inflammatory/healing process. Therefore biopsies via colonoscopy was again performed. He is here today for a consultation for a biopsy as his most recent PET showed no change of the para aortic node.    Dr. Miranda reviewed the imaging and approved the biopsy, Series 604 Image 133        Past Medical History:   Diagnosis Date     Mantle cell lymphoma (H) 06/2019    noted incidentally on colon polyp pathology      Uncomplicated asthma     When exposed to enviromental allergies     Past Surgical History:   Procedure Laterality Date     AS SKIN PINCH GRAFT PROCEDURE <2CM Right 2017     INSERT PORT VASCULAR ACCESS Right 10/3/2019    Procedure: Chest Port Placement;  Surgeon: July Simpson PA-C;  [facility]: [facility]     IR CHEST PORT PLACEMENT > 5 YRS OF AGE  10/3/2019     LITHOTRIPSY N/A 06/2019    unknown side     Family History   Problem Relation Age of Onset     Cancer No family hx of      Bleeding Disorder No family hx of      Clotting Disorder No family  hx of      Social History     Tobacco Use     Smoking status: Former Smoker     Packs/day: 0.25     Types: Cigarettes     Smokeless tobacco: Former User     Types: Chew     Tobacco comment: A couple of cigarettes a month   Substance Use Topics     Alcohol use: Yes     Alcohol/week: 21.0 standard drinks     Types: 21 Cans of beer per week     Comment: Rare     Patient Active Problem List    Diagnosis Date Noted     Mantle cell lymphoma (H) 11/15/2019     Priority: Medium     Admission for chemotherapy 10/03/2019     Priority: Medium     Corneal opacity 10/02/2019     Priority: Medium     Disorder of refraction and accommodation 10/02/2019     Priority: Medium     Mantle cell lymphoma of lymph nodes of multiple sites (H) 09/10/2019     Priority: Medium     Personal history of tobacco use, presenting hazards to health 11/25/2003     Priority: Medium     Prescription Medications as of 3/3/2020       Rx Number Disp Refills Start End Last Dispensed Date Next Fill Date Owning Pharmacy    acyclovir (ZOVIRAX) 400 MG tablet  60 tablet 3 11/6/2019    [address]    Sig: Take 1 tablet (400 mg) by mouth 2 times daily    Class: E-Prescribe    Route: Oral    allopurinol (ZYLOPRIM) 300 MG tablet  30 tablet 0 1/5/2020    [address]    Sig: Take 1 tablet (300 mg) by mouth daily    Class: E-Prescribe    Route: Oral    diphenhydrAMINE (BENADRYL) 50 MG capsule            Sig: Take 50 mg by mouth every 6 hours as needed for itching or allergies    Class: Historical    Route: Oral    fluconazole (DIFLUCAN) 100 MG tablet  30 tablet 1 11/16/2019    [address]    Sig: Take 2 tablets (200 mg) by mouth daily . Take until ANC >1000.    Class: Historical    Route: Oral    levofloxacin (LEVAQUIN) 250 MG tablet  30 tablet 3 11/18/2019    [facility]  "[address]    Sig: Take 1 tablet (250 mg) by mouth daily . Take until ANC >1000.    Class: E-Prescribe    Route: Oral    lidocaine-prilocaine (EMLA) 2.5-2.5 % external cream  30 g 1 10/25/2019    [facility] [address] [facility] [phone]    Sig: Apply topically as needed for moderate pain Apply to port site 30 minutes prior to port access    Class: E-Prescribe    Route: Topical    loratadine (CLARITIN) 10 MG capsule            Sig: Take 10 mg by mouth daily as needed (Take 1 day before and daily for 8 days after neulasta administration)    Class: Historical    Route: Oral    OLANZapine (ZYPREXA) 5 MG tablet  30 tablet 0 1/5/2020 1/7/2020   [address]    Sig: Take 1 tablet (5 mg) by mouth 2 times daily for 2 days    Class: E-Prescribe    Route: Oral    Renewals     Renewal requests to authorizing provider (Dieter Lorenz MD) <b>prohibited</b>          ondansetron (ZOFRAN) 8 MG tablet  30 tablet 3 11/16/2019    [address]    Sig: Take 1 tablet (8 mg) by mouth every 8 hours as needed for nausea or vomiting    Class: E-Prescribe    Route: Oral    polyethylene glycol (GOLYTELY/NULYTELY) 236 g suspension (Ended)  4000 mL 0 2/6/2020 2/6/2020   [place], [place] - [address] [place] [address] [address]    Sig: Take 4,000 mLs (4 L) by mouth once for 1 dose Refer to \"Getting Ready for a Colonoscopy\" instruction handout    Class: E-Prescribe    Route: Oral    prednisoLONE acetate (PRED FORTE) 1 % ophthalmic suspension  5 mL 0 1/5/2020    [address]    Sig: Place 2 drops into both eyes 4 times daily for 7 days.    Class: E-Prescribe    Route: Both Eyes    prochlorperazine (COMPAZINE) 10 MG tablet  30 tablet 3 11/16/2019    [place], [place] " - [address] [facility]    Sig: Take 1 tablet (10 mg) by mouth every 6 hours as needed for nausea or vomiting    Class: E-Prescribe    Route: Oral    Non-formulary Exception Code: Specific indication for non-formulary alternative    sildenafil (VIAGRA) 100 MG tablet    4/25/2019        Sig: Take 100 mg by mouth daily as needed     Class: Historical    Route: Oral        Allergies:  Patient has no known allergies.  Vital Signs:  VS w/ IP 3/3/2020   SYSTOLIC 113   DIASTOLIC 69   PULSE 73   TEMPERATURE    RESPIRATIONS    Wt.(Lbs.) 161.8   Wt. (Kg) 73.392 kg   Ht. (Feet./Inches)    Ht. (Cm)    BMI    O2 Sat 99     ROS:  Skin: negative  Eyes: negative  Ears/Nose/Throat: negative  Respiratory: No shortness of breath, dyspnea on exertion, cough, or hemoptysis  Cardiovascular: negative  Gastrointestinal: negative  Genitourinary: negative  Musculoskeletal: negative  Neurologic: negative  Psychiatric: negative  Hematologic/Lymphatic/Immunologic: negative  Endocrine: negative      Physical Examination: Vital signs reviewed and are Stable  Constitutional: healthy, alert and no distress  Cardiovascular: negative, no lifts, heaves, or thrills. RRR. No murmurs, clicks gallops or rub  Respiratory: Good diaphragmatic excursion. Lungs clear throughout  Musculoskeletal: extremities normal- no gross deformities noted, gait normal and normal muscle tone  Neurologic: Gait normal.  Psychiatric: affect normal/bright and mentation appears normal.      BMP:   Lab Results   Component Value Date     01/29/2020    POTASSIUM 4.4 01/29/2020    CHLORIDE 109 01/29/2020    CO2 28 01/29/2020    ANIONGAP 3 01/29/2020     (H) 01/29/2020    BUN 18 01/29/2020    CR 0.80 01/29/2020    GFRESTIMATED >90 01/29/2020    GFRESTBLACK >90 01/29/2020    PIEDAD 8.9 01/29/2020      CBC:  Lab Results   Component Value Date    WBC 3.5 (L) 01/29/2020    RBC 3.32 (L) 01/29/2020    HGB 11.6 (L) 01/29/2020    HCT 35.4 (L) 01/29/2020     (H) 01/29/2020    MCH  34.9 (H) 01/29/2020    MCHC 32.8 01/29/2020    RDW 15.5 (H) 01/29/2020     01/29/2020     INR:  Lab Results   Component Value Date    INR 1.07 10/03/2019       Diagnostic studies:   PET 1/28/2020    PROVIDER NOTE:  BEBO Pina, CNS  Explained the procedure to Slava and his wife.  This included:    Preparing for the procedure, the actual procedure, and recovery.  This included the risk of the biopsy:  Bleeding, infection and pain related to the procedure.  Explanation that the results usually return after four to seven business days.    Explanation that he would be contacted by Dr. Vargas's office to determine a future plan.  Thank you for involving us in the care of your patient.    Multiple concerns/questions were addressed at today's appointment. Including clarification of colonoscopy results, where to go for a second opinion regarding the colonoscopy results, why this para aortic lymph node is being biopsied instead of the khadra-rectal lymph node, what today's biopsy is going to be tested for, his right to choose to have maintainence therapy instead of a BMT.      Sam Boilvar, RADHAN, RN  DNP AG-CNS Student  [facility]  [employer]     Time spent with patient 60 minutes, 55 minutes spent on counseling.   Attending CNS (Shelley Cordboa MS, BEBO, CNS, CRN) :  I performed a history and physical exam of the patient and discussed the patient's management with the CNS student. I reviewed the student's note and agree with the documented findings and plan of care.    Shelley Cordoba MS, BEBO, CNS, CRN  Clinical Nurse Specialist  Interventional Radiology  Voice mail [phone]  Pager [phone]  CC  Patient Care Team:  Christa Nagel as PCP - General (Family Practice)  Olive Perrin, GIGI as Specialty Care Coordinator (Hematology & Oncology)  Claudia Vargas MD as MD (Hematology & Oncology)  CLAUDIA VARGAS

## 2020-03-03 NOTE — PRE-PROCEDURE
GENERAL PRE-PROCEDURE:   Procedure:  CT guided left retroperitoneal lesion biopsy  Date/Time:  3/3/2020 1:16 PM    Risks and benefits: Risks, benefits and alternatives were discussed    Consent given by:  Patient  Patient states understanding of procedure being performed: Yes    Patient's understanding of procedure matches consent: Yes    Procedure consent matches procedure scheduled: Yes    Expected level of sedation:  Moderate  Appropriately NPO:  Yes  ASA Class:  Class 2- mild systemic disease, no acute problems, no functional limitations  Mallampati  :  Grade 2- soft palate, base of uvula, tonsillar pillars, and portion of posterior pharyngeal wall visible  Lungs:  Lungs clear with good breath sounds bilaterally  Heart:  Normal heart sounds and rate  History & Physical reviewed:  History and physical reviewed and no updates needed  Statement of review:  I have reviewed the lab findings, diagnostic data, medications, and the plan for sedation

## 2020-03-04 LAB — COPATH REPORT: NORMAL

## 2020-03-05 ENCOUNTER — TELEPHONE (OUTPATIENT)
Dept: GASTROENTEROLOGY | Facility: CLINIC | Age: 50
End: 2020-03-05

## 2020-03-05 LAB — COPATH REPORT: NORMAL

## 2020-03-05 NOTE — TELEPHONE ENCOUNTER
Attempted to reach patient to update on next steps in care plan for removal of large TVA seen on colonoscopy (now that LN biopsies are complete).     Voicemail message left with immediate callback number and GI clinic contact information.

## 2020-03-05 NOTE — TELEPHONE ENCOUNTER
"Patient was able to call back this afternoon.     He shares that he completed his LN biopsies. He was told that this was likely lymphoma and he is planned for follow-up in oncology clinic next week.     I let Mr. Lane know that Dr. Redding and I had discussed his case further and I had received this updates as well.     From a GI perspective we would move forward with endoscopic resection of his ascending colon lesion with Dr. Ahsan Sharpe. This would be done in the OR, but done via colonoscopy. The advanced endoscopy team will reach out to him to schedule this procedure.     Mr. Lane would like us to know that \"his health comes first\" and he would like to do this as early as able.   "

## 2020-03-06 ENCOUNTER — PREP FOR PROCEDURE (OUTPATIENT)
Dept: GASTROENTEROLOGY | Facility: CLINIC | Age: 50
End: 2020-03-06

## 2020-03-06 ENCOUNTER — CARE COORDINATION (OUTPATIENT)
Dept: GASTROENTEROLOGY | Facility: CLINIC | Age: 50
End: 2020-03-06

## 2020-03-06 DIAGNOSIS — Z86.0100 HISTORY OF COLONIC POLYPS: Primary | ICD-10-CM

## 2020-03-06 NOTE — PROGRESS NOTES
"Advanced Endoscopy Internal Procedure Intake form:    Referring/Requesting provider: Juaquin:  This patient had his LN biopsies this week and path came back as lymphoma (not colon cancer, which we thought would be unlikely anyway).    I've talked with both the patient and his oncologist (Dr. Redding) and they are ok with us moving ahead with removal of the asc colon polyp/lesion.      Lymphoma treatment will be discussed at his oncology visit next week and should not interfere with procedure.    Clinic contact - GI, luminal team    Procedure Requested:   Procedure/Imaging/Clinic: Colonoscopy with EMR   Physician: Dell   Timing: next available   Procedure length: 90 min   Anesthesia: MAC   Dx: Colon polyp   Location: OR [facility]     Requested provider (if specified): Dell    Has patient been evaluated in clinic or had a procedure [facility] provider in the last 5 years: Yes      History and physical within last 30 days? Yes - if done prior to 4/3/20      Indication/Reason for procedure: polyp      Is patient is aware of request for procedure and ok to be contacted to schedule? Yes      Orders placed for procedure.  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  Instructed to have preoperative h and p done with PCP within 30 days of procedure.  He is not diabetic and is not on blood thinners.  Denies questions regarding bowel prep \"I just did one with Golytely.\"    He requested that we send OR instructions via my chart.  His wife will accompany him for procedure and they will make arrangements to stay at [facility].    I have asked him to contact me with additional questions or concerns.    Eduarda Prater RN   BSN, HNBC, STAR-T  [facility]  Care Coordinator  Ph: [phone]  FAX: [phone]            "

## 2020-03-09 ENCOUNTER — TELEPHONE (OUTPATIENT)
Dept: GASTROENTEROLOGY | Facility: CLINIC | Age: 50
End: 2020-03-09

## 2020-03-09 PROBLEM — Z86.0100 HISTORY OF COLONIC POLYPS: Status: ACTIVE | Noted: 2020-03-09

## 2020-03-09 NOTE — TELEPHONE ENCOUNTER
Clinic Care Coordination - Initial     Eduarda Prater, RN  You 3 days ago       Haresh Root   Looks like we could add this for Ahsan for 4/17.  Patient would like you to communicate via my chart with information.    Thanks   MK      Per message received above the patient is scheduled for 4/17/2020 and  I will send Madmagzhart message with procedure information.

## 2020-03-18 ENCOUNTER — VIRTUAL VISIT (OUTPATIENT)
Dept: TRANSPLANT | Facility: CLINIC | Age: 50
End: 2020-03-18
Attending: INTERNAL MEDICINE
Payer: COMMERCIAL

## 2020-03-18 DIAGNOSIS — C83.18 MANTLE CELL LYMPHOMA OF LYMPH NODES OF MULTIPLE SITES (H): Primary | ICD-10-CM

## 2020-03-18 NOTE — PROGRESS NOTES
"Slava Lane is a 49 year old male who is being evaluated via a billable telephone visit.      The patient has been notified of following:     \"This telephone visit will be conducted via a call between you and your physician/provider. We have found that certain health care needs can be provided without the need for a physical exam.  This service lets us provide the care you need with a short phone conversation.  If a prescription is necessary we can send it directly to your pharmacy.  If lab work is needed we can place an order for that and you can then stop by our lab to have the test done at a later time.    If during the course of the call the physician/provider feels a telephone visit is not appropriate, you will not be charged for this service.\"       Slava Lane complains of    Chief Complaint   Patient presents with     Telephone     Return: Mantle cell lymphoma of lymph nodes of multiple sites        I have reviewed and updated the patient's Past Medical History, Social History, Family History and Medication List.    ALLERGIES  Patient has no known allergies.    Machelle Bellamy MA      Additional provider notes:       Assessment/Plan:  No diagnosis found.    I have reviewed the note as documented above.  This accurately captures the substance of my conversation with the patient.  [facility] Follow-Up      Slava Lane is a 49 year old man with mantle cell lymphoma from colon polyps.      Hematologic history:  Mantle cell lymphoma.  Mantle cell lymphoma, Stage IV with colon, BM involvement Ki67 -30%, TP53 mutations - Negative.  MCL MIPI - 5.9- Intermediate   .    Date Treatment Response Toxicities/Complications   9/18/19 R-CHOP/R-DHAP x3 each IA                             HPI:  Please see my entry above for hematologic history.  Pt is feeling at baseline currently.  Diagnosed with mantle cell lymphoma incidentally after routine screening colonoscopy noted 5/5 polyps with mantle cell lymphoma.     Doing " ok.     ASSESSMENT AND PLAN:    50 y/o with MCL Stage IV s/p completion of the Nordic regimen. His repeat PET scan again demonstrates some FDG avid areas in the colon, a perirectal node, and a paraortic node. These are unchanged from the prior PET. It is unclear if this is remaining disease or an inflammatory/healing process. We will obtain a biopsy of the colon via colonoscopy and will sample one of the lymph nodes through IR. If both are negative, we will proceed to ASCT as planned. If not, we will discuss clinical trial options. If he is able to undergo ASCT, we will give rituxan maintenance afterwards. ( See below for rationale for these treatments)    PET scan 1/28/2020  IMPRESSION: In this patient with history of mantle cell lymphoma there  has been no change in size and metabolic activity of known lesions:  1. Unchanged focal FDG avid wall thickening in the ascending colon .  2. Unchanged FDG avid left periaortic 2.1 x 2.8 cm left perirectal 2.6  x 1.9 cm javon masses. No new lesions.    I had  a 60 minute conversation with Keerthi regarding his primary refractory mantle cell lymphoma. The options in my order fo preference are  1. Loncatuximab + Ibrutinib trial  2. Ibrutinib + venetoclax  3. R-ICE + salvage ASCT    I went through each option including CART and explained the advantages and disadvantages.  We need to set up a telephone visit next week to know of their decision.      Mark ROMERO MS  Attending Physician  Pager - [phone]  Email - [email or web address]                       Phone call contact time  Call Started at 10:50  Call Ended at 11:45    Mark Redding MD

## 2020-03-25 ENCOUNTER — VIRTUAL VISIT (OUTPATIENT)
Dept: TRANSPLANT | Facility: CLINIC | Age: 50
End: 2020-03-25
Attending: INTERNAL MEDICINE
Payer: COMMERCIAL

## 2020-03-25 DIAGNOSIS — C83.18 MANTLE CELL LYMPHOMA OF LYMPH NODES OF MULTIPLE SITES (H): Primary | ICD-10-CM

## 2020-03-25 NOTE — PROGRESS NOTES
"Slava Lane is a 49 year old male who is being evaluated via a billable telephone visit.      The patient has been notified of following:     \"This telephone visit will be conducted via a call between you and your physician/provider. We have found that certain health care needs can be provided without the need for a physical exam.  This service lets us provide the care you need with a short phone conversation.  If a prescription is necessary we can send it directly to your pharmacy.  If lab work is needed we can place an order for that and you can then stop by our lab to have the test done at a later time.    If during the course of the call the physician/provider feels a telephone visit is not appropriate, you will not be charged for this service.\"     Patient is requesting a refill for acyclovir.     Robyn Craig CMA March 25, 2020      Slava Lane complains of    Chief Complaint   Patient presents with     Telephone     Mantle cell lymphoma        I have reviewed and updated the patient's Past Medical History, Social History, Family History and Medication List.    ALLERGIES  Patient has no known allergies.    Additional provider notes:       Dr. Mamadou Holland- [phone]      Assessment/Plan:    There are no diagnoses linked to this encounter.   Primary refractory mantle cell lymphoma. Currently all clinical trials are on hold. We agree since he has low volume disease we will hold off on ibrutinib/venetoclax till the COVID 19 crisis is over.  He also contacted Dr. Mamadou Holland for a CART study. I have advised Olayinka and his family to stay home as best as possible.    Phone call duration: 30 minutes    Mark Redding MD    "

## 2020-03-31 ENCOUNTER — CARE COORDINATION (OUTPATIENT)
Dept: ONCOLOGY | Facility: CLINIC | Age: 50
End: 2020-03-31

## 2020-03-31 NOTE — PROGRESS NOTES
Writer returned Slava Lane call from earlier to go over labs from last week and overall plan. Labs stable.  He continues to feel good, offers no complaints. Taking the recommended precautions with the current COVID 19 outbreak. Has repeat labs and provider visit at the end of April. Will call sooner if any problems or questions.

## 2020-04-13 ENCOUNTER — CARE COORDINATION (OUTPATIENT)
Dept: ONCOLOGY | Facility: CLINIC | Age: 50
End: 2020-04-13

## 2020-04-13 ENCOUNTER — CARE COORDINATION (OUTPATIENT)
Dept: GASTROENTEROLOGY | Facility: CLINIC | Age: 50
End: 2020-04-13

## 2020-04-13 NOTE — PROGRESS NOTES
"Slavastewart Lane called and is concerned about having his colonoscopy this Friday. He states per an earlier conversation with Doctor Almeida it was recommended that he not have it. He is also very anxious to learn what \"next steps\" are gong to be. Writer talked to him about getting as much information possible to make those decisions is helpful. Has follow-up appointment with Doctor Redding for further treatment recommendations on 4/29/2020. Writer has been in contact with Eduarda Prater RNCC who has also been in contact with Brannon.     Writer called Doctor Almeida and the reason for him not recommending the colonoscopy was the coronavirus. Back a month ago it was predicted that [place] would be in the deep of the virus with a lot more cases and deaths. He feels we are doing well in keeping it contained and this WOULD be a good time for him to have it. It is difficult to predict this virus or what it will be like in another month.     Brannon called with the above and agrees to go ahead with the colonoscopy. Eduarda Prater updated and will contact Brannon again regarding prep and instructions.   "

## 2020-04-14 ENCOUNTER — ANESTHESIA EVENT (OUTPATIENT)
Dept: SURGERY | Facility: CLINIC | Age: 50
End: 2020-04-14
Payer: COMMERCIAL

## 2020-04-14 NOTE — PROGRESS NOTES
Care Coordination Telephone Call  [facility]   Called patient pharmacy at [facility] to Provide prescription information for Golytely as ordered by Dr. Sahrpe.    I have asked them to call with any additional questions or concerns and have provided my contact information.    Eduarda GARCIAN, HNBC, STAR-T  RN Care Coordinator  [facility]  Ph: [phone]  FAX: [phone]

## 2020-04-17 ENCOUNTER — ANESTHESIA (OUTPATIENT)
Dept: SURGERY | Facility: CLINIC | Age: 50
End: 2020-04-17
Payer: COMMERCIAL

## 2020-04-17 ENCOUNTER — HOSPITAL ENCOUNTER (OUTPATIENT)
Facility: CLINIC | Age: 50
Discharge: HOME | End: 2020-04-17
Attending: INTERNAL MEDICINE | Admitting: INTERNAL MEDICINE
Payer: COMMERCIAL

## 2020-04-17 VITALS
BODY MASS INDEX: 26.37 KG/M2 | DIASTOLIC BLOOD PRESSURE: 59 MMHG | TEMPERATURE: 97.6 F | WEIGHT: 167.99 LBS | OXYGEN SATURATION: 97 % | HEART RATE: 84 BPM | HEIGHT: 67 IN | RESPIRATION RATE: 14 BRPM | SYSTOLIC BLOOD PRESSURE: 99 MMHG

## 2020-04-17 DIAGNOSIS — Z86.0100 HISTORY OF COLONIC POLYPS: ICD-10-CM

## 2020-04-17 LAB
COLONOSCOPY: NORMAL
GLUCOSE BLDC GLUCOMTR-MCNC: 94 MG/DL (ref 70–99)

## 2020-04-17 PROCEDURE — 27210794 ZZH OR GENERAL SUPPLY STERILE: Performed by: INTERNAL MEDICINE

## 2020-04-17 PROCEDURE — 25000128 H RX IP 250 OP 636: Performed by: NURSE ANESTHETIST, CERTIFIED REGISTERED

## 2020-04-17 PROCEDURE — 25000566 ZZH SEVOFLURANE, EA 15 MIN: Performed by: INTERNAL MEDICINE

## 2020-04-17 PROCEDURE — 37000009 ZZH ANESTHESIA TECHNICAL FEE, EACH ADDTL 15 MIN: Performed by: INTERNAL MEDICINE

## 2020-04-17 PROCEDURE — 71000012 ZZH RECOVERY PHASE 1 LEVEL 1 FIRST HR: Performed by: INTERNAL MEDICINE

## 2020-04-17 PROCEDURE — 36000051 ZZH SURGERY LEVEL 2 1ST 30 MIN - UMMC: Performed by: INTERNAL MEDICINE

## 2020-04-17 PROCEDURE — 37000008 ZZH ANESTHESIA TECHNICAL FEE, 1ST 30 MIN: Performed by: INTERNAL MEDICINE

## 2020-04-17 PROCEDURE — 88307 TISSUE EXAM BY PATHOLOGIST: CPT | Performed by: INTERNAL MEDICINE

## 2020-04-17 PROCEDURE — 82962 GLUCOSE BLOOD TEST: CPT

## 2020-04-17 PROCEDURE — 25800030 ZZH RX IP 258 OP 636: Performed by: NURSE ANESTHETIST, CERTIFIED REGISTERED

## 2020-04-17 PROCEDURE — 40000170 ZZH STATISTIC PRE-PROCEDURE ASSESSMENT II: Performed by: INTERNAL MEDICINE

## 2020-04-17 PROCEDURE — 71000027 ZZH RECOVERY PHASE 2 EACH 15 MINS: Performed by: INTERNAL MEDICINE

## 2020-04-17 PROCEDURE — 36000053 ZZH SURGERY LEVEL 2 EA 15 ADDTL MIN - UMMC: Performed by: INTERNAL MEDICINE

## 2020-04-17 PROCEDURE — 25000125 ZZHC RX 250: Performed by: INTERNAL MEDICINE

## 2020-04-17 PROCEDURE — 25000128 H RX IP 250 OP 636: Performed by: ANESTHESIOLOGY

## 2020-04-17 PROCEDURE — 25000125 ZZHC RX 250: Performed by: NURSE ANESTHETIST, CERTIFIED REGISTERED

## 2020-04-17 PROCEDURE — 27211024 ZZHC OR SUPPLY OTHER OPNP: Performed by: INTERNAL MEDICINE

## 2020-04-17 RX ORDER — NALOXONE HYDROCHLORIDE 0.4 MG/ML
.1-.4 INJECTION, SOLUTION INTRAMUSCULAR; INTRAVENOUS; SUBCUTANEOUS
Status: DISCONTINUED | OUTPATIENT
Start: 2020-04-17 | End: 2020-04-17 | Stop reason: HOSPADM

## 2020-04-17 RX ORDER — ONDANSETRON 2 MG/ML
4 INJECTION INTRAMUSCULAR; INTRAVENOUS
Status: DISCONTINUED | OUTPATIENT
Start: 2020-04-17 | End: 2020-04-17 | Stop reason: HOSPADM

## 2020-04-17 RX ORDER — SODIUM CHLORIDE, SODIUM LACTATE, POTASSIUM CHLORIDE, CALCIUM CHLORIDE 600; 310; 30; 20 MG/100ML; MG/100ML; MG/100ML; MG/100ML
INJECTION, SOLUTION INTRAVENOUS CONTINUOUS
Status: DISCONTINUED | OUTPATIENT
Start: 2020-04-17 | End: 2020-04-17 | Stop reason: HOSPADM

## 2020-04-17 RX ORDER — FENTANYL CITRATE 50 UG/ML
INJECTION, SOLUTION INTRAMUSCULAR; INTRAVENOUS PRN
Status: DISCONTINUED | OUTPATIENT
Start: 2020-04-17 | End: 2020-04-17

## 2020-04-17 RX ORDER — ONDANSETRON 2 MG/ML
4 INJECTION INTRAMUSCULAR; INTRAVENOUS EVERY 30 MIN PRN
Status: DISCONTINUED | OUTPATIENT
Start: 2020-04-17 | End: 2020-04-17 | Stop reason: HOSPADM

## 2020-04-17 RX ORDER — HEPARIN SODIUM (PORCINE) LOCK FLUSH IV SOLN 100 UNIT/ML 100 UNIT/ML
5 SOLUTION INTRAVENOUS
Status: DISCONTINUED | OUTPATIENT
Start: 2020-04-17 | End: 2020-04-17 | Stop reason: HOSPADM

## 2020-04-17 RX ORDER — LIDOCAINE 40 MG/G
CREAM TOPICAL
Status: DISCONTINUED
Start: 2020-04-17 | End: 2020-04-17 | Stop reason: HOSPADM

## 2020-04-17 RX ORDER — PROPOFOL 10 MG/ML
INJECTION, EMULSION INTRAVENOUS PRN
Status: DISCONTINUED | OUTPATIENT
Start: 2020-04-17 | End: 2020-04-17

## 2020-04-17 RX ORDER — ONDANSETRON 2 MG/ML
4 INJECTION INTRAMUSCULAR; INTRAVENOUS EVERY 6 HOURS PRN
Status: DISCONTINUED | OUTPATIENT
Start: 2020-04-17 | End: 2020-04-17 | Stop reason: HOSPADM

## 2020-04-17 RX ORDER — LIDOCAINE 40 MG/G
CREAM TOPICAL
Status: DISCONTINUED | OUTPATIENT
Start: 2020-04-17 | End: 2020-04-17 | Stop reason: HOSPADM

## 2020-04-17 RX ORDER — FLUMAZENIL 0.1 MG/ML
0.2 INJECTION, SOLUTION INTRAVENOUS
Status: DISCONTINUED | OUTPATIENT
Start: 2020-04-17 | End: 2020-04-17 | Stop reason: HOSPADM

## 2020-04-17 RX ORDER — MEPERIDINE HYDROCHLORIDE 25 MG/ML
12.5 INJECTION INTRAMUSCULAR; INTRAVENOUS; SUBCUTANEOUS
Status: DISCONTINUED | OUTPATIENT
Start: 2020-04-17 | End: 2020-04-17 | Stop reason: HOSPADM

## 2020-04-17 RX ORDER — FENTANYL CITRATE 50 UG/ML
25-50 INJECTION, SOLUTION INTRAMUSCULAR; INTRAVENOUS
Status: DISCONTINUED | OUTPATIENT
Start: 2020-04-17 | End: 2020-04-17 | Stop reason: HOSPADM

## 2020-04-17 RX ORDER — DEXAMETHASONE SODIUM PHOSPHATE 4 MG/ML
INJECTION, SOLUTION INTRA-ARTICULAR; INTRALESIONAL; INTRAMUSCULAR; INTRAVENOUS; SOFT TISSUE PRN
Status: DISCONTINUED | OUTPATIENT
Start: 2020-04-17 | End: 2020-04-17

## 2020-04-17 RX ORDER — ONDANSETRON 4 MG/1
4 TABLET, ORALLY DISINTEGRATING ORAL EVERY 6 HOURS PRN
Status: DISCONTINUED | OUTPATIENT
Start: 2020-04-17 | End: 2020-04-17 | Stop reason: HOSPADM

## 2020-04-17 RX ORDER — ONDANSETRON 4 MG/1
4 TABLET, ORALLY DISINTEGRATING ORAL EVERY 30 MIN PRN
Status: DISCONTINUED | OUTPATIENT
Start: 2020-04-17 | End: 2020-04-17 | Stop reason: HOSPADM

## 2020-04-17 RX ORDER — EPHEDRINE SULFATE 50 MG/ML
INJECTION, SOLUTION INTRAMUSCULAR; INTRAVENOUS; SUBCUTANEOUS PRN
Status: DISCONTINUED | OUTPATIENT
Start: 2020-04-17 | End: 2020-04-17

## 2020-04-17 RX ORDER — ONDANSETRON 2 MG/ML
INJECTION INTRAMUSCULAR; INTRAVENOUS PRN
Status: DISCONTINUED | OUTPATIENT
Start: 2020-04-17 | End: 2020-04-17

## 2020-04-17 RX ORDER — SODIUM CHLORIDE, SODIUM LACTATE, POTASSIUM CHLORIDE, CALCIUM CHLORIDE 600; 310; 30; 20 MG/100ML; MG/100ML; MG/100ML; MG/100ML
INJECTION, SOLUTION INTRAVENOUS CONTINUOUS PRN
Status: DISCONTINUED | OUTPATIENT
Start: 2020-04-17 | End: 2020-04-17

## 2020-04-17 RX ORDER — PROPOFOL 10 MG/ML
INJECTION, EMULSION INTRAVENOUS CONTINUOUS PRN
Status: DISCONTINUED | OUTPATIENT
Start: 2020-04-17 | End: 2020-04-17

## 2020-04-17 RX ADMIN — PROPOFOL 30 MG: 10 INJECTION, EMULSION INTRAVENOUS at 07:52

## 2020-04-17 RX ADMIN — PHENYLEPHRINE HYDROCHLORIDE 100 MCG: 10 INJECTION INTRAVENOUS at 09:22

## 2020-04-17 RX ADMIN — NOREPINEPHRINE BITARTRATE 6.4 MCG: 1 INJECTION, SOLUTION, CONCENTRATE INTRAVENOUS at 10:23

## 2020-04-17 RX ADMIN — Medication 5 MG: at 09:01

## 2020-04-17 RX ADMIN — HEPARIN SODIUM (PORCINE) LOCK FLUSH IV SOLN 100 UNIT/ML 5 ML: 100 SOLUTION at 11:55

## 2020-04-17 RX ADMIN — PHENYLEPHRINE HYDROCHLORIDE 100 MCG: 10 INJECTION INTRAVENOUS at 06:59

## 2020-04-17 RX ADMIN — PROPOFOL 100 MCG/KG/MIN: 10 INJECTION, EMULSION INTRAVENOUS at 07:54

## 2020-04-17 RX ADMIN — FENTANYL CITRATE 50 MCG: 50 INJECTION, SOLUTION INTRAMUSCULAR; INTRAVENOUS at 09:07

## 2020-04-17 RX ADMIN — NOREPINEPHRINE BITARTRATE 6.4 MCG: 1 INJECTION, SOLUTION, CONCENTRATE INTRAVENOUS at 09:33

## 2020-04-17 RX ADMIN — PROPOFOL 50 MG: 10 INJECTION, EMULSION INTRAVENOUS at 08:14

## 2020-04-17 RX ADMIN — NOREPINEPHRINE BITARTRATE 6.4 MCG: 1 INJECTION, SOLUTION, CONCENTRATE INTRAVENOUS at 09:44

## 2020-04-17 RX ADMIN — NOREPINEPHRINE BITARTRATE 6.4 MCG: 1 INJECTION, SOLUTION, CONCENTRATE INTRAVENOUS at 09:39

## 2020-04-17 RX ADMIN — PROPOFOL 40 MG: 10 INJECTION, EMULSION INTRAVENOUS at 09:07

## 2020-04-17 RX ADMIN — PHENYLEPHRINE HYDROCHLORIDE 100 MCG: 10 INJECTION INTRAVENOUS at 08:30

## 2020-04-17 RX ADMIN — PHENYLEPHRINE HYDROCHLORIDE 0.3 MCG/KG/MIN: 10 INJECTION INTRAVENOUS at 09:40

## 2020-04-17 RX ADMIN — FENTANYL CITRATE 50 MCG: 50 INJECTION, SOLUTION INTRAMUSCULAR; INTRAVENOUS at 09:04

## 2020-04-17 RX ADMIN — FENTANYL CITRATE 100 MCG: 50 INJECTION, SOLUTION INTRAMUSCULAR; INTRAVENOUS at 07:52

## 2020-04-17 RX ADMIN — Medication 5 MG: at 09:24

## 2020-04-17 RX ADMIN — Medication 5 MG: at 07:32

## 2020-04-17 RX ADMIN — PROPOFOL 50 MG: 10 INJECTION, EMULSION INTRAVENOUS at 08:10

## 2020-04-17 RX ADMIN — PROPOFOL 40 MG: 10 INJECTION, EMULSION INTRAVENOUS at 08:46

## 2020-04-17 RX ADMIN — PHENYLEPHRINE HYDROCHLORIDE 100 MCG: 10 INJECTION INTRAVENOUS at 08:22

## 2020-04-17 RX ADMIN — PROPOFOL 40 MG: 10 INJECTION, EMULSION INTRAVENOUS at 08:57

## 2020-04-17 RX ADMIN — ONDANSETRON 4 MG: 2 INJECTION INTRAMUSCULAR; INTRAVENOUS at 07:52

## 2020-04-17 RX ADMIN — PROPOFOL 40 MG: 10 INJECTION, EMULSION INTRAVENOUS at 09:05

## 2020-04-17 RX ADMIN — PROPOFOL 40 MG: 10 INJECTION, EMULSION INTRAVENOUS at 08:32

## 2020-04-17 RX ADMIN — PHENYLEPHRINE HYDROCHLORIDE 100 MCG: 10 INJECTION INTRAVENOUS at 08:58

## 2020-04-17 RX ADMIN — MIDAZOLAM HYDROCHLORIDE 2 MG: 1 INJECTION, SOLUTION INTRAMUSCULAR; INTRAVENOUS at 08:14

## 2020-04-17 RX ADMIN — SODIUM CHLORIDE, POTASSIUM CHLORIDE, SODIUM LACTATE AND CALCIUM CHLORIDE: 600; 310; 30; 20 INJECTION, SOLUTION INTRAVENOUS at 07:50

## 2020-04-17 RX ADMIN — DEXAMETHASONE SODIUM PHOSPHATE 6 MG: 4 INJECTION, SOLUTION INTRA-ARTICULAR; INTRALESIONAL; INTRAMUSCULAR; INTRAVENOUS; SOFT TISSUE at 07:52

## 2020-04-17 RX ADMIN — PHENYLEPHRINE HYDROCHLORIDE 100 MCG: 10 INJECTION INTRAVENOUS at 08:49

## 2020-04-17 RX ADMIN — NOREPINEPHRINE BITARTRATE 6.4 MCG: 1 INJECTION, SOLUTION, CONCENTRATE INTRAVENOUS at 10:05

## 2020-04-17 RX ADMIN — MIDAZOLAM HYDROCHLORIDE 2 MG: 1 INJECTION, SOLUTION INTRAMUSCULAR; INTRAVENOUS at 07:52

## 2020-04-17 RX ADMIN — Medication 5 MG: at 07:38

## 2020-04-17 RX ADMIN — SODIUM CHLORIDE, POTASSIUM CHLORIDE, SODIUM LACTATE AND CALCIUM CHLORIDE: 600; 310; 30; 20 INJECTION, SOLUTION INTRAVENOUS at 09:48

## 2020-04-17 RX ADMIN — Medication 5 MG: at 08:10

## 2020-04-17 RX ADMIN — PHENYLEPHRINE HYDROCHLORIDE 100 MCG: 10 INJECTION INTRAVENOUS at 06:49

## 2020-04-17 ASSESSMENT — MIFFLIN-ST. JEOR: SCORE: 1575.63

## 2020-04-17 NOTE — ANESTHESIA CARE TRANSFER NOTE
Patient: Slava Patch    Procedure(s):  COLONOSCOPY, WITH ENDOSCOPIC MUCOSAL RESECTION, ENDO CLIP FOR HEMOSTASIS    Diagnosis: History of colonic polyps [Z86.010]  Diagnosis Additional Information: No value filed.    Anesthesia Type:   MAC     Note:  Airway :Room Air  Patient transferred to:PACU  Handoff Report: Identifed the Patient, Identified the Reponsible Provider, Reviewed the pertinent medical history, Discussed the surgical course, Reviewed Intra-OP anesthesia mangement and issues during anesthesia, Set expectations for post-procedure period and Allowed opportunity for questions and acknowledgement of understanding      Vitals: (Last set prior to Anesthesia Care Transfer)    CRNA VITALS  4/17/2020 1024 - 4/17/2020 1109      4/17/2020             Pulse:  85    Ht Rate:  89    SpO2:  93 %                Electronically Signed By: BEBO Wilkes CRNA  April 17, 2020  11:09 AM

## 2020-04-17 NOTE — DISCHARGE INSTRUCTIONS
[facility]  Same-Day Surgery   Adult Discharge Orders & Instructions     For 24 hours after surgery    1. Get plenty of rest.  A responsible adult must stay with you for at least 24 hours after you leave the hospital.   2. Do not drive or use heavy equipment.  If you have weakness or tingling, don't drive or use heavy equipment until this feeling goes away.  3. Do not drink alcohol.  4. Avoid strenuous or risky activities.  Ask for help when climbing stairs.   5. You may feel lightheaded.  IF so, sit for a few minutes before standing.  Have someone help you get up.   6. If you have nausea (feel sick to your stomach): Drink only clear liquids such as apple juice, ginger ale, broth or 7-Up.  Rest may also help.  Be sure to drink enough fluids.  Move to a regular diet as you feel able.  7. You may have a slight fever. Call the doctor if your fever is over 100 F (37.7 C) (taken under the tongue) or lasts longer than 24 hours.  8. You may have a dry mouth, a sore throat, muscle aches or trouble sleeping.  These should go away after 24 hours.  9. Do not make important or legal decisions.   Call your doctor for any of the followin.  Signs of infection (fever, growing tenderness at the surgery site, a large amount of drainage or bleeding, severe pain, foul-smelling drainage, redness, swelling).    2. It has been over 8 to 10 hours since surgery and you are still not able to urinate (pass water).      To contact a doctor, call Dr. Sharpe [phone] [CLINIC] or:    X   [phone] and ask for the resident on call for Gastroenterology   (answered 24 hours a day)  X   Emergency Department:    [facility]: [phone]       (TTY for hearing impaired: [phone])

## 2020-04-17 NOTE — ANESTHESIA POSTPROCEDURE EVALUATION
Anesthesia POST Procedure Evaluation    Patient: Slava Lane   MRN:     [id number] Gender:   male   Age:    49 year old :      1970        Preoperative Diagnosis: History of colonic polyps [Z86.010]   Procedure(s):  COLONOSCOPY, WITH ENDOSCOPIC MUCOSAL RESECTION, ENDO CLIP FOR HEMOSTASIS   Postop Comments: No value filed.     Anesthesia Type: MAC       Disposition: Outpatient   Postop Pain Control: Uneventful            Sign Out: Well controlled pain   PONV: No   Neuro/Psych: Uneventful            Sign Out: Acceptable/Baseline neuro status   Airway/Respiratory: Uneventful            Sign Out: Acceptable/Baseline resp. status   CV/Hemodynamics: Uneventful            Sign Out: Acceptable CV status   Other NRE: NONE   DID A NON-ROUTINE EVENT OCCUR? No         Last Anesthesia Record Vitals:  CRNA VITALS  2020 1024 - 2020 1124      2020             Pulse:  85    Ht Rate:  89    SpO2:  93 %          Last PACU Vitals:  Vitals Value Taken Time   BP 99/66 2020 11:30 AM   Temp 36.4  C (97.6  F) 2020 11:15 AM   Pulse 72 2020 11:30 AM   Resp 10 2020 11:15 AM   SpO2 94 % 2020 11:31 AM   Temp src     NIBP     Pulse     SpO2     Resp     Temp     Ht Rate     Temp 2     Vitals shown include unvalidated device data.      Electronically Signed By: Yessy Carter MD, 2020, 11:32 AM

## 2020-04-17 NOTE — BRIEF OP NOTE
[facility], [place]    Brief Operative Note    Pre-operative diagnosis: History of colonic polyps [Z86.010]  Post-operative diagnosis Same as pre-operative diagnosis    Procedure: Procedure(s):  COLONOSCOPY, WITH ENDOSCOPIC MUCOSAL RESECTION, ENDO CLIP FOR HEMOSTASIS  Surgeon: Surgeon(s) and Role:     * Ahsan Sharpe MD - Primary     * Kobi Starks MD  Anesthesia: General   Estimated blood loss: Minimal  Drains: None  Specimens:   ID Type Source Tests Collected by Time Destination   A : ascending colon polyp Polyp Large Intestine, Other SURGICAL PATHOLOGY EXAM Ahsan Sharpe MD 4/17/2020  8:28 AM      Complications: None.  Implants: * No implants in log *        Findings:    - One 40 mm polyp in the ascending colon, removed by EMR in a piecemeal fashion.  Residual polyp was removed by avulsion and the polyp borders were treated with snare tip and soft coagulation.   - Three clips were placed to close the defect, however given size and complexity the defect was not amenable to clip closure.      Recommendations:  - Observe patient in PACU for possible discharge same day.   - Resume previous diet.   - Continue present medications.   - Await pathology results.   - Repeat colonoscopy in 6 months for surveillance.   - The findings and recommendations were discussed with the patient and their spouse.         Kobi Starks MD  Interventional Endoscopy Fellow

## 2020-04-17 NOTE — ANESTHESIA PREPROCEDURE EVALUATION
"Anesthesia Pre-Procedure Evaluation    Patient: Slava Lane   MRN:     [id number] Gender:   male   Age:    49 year old :      1970        Preoperative Diagnosis: History of colonic polyps [Z86.010]   Procedure(s):  COLONOSCOPY, WITH ENDOSCOPIC MUCOSAL RESECTION     LABS:  CBC:   Lab Results   Component Value Date    WBC 5.8 2020    WBC 3.5 (L) 2020    HGB 13.4 2020    HGB 11.6 (L) 2020    HCT 41.0 2020    HCT 35.4 (L) 2020     2020     2020     BMP:   Lab Results   Component Value Date     2020     2020    POTASSIUM 4.4 2020    POTASSIUM 4.3 2020    CHLORIDE 109 2020    CHLORIDE 113 (H) 2020    CO2 28 2020    CO2 24 2020    BUN 18 2020    BUN 12 2020    CR 0.80 2020    CR 0.67 2020     (H) 2020     (H) 2020     COAGS:   Lab Results   Component Value Date    INR 1.08 2020     POC:   Lab Results   Component Value Date    BGM 94 2020     OTHER:   Lab Results   Component Value Date    PIEDAD 8.9 2020    PHOS 3.9 10/07/2019    MAG 1.9 11/15/2019    ALBUMIN 3.7 2020    PROTTOTAL 6.9 2020    ALT 21 2020    AST 19 2020    ALKPHOS 65 2020    BILITOTAL 0.3 2020        Preop Vitals    BP Readings from Last 3 Encounters:   20 106/76   20 114/74   20 113/69    Pulse Readings from Last 3 Encounters:   20 72   20 72   20 73      Resp Readings from Last 3 Encounters:   20 16   20 18   20 16    SpO2 Readings from Last 3 Encounters:   20 100%   20 96%   20 99%      Temp Readings from Last 1 Encounters:   20 36.4  C (97.6  F) (Oral)    Ht Readings from Last 1 Encounters:   20 1.702 m (5' 7\")      Wt Readings from Last 1 Encounters:   20 76.2 kg (167 lb 15.9 oz)    Estimated body mass index is 26.31 kg/m  as calculated " "from the following:    Height as of this encounter: 1.702 m (5' 7\").    Weight as of this encounter: 76.2 kg (167 lb 15.9 oz).     LDA:  Port A Cath Single 10/03/19 Right Chest wall (Active)   Number of days: 197        Past Medical History:   Diagnosis Date     Mantle cell lymphoma (H) 06/2019    noted incidentally on colon polyp pathology      Uncomplicated asthma     When exposed to enviromental allergies      Past Surgical History:   Procedure Laterality Date     AS SKIN PINCH GRAFT PROCEDURE <2CM Right 2017     BIOPSY      bone marrow     COLONOSCOPY       EYE SURGERY      surgeries at age 5-6     INSERT PORT VASCULAR ACCESS Right 10/3/2019    Procedure: Chest Port Placement;  Surgeon: July Simpson PA-C;  Location: [facility] OR     IR CHEST PORT PLACEMENT > 5 YRS OF AGE  10/3/2019     LITHOTRIPSY N/A 06/2019    unknown side      No Known Allergies     Anesthesia Evaluation     .             ROS/MED HX    ENT/Pulmonary:     (+)asthma , . .    Neurologic:  - neg neurologic ROS     Cardiovascular:  - neg cardiovascular ROS       METS/Exercise Tolerance:  >4 METS   Hematologic:  - neg hematologic  ROS       Musculoskeletal:  - neg musculoskeletal ROS       GI/Hepatic: Comment: Colon polyp        Renal/Genitourinary:  - ROS Renal section negative       Endo:  - neg endo ROS       Psychiatric:         Infectious Disease:  - neg infectious disease ROS       Malignancy:   (+) Malignancy History of Lymphoma/Leukemia          Other:                         PHYSICAL EXAM:   Mental Status/Neuro:    Airway: Facies: Feasible  Mouth/Opening: Full  TM distance: > 6 cm  Neck ROM: Full   Respiratory: Auscultation: CTAB     Resp. Rate: Normal     Resp. Effort: Normal      CV: Rhythm: Regular  Rate: Age appropriate  Heart: Normal Sounds  Edema: None   Comments:                      Assessment:   ASA SCORE: 3    H&P: History and physical reviewed and following examination; no interval change.   Smoking Status:  Non-Smoker/Unknown   NPO " Status: NPO Appropriate     Plan:   Anes. Type:  MAC   Pre-Medication: None   Induction:  N/a   Airway: Native Airway   Access/Monitoring: PIV   Maintenance: Propofol Sedation     Postop Plan:   Postop Pain: None  Postop Sedation/Airway: Not planned  Disposition: Outpatient     PONV Management:  NO PONV Prophylaxis Required     CONSENT: Direct conversation   Plan and risks discussed with: Patient                      Yessy Carter MD

## 2020-04-21 LAB — COPATH REPORT: NORMAL

## 2020-04-22 ENCOUNTER — CARE COORDINATION (OUTPATIENT)
Dept: GASTROENTEROLOGY | Facility: CLINIC | Age: 50
End: 2020-04-22

## 2020-04-22 NOTE — PROGRESS NOTES
[facility] Internal Procedure Intake form:    Referring/Requesting provider: Dell    Procedure Requested: Per Dr. Sharpe  Procedure/Imaging/Clinic: Colonoscopy with possible endorotor   Physician: Dell   Timin months   Procedure length: 60 min   Anesthesia: MAC   Dx: colon polyp   [facility]     Has patient been evaluated in clinic or had a procedure [facility] provider in the last 5 years:  Yes this is 6 month follow up    History and physical within last 30 days? Will need review of chart    Indication/Reason for procedure: colon polyp    Above orders will need to be placed and patient will be contacted in about about 4 months to arrange.     Eduarda Prater RN   BSN, HNBC, STAR-T  [facility]  Care Coordinator  Ph: [phone]  FAX: [phone]

## 2020-04-22 NOTE — RESULT ENCOUNTER NOTE
I called the patient and reviewed his pathology from his recent colonoscopy. Ascending colon polyp returned as just tubulovillous adenoma. Polyp was resected in piecemeal. Recommend repeat colonoscopy in 6 months for surveillance. Patient otherwise doing well. From my standpoint, patient can be started on chemotherapy for his lymphoma if need be.    Eduarda,  Please assist in scheduling:     Procedure/Imaging/Clinic: Colonoscopy with possible endorotor  Physician: Dell  Timin months  Procedure length: 60 min  Anesthesia: MAC  Dx: colon polyp  [facility]    Ahsan Sharpe MD  [facility]  Division of Gastroenterology and Hepatology  [facility] [address] [id number]

## 2020-04-29 ENCOUNTER — VIRTUAL VISIT (OUTPATIENT)
Dept: TRANSPLANT | Facility: CLINIC | Age: 50
End: 2020-04-29
Attending: INTERNAL MEDICINE
Payer: COMMERCIAL

## 2020-04-29 DIAGNOSIS — C83.18 MANTLE CELL LYMPHOMA OF LYMPH NODES OF MULTIPLE SITES (H): Primary | ICD-10-CM

## 2020-04-29 NOTE — PROGRESS NOTES
"Slava Lane is a 49 year old male who is being evaluated via a billable video visit.      The patient has been notified of following:     \"This video visit will be conducted via a call between you and your physician/provider. We have found that certain health care needs can be provided without the need for an in-person physical exam.  This service lets us provide the care you need with a video conversation.  If a prescription is necessary we can send it directly to your pharmacy.  If lab work is needed we can place an order for that and you can then stop by our lab to have the test done at a later time.    Video visits are billed at different rates depending on your insurance coverage.  Please reach out to your insurance provider with any questions.    If during the course of the call the physician/provider feels a video visit is not appropriate, you will not be charged for this service.\"    Patient has given verbal consent for Video visit? Yes    How would you like to obtain your AVS? Ellen    Patient would like the video invitation sent by: Send to e-mail at: [email or web address]    Will anyone else be joining your video visit? No      Video-Visit Details    Type of service:  Video Visit      Originating Location (pt. Location): Home    Distant Location (provider location):  [facility] BLOOD AND MARROW TRANSPLANT     Mode of Communication:  Video Conference via [facility]    Assessment/Plan:  No diagnosis found.     I have reviewed the note as documented above.  This accurately captures the substance of my conversation with the patient.  [facility] Follow-Up        Slava Lane is a 49 year old man with mantle cell lymphoma from colon polyps.        Hematologic history:  Mantle cell lymphoma.  Mantle cell lymphoma, Stage IV with colon, BM involvement Ki67 -30%, TP53 mutations - Negative.  MCL MIPI - 5.9- Intermediate   .     Date Treatment Response Toxicities/Complications   9/18/19 R-CHOP/R-DHAP x3 each " CO                                            HPI:  Please see my entry above for hematologic history.  Pt is feeling at baseline currently.  Diagnosed with mantle cell lymphoma incidentally after routine screening colonoscopy noted 5/5 polyps with mantle cell lymphoma.      Doing ok. His colonoscopy is good.     ASSESSMENT AND PLAN:     50 y/o with MCL Stage IV s/p completion of the Nordic regimen. His repeat PET scan again demonstrates some FDG avid areas in the colon, a perirectal node, and a paraortic node. These are unchanged from the prior PET. It is unclear if this is remaining disease or an inflammatory/healing process. We will obtain a biopsy of the colon via colonoscopy and will sample one of the lymph nodes through IR. If both are negative, we will proceed to ASCT as planned. If not, we will discuss clinical trial options. If he is able to undergo ASCT, we will give rituxan maintenance afterwards. ( See below for rationale for these treatments)     PET scan 1/28/2020  IMPRESSION: In this patient with history of mantle cell lymphoma there  has been no change in size and metabolic activity of known lesions:  1. Unchanged focal FDG avid wall thickening in the ascending colon .  2. Unchanged FDG avid left periaortic 2.1 x 2.8 cm left perirectal 2.6  x 1.9 cm javon masses. No new lesions.     I had  a 60 minute conversation with Olayinka and Ya regarding his primary refractory mantle cell lymphoma. The options in my order fo preference are  1. Loncatuximab + Ibrutinib trial  2. Ibrutinib + venetoclax  3. R-ICE + salvage ASCT     I went through each option including CART and explained the advantages and disadvantages.  We will rescan him with a PET. I will send the loncatuximab consent by email and the study calendar as he wants to see it. If PET shows increasing or significant disease we will proceed with loncatuximab + ibrutinib trial     Video call duration, including review of materials sent by patient if  present, prior history, review of  Labs, imaging as performed : 30 minutes       Mark ROMERO MS  Attending Physician  Pager - [phone]  Email - [email or web address]            Mark Redding MD

## 2020-05-04 ENCOUNTER — HOSPITAL ENCOUNTER (OUTPATIENT)
Dept: PET IMAGING | Facility: CLINIC | Age: 50
Discharge: HOME | End: 2020-05-04
Attending: INTERNAL MEDICINE | Admitting: INTERNAL MEDICINE
Payer: COMMERCIAL

## 2020-05-04 DIAGNOSIS — C90.02 MULTIPLE MYELOMA IN RELAPSE (H): Primary | ICD-10-CM

## 2020-05-04 DIAGNOSIS — C83.18 MANTLE CELL LYMPHOMA OF LYMPH NODES OF MULTIPLE SITES (H): ICD-10-CM

## 2020-05-04 DIAGNOSIS — C90.02 MULTIPLE MYELOMA IN RELAPSE (H): ICD-10-CM

## 2020-05-04 LAB
ALBUMIN SERPL-MCNC: 3.6 G/DL (ref 3.4–5)
ALP SERPL-CCNC: 60 U/L (ref 40–150)
ALT SERPL W P-5'-P-CCNC: 22 U/L (ref 0–70)
ANION GAP SERPL CALCULATED.3IONS-SCNC: 3 MMOL/L (ref 3–14)
AST SERPL W P-5'-P-CCNC: 17 U/L (ref 0–45)
BASOPHILS # BLD AUTO: 0 10E9/L (ref 0–0.2)
BASOPHILS NFR BLD AUTO: 0.8 %
BILIRUB SERPL-MCNC: 0.4 MG/DL (ref 0.2–1.3)
BUN SERPL-MCNC: 19 MG/DL (ref 7–30)
CALCIUM SERPL-MCNC: 8.5 MG/DL (ref 8.5–10.1)
CHLORIDE SERPL-SCNC: 108 MMOL/L (ref 94–109)
CO2 SERPL-SCNC: 28 MMOL/L (ref 20–32)
CREAT SERPL-MCNC: 0.78 MG/DL (ref 0.66–1.25)
DIFFERENTIAL METHOD BLD: ABNORMAL
EOSINOPHIL # BLD AUTO: 0.3 10E9/L (ref 0–0.7)
EOSINOPHIL NFR BLD AUTO: 8.1 %
ERYTHROCYTE [DISTWIDTH] IN BLOOD BY AUTOMATED COUNT: 13.1 % (ref 10–15)
GFR SERPL CREATININE-BSD FRML MDRD: >90 ML/MIN/{1.73_M2}
GLUCOSE SERPL-MCNC: 106 MG/DL (ref 70–99)
HCT VFR BLD AUTO: 40.5 % (ref 40–53)
HGB BLD-MCNC: 13.1 G/DL (ref 13.3–17.7)
IMM GRANULOCYTES # BLD: 0 10E9/L (ref 0–0.4)
IMM GRANULOCYTES NFR BLD: 0.3 %
LDH SERPL L TO P-CCNC: 126 U/L (ref 85–227)
LYMPHOCYTES # BLD AUTO: 1.1 10E9/L (ref 0.8–5.3)
LYMPHOCYTES NFR BLD AUTO: 32 %
MCH RBC QN AUTO: 32.6 PG (ref 26.5–33)
MCHC RBC AUTO-ENTMCNC: 32.3 G/DL (ref 31.5–36.5)
MCV RBC AUTO: 101 FL (ref 78–100)
MONOCYTES # BLD AUTO: 0.4 10E9/L (ref 0–1.3)
MONOCYTES NFR BLD AUTO: 11.5 %
NEUTROPHILS # BLD AUTO: 1.7 10E9/L (ref 1.6–8.3)
NEUTROPHILS NFR BLD AUTO: 47.3 %
NRBC # BLD AUTO: 0 10*3/UL
NRBC BLD AUTO-RTO: 0 /100
PLATELET # BLD AUTO: 195 10E9/L (ref 150–450)
POTASSIUM SERPL-SCNC: 4 MMOL/L (ref 3.4–5.3)
PROT SERPL-MCNC: 6.4 G/DL (ref 6.8–8.8)
RBC # BLD AUTO: 4.02 10E12/L (ref 4.4–5.9)
SODIUM SERPL-SCNC: 138 MMOL/L (ref 133–144)
WBC # BLD AUTO: 3.6 10E9/L (ref 4–11)

## 2020-05-04 PROCEDURE — 80053 COMPREHEN METABOLIC PANEL: CPT | Performed by: INTERNAL MEDICINE

## 2020-05-04 PROCEDURE — 25000128 H RX IP 250 OP 636: Performed by: INTERNAL MEDICINE

## 2020-05-04 PROCEDURE — 78816 PET IMAGE W/CT FULL BODY: CPT | Mod: PS

## 2020-05-04 PROCEDURE — 36592 COLLECT BLOOD FROM PICC: CPT | Performed by: INTERNAL MEDICINE

## 2020-05-04 PROCEDURE — 83615 LACTATE (LD) (LDH) ENZYME: CPT | Performed by: INTERNAL MEDICINE

## 2020-05-04 PROCEDURE — A9552 F18 FDG: HCPCS | Performed by: INTERNAL MEDICINE

## 2020-05-04 PROCEDURE — 34300033 ZZH RX 343: Performed by: INTERNAL MEDICINE

## 2020-05-04 PROCEDURE — 85025 COMPLETE CBC W/AUTO DIFF WBC: CPT | Performed by: INTERNAL MEDICINE

## 2020-05-04 RX ADMIN — FLUDEOXYGLUCOSE F-18 10.09 MCI.: 500 INJECTION, SOLUTION INTRAVENOUS at 07:03

## 2020-05-04 RX ADMIN — Medication 500 UNITS: at 08:04

## 2020-05-06 ENCOUNTER — VIRTUAL VISIT (OUTPATIENT)
Dept: TRANSPLANT | Facility: CLINIC | Age: 50
End: 2020-05-06
Attending: INTERNAL MEDICINE
Payer: COMMERCIAL

## 2020-05-06 DIAGNOSIS — C83.18 MANTLE CELL LYMPHOMA OF LYMPH NODES OF MULTIPLE SITES (H): Primary | ICD-10-CM

## 2020-05-06 NOTE — PROGRESS NOTES
"Slava Lane is a 49 year old male who is being evaluated via a billable video visit.      The patient has been notified of following:     \"This video visit will be conducted via a call between you and your physician/provider. We have found that certain health care needs can be provided without the need for an in-person physical exam.  This service lets us provide the care you need with a video conversation.  If a prescription is necessary we can send it directly to your pharmacy.  If lab work is needed we can place an order for that and you can then stop by our lab to have the test done at a later time.    Video visits are billed at different rates depending on your insurance coverage.  Please reach out to your insurance provider with any questions.    If during the course of the call the physician/provider feels a video visit is not appropriate, you will not be charged for this service.\"    Patient has given verbal consent for Video visit? Yes    How would you like to obtain your AVS? Ellen    Patient would like the video invitation sent by: Send to e-mail at: [email or web address]    Will anyone else be joining your video visit? No        Video-Visit Details    Type of service:  Video Visit    Slava Lane is a 49 year old man with mantle cell lymphoma from colon polyps.        Hematologic history:  Mantle cell lymphoma.  Mantle cell lymphoma, Stage IV with colon, BM involvement Ki67 -30%, TP53 mutations - Negative.  MCL MIPI - 5.9- Intermediate   .     Date Treatment Response Toxicities/Complications   9/18/19 - 1/25/20 R-CHOP/R-DHAP x3 each NC, Progressive disease                                            HPI:  Please see my entry above for hematologic history.  Pt is feeling at baseline currently.  Diagnosed with mantle cell lymphoma incidentally after routine screening colonoscopy noted 5/5 polyps with mantle cell lymphoma.      Doing ok. His colonoscopy is good.     ASSESSMENT AND PLAN:     50 y/o with " "MCL Stage IV s/p completion of the Nordic regimen and now with progressive disease within 6 months of primary therapy and hence this is primary refractory disease. His repeat PET is showing progressive disease and higher SUV and a new lesion.     I had  a 30 minute conversation with Olayinka and Ya regarding his primary refractory mantle cell lymphoma. The options in my order fo preference are  1. Loncatuximab + Ibrutinib trial  2. Ibrutinib + venetoclax [Higher ALVES and CR rates than ibrutinib alone and considering this is primary refractory disease     I went through each option and explained the advantages and disadvantages. My opinion was the clinical trial with loncatuximab + ibrutinib.    After extensive consideration Olayinka and Ya chose to go with the ibrutinib and venetoclax.  Ibrutinib plus Venetoclax for the Treatment of Mantle-Cell Lymphoma    To reduce the risk of the tumor lysis syndrome, all patients commenced treatment with ibrutinib monotherapy at an oral dose of 560 mg per day for the first 4 weeks. Venetoclax was then introduced in week 5 according to a dosing schedule that started at 50 mg per day orally and increased weekly in a stepwise fashion (ramp-up) to 100 mg per day, then to 200 mg per day, and finally to 400 mg per day, on the basis of the recommended dose for treating chronic lymphocytic leukemia at the time of study conception (Figure 1). Subsequently, the recommended phase 2 dose in mantle-cell lymphoma was reported to be 800 mg per day,13 and the study protocol (available at NEJM.org) was amended to allow escalation to a dose of 800 mg per day after week 16 if a complete response had not occurred.\"      Video call duration, including review of materials sent by patient if present, prior history, review of  Labs, imaging as performed : 30 minutes        Mark ROMERO MS  Attending Physician  Pager - [phone]  Email - [email or web address]    Originating Location (pt. " Location): Home    Distant Location (provider location):  [facility] BLOOD AND MARROW TRANSPLANT     Platform used for Video Visit: Jose G Redding MD

## 2020-05-08 ENCOUNTER — PATIENT OUTREACH (OUTPATIENT)
Dept: ONCOLOGY | Facility: CLINIC | Age: 50
End: 2020-05-08

## 2020-05-08 NOTE — PROGRESS NOTES
Writer placed call to patient to advise that Dr Redding had received the message regarding chosen treatment path and that he would work on creating the orders for the regimen over the weekend and would hear back from clinic on Monday. Patient voiced understanding.

## 2020-05-08 NOTE — PROGRESS NOTES
Writer received call from patient who stated that he does not want to pursue a clinical trial as offered by Dr Mark Redding but instead go with the Ibrutinib-Venetoclax option. Patient is concerned about insurance coverage and out-of-pocket expenses. Writer advised patient that care team would pursue coverage and options for free-reduced cost drug.Writer sent IB to care team and Pharmacy Liaisons with patient information.

## 2020-05-11 DIAGNOSIS — C83.18 MANTLE CELL LYMPHOMA OF LYMPH NODES OF MULTIPLE SITES (H): Primary | ICD-10-CM

## 2020-05-12 ENCOUNTER — TELEPHONE (OUTPATIENT)
Dept: ONCOLOGY | Facility: CLINIC | Age: 50
End: 2020-05-12

## 2020-05-12 NOTE — TELEPHONE ENCOUNTER
PA Initiation    Medication: imbruvica - pa submitted Key: [id number]  Insurance Company: [employer] [place] - Phone [phone] Fax [phone]  Start Date: 5/12/2020    [facility]   Oncology Pharmacy Liaison  [email or web address]   [phone] (phone)   [phone] (fax)

## 2020-05-13 ENCOUNTER — TELEPHONE (OUTPATIENT)
Dept: ONCOLOGY | Facility: CLINIC | Age: 50
End: 2020-05-13

## 2020-05-13 ENCOUNTER — PATIENT OUTREACH (OUTPATIENT)
Dept: ONCOLOGY | Facility: CLINIC | Age: 50
End: 2020-05-13

## 2020-05-13 NOTE — TELEPHONE ENCOUNTER
Writer called patient and informed him of PA approval for Imbruvica and Venclexta. Patient understood to expect a call from the pharmacy once the meds are ready for delivery. Patient expressed gratitude.    [facility]  [facility]   Oncology Pharmacy Liaison  [email or web address]   [phone] (phone)   [phone] (fax)

## 2020-05-13 NOTE — TELEPHONE ENCOUNTER
Writer called patient and spoke to patient's wife. Writer informed patient's wife that PA's for both Imbruvica and Venclexta had been submitted and to expect a call from this writer once determination's are made and co-pays obtained. Patient's wife understood.    [facility]   Oncology Pharmacy Liaison  [email or web address]   [phone] (phone)   [phone] (fax)

## 2020-05-13 NOTE — TELEPHONE ENCOUNTER
PA Initiation    Medication: Venclexta - PA Submitted  Insurance Company: [employer] - Phone [phone] Fax [phone]  Start Date: 5/13/2020    [facility]   Oncology Pharmacy Liaison  [email or web address]   [phone] (phone)   [phone] (fax)

## 2020-05-15 ENCOUNTER — TELEPHONE (OUTPATIENT)
Dept: ONCOLOGY | Facility: CLINIC | Age: 50
End: 2020-05-15

## 2020-05-15 DIAGNOSIS — C83.18 MANTLE CELL LYMPHOMA OF LYMPH NODES OF MULTIPLE SITES (H): Primary | ICD-10-CM

## 2020-05-15 DIAGNOSIS — Z79.899 ENCOUNTER FOR LONG-TERM (CURRENT) USE OF MEDICATIONS: ICD-10-CM

## 2020-05-15 DIAGNOSIS — C83.10 MANTLE CELL LYMPHOMA, UNSPECIFIED BODY REGION (H): ICD-10-CM

## 2020-05-15 RX ORDER — PROCHLORPERAZINE MALEATE 10 MG
10 TABLET ORAL EVERY 6 HOURS PRN
Qty: 30 TABLET | Refills: 2 | Status: SHIPPED | OUTPATIENT
Start: 2020-05-15 | End: 2022-09-22

## 2020-05-15 RX ORDER — ALLOPURINOL 300 MG/1
300 TABLET ORAL DAILY
Qty: 90 TABLET | Refills: 0 | Status: SHIPPED | OUTPATIENT
Start: 2020-05-15 | End: 2020-08-28

## 2020-05-15 NOTE — TELEPHONE ENCOUNTER
"Oral Chemotherapy Monitoring Program    Primary Oncologist: Dr. Redding  Primary Oncology Clinic: [facility]   Cancer Diagnosis: Mantle Cell Lymphoma    Drug: Imbruvica 560mg by mouth daily (in combination with Venetoclax taper up that begins on week 5).   Start Date: ASAP  Dose is appropriate for patients: Renal and Hepatic Function   Expected duration of therapy: Until disease progression or unacceptable toxicity    Drug Interaction Assessment: no clinically significant drug interactions identified.   Medication list checked (5/15): -Imbruvica -Venclexta -Acyclovir (Systemic) -Allopurinol -DiphenhydrAMINE -EMLA -Loratadine -Ondansetron -Sildenafil     Lab Monitoring Plan  CBC/CMP monthly unless directed otherwise by Dr. Redding    Subjective/Objective:  Slava Lane is a 49 year old male contacted by phone for an initial visit for oral chemotherapy education. I spoke to Slava and his wife, Ya. We discussed that he will starting the Venclexta on week 5 and prior to initiating that therapy that education would be provided about that medication. Education packets for both medications will be sent out today though.     ORAL CHEMOTHERAPY 5/15/2020   Drug Name Imbruvica (Ibrutinib)   Current Dosage 560mg   Current Schedule Daily   Cycle Details Continuous   Any new drug interactions? No   Is the dose as ordered appropriate for the patient? Yes       Last PHQ-2 Score on record: No flowsheet data found.    Vitals:  BP:   BP Readings from Last 1 Encounters:   04/17/20 99/59     Wt Readings from Last 1 Encounters:   04/17/20 76.2 kg (167 lb 15.9 oz)     Estimated body surface area is 1.9 meters squared as calculated from the following:    Height as of 4/17/20: 1.702 m (5' 7\").    Weight as of 4/17/20: 76.2 kg (167 lb 15.9 oz).      Labs:  _  Result Component Current Result Ref Range   Sodium 138 (5/4/2020) 133 - 144 mmol/L     _  Result Component Current Result Ref Range   Potassium 4.0 (5/4/2020) 3.4 - " 5.3 mmol/L     _  Result Component Current Result Ref Range   Calcium 8.5 (5/4/2020) 8.5 - 10.1 mg/dL     No results found for Mag within last 30 days.     No results found for Phos within last 30 days.     _  Result Component Current Result Ref Range   Albumin 3.6 (5/4/2020) 3.4 - 5.0 g/dL     _  Result Component Current Result Ref Range   Urea Nitrogen 19 (5/4/2020) 7 - 30 mg/dL     _  Result Component Current Result Ref Range   Creatinine 0.78 (5/4/2020) 0.66 - 1.25 mg/dL     _  Result Component Current Result Ref Range   AST 17 (5/4/2020) 0 - 45 U/L     _  Result Component Current Result Ref Range   ALT 22 (5/4/2020) 0 - 70 U/L     _  Result Component Current Result Ref Range   Bilirubin Total 0.4 (5/4/2020) 0.2 - 1.3 mg/dL     _  Result Component Current Result Ref Range   WBC 3.6 (L) (5/4/2020) 4.0 - 11.0 10e9/L     _  Result Component Current Result Ref Range   Hemoglobin 13.1 (L) (5/4/2020) 13.3 - 17.7 g/dL     _  Result Component Current Result Ref Range   Platelet Count 195 (5/4/2020) 150 - 450 10e9/L     _  Result Component Current Result Ref Range   Absolute Neutrophil 1.7 (5/4/2020) 1.6 - 8.3 10e9/L       Assessment:  Patient is appropriate to start therapy.    Plan:  Basic chemotherapy teaching was reviewed with the patient and the patient's family including indication, start date of therapy, dose, administration, adverse effects, missed doses, food and drug interactions, monitoring, side effect management, office contact information, and safe handling. Written materials were provided and all questions answered.    Follow-Up:  1 week following the start of Imbruvica therapy.     Prema Cesar, PharmD  Oral Chemotherapy Monitoring Program  [facility]  [phone]       Addendum:   Dr. Redding would like the patient to have CBC/CMP drawn weekly starting on 5/18. I contacted Olayinka to discuss this. He was in agreement with this plan and standing orders are in place at the [facility] per patient.     I also informed him that Dr. Redding sent a prescription to his local pharmacy for allopurinol 300mg daily that he is to start taking. Olayinka detailed that he would get that from the pharmacy on Monday.     Prema Cesar, PharmD  Oral Chemotherapy Monitoring Program  [facility]  [phone]

## 2020-05-15 NOTE — PROGRESS NOTES
Writer called Slava Lane, wife Ya there also. He is to start the new chemotherapy regime, Ibutinib and Venetoclax. Prior auth still pending at time of writers call. I told Brannon that we were still waiting for this and he should hear soon. Writer does not do this. Writer did some initial education but then told Brannon and Ya that the oral chemotherapy pharmacist would be calling them with more information. They will set up delivery once everything is approved.     Major side effects with Venetoclax and Ibutinib were lowered blood counts, diarrhea and nausea. We would be following his counts closely, especially in the beginning.    He will also be on Allopurinol.    Writer will continue to follow.

## 2020-05-28 ENCOUNTER — TELEPHONE (OUTPATIENT)
Dept: ONCOLOGY | Facility: CLINIC | Age: 50
End: 2020-05-28

## 2020-05-28 NOTE — ORAL ONC MGMT
"Oral Chemotherapy Monitoring Program     Primary Oncologist: Dr. Redding  Primary Oncology Clinic: [facility]   Cancer Diagnosis: Mantle Cell Lymphoma     Drug: Imbruvica 560 mg by mouth daily (in combination with Venetoclax taper up that begins on week 5).   Start Date: ASAP  Dose is appropriate for patients: Renal and Hepatic Function            Expected duration of therapy: Until disease progression or unacceptable toxicity     Drug Interaction Assessment: no clinically significant drug interactions identified.   Medication list checked (5/15): -Imbruvica -Venclexta -Acyclovir (Systemic) -Allopurinol -DiphenhydrAMINE -EMLA -Loratadine -Ondansetron -Sildenafil      Lab Monitoring Plan  CBC/CMP monthly unless directed otherwise by Dr. Redding     Subjective/Objective:  Slava Lane is a 49 year old male contacted by phone for an initial follow visit for oral chemotherapy education. I spoke to Slava. He mentioned some concerns about a headache that was only temporary and a rash on his rectum. He has not noticed another headache. He has been dealing with a rash on his rectum. He used some witch hazel and keeping the area clean, he has noticed it is beginning to go away. We discussed the plans to begin Venclexta (with a ramp up) on week 5 of Ibrutinib therapy. Will call and do mini teach closer to start date of Venclexta.     ORAL CHEMOTHERAPY 5/15/2020   Drug Name Imbruvica (Ibrutinib)   Current Dosage 560mg   Current Schedule Daily   Cycle Details Continuous   Any new drug interactions? No   Is the dose as ordered appropriate for the patient? Yes       Last PHQ-2 Score on record: No flowsheet data found.    Vitals:  BP:   BP Readings from Last 1 Encounters:   04/17/20 99/59     Wt Readings from Last 1 Encounters:   04/17/20 76.2 kg (167 lb 15.9 oz)     Estimated body surface area is 1.9 meters squared as calculated from the following:    Height as of 4/17/20: 1.702 m (5' 7\").    Weight as of 4/17/20: " 76.2 kg (167 lb 15.9 oz).    Labs:  _  Result Component Current Result Ref Range   Sodium 138 (5/4/2020) 133 - 144 mmol/L     _  Result Component Current Result Ref Range   Potassium 4.0 (5/4/2020) 3.4 - 5.3 mmol/L     _  Result Component Current Result Ref Range   Calcium 8.5 (5/4/2020) 8.5 - 10.1 mg/dL     No results found for Mag within last 30 days.     No results found for Phos within last 30 days.     _  Result Component Current Result Ref Range   Albumin 3.6 (5/4/2020) 3.4 - 5.0 g/dL     _  Result Component Current Result Ref Range   Urea Nitrogen 19 (5/4/2020) 7 - 30 mg/dL     _  Result Component Current Result Ref Range   Creatinine 0.78 (5/4/2020) 0.66 - 1.25 mg/dL       _  Result Component Current Result Ref Range   AST 17 (5/4/2020) 0 - 45 U/L     _  Result Component Current Result Ref Range   ALT 22 (5/4/2020) 0 - 70 U/L     _  Result Component Current Result Ref Range   Bilirubin Total 0.4 (5/4/2020) 0.2 - 1.3 mg/dL       _  Result Component Current Result Ref Range   WBC 3.6 (L) (5/4/2020) 4.0 - 11.0 10e9/L     _  Result Component Current Result Ref Range   Hemoglobin 13.1 (L) (5/4/2020) 13.3 - 17.7 g/dL     _  Result Component Current Result Ref Range   Platelet Count 195 (5/4/2020) 150 - 450 10e9/L     _  Result Component Current Result Ref Range   Absolute Neutrophil 1.7 (5/4/2020) 1.6 - 8.3 10e9/L     Assessment:  Overall, Slava is doing well and will continue to monitor is discomfort/rash on rectum. Report to clinic if symptoms don't reside. Will do mini teach phone call on Venclexta on 6/8 as well as release RX to [facility] on 6/8.     Plan:  Continue Ibrutinib 560 mg daily. Start Venclexta (ramp up) on 6/18.     Follow-Up:  Need CBC and CMP monthly due week of 6/15 about one month after beginning Ibrutinib. Then will need CBC and CMP weekly with Venclexta then change back to monthly to sync up with Ibrutinib labs.     Refill Due:  Ibrutinib refill 6/8 for 6/18 [facility].   Venclexta need release to  pharmacy [facility] 6/8 to start 6/18 (which correlates with week 5 start of Ibrutinib).     Oral Chemotherapy Monitoring Program  Aries Herrera PharmD  [facility]  [phone]  May 28, 2020

## 2020-06-09 ENCOUNTER — TELEPHONE (OUTPATIENT)
Dept: ONCOLOGY | Facility: CLINIC | Age: 50
End: 2020-06-09

## 2020-06-09 DIAGNOSIS — C83.10 MANTLE CELL LYMPHOMA, UNSPECIFIED BODY REGION (H): Primary | ICD-10-CM

## 2020-06-09 NOTE — ORAL ONC MGMT
"Oral Chemotherapy Monitoring Program    Primary Oncologist: Dr. Redding  Primary Oncology Clinic: [facility]  Cancer Diagnosis: Mantle Cell Lymphoma    Drug: Venclexta (being added to Imbruvica)   Start Date: 6/18/20  Dose is appropriate for patients: BSA, Renal Function and Hepatic Function   Expected duration of therapy: Until disease progression or unacceptable toxicity    Drug Interaction Assessment: No drug interactions identified    Lab Monitoring Plan  Labs with ramp up: prior to, 6-8 h after, and 24 h after first dose of 20 mg and 50 mg - after that labs prior to each ramp-up dose    Labs drawn outside [place]:  [facility]  Ph: [phone]  Fax: [phone]    Subjective/Objective:  Slava Lane is a 49 year old male contacted by phone for an initial visit for oral chemotherapy education.     ORAL CHEMOTHERAPY 5/15/2020 5/28/2020 6/9/2020   Drug Name Imbruvica (Ibrutinib) Imbruvica (Ibrutinib) Venclexta (Venetoclax)   Current Dosage 560mg 560mg (No Data)   Current Schedule Daily Daily Daily   Cycle Details Continuous Continuous Continuous   Start Date of Last Cycle - 5/21/2020 -   Planned next cycle start date - 6/18/2020 6/18/2020   Doses missed in last 2 weeks - 0 -   Adherence Assessment - Adherent -   Adverse Effects - (No Data) -   Any new drug interactions? No No -   Is the dose as ordered appropriate for the patient? Yes Yes -       Last PHQ-2 Score on record: No flowsheet data found.        Vitals:  BP:   BP Readings from Last 1 Encounters:   04/17/20 99/59     Wt Readings from Last 1 Encounters:   04/17/20 76.2 kg (167 lb 15.9 oz)     Estimated body surface area is 1.9 meters squared as calculated from the following:    Height as of 4/17/20: 1.702 m (5' 7\").    Weight as of 4/17/20: 76.2 kg (167 lb 15.9 oz).      Labs:  No results found for NA within last 30 days.     No results found for K within last 30 days.     No results found for CA within last 30 days. "     No results found for Mag within last 30 days.     No results found for Phos within last 30 days.     No results found for ALBUMIN within last 30 days.     No results found for BUN within last 30 days.     No results found for CR within last 30 days.       No results found for AST within last 30 days.     No results found for ALT within last 30 days.     No results found for BILITOTAL within last 30 days.       No results found for WBC within last 30 days.     No results found for HGB within last 30 days.     No results found for PLT within last 30 days.     No results found for ANC within last 30 days.         Assessment:  Patient is appropriate to start therapy.    Plan:  Basic chemotherapy teaching was reviewed with the patient and the patient's family including indication, start date of therapy, dose, administration, adverse effects, missed doses, food and drug interactions, monitoring, side effect management, office contact information, and safe handling. Written materials were provided and all questions answered.    Follow-Up:  Will follow up with pre-dose labs prior to Slava starting Venclexta     Chen Rey, PharmD, BCOP  Hematology/Oncology Clinical Pharmacist  [facility]  [facility]  [phone]

## 2020-06-10 DIAGNOSIS — C83.18 MANTLE CELL LYMPHOMA OF LYMPH NODES OF MULTIPLE SITES (H): Primary | ICD-10-CM

## 2020-06-16 DIAGNOSIS — C83.18 MANTLE CELL LYMPHOMA OF LYMPH NODES OF MULTIPLE SITES (H): ICD-10-CM

## 2020-06-16 RX ORDER — ACYCLOVIR 400 MG/1
400 TABLET ORAL 2 TIMES DAILY
Qty: 60 TABLET | Refills: 3 | Status: SHIPPED | OUTPATIENT
Start: 2020-06-16 | End: 2021-03-04

## 2020-06-17 ENCOUNTER — TELEPHONE (OUTPATIENT)
Dept: ONCOLOGY | Facility: CLINIC | Age: 50
End: 2020-06-17

## 2020-06-17 NOTE — TELEPHONE ENCOUNTER
Requested orders from message below faxed to [place] at [phone].     Successful transmission verified by Right[employer]ax.     Edie Olson CMA

## 2020-06-17 NOTE — TELEPHONE ENCOUNTER
----- Message from Marina Archuleta sent at 6/17/2020  3:20 PM CDT -----  Regarding: please refax lab orders  Hello,     The previous fax for orders was cut off when sent to [place] for this patient. He has a lab draw tomorrow at 7:30am, can you please fax lab orders from Dr. Redding to ([phone] ASAP.     Thank you!  Marina Archuleta  Clinic Coordinator  [facility]  [phone]

## 2020-07-02 ENCOUNTER — TELEPHONE (OUTPATIENT)
Dept: ONCOLOGY | Facility: CLINIC | Age: 50
End: 2020-07-02

## 2020-07-02 NOTE — TELEPHONE ENCOUNTER
"Oral Chemotherapy Monitoring Program    Primary Oncologist: Dr. Redding  Primary Oncology Clinic: [facility]   Cancer Diagnosis: Mantle Cell Lymphoma    Therapy History:  Venclexa ramp up (Week 1: 20mg daily, Week 2: 50mg daily, Week 3: 100mg daily, Week 4: 200mg daily).   C1D1=6/18/2020    Imbruvica 560mg daily  C1D1=5/21/2020  C2D1=6/18/2020    Drug Interaction Assessment: no new drug interactions identified.     Lab Monitoring Plan  Labs with ramp up: prior to, 6-8 hour after, and 24 hour after first dose of 20mg and 50mg; then labs prior to each ramp-up dose.  Labs drawn outside of [place]: [facility]; Ph: [phone]; Fax: [phone]    Subjective/Objective:  Slava Lane is a 49 year old male contacted by phone for a follow-up visit for oral chemotherapy. I spoke to Olayinka who confirmed he is starting week 3 of the Venclexta ramp-up and that he took his first dose of 100mg daily following the review of his labs today. He denies any concerns at this time. He details that he had occasionally had a bout of diarrhea and that he constantly feels dehydrated. However, he reports that \"given this time of year\" and \"the temperatures we are having\" that he is just needing to increase his daily fluid intake.     ORAL CHEMOTHERAPY 5/15/2020 5/28/2020 6/9/2020 7/2/2020   Drug Name Imbruvica (Ibrutinib) Imbruvica (Ibrutinib) Venclexta (Venetoclax) Venclexta (Venetoclax)   Current Dosage 560mg 560mg (No Data) (No Data)   Current Schedule Daily Daily Daily Daily   Cycle Details Continuous Continuous Continuous Continuous   Start Date of Last Cycle - 5/21/2020 - 6/18/2020   Planned next cycle start date - 6/18/2020 6/18/2020 -   Doses missed in last 2 weeks - 0 - 0   Adherence Assessment - Adherent - Adherent   Adverse Effects - (No Data) - No AE identified during assessment   Any new drug interactions? No No - No   Is the dose as ordered appropriate for the patient? Yes Yes - Yes " "      Last PHQ-2 Score on record: No flowsheet data found.    Vitals:  BP:   BP Readings from Last 1 Encounters:   04/17/20 99/59     Wt Readings from Last 1 Encounters:   04/17/20 76.2 kg (167 lb 15.9 oz)     Estimated body surface area is 1.9 meters squared as calculated from the following:    Height as of 4/17/20: 1.702 m (5' 7\").    Weight as of 4/17/20: 76.2 kg (167 lb 15.9 oz).    Labs:        Assessment:  Olayinka is tolerating both therapies well.     Plan:  Continue Venclexta ramp up and Imbruvica therapy.     Follow-Up:  7/9: labs      Prema Cesar PharmD  Oral Chemotherapy Monitoring Program  [facility]  [phone]   "

## 2020-07-08 ENCOUNTER — VIRTUAL VISIT (OUTPATIENT)
Dept: TRANSPLANT | Facility: CLINIC | Age: 50
End: 2020-07-08
Attending: INTERNAL MEDICINE
Payer: COMMERCIAL

## 2020-07-08 DIAGNOSIS — C83.18 MANTLE CELL LYMPHOMA OF LYMPH NODES OF MULTIPLE SITES (H): Primary | ICD-10-CM

## 2020-07-08 PROCEDURE — 40001009 ZZH VIDEO/TELEPHONE VISIT; NO CHARGE: Mod: 95

## 2020-07-08 ASSESSMENT — PAIN SCALES - GENERAL: PAINLEVEL: NO PAIN (0)

## 2020-07-08 NOTE — LETTER
"    7/8/2020         RE: Slava Lane  [address]  [place] [address]        Dear Colleague,    Thank you for referring your patient, Slava Lane, to the [facility]. Please see a copy of my visit note below.    Slava Lane is a 49 year old male who is being evaluated via a billable video visit.        I have reviewed and updated the patient's allergies and medication list.    Concerns: No concerns  Refills: Would like refill for venetoclax and ibrutinib.    Vitals - Patient Reported  Weight (Patient Reported): 74.8 kg (165 lb)  Height (Patient Reported): 170.2 cm (5' 7\")  BMI (Based on Pt Reported Ht/Wt): 25.84      Patient reports no pain today.    [employer] if cannot connect: [phone]     Jenifer Greenwood CMA          Video-Visit Details    Type of service:  Video Visit    Video Visit duration: 30 minutes       Originating Location (pt. Location): Home    Distant Location (provider location):  [facility]     Platform used for Video Visit: Blane Lane is a 49 year old man with mantle cell lymphoma from colon polyps.        Hematologic history:  Mantle cell lymphoma.  Mantle cell lymphoma, Stage IV with colon, BM involvement Ki67 -30%, TP53 mutations - Negative.  MCL MIPI - 5.9- Intermediate   .     Date Treatment Response Toxicities/Complications   9/18/19 - 1/25/20 R-CHOP/R-DHAP x3 each VT, Progressive disease      5/21/2020  Ibrutinib/Venetoclax                                    Interval History:  Video interview was conducted today. His wife was also present. He endorses typical side-effects from Ibrutinib- joint stiffness, MSK pain, changes in skin and easy bruising. He denies any constitutional symptoms. Try to stay active. Mood is appropriate. No abdominal pain, weight is stable.     Physical Exam:  No physical was performed due to COVID pandemic. Appropriate mood. NAD. Voice is appropriate.     Labs:  His blood work was performed locally. " It was reviewed with him and his wife. No significant abnormalities has been found.     ASSESSMENT AND PLAN:  50 y/o with MCL Stage IV s/p completion of the Nordic regimen and now with progressive disease within 6 months of primary therapy and hence this is primary refractory disease. His repeat PET is showing progressive disease and higher SUV and a new lesion.  He was started on ibrutinib and venetoclax regimen  (Ibrutinib plus Venetoclax for the Treatment of Mantle-Cell Lymphoma).  To reduce the risk of tumor lysis syndrome venetoclax is ordered as a ramp-up, he is currently on 200 mg and will go up to 400 mg.  He tolerates the medication well.  - Continue Venetoclax ramp-up to target dose 400 mg  -Weekly labs as scheduled till target dose achieved  - Plan for PET CT in a month from now (will continue with q3m PET scans as per study; the first BMB at 16th week of therapy)  - MSK pain, joint stiffness, easy bruising and skin changes are typical side effects of Ibrutinib. He prefers to use claritin for now.    Plan:   Continue Ibrutinib/Venetoclax  PET in 1 month  Follow up with Dr. Redding with preclinic labs in 5 weeks from now      Discussed with Dr. Miladis Hein MD  Hem/Onc fellow    The patient was discussed with the clinical fellow, seen and examined by me. All labs and imaging were reviewed. I  I agree with the fellows note and have been responsible for the care plan and interpretation of progress. Any additional information to the fellows note has been documented below.    50 y/o with MCL Stage IV s/p completion of the Nordic regimen and now with progressive disease within 6 months of primary therapy and hence this is primary refractory disease. His repeat PET is showing progressive disease and higher SUV and a new lesion.  He was started on ibrutinib and venetoclax regimen and has toxicities from the ibrutinib - arthralgias, minor skin bleeds, leathery skin, . We will restage after a C3  EMERY ROMERO MS  Attending Physician  Pager - [phone]  Email - [email or web address]

## 2020-07-08 NOTE — PROGRESS NOTES
"Slava Lane is a 49 year old male who is being evaluated via a billable video visit.      The patient has been notified of following:     \"This video visit will be conducted via a call between you and your physician/provider. We have found that certain health care needs can be provided without the need for an in-person physical exam.  This service lets us provide the care you need with a video conversation.  If a prescription is necessary we can send it directly to your pharmacy.  If lab work is needed we can place an order for that and you can then stop by our lab to have the test done at a later time.    Video visits are billed at different rates depending on your insurance coverage.  Please reach out to your insurance provider with any questions.    If during the course of the call the physician/provider feels a video visit is not appropriate, you will not be charged for this service.\"    Patient has given verbal consent for Video visit? Yes    Will anyone else be joining your video visit? No       I have reviewed and updated the patient's allergies and medication list.    Concerns: No concerns  Refills: Would like refill for venetoclax and ibrutinib.    Vitals - Patient Reported  Weight (Patient Reported): 74.8 kg (165 lb)  Height (Patient Reported): 170.2 cm (5' 7\")  BMI (Based on Pt Reported Ht/Wt): 25.84      Patient reports no pain today.    Doximity if cannot connect: [phone]     Jenifer Greenwood CMA          Video-Visit Details    Type of service:  Video Visit    Video Visit duration: 30 minutes       Originating Location (pt. Location): Home    Distant Location (provider location):  [facility] BLOOD AND MARROW [facility]ANSPLANT     Platform used for Video Visit: Blane Lane is a 49 year old man with mantle cell lymphoma from colon polyps.        Hematologic history:  Mantle cell lymphoma.  Mantle cell lymphoma, Stage IV with colon, BM involvement Ki67 -30%, TP53 mutations - Negative.  MCL MIPI - " 5.9- Intermediate   .     Date Treatment Response Toxicities/Complications   9/18/19 - 1/25/20 R-CHOP/R-DHAP x3 each MO, Progressive disease      5/21/2020  Ibrutinib/Venetoclax                                    Interval History:  Video interview was conducted today. His wife was also present. He endorses typical side-effects from Ibrutinib- joint stiffness, MSK pain, changes in skin and easy bruising. He denies any constitutional symptoms. Try to stay active. Mood is appropriate. No abdominal pain, weight is stable.     Physical Exam:  No physical was performed due to COVID pandemic. Appropriate mood. NAD. Voice is appropriate.     Labs:  His blood work was performed locally. It was reviewed with him and his wife. No significant abnormalities has been found.     ASSESSMENT AND PLAN:  48 y/o with MCL Stage IV s/p completion of the Nordic regimen and now with progressive disease within 6 months of primary therapy and hence this is primary refractory disease. His repeat PET is showing progressive disease and higher SUV and a new lesion.  He was started on ibrutinib and venetoclax regimen  (Ibrutinib plus Venetoclax for the Treatment of Mantle-Cell Lymphoma).  To reduce the risk of tumor lysis syndrome venetoclax is ordered as a ramp-up, he is currently on 200 mg and will go up to 400 mg.  He tolerates the medication well.  - Continue Venetoclax ramp-up to target dose 400 mg  -Weekly labs as scheduled till target dose achieved  - Plan for PET CT in a month from now (will continue with q3m PET scans as per study; the first BMB at 16th week of therapy)  - MSK pain, joint stiffness, easy bruising and skin changes are typical side effects of Ibrutinib. He prefers to use claritin for now.    Plan:   Continue Ibrutinib/Venetoclax  PET in 1 month  Follow up with Dr. Redding with preclinic labs in 5 weeks from now      Discussed with Dr. Miladis Hein MD  Hem/Onc fellow    The patient was discussed with the  clinical fellow, seen and examined by me. All labs and imaging were reviewed. I  I agree with the fellows note and have been responsible for the care plan and interpretation of progress. Any additional information to the fellows note has been documented below.    50 y/o with MCL Stage IV s/p completion of the Nordic regimen and now with progressive disease within 6 months of primary therapy and hence this is primary refractory disease. His repeat PET is showing progressive disease and higher SUV and a new lesion.  He was started on ibrutinib and venetoclax regimen and has toxicities from the ibrutinib - arthralgias, minor skin bleeds, leathery skin, . We will restage after a C3 PET        Mark ROMERO MS  Attending Physician  Pager - [phone]  Email - [email or web address]

## 2020-08-03 DIAGNOSIS — C83.18 MANTLE CELL LYMPHOMA OF LYMPH NODES OF MULTIPLE SITES (H): Primary | ICD-10-CM

## 2020-08-05 DIAGNOSIS — C83.18 MANTLE CELL LYMPHOMA OF LYMPH NODES OF MULTIPLE SITES (H): Primary | ICD-10-CM

## 2020-08-12 ENCOUNTER — HOSPITAL ENCOUNTER (OUTPATIENT)
Dept: PET IMAGING | Facility: CLINIC | Age: 50
Discharge: HOME | End: 2020-08-12
Attending: INTERNAL MEDICINE | Admitting: INTERNAL MEDICINE
Payer: COMMERCIAL

## 2020-08-12 DIAGNOSIS — C83.18 MANTLE CELL LYMPHOMA OF LYMPH NODES OF MULTIPLE SITES (H): ICD-10-CM

## 2020-08-12 LAB
ALBUMIN SERPL-MCNC: 3.7 G/DL (ref 3.4–5)
ALP SERPL-CCNC: 50 U/L (ref 40–150)
ALT SERPL W P-5'-P-CCNC: 24 U/L (ref 0–70)
ANION GAP SERPL CALCULATED.3IONS-SCNC: 3 MMOL/L (ref 3–14)
AST SERPL W P-5'-P-CCNC: 24 U/L (ref 0–45)
BASOPHILS # BLD AUTO: 0 10E9/L (ref 0–0.2)
BASOPHILS NFR BLD AUTO: 0.5 %
BILIRUB SERPL-MCNC: 1.3 MG/DL (ref 0.2–1.3)
BUN SERPL-MCNC: 17 MG/DL (ref 7–30)
CALCIUM SERPL-MCNC: 8.5 MG/DL (ref 8.5–10.1)
CHLORIDE SERPL-SCNC: 109 MMOL/L (ref 94–109)
CO2 SERPL-SCNC: 26 MMOL/L (ref 20–32)
CREAT SERPL-MCNC: 0.73 MG/DL (ref 0.66–1.25)
DIFFERENTIAL METHOD BLD: ABNORMAL
EOSINOPHIL # BLD AUTO: 0 10E9/L (ref 0–0.7)
EOSINOPHIL NFR BLD AUTO: 0.3 %
ERYTHROCYTE [DISTWIDTH] IN BLOOD BY AUTOMATED COUNT: 14.4 % (ref 10–15)
GFR SERPL CREATININE-BSD FRML MDRD: >90 ML/MIN/{1.73_M2}
GLUCOSE SERPL-MCNC: 83 MG/DL (ref 70–99)
HCT VFR BLD AUTO: 42.7 % (ref 40–53)
HGB BLD-MCNC: 14 G/DL (ref 13.3–17.7)
IMM GRANULOCYTES # BLD: 0 10E9/L (ref 0–0.4)
IMM GRANULOCYTES NFR BLD: 0.3 %
LYMPHOCYTES # BLD AUTO: 1.3 10E9/L (ref 0.8–5.3)
LYMPHOCYTES NFR BLD AUTO: 33.3 %
MCH RBC QN AUTO: 32.7 PG (ref 26.5–33)
MCHC RBC AUTO-ENTMCNC: 32.8 G/DL (ref 31.5–36.5)
MCV RBC AUTO: 100 FL (ref 78–100)
MONOCYTES # BLD AUTO: 0.6 10E9/L (ref 0–1.3)
MONOCYTES NFR BLD AUTO: 13.9 %
NEUTROPHILS # BLD AUTO: 2.1 10E9/L (ref 1.6–8.3)
NEUTROPHILS NFR BLD AUTO: 51.7 %
NRBC # BLD AUTO: 0 10*3/UL
NRBC BLD AUTO-RTO: 0 /100
PHOSPHATE SERPL-MCNC: 3 MG/DL (ref 2.5–4.5)
PLATELET # BLD AUTO: 149 10E9/L (ref 150–450)
POTASSIUM SERPL-SCNC: 4.1 MMOL/L (ref 3.4–5.3)
PROT SERPL-MCNC: 6.7 G/DL (ref 6.8–8.8)
RBC # BLD AUTO: 4.28 10E12/L (ref 4.4–5.9)
SODIUM SERPL-SCNC: 138 MMOL/L (ref 133–144)
URATE SERPL-MCNC: 4 MG/DL (ref 3.5–7.2)
WBC # BLD AUTO: 4 10E9/L (ref 4–11)

## 2020-08-12 PROCEDURE — 85025 COMPLETE CBC W/AUTO DIFF WBC: CPT | Performed by: INTERNAL MEDICINE

## 2020-08-12 PROCEDURE — 84100 ASSAY OF PHOSPHORUS: CPT | Performed by: INTERNAL MEDICINE

## 2020-08-12 PROCEDURE — 80053 COMPREHEN METABOLIC PANEL: CPT | Performed by: INTERNAL MEDICINE

## 2020-08-12 PROCEDURE — 36415 COLL VENOUS BLD VENIPUNCTURE: CPT | Performed by: INTERNAL MEDICINE

## 2020-08-12 PROCEDURE — 25000128 H RX IP 250 OP 636: Performed by: INTERNAL MEDICINE

## 2020-08-12 PROCEDURE — 84550 ASSAY OF BLOOD/URIC ACID: CPT | Performed by: INTERNAL MEDICINE

## 2020-08-12 PROCEDURE — 34300033 ZZH RX 343: Performed by: INTERNAL MEDICINE

## 2020-08-12 PROCEDURE — A9552 F18 FDG: HCPCS | Performed by: INTERNAL MEDICINE

## 2020-08-12 PROCEDURE — 40000557 PET ONCOLOGY WHOLE BODY

## 2020-08-12 RX ORDER — HEPARIN SODIUM (PORCINE) LOCK FLUSH IV SOLN 100 UNIT/ML 100 UNIT/ML
500 SOLUTION INTRAVENOUS ONCE
Status: COMPLETED | OUTPATIENT
Start: 2020-08-12 | End: 2020-08-12

## 2020-08-12 RX ADMIN — Medication 500 UNITS: at 11:02

## 2020-08-12 RX ADMIN — FLUDEOXYGLUCOSE F-18 10.18 MCI.: 500 INJECTION, SOLUTION INTRAVENOUS at 10:30

## 2020-08-17 DIAGNOSIS — C83.10 MANTLE CELL LYMPHOMA, UNSPECIFIED BODY REGION (H): ICD-10-CM

## 2020-08-19 ENCOUNTER — PREP FOR PROCEDURE (OUTPATIENT)
Dept: GASTROENTEROLOGY | Facility: CLINIC | Age: 50
End: 2020-08-19

## 2020-08-19 ENCOUNTER — VIRTUAL VISIT (OUTPATIENT)
Dept: TRANSPLANT | Facility: CLINIC | Age: 50
End: 2020-08-19
Attending: INTERNAL MEDICINE
Payer: COMMERCIAL

## 2020-08-19 DIAGNOSIS — C83.18 MANTLE CELL LYMPHOMA OF LYMPH NODES OF MULTIPLE SITES (H): Primary | ICD-10-CM

## 2020-08-19 DIAGNOSIS — Z86.0100 HISTORY OF COLONIC POLYPS: Primary | ICD-10-CM

## 2020-08-19 PROCEDURE — 40001009 ZZH VIDEO/TELEPHONE VISIT; NO CHARGE

## 2020-08-19 RX ORDER — ALLOPURINOL 300 MG/1
300 TABLET ORAL DAILY
Qty: 90 TABLET | Refills: 0 | OUTPATIENT
Start: 2020-08-19

## 2020-08-19 NOTE — PROGRESS NOTES
"Slava Lane is a 50 year old male who is being evaluated via a billable video visit.      The patient has been notified of following:     \"This video visit will be conducted via a call between you and your physician/provider. We have found that certain health care needs can be provided without the need for an in-person physical exam.  This service lets us provide the care you need with a video conversation.  If a prescription is necessary we can send it directly to your pharmacy.  If lab work is needed we can place an order for that and you can then stop by our lab to have the test done at a later time.    Video visits are billed at different rates depending on your insurance coverage.  Please reach out to your insurance provider with any questions.    If during the course of the call the physician/provider feels a video visit is not appropriate, you will not be charged for this service.\"    Patient has given verbal consent for Video visit? Yes  How would you like to obtain your AVS? MyChart  If you are dropped from the video visit, the video invite should be resent to: Text to cell phone: [phone]  Will anyone else be joining your video visit? No      Machelle Bellamy CAT      Video-Visit Details    Type of service:  Video Visit    Duration of the video visit: 30 minute     Originating Location (pt. Location): Home    Distant Location (provider location):  [facility] BLOOD AND MARROW TRANSPLANT     Platform used for Video Visit: Blane Lane is a 49 year old male who is being evaluated via a billable video visit.         Hematologic history:  Mantle cell lymphoma.  Mantle cell lymphoma, Stage IV with colon, BM involvement Ki67 -30%, TP53 mutations - Negative.  MCL MIPI - 5.9- Intermediate   .     Date Treatment Response Toxicities/Complications   9/18/19 - 1/25/20 R-CHOP/R-DHAP x3 each OR, Progressive disease      5/21/2020  Ibrutinib/Venetoclax                                    Interval History:  Video " interview was conducted today. He has finished ramp-up and is on Venetoclax 400 mg daily dose. His wife was also present. He endorses typical side-effects from Ibrutinib- joint stiffness, MSK pain, changes in skin and hair color. Mild fatigue. He denies any other constitutional symptoms. Try to stay active. Mood is appropriate. No abdominal pain, weight is stable.     Physical Exam:  No physical was performed due to COVID pandemic. Appropriate mood. NAD. Voice is appropriate.     Labs:  Recent Labs   Lab Test 08/12/20  1100 05/04/20  0745 03/03/20  1200   WBC 4.0 3.6* 5.8   RBC 4.28* 4.02* 4.00*   HGB 14.0 13.1* 13.4   HCT 42.7 40.5 41.0    101* 103*   MCH 32.7 32.6 33.5*   MCHC 32.8 32.3 32.7   RDW 14.4 13.1 13.2   * 195 169   NEUTROPHIL 51.7 47.3 55.3   ANEU 2.1 1.7 3.2   ALYM 1.3 1.1 1.5   PROSPER 0.6 0.4 0.6   AEOS 0.0 0.3 0.4     Recent Labs   Lab Test 08/12/20  1100 05/04/20  0745 01/29/20  1203    138 140   POTASSIUM 4.1 4.0 4.4   CHLORIDE 109 108 109   CO2 26 28 28   ANIONGAP 3 3 3   GLC 83 106* 103*   BUN 17 19 18   CR 0.73 0.78 0.80   PIEDAD 8.5 8.5 8.9     Recent Labs   Lab Test 08/12/20  1100 05/04/20  0745 01/29/20  1203   BILITOTAL 1.3 0.4 0.3   ALKPHOS 50 60 65   AST 24 17 19   ALT 24 22 21         PET scan 8/12/2020  IMPRESSION: In this patient with a history of mantle cell lymphoma  currently undergoing therapy with ibrutinib and venetoclax:     1. Evidence of partial response per Lugano criteria. Resolved  hypermetabolism and decreased size of the retroperitoneal and  pericolic lymph nodes. Largest node remains > 1.5 cm in length  (perirectal 2.0 cm node).     2. No new hypermetabolic lesion.       ASSESSMENT AND PLAN:  48 y/o with MCL Stage IV s/p completion of the Nordic regimen and now with progressive disease within 6 months of primary therapy and hence this is primary refractory disease. His repeat PET is showing progressive disease and higher SUV and a new lesion.  He was  started on ibrutinib and venetoclax regimen(Ibrutinib plus Venetoclax for the Treatment of Mantle-Cell Lymphoma).    To reduce the risk of tumor lysis syndrome venetoclax was  ordered as a ramp-up, completed now,  - he is on 400 mg.  He tolerates the medication well.   - Now with partial response per Lugano criteria.  - Continue Venetoclax/Ibrutinib  - Plan for PET CT q3m as per study - next in November 2020  - MSK pain, joint stiffness, easy bruising and skin changes are typical side effects of Ibrutinib. He prefers to use claritin for now.    Plan:   Continue Ibrutinib/Venetoclax  PET in 3 months  Stop allopurinol  Labs every 2 weeks  Follow up with Dr. Redding with preclinic labs in 3 months from now (after the results of PET are available)        Discussed with Dr. Miladis Hein MD  Hem/Onc fellow      The patient was discussed with the clinical fellow, seen and examined by me. All labs and imaging were reviewed. I  I agree with the fellows note and have been responsible for the care plan and interpretation of progress. Any additional information to the fellows note has been documented below.      50 y/o with MCL Stage IV s/p completion of the Nordic regimen and now with progressive disease within 6 months of primary therapy and hence this is primary refractory disease. His repeat PET is showing progressive disease and higher SUV and a new lesion.  He was started on ibrutinib and venetoclax regimen(Ibrutinib plus Venetoclax for the Treatment of Mantle-Cell Lymphoma).    -At the end of 3 cycles he is showing a good AR  -We will repeat imaging@ 6 cycles  -His side effect is the arthralgias of ibrutinib  -He can get labs CBC, CMP q 2 weeks locally  -We discussed about ASCT, CART as consolidation/ relapse therapies      Mark ROMERO MS  Attending Physician  Pager - [phone]  Email - [email or web address]

## 2020-08-19 NOTE — LETTER
"8/19/2020     RE: Slava Lane  [address]  [place] [address]    Dear Colleague,    Thank you for referring your patient, Slava Lane, to the [facility] BLOOD AND MARROW TRANSPLANT. Please see a copy of my visit note below.    Slava Lane is a 50 year old male who is being evaluated via a billable video visit.      The patient has been notified of following:     \"This video visit will be conducted via a call between you and your physician/provider. We have found that certain health care needs can be provided without the need for an in-person physical exam.  This service lets us provide the care you need with a video conversation.  If a prescription is necessary we can send it directly to your pharmacy.  If lab work is needed we can place an order for that and you can then stop by our lab to have the test done at a later time.    Video visits are billed at different rates depending on your insurance coverage.  Please reach out to your insurance provider with any questions.    If during the course of the call the physician/provider feels a video visit is not appropriate, you will not be charged for this service.\"    Patient has given verbal consent for Video visit? Yes  How would you like to obtain your AVS? MyChart  If you are dropped from the video visit, the video invite should be resent to: Text to cell phone: [phone]  Will anyone else be joining your video visit? No      Machelle RODGERS      Video-Visit Details    Type of service:  Video Visit    Duration of the video visit: 30 minute     Originating Location (pt. Location): Home    Distant Location (provider location):  [facility] BLOOD AND MARROW TRANSPLANT     Platform used for Video Visit: Blane Lane is a 49 year old male who is being evaluated via a billable video visit.         Hematologic history:  Mantle cell lymphoma.  Mantle cell lymphoma, Stage IV with colon, BM involvement Ki67 -30%, TP53 mutations - Negative.  MCL MIPI - 5.9- " Intermediate   .     Date Treatment Response Toxicities/Complications   9/18/19 - 1/25/20 R-CHOP/R-DHAP x3 each SD, Progressive disease      5/21/2020  Ibrutinib/Venetoclax                                    Interval History:  Video interview was conducted today. He has finished ramp-up and is on Venetoclax 400 mg daily dose. His wife was also present. He endorses typical side-effects from Ibrutinib- joint stiffness, MSK pain, changes in skin and hair color. Mild fatigue. He denies any other constitutional symptoms. Try to stay active. Mood is appropriate. No abdominal pain, weight is stable.     Physical Exam:  No physical was performed due to COVID pandemic. Appropriate mood. NAD. Voice is appropriate.     Labs:  Recent Labs   Lab Test 08/12/20  1100 05/04/20  0745 03/03/20  1200   WBC 4.0 3.6* 5.8   RBC 4.28* 4.02* 4.00*   HGB 14.0 13.1* 13.4   HCT 42.7 40.5 41.0    101* 103*   MCH 32.7 32.6 33.5*   MCHC 32.8 32.3 32.7   RDW 14.4 13.1 13.2   * 195 169   NEUTROPHIL 51.7 47.3 55.3   ANEU 2.1 1.7 3.2   ALYM 1.3 1.1 1.5   PROSPER 0.6 0.4 0.6   AEOS 0.0 0.3 0.4     Recent Labs   Lab Test 08/12/20  1100 05/04/20  0745 01/29/20  1203    138 140   POTASSIUM 4.1 4.0 4.4   CHLORIDE 109 108 109   CO2 26 28 28   ANIONGAP 3 3 3   GLC 83 106* 103*   BUN 17 19 18   CR 0.73 0.78 0.80   PIEDAD 8.5 8.5 8.9     Recent Labs   Lab Test 08/12/20  1100 05/04/20  0745 01/29/20  1203   BILITOTAL 1.3 0.4 0.3   ALKPHOS 50 60 65   AST 24 17 19   ALT 24 22 21         PET scan 8/12/2020  IMPRESSION: In this patient with a history of mantle cell lymphoma  currently undergoing therapy with ibrutinib and venetoclax:     1. Evidence of partial response per Lugano criteria. Resolved  hypermetabolism and decreased size of the retroperitoneal and  pericolic lymph nodes. Largest node remains > 1.5 cm in length  (perirectal 2.0 cm node).     2. No new hypermetabolic lesion.       ASSESSMENT AND PLAN:  50 y/o with MCL Stage IV s/p  completion of the Nordic regimen and now with progressive disease within 6 months of primary therapy and hence this is primary refractory disease. His repeat PET is showing progressive disease and higher SUV and a new lesion.  He was started on ibrutinib and venetoclax regimen(Ibrutinib plus Venetoclax for the Treatment of Mantle-Cell Lymphoma).    To reduce the risk of tumor lysis syndrome venetoclax was  ordered as a ramp-up, completed now,  - he is on 400 mg.  He tolerates the medication well.   - Now with partial response per Lugano criteria.  - Continue Venetoclax/Ibrutinib  - Plan for PET CT q3m as per study - next in November 2020  - MSK pain, joint stiffness, easy bruising and skin changes are typical side effects of Ibrutinib. He prefers to use claritin for now.    Plan:   Continue Ibrutinib/Venetoclax  PET in 3 months  Stop allopurinol  Labs every 2 weeks  Follow up with Dr. Redding with preclinic labs in 3 months from now (after the results of PET are available)        Discussed with Dr. Miladis Hein MD  Hem/Onc fellow      The patient was discussed with the clinical fellow, seen and examined by me. All labs and imaging were reviewed. I  I agree with the fellows note and have been responsible for the care plan and interpretation of progress. Any additional information to the fellows note has been documented below.      50 y/o with MCL Stage IV s/p completion of the Nordic regimen and now with progressive disease within 6 months of primary therapy and hence this is primary refractory disease. His repeat PET is showing progressive disease and higher SUV and a new lesion.  He was started on ibrutinib and venetoclax regimen(Ibrutinib plus Venetoclax for the Treatment of Mantle-Cell Lymphoma).    -At the end of 3 cycles he is showing a good [place]  -We will repeat imaging@ 6 cycles  -His side effect is the arthralgias of ibrutinib  -He can get labs CBC, CMP q 2 weeks locally  -We discussed about  ASCT, CART as consolidation/ relapse therapies      Mark ROMERO MS  Attending Physician  Pager - [phone]  Email - [email or web address]

## 2020-08-27 ENCOUNTER — TELEPHONE (OUTPATIENT)
Dept: ONCOLOGY | Facility: CLINIC | Age: 50
End: 2020-08-27

## 2020-08-27 NOTE — ORAL ONC MGMT
Oral Chemotherapy Monitoring Program     Primary Oncologist: Dr. Redding  Primary Oncology Clinic: [facility]   Cancer Diagnosis: Mantle Cell Lymphoma     Therapy History:  Venclexa ramp up (Week 1: 20mg daily, Week 2: 50mg daily, Week 3: 100mg daily, Week 4: 200mg daily) then 400 mg daily.   C1D1=6/18/2020     Imbruvica 560mg daily  C1D1=5/21/2020  C2D1=6/18/2020     Drug Interaction Assessment: no new drug interactions identified.      Lab Monitoring Plan  Labs with ramp up: prior to, 6-8 hour after, and 24 hour after first dose of 20mg and 50mg; then labs prior to each ramp-up dose.    Labs drawn outside of [place]: [facility]; Ph: [phone]; Fax: [phone]    Subjective/Objective:  Slava Lnae is a 50 year old male contacted by phone for a follow-up visit for oral chemotherapy.  Slava and I discussed his oral chemo regimen and he expressed a good understanding of the plan. No missed doses and no new side effects. He stated that since Dr. Redding has taken him off of Allopurinol around 8/19, his feet feel like they have an occassional burning sensation. He would like to resume Allopurinol if possible. I will message Dr. Redding about this.     ORAL CHEMOTHERAPY 5/15/2020 5/28/2020 6/9/2020 7/2/2020 8/27/2020 8/27/2020   Drug Name Imbruvica (Ibrutinib) Imbruvica (Ibrutinib) Venclexta (Venetoclax) Venclexta (Venetoclax) Venclexta (Venetoclax) Imbruvica (Ibrutinib)   Current Dosage 560mg 560mg (No Data) (No Data) 400mg 560mg   Current Schedule Daily Daily Daily Daily Daily Daily   Cycle Details Continuous Continuous Continuous Continuous Continuous Continuous   Start Date of Last Cycle - 5/21/2020 - 6/18/2020 - -   Planned next cycle start date - 6/18/2020 6/18/2020 - 9/11/2020 9/11/2020   Doses missed in last 2 weeks - 0 - 0 - -   Adherence Assessment - Adherent - Adherent Adherent Adherent   Adverse Effects - (No Data) - No AE identified during assessment - -   Any  "new drug interactions? No No - No - -   Is the dose as ordered appropriate for the patient? Yes Yes - Yes Yes Yes       Last PHQ-2 Score on record: No flowsheet data found.    Vitals:  BP:   BP Readings from Last 1 Encounters:   04/17/20 99/59     Wt Readings from Last 1 Encounters:   04/17/20 76.2 kg (167 lb 15.9 oz)     Estimated body surface area is 1.9 meters squared as calculated from the following:    Height as of 4/17/20: 1.702 m (5' 7\").    Weight as of 4/17/20: 76.2 kg (167 lb 15.9 oz).    Labs:  _  Result Component Current Result Ref Range   Sodium 138 (8/12/2020) 133 - 144 mmol/L     _  Result Component Current Result Ref Range   Potassium 4.1 (8/12/2020) 3.4 - 5.3 mmol/L     _  Result Component Current Result Ref Range   Calcium 8.5 (8/12/2020) 8.5 - 10.1 mg/dL     No results found for Mag within last 30 days.     _  Result Component Current Result Ref Range   Phosphorus 3.0 (8/12/2020) 2.5 - 4.5 mg/dL     _  Result Component Current Result Ref Range   Albumin 3.7 (8/12/2020) 3.4 - 5.0 g/dL     _  Result Component Current Result Ref Range   Urea Nitrogen 17 (8/12/2020) 7 - 30 mg/dL     _  Result Component Current Result Ref Range   Creatinine 0.73 (8/12/2020) 0.66 - 1.25 mg/dL       _  Result Component Current Result Ref Range   AST 24 (8/12/2020) 0 - 45 U/L     _  Result Component Current Result Ref Range   ALT 24 (8/12/2020) 0 - 70 U/L     _  Result Component Current Result Ref Range   Bilirubin Total 1.3 (8/12/2020) 0.2 - 1.3 mg/dL       _  Result Component Current Result Ref Range   WBC 4.0 (8/12/2020) 4.0 - 11.0 10e9/L     _  Result Component Current Result Ref Range   Hemoglobin 14.0 (8/12/2020) 13.3 - 17.7 g/dL     _  Result Component Current Result Ref Range   Platelet Count 149 (L) (8/12/2020) 150 - 450 10e9/L     _  Result Component Current Result Ref Range   Absolute Neutrophil 2.1 (8/12/2020) 1.6 - 8.3 10e9/L       Assessment:  Patient overall doing ok on Ibrutinib and Venetoclax. Patient " needs labs drawn.     Plan:  Send Care Team an IB message asking about Allopurinol renewal per patient request. Continue Ibrutinib 560 mg daily and Venetoclax 400 mg daily. Continue q2week labs.     Follow-Up:  8/28 labs and then every 2 week labs.    Refill Due:  9/4 for 9/11    Oral Chemotherapy Monitoring Program  Aries Herrera PharmD  [facility]  [phone]  August 27, 2020

## 2020-08-28 ENCOUNTER — TELEPHONE (OUTPATIENT)
Dept: ONCOLOGY | Facility: CLINIC | Age: 50
End: 2020-08-28

## 2020-08-28 DIAGNOSIS — C83.10 MANTLE CELL LYMPHOMA, UNSPECIFIED BODY REGION (H): ICD-10-CM

## 2020-08-28 RX ORDER — ALLOPURINOL 300 MG/1
300 TABLET ORAL DAILY
Qty: 90 TABLET | Refills: 0 | Status: SHIPPED | OUTPATIENT
Start: 2020-08-28 | End: 2020-11-30

## 2020-08-28 NOTE — ORAL ONC MGMT
Oral Chemotherapy Monitoring Program  Lab Follow Up     Patient currently on Imbruvica and Veneclexta  Reviewed lab results from today.     No concerning abnormalities.    Assessment & Plan:  Contacted patient by phone to review labs.  Instructed patient to continue therapy and call with any questions.     Also prescription for allopurinol renewed and sent to his preferred pharmacy.      Miguel Angel Renteria, PharmD.  Oral Chemotherapy Monitoring Program  [phone]

## 2020-08-28 NOTE — TELEPHONE ENCOUNTER
"Writer received call from patient who stated that his allopurinol was recently discontinued and since then, his joint pain and burning sensation in his feet has increased. This morning he reports that he \"almost needed\" assistance getting out of bed. Had labs done locally and his uric acid has increased to 5.4 which patient states is the highest it's ever been. He had reduction in these symptoms when on allopurinol and is requesting that it be added to his medication regimen again. Writer transferred call to Olive Perrin RNCC, to discuss options and will rout call encounter to care team for review.  "

## 2020-08-28 NOTE — TELEPHONE ENCOUNTER
Allopurinol sent to his local pharmacy in [facility]. Brannon called with the above.

## 2020-09-01 ENCOUNTER — HOSPITAL ENCOUNTER (OUTPATIENT)
Facility: CLINIC | Age: 50
End: 2020-09-01
Attending: INTERNAL MEDICINE | Admitting: INTERNAL MEDICINE
Payer: COMMERCIAL

## 2020-09-01 ENCOUNTER — PATIENT OUTREACH (OUTPATIENT)
Dept: GASTROENTEROLOGY | Facility: CLINIC | Age: 50
End: 2020-09-01

## 2020-09-01 DIAGNOSIS — Z11.59 ENCOUNTER FOR SCREENING FOR OTHER VIRAL DISEASES: Primary | ICD-10-CM

## 2020-09-01 NOTE — PROGRESS NOTES
ADVANCED ENDOSCOPY OR Instructions  RN CARE COORDINATOR NOTE:    Called patient to review colonoscopy instructions for upcoming procedure on 10/15/20.    Patient relates that he thinks that this is to be delayed until after upcoming PET to be done on November 19th.      He asks to confirm this with his medical oncology team.   Message sent.    Eduarda Prater RN   BSN, HNBC, STAR-T  [facility]  Care Coordinator  Ph: [phone]  FAX: [phone]

## 2020-09-04 DIAGNOSIS — C83.18 MANTLE CELL LYMPHOMA OF LYMPH NODES OF MULTIPLE SITES (H): Primary | ICD-10-CM

## 2020-09-22 ENCOUNTER — TELEPHONE (OUTPATIENT)
Dept: GASTROENTEROLOGY | Facility: CLINIC | Age: 50
End: 2020-09-22

## 2020-09-22 NOTE — TELEPHONE ENCOUNTER
Spoke to patient's wife Ya in regards to rescheduling procedure for patient. Informed her the patient is rescheduled with Dr. Sharpe on 12/4/2020. Informed her the patient will need an updated pre-op physical within 30 days of his procedure.She stated the patient is going to have this done locally. Informed her the patient will need a  and someone to monitor him for 24 hours after the procedure. Informed her all new scheduling details will be sent to [facility] per her request.

## 2020-09-25 ENCOUNTER — TELEPHONE (OUTPATIENT)
Dept: PHARMACY | Facility: CLINIC | Age: 50
End: 2020-09-25

## 2020-09-25 NOTE — ORAL ONC MGMT
Oral Chemotherapy Monitoring Program    Primary Oncologist: Dr. Redding  Primary Oncology Clinic: [facility]  Cancer Diagnosis: MCL    Therapy History:  Venclexa ramp up (Week 1: 20mg daily, Week 2: 50mg daily, Week 3: 100mg daily, Week 4: 200mg daily) then 400 mg daily.   C1D1=6/18/2020     Imbruvica 560mg daily  C1D1=5/21/2020  C2D1=6/18/2020    Drug Interaction Assessment: No new drug interactions identified.     Lab Monitoring Plan  Local labs drawn every 2 weeks CBC and CMP  Subjective/Objective:  Slava Lane is a 50 year old male Contacted by phone for a follow-up visit for oral chemotherapy. Slava confirmed his regimen of both medications and that he is adherent. He reports that his burning sensation has improved since restarting allopurinol. Slava denies any side effects other than an upset stomach following morning dose.    ORAL CHEMOTHERAPY 5/15/2020 5/28/2020 6/9/2020 7/2/2020 8/27/2020 8/27/2020 9/25/2020   Drug Name Imbruvica (Ibrutinib) Imbruvica (Ibrutinib) Venclexta (Venetoclax) Venclexta (Venetoclax) Venclexta (Venetoclax) Imbruvica (Ibrutinib) Venclexta (Venetoclax)   Current Dosage 560mg 560mg (No Data) (No Data) 400mg 560mg 400mg   Current Schedule Daily Daily Daily Daily Daily Daily Daily   Cycle Details Continuous Continuous Continuous Continuous Continuous Continuous Continuous   Start Date of Last Cycle - 5/21/2020 - 6/18/2020 - - -   Planned next cycle start date - 6/18/2020 6/18/2020 - 9/11/2020 9/11/2020 -   Doses missed in last 2 weeks - 0 - 0 - - 0   Adherence Assessment - Adherent - Adherent Adherent Adherent Adherent   Adverse Effects - (No Data) - No AE identified during assessment - - -   Any new drug interactions? No No - No - - -   Is the dose as ordered appropriate for the patient? Yes Yes - Yes Yes Yes Yes       Last PHQ-2 Score on record: No flowsheet data found.      Vitals:  BP:   BP Readings from Last 1 Encounters:   04/17/20 99/59     Wt Readings from Last 1  "Encounters:   04/17/20 76.2 kg (167 lb 15.9 oz)     Estimated body surface area is 1.9 meters squared as calculated from the following:    Height as of 4/17/20: 1.702 m (5' 7\").    Weight as of 4/17/20: 76.2 kg (167 lb 15.9 oz).    Labs:  No results found for NA within last 30 days.     No results found for K within last 30 days.     No results found for CA within last 30 days.     No results found for Mag within last 30 days.     No results found for Phos within last 30 days.     No results found for ALBUMIN within last 30 days.     No results found for BUN within last 30 days.     No results found for CR within last 30 days.       No results found for AST within last 30 days.     No results found for ALT within last 30 days.     No results found for BILITOTAL within last 30 days.       No results found for WBC within last 30 days.     No results found for HGB within last 30 days.     No results found for PLT within last 30 days.     No results found for ANC within last 30 days.     Assessment/Plan:  MCL: patient is tolerating well, continue therapy as planned.    Follow-Up:  Labs in about 2 weeks.    Refill Due:  9/29 for 10/06    [facility] Pharmacy Intern  Oral Chemotherapy Monitoring Program  [phone]      "

## 2020-09-28 DIAGNOSIS — C83.18 MANTLE CELL LYMPHOMA OF LYMPH NODES OF MULTIPLE SITES (H): Primary | ICD-10-CM

## 2020-10-07 ENCOUNTER — TELEPHONE (OUTPATIENT)
Dept: ONCOLOGY | Facility: CLINIC | Age: 50
End: 2020-10-07

## 2020-10-07 NOTE — TELEPHONE ENCOUNTER
"Writer was forwarded voicemail from Ya, Brannon's wife, stating that they were not contacted for refill of Imbruvica/Venclexta. Prescriptions for both medications were released to [facility] on 9/29/2020. Notation in [employer] states that multiple attempts/voicemails have been left attempting to establish delivery coordination.    This writer called Ya and informed her that the prescriptions have been ready to delivery since 9/29. Writer stated that there have been attempts to contact them, but were unsuccessful. Ya stated \"What they forget is that the prescriptions need to be sent by Fed-Ex up to [facility].\" Writer gave Ya [facility]'s direct contact number, [phone], to have her contact [facility] directly to coordinate delivery. Writer also gave Ya this writer's direct number, [phone], should she have issues getting in contact with [facility].    Ya thanked writer for his time, and had no further questions at this time.    Pj Neff  [facility]   Oncology Pharmacy Liaison  [email or web address]   [phone] (phone)   [phone] (fax)      "

## 2020-10-18 DIAGNOSIS — C83.18 MANTLE CELL LYMPHOMA OF LYMPH NODES OF MULTIPLE SITES (H): Primary | ICD-10-CM

## 2020-10-28 ENCOUNTER — HOSPITAL ENCOUNTER (OUTPATIENT)
Dept: PET IMAGING | Facility: CLINIC | Age: 50
End: 2020-10-28
Attending: INTERNAL MEDICINE
Payer: COMMERCIAL

## 2020-10-28 DIAGNOSIS — C83.18 MANTLE CELL LYMPHOMA OF LYMPH NODES OF MULTIPLE SITES (H): ICD-10-CM

## 2020-10-28 LAB
ALBUMIN SERPL-MCNC: 3.8 G/DL (ref 3.4–5)
ALP SERPL-CCNC: 64 U/L (ref 40–150)
ALT SERPL W P-5'-P-CCNC: 34 U/L (ref 0–70)
ANION GAP SERPL CALCULATED.3IONS-SCNC: 5 MMOL/L (ref 3–14)
AST SERPL W P-5'-P-CCNC: 17 U/L (ref 0–45)
BASOPHILS # BLD AUTO: 0 10E9/L (ref 0–0.2)
BASOPHILS NFR BLD AUTO: 0.7 %
BILIRUB SERPL-MCNC: 1.1 MG/DL (ref 0.2–1.3)
BUN SERPL-MCNC: 19 MG/DL (ref 7–30)
CALCIUM SERPL-MCNC: 9 MG/DL (ref 8.5–10.1)
CHLORIDE SERPL-SCNC: 104 MMOL/L (ref 94–109)
CO2 SERPL-SCNC: 27 MMOL/L (ref 20–32)
CREAT SERPL-MCNC: 0.82 MG/DL (ref 0.66–1.25)
DIFFERENTIAL METHOD BLD: ABNORMAL
EOSINOPHIL # BLD AUTO: 0 10E9/L (ref 0–0.7)
EOSINOPHIL NFR BLD AUTO: 0 %
ERYTHROCYTE [DISTWIDTH] IN BLOOD BY AUTOMATED COUNT: 14.2 % (ref 10–15)
GFR SERPL CREATININE-BSD FRML MDRD: >90 ML/MIN/{1.73_M2}
GLUCOSE SERPL-MCNC: 92 MG/DL (ref 70–99)
HCT VFR BLD AUTO: 45.1 % (ref 40–53)
HGB BLD-MCNC: 15.1 G/DL (ref 13.3–17.7)
IMM GRANULOCYTES # BLD: 0 10E9/L (ref 0–0.4)
IMM GRANULOCYTES NFR BLD: 0.5 %
LYMPHOCYTES # BLD AUTO: 1.6 10E9/L (ref 0.8–5.3)
LYMPHOCYTES NFR BLD AUTO: 37.1 %
MCH RBC QN AUTO: 33.1 PG (ref 26.5–33)
MCHC RBC AUTO-ENTMCNC: 33.5 G/DL (ref 31.5–36.5)
MCV RBC AUTO: 99 FL (ref 78–100)
MONOCYTES # BLD AUTO: 0.6 10E9/L (ref 0–1.3)
MONOCYTES NFR BLD AUTO: 14.3 %
NEUTROPHILS # BLD AUTO: 2 10E9/L (ref 1.6–8.3)
NEUTROPHILS NFR BLD AUTO: 47.4 %
NRBC # BLD AUTO: 0 10*3/UL
NRBC BLD AUTO-RTO: 0 /100
PHOSPHATE SERPL-MCNC: 3.7 MG/DL (ref 2.5–4.5)
PLATELET # BLD AUTO: 157 10E9/L (ref 150–450)
POTASSIUM SERPL-SCNC: 4.2 MMOL/L (ref 3.4–5.3)
PROT SERPL-MCNC: 7 G/DL (ref 6.8–8.8)
RBC # BLD AUTO: 4.56 10E12/L (ref 4.4–5.9)
SODIUM SERPL-SCNC: 135 MMOL/L (ref 133–144)
URATE SERPL-MCNC: 3.7 MG/DL (ref 3.5–7.2)
WBC # BLD AUTO: 4.2 10E9/L (ref 4–11)

## 2020-10-28 PROCEDURE — 70491 CT SOFT TISSUE NECK W/DYE: CPT | Mod: 26 | Performed by: RADIOLOGY

## 2020-10-28 PROCEDURE — 250N000011 HC RX IP 250 OP 636: Performed by: INTERNAL MEDICINE

## 2020-10-28 PROCEDURE — 71260 CT THORAX DX C+: CPT | Mod: 26 | Performed by: RADIOLOGY

## 2020-10-28 PROCEDURE — 343N000001 HC RX 343: Performed by: INTERNAL MEDICINE

## 2020-10-28 PROCEDURE — 70491 CT SOFT TISSUE NECK W/DYE: CPT

## 2020-10-28 PROCEDURE — 80053 COMPREHEN METABOLIC PANEL: CPT | Performed by: INTERNAL MEDICINE

## 2020-10-28 PROCEDURE — 74177 CT ABD & PELVIS W/CONTRAST: CPT | Mod: 26 | Performed by: RADIOLOGY

## 2020-10-28 PROCEDURE — A9552 F18 FDG: HCPCS | Performed by: INTERNAL MEDICINE

## 2020-10-28 PROCEDURE — 84550 ASSAY OF BLOOD/URIC ACID: CPT | Performed by: INTERNAL MEDICINE

## 2020-10-28 PROCEDURE — 999N000130 PET ONCOLOGY WHOLE BODY

## 2020-10-28 PROCEDURE — 85025 COMPLETE CBC W/AUTO DIFF WBC: CPT | Performed by: INTERNAL MEDICINE

## 2020-10-28 PROCEDURE — 78816 PET IMAGE W/CT FULL BODY: CPT | Mod: 26 | Performed by: RADIOLOGY

## 2020-10-28 PROCEDURE — 84100 ASSAY OF PHOSPHORUS: CPT | Performed by: INTERNAL MEDICINE

## 2020-10-28 RX ORDER — IOPAMIDOL 755 MG/ML
107 INJECTION, SOLUTION INTRAVASCULAR ONCE
Status: COMPLETED | OUTPATIENT
Start: 2020-10-28 | End: 2020-10-28

## 2020-10-28 RX ORDER — HEPARIN SODIUM (PORCINE) LOCK FLUSH IV SOLN 100 UNIT/ML 100 UNIT/ML
500 SOLUTION INTRAVENOUS ONCE
Status: COMPLETED | OUTPATIENT
Start: 2020-10-28 | End: 2020-10-28

## 2020-10-28 RX ADMIN — FLUDEOXYGLUCOSE F-18 10.07 MCI.: 500 INJECTION, SOLUTION INTRAVENOUS at 10:13

## 2020-10-28 RX ADMIN — IOPAMIDOL 107 ML: 755 INJECTION, SOLUTION INTRAVENOUS at 10:38

## 2020-10-28 RX ADMIN — Medication 500 UNITS: at 10:39

## 2020-10-29 DIAGNOSIS — C83.18 MANTLE CELL LYMPHOMA OF LYMPH NODES OF MULTIPLE SITES (H): Primary | ICD-10-CM

## 2020-10-30 DIAGNOSIS — C83.18 MANTLE CELL LYMPHOMA OF LYMPH NODES OF MULTIPLE SITES (H): Primary | ICD-10-CM

## 2020-11-03 ENCOUNTER — TELEPHONE (OUTPATIENT)
Dept: ONCOLOGY | Facility: CLINIC | Age: 50
End: 2020-11-03

## 2020-11-03 NOTE — TELEPHONE ENCOUNTER
Oral Chemotherapy Monitoring Program    Subjective/Objective:  Slava Lane is a 50 year old male contacted by phone for a follow-up visit for oral chemotherapy.  Reviewed lab work from 10/28, as well as patient communications via [facility] about upset stomach. He stated that his stomach was irritated post pet-scan from the contrast/dye. Otherwise, he denies any side effects. He did note that he was shorted a couple of venclexta tablets, and had taken 2 pills per day for 3 days, rather than 4 pills per day.    ORAL CHEMOTHERAPY 8/27/2020 8/27/2020 9/25/2020 10/30/2020 10/30/2020 11/3/2020 11/3/2020   Assessment Type - - - Refill - Lab Monitoring Lab Monitoring   Diagnosis Code - - - Non-Hodgkin Lymphoma (NHL) - Non-Hodgkin Lymphoma (NHL) Non-Hodgkin Lymphoma (NHL)   Providers - - - Dr. Redding - Dr. Miladis Redding   [facility] - [facility]   Drug Name Venclexta (Venetoclax) Imbruvica (Ibrutinib) Venclexta (Venetoclax) Venclexta (Venetoclax) Imbruvica (Ibrutinib) Imbruvica (Ibrutinib) Venclexta (Venetoclax)   Dose - - - 400 mg 560 mg 560 mg 400 mg   Current Schedule Daily Daily Daily Daily Daily Daily Daily   Cycle Details Continuous Continuous Continuous Continuous Continuous Continuous Continuous   Start Date of Last Cycle - - - - - 10/8/2020 10/8/2020   Planned next cycle start date 9/11/2020 9/11/2020 - - - 11/5/2020 11/5/2020   Doses missed in last 2 weeks - - 0 - - 0 1   Adherence Assessment Adherent Adherent Adherent - - Adherent Adherent   Adverse Effects - - - - - Nausea Nausea   Nausea - - - - - Grade 1 Grade 1   Pharmacist Intervention(nausea) - - - - - Yes Yes   Intervention(s) - - - - - OTC recommendation OTC recommendation   Home BPs - - - - - Not applicable Not applicable   Any new drug interactions? - - - - - No No   Is the dose as ordered appropriate for the patient? Yes Yes Yes - - Yes Yes   Is the patient currently in pain? - - - - - No No   Has the patient  "been assessed within the past 6 months for depression? - - - - - Yes Yes   Has the patient missed any days of school, work, or other routine activity? - - - - - No No   Since the last time we talked, have you been hospitalized or used the emergency room? - - - - - No No       Last PHQ-2 Score on record: No flowsheet data found.    Vitals:  BP:   BP Readings from Last 1 Encounters:   04/17/20 99/59     Wt Readings from Last 1 Encounters:   04/17/20 76.2 kg (167 lb 15.9 oz)     Estimated body surface area is 1.9 meters squared as calculated from the following:    Height as of 4/17/20: 1.702 m (5' 7\").    Weight as of 4/17/20: 76.2 kg (167 lb 15.9 oz).    Labs:  _  Result Component Current Result Ref Range   Sodium 135 (10/28/2020) 133 - 144 mmol/L     _  Result Component Current Result Ref Range   Potassium 4.2 (10/28/2020) 3.4 - 5.3 mmol/L     _  Result Component Current Result Ref Range   Calcium 9.0 (10/28/2020) 8.5 - 10.1 mg/dL     No results found for Mag within last 30 days.     _  Result Component Current Result Ref Range   Phosphorus 3.7 (10/28/2020) 2.5 - 4.5 mg/dL     _  Result Component Current Result Ref Range   Albumin 3.8 (10/28/2020) 3.4 - 5.0 g/dL     _  Result Component Current Result Ref Range   Urea Nitrogen 19 (10/28/2020) 7 - 30 mg/dL     _  Result Component Current Result Ref Range   Creatinine 0.82 (10/28/2020) 0.66 - 1.25 mg/dL     _  Result Component Current Result Ref Range   AST 17 (10/28/2020) 0 - 45 U/L     _  Result Component Current Result Ref Range   ALT 34 (10/28/2020) 0 - 70 U/L     _  Result Component Current Result Ref Range   Bilirubin Total 1.1 (10/28/2020) 0.2 - 1.3 mg/dL     _  Result Component Current Result Ref Range   WBC 4.2 (10/28/2020) 4.0 - 11.0 10e9/L     _  Result Component Current Result Ref Range   Hemoglobin 15.1 (10/28/2020) 13.3 - 17.7 g/dL     _  Result Component Current Result Ref Range   Platelet Count 157 (10/28/2020) 150 - 450 10e9/L     _  Result Component " Current Result Ref Range   Absolute Neutrophil 2.0 (10/28/2020) 1.6 - 8.3 10e9/L         Assessment/Plan:  Patient is tolerating therapy well. GI upset likely due to dye, recommended tums then pepcid prn. Continue current chemotherapy. Reinforced need to call us if missing doses.    Follow-Up:  Review Dr. THALIA rodas on 11/18, patient may be done therapy after that.    Refill Due:  11/5, already released and being fedex'd    Guido Owens, Pharm D.  Clinical Oncology Pharmacist- Oral Chemotherapy Monitoring Program  [phone]    November 3, 2020

## 2020-11-13 ENCOUNTER — TELEPHONE (OUTPATIENT)
Dept: ONCOLOGY | Facility: CLINIC | Age: 50
End: 2020-11-13

## 2020-11-18 ENCOUNTER — VIRTUAL VISIT (OUTPATIENT)
Dept: TRANSPLANT | Facility: CLINIC | Age: 50
End: 2020-11-18
Attending: INTERNAL MEDICINE
Payer: COMMERCIAL

## 2020-11-18 DIAGNOSIS — C83.18 MANTLE CELL LYMPHOMA OF LYMPH NODES OF MULTIPLE SITES (H): Primary | ICD-10-CM

## 2020-11-18 PROCEDURE — 99214 OFFICE O/P EST MOD 30 MIN: CPT | Mod: 95 | Performed by: INTERNAL MEDICINE

## 2020-11-18 PROCEDURE — 999N001193 HC VIDEO/TELEPHONE VISIT; NO CHARGE

## 2020-11-18 NOTE — PROGRESS NOTES
"Slava Lane is a 50 year old male who is being evaluated via a billable video visit.      The patient has been notified of following:     \"This video visit will be conducted via a call between you and your physician/provider. We have found that certain health care needs can be provided without the need for an in-person physical exam.  This service lets us provide the care you need with a video conversation.  If a prescription is necessary we can send it directly to your pharmacy.  If lab work is needed we can place an order for that and you can then stop by our lab to have the test done at a later time.    Video visits are billed at different rates depending on your insurance coverage.  Please reach out to your insurance provider with any questions.    If during the course of the call the physician/provider feels a video visit is not appropriate, you will not be charged for this service.\"    Patient has given verbal consent for Video visit? Yes  How would you like to obtain your AVS? Mail a copy  If you are dropped from the video visit, the video invite should be resent to: Send to e-mail at: [email or web address]  Will anyone else be joining your video visit? No      Vitals - Patient Reported  Weight (Patient Reported): 78.5 kg (173 lb)  Pain Score: No Pain (1)  Pain Loc: (Patient reports slight pain in stomach)        I have reviewed and updated patient's allergy and medication list.    Concerns: Pet scan - Stomach pain  Refills: Allopurinol      Leticia Lino CMA        "

## 2020-11-18 NOTE — LETTER
"    11/18/2020         RE: Slava Lane  [address]  [place] [address]        Dear Colleague,    Thank you for referring your patient, Slava Lane, to the [facility] BLOOD AND MARROW TRANSPLANT PROGRAM [place]. Please see a copy of my visit note below.    Slava Lane is a 50 year old male who is being evaluated via a billable video visit.      The patient has been notified of following:     \"This video visit will be conducted via a call between you and your physician/provider. We have found that certain health care needs can be provided without the need for an in-person physical exam.  This service lets us provide the care you need with a video conversation.  If a prescription is necessary we can send it directly to your pharmacy.  If lab work is needed we can place an order for that and you can then stop by our lab to have the test done at a later time.    Video visits are billed at different rates depending on your insurance coverage.  Please reach out to your insurance provider with any questions.    If during the course of the call the physician/provider feels a video visit is not appropriate, you will not be charged for this service.\"    Patient has given verbal consent for Video visit? Yes  How would you like to obtain your AVS? Mail a copy  If you are dropped from the video visit, the video invite should be resent to: Send to e-mail at: [email or web address]  Will anyone else be joining your video visit? No      Vitals - Patient Reported  Weight (Patient Reported): 78.5 kg (173 lb)  Pain Score: No Pain (1)  Pain Loc: (Patient reports slight pain in stomach)        I have reviewed and updated patient's allergy and medication list.    Concerns: Pet scan - Stomach pain  Refills: Allopurinol      Leticia Lino CMA          Slava Lane is a 50 year old male who is being evaluated via a billable video visit.      The patient has been notified of following:     \"This video visit will be " "conducted via a call between you and your physician/provider. We have found that certain health care needs can be provided without the need for an in-person physical exam.  This service lets us provide the care you need with a video conversation.  If a prescription is necessary we can send it directly to your pharmacy.  If lab work is needed we can place an order for that and you can then stop by our lab to have the test done at a later time.    Video visits are billed at different rates depending on your insurance coverage.  Please reach out to your insurance provider with any questions.    If during the course of the call the physician/provider feels a video visit is not appropriate, you will not be charged for this service.\"    Patient has given verbal consent for Video visit? Yes  How would you like to obtain your AVS? Mail a copy  If you are dropped from the video visit, the video invite should be resent to: Send to e-mail at: [email or web address]  Will anyone else be joining your video visit? No      Vitals - Patient Reported  Weight (Patient Reported): 78.5 kg (173 lb)  Pain Score: No Pain (1)  Pain Loc: (Patient reports slight pain in stomach)        I have reviewed and updated patient's allergy and medication list.    Concerns: Pet scan - Stomach pain  Refills: Allopurinol      Leticia Lane is a 50 year old male who is being evaluated via a billable video visit.      The patient has been notified of following:     \"This video visit will be conducted via a call between you and your physician/provider. We have found that certain health care needs can be provided without the need for an in-person physical exam.  This service lets us provide the care you need with a video conversation.  If a prescription is necessary we can send it directly to your pharmacy.  If lab work is needed we can place an order for that and you can then stop by our lab to have the test done at a later " "time.    Video visits are billed at different rates depending on your insurance coverage.  Please reach out to your insurance provider with any questions.    If during the course of the call the physician/provider feels a video visit is not appropriate, you will not be charged for this service.\"    Patient has given verbal consent for Video visit? Yes  How would you like to obtain your AVS? MyChart  If you are dropped from the video visit, the video invite should be resent to: Text to cell phone: [phone]  Will anyone else be joining your video visit? No      Machelle RODGERS      Video-Visit Details    Type of service:  Video Visit    Duration of the video visit: 30 minute     Originating Location (pt. Location): Home    Distant Location (provider location):  [facility] BLOOD AND MARROW [facility]ANSPL[facility]     Platform used for Video Visit: Blane Lane is a 49 year old male who is being evaluated via a billable video visit.         Hematologic history:  Mantle cell lymphoma.  Mantle cell lymphoma, Stage IV with colon, BM involvement Ki67 -30%, TP53 mutations - Negative.  MCL MIPI - 5.9- Intermediate   .     Date Treatment Response Toxicities/Complications   9/18/19 - 1/25/20 R-CHOP/R-DHAP x3 each [place], Progressive disease      5/21/2020  Ibrutinib/Venetoclax  CR after 6 cycles  GI symptoms and he doesn't like to be on meds                                Interval History:  Video interview was conducted today. He is doing relatively well, he has grown a beard.He is very active. Their family is fine.  He has gastritis symptoms and I have advised pepcid    Physical Exam:  No physical was performed due to COVID pandemic. Appropriate mood. NAD. Voice is appropriate.     Labs: In care everywhere and Imaging reviewed           ASSESSMENT AND PLAN:  50 y/o with MCL Stage IV s/p completion of the Nordic regimen and  with progressive disease within 6 months of primary therapy and hence this is primary refractory disease. " His repeat PET is showing progressive disease and higher SUV and a new lesion. He was started on ibrutinib and venetoclax regimen(Ibrutinib plus Venetoclax for the Treatment of Mantle-Cell Lymphoma).   His PET at the end of 6 cycles is showing a CR      Continue Ibrutinib/Venetoclax for 6 further cycles for a total of 12  I agree with decreasing ibrutinib to 420 mg from C8.  PET in 3 months after C9  I will see him after PET  Continue allopurinol has he is feeling gout like symptoms  Labs every month  Postpone colonoscopy for now to Jan/Feb considering COVID      At the end of 1 year if he continues to have a CR then options are - observation, continuing ibrutinib/venetoclax, vs conslidation ASCT/CART .      Mark ROMERO MS  Attending Physician  Pager - [phone]  Email - [email or web address]      Video call duration, including review of materials sent by patient if present, prior history, review of  Labs, imaging as performed : 40 minutes      Again, thank you for allowing me to participate in the care of your patient.        Sincerely,        Mark Redding MD

## 2020-11-19 NOTE — PROGRESS NOTES
"Slava Lane is a 50 year old male who is being evaluated via a billable video visit.      The patient has been notified of following:     \"This video visit will be conducted via a call between you and your physician/provider. We have found that certain health care needs can be provided without the need for an in-person physical exam.  This service lets us provide the care you need with a video conversation.  If a prescription is necessary we can send it directly to your pharmacy.  If lab work is needed we can place an order for that and you can then stop by our lab to have the test done at a later time.    Video visits are billed at different rates depending on your insurance coverage.  Please reach out to your insurance provider with any questions.    If during the course of the call the physician/provider feels a video visit is not appropriate, you will not be charged for this service.\"    Patient has given verbal consent for Video visit? Yes  How would you like to obtain your AVS? Mail a copy  If you are dropped from the video visit, the video invite should be resent to: Send to e-mail at: [email or web address]  Will anyone else be joining your video visit? No      Vitals - Patient Reported  Weight (Patient Reported): 78.5 kg (173 lb)  Pain Score: No Pain (1)  Pain Loc: (Patient reports slight pain in stomach)        I have reviewed and updated patient's allergy and medication list.    Concerns: Pet scan - Stomach pain  Refills: Allopurinol      Leticia Lino VIKASH Lane is a 50 year old male who is being evaluated via a billable video visit.      The patient has been notified of following:     \"This video visit will be conducted via a call between you and your physician/provider. We have found that certain health care needs can be provided without the need for an in-person physical exam.  This service lets us provide the care you need with a video conversation.  If a prescription is necessary we " "can send it directly to your pharmacy.  If lab work is needed we can place an order for that and you can then stop by our lab to have the test done at a later time.    Video visits are billed at different rates depending on your insurance coverage.  Please reach out to your insurance provider with any questions.    If during the course of the call the physician/provider feels a video visit is not appropriate, you will not be charged for this service.\"    Patient has given verbal consent for Video visit? Yes  How would you like to obtain your AVS? MyChart  If you are dropped from the video visit, the video invite should be resent to: Text to cell phone: [phone]  Will anyone else be joining your video visit? No      Machelle RODGERS      Video-Visit Details    Type of service:  Video Visit    Duration of the video visit: 30 minute     Originating Location (pt. Location): Home    Distant Location (provider location):  [facility] BLOOD AND MARROW TRANSPLANT     Platform used for Video Visit: Blane Lane is a 49 year old male who is being evaluated via a billable video visit.         Hematologic history:  Mantle cell lymphoma.  Mantle cell lymphoma, Stage IV with colon, BM involvement Ki67 -30%, TP53 mutations - Negative.  MCL MIPI - 5.9- Intermediate   .     Date Treatment Response Toxicities/Complications   9/18/19 - 1/25/20 R-CHOP/R-DHAP x3 each [place], Progressive disease      5/21/2020  Ibrutinib/Venetoclax  CR after 6 cycles  GI symptoms and he doesn't like to be on meds                                Interval History:  Video interview was conducted today. He is doing relatively well, he has grown a beard.He is very active. Their family is fine.  He has gastritis symptoms and I have advised pepcid    Physical Exam:  No physical was performed due to COVID pandemic. Appropriate mood. NAD. Voice is appropriate.     Labs: In care everywhere and Imaging reviewed           ASSESSMENT AND PLAN:  48 y/o with MCL " Stage IV s/p completion of the Nordic regimen and  with progressive disease within 6 months of primary therapy and hence this is primary refractory disease. His repeat PET is showing progressive disease and higher SUV and a new lesion. He was started on ibrutinib and venetoclax regimen(Ibrutinib plus Venetoclax for the Treatment of Mantle-Cell Lymphoma).   His PET at the end of 6 cycles is showing a CR      Continue Ibrutinib/Venetoclax for 6 further cycles for a total of 12  I agree with decreasing ibrutinib to 420 mg from C8.  PET in 3 months after C9  I will see him after PET  Continue allopurinol has he is feeling gout like symptoms  Labs every month  Postpone colonoscopy for now to Jan/Feb considering COVID      At the end of 1 year if he continues to have a CR then options are - observation, continuing ibrutinib/venetoclax, vs conslidation ASCT/CART .      Mark ROMERO MS  Attending Physician  Pager - [phone]  Email - [email or web address]      Video call duration, including review of materials sent by patient if present, prior history, review of  Labs, imaging as performed : 40 minutes

## 2020-11-22 ENCOUNTER — HEALTH MAINTENANCE LETTER (OUTPATIENT)
Age: 50
End: 2020-11-22

## 2020-11-25 ENCOUNTER — TELEPHONE (OUTPATIENT)
Dept: ONCOLOGY | Facility: CLINIC | Age: 50
End: 2020-11-25

## 2020-11-25 DIAGNOSIS — C83.18 MANTLE CELL LYMPHOMA OF LYMPH NODES OF MULTIPLE SITES (H): Primary | ICD-10-CM

## 2020-11-25 RX ORDER — ONDANSETRON 2 MG/ML
4 INJECTION INTRAMUSCULAR; INTRAVENOUS
Status: CANCELLED | OUTPATIENT
Start: 2020-11-25

## 2020-11-25 RX ORDER — LIDOCAINE 40 MG/G
CREAM TOPICAL
Status: CANCELLED | OUTPATIENT
Start: 2020-11-25

## 2020-11-25 NOTE — TELEPHONE ENCOUNTER
Oral Chemotherapy Monitoring Program    Placed call to patient in follow up of Imbruvica therapy.    Left message to please call back in follow up of therapy. No patient or drug names were mentioned.    Jose Mckeon  Pharmacy Intern  Oral Chemotherapy Monitoring Program  [facility]  [phone]

## 2020-11-27 ENCOUNTER — MYC MEDICAL ADVICE (OUTPATIENT)
Dept: ONCOLOGY | Facility: CLINIC | Age: 50
End: 2020-11-27

## 2020-11-30 ENCOUNTER — TELEPHONE (OUTPATIENT)
Dept: GASTROENTEROLOGY | Facility: CLINIC | Age: 50
End: 2020-11-30

## 2020-11-30 DIAGNOSIS — C83.10 MANTLE CELL LYMPHOMA, UNSPECIFIED BODY REGION (H): ICD-10-CM

## 2020-11-30 RX ORDER — ALLOPURINOL 300 MG/1
300 TABLET ORAL DAILY
Qty: 90 TABLET | Refills: 0 | Status: SHIPPED | OUTPATIENT
Start: 2020-11-30 | End: 2021-05-04

## 2020-11-30 NOTE — TELEPHONE ENCOUNTER
Caller Slava Lane    Reason for cancel? No longer would like to do this procedure. He was deemed cancer free and would like to limit the risk of covid    Rescheduled? no    Will patient call back to reschedule?    I called the OR and had them cancel the appt.

## 2020-12-01 ENCOUNTER — PATIENT OUTREACH (OUTPATIENT)
Dept: GASTROENTEROLOGY | Facility: CLINIC | Age: 50
End: 2020-12-01

## 2020-12-01 ENCOUNTER — PREP FOR PROCEDURE (OUTPATIENT)
Dept: GASTROENTEROLOGY | Facility: CLINIC | Age: 50
End: 2020-12-01

## 2020-12-01 DIAGNOSIS — Z86.0100 HISTORY OF COLONIC POLYPS: Primary | ICD-10-CM

## 2020-12-01 NOTE — PROGRESS NOTES
Care Coordination Telephone Call  [facility]     Called patient to discuss plan of care for rescheduling of colonoscopy and EGD.  We will plan for first part of February per patient request and they will arrange PET around this procedure.      Currently we are able to hold time but not schedule OR case due to covid.  We will hold 2/4/21 for procedure.  Will confirm when we can schedule.      OR orders placed today; patient will call if does not have Golytely prep available.    I have asked the patient to call with any additional questions or concerns and have provided my contact information.    Eduarda GARCIAN, HNBC, STAR-T  RN Care Coordinator  [facility]  Ph: [phone]  FAX: [phone]

## 2020-12-03 ENCOUNTER — TELEPHONE (OUTPATIENT)
Dept: ONCOLOGY | Facility: CLINIC | Age: 50
End: 2020-12-03

## 2020-12-03 NOTE — TELEPHONE ENCOUNTER
Oral Chemotherapy Monitoring Program     Placed call to patient in follow up of oral chemotherapy. Trying to hunt down lab results, last labs 11/13 and supposed to be Q2 weeks. Spoke to Slava's wife, Ya, who said that Dr. Redding gave them the okay to move lab draws out to monthly.     Follow-up:  Slava has appt to have labs drawn tomorrow, 12/4.     Vika Cho, PharmD  Hematology/Oncology Clinical Pharmacist  [facility]  [facility]

## 2020-12-04 ENCOUNTER — TRANSFERRED RECORDS (OUTPATIENT)
Dept: HEALTH INFORMATION MANAGEMENT | Facility: CLINIC | Age: 50
End: 2020-12-04

## 2020-12-04 ENCOUNTER — MYC MEDICAL ADVICE (OUTPATIENT)
Dept: ONCOLOGY | Facility: CLINIC | Age: 50
End: 2020-12-04

## 2020-12-04 ENCOUNTER — TELEPHONE (OUTPATIENT)
Dept: ONCOLOGY | Facility: CLINIC | Age: 50
End: 2020-12-04

## 2020-12-04 NOTE — ORAL ONC MGMT
Oral Chemotherapy Monitoring Program  Lab Follow Up    Reviewed lab results from 12/4/2020 in Care Everywhere.    ORAL CHEMOTHERAPY 10/30/2020 10/30/2020 11/3/2020 11/3/2020 11/13/2020 11/27/2020 12/4/2020   Assessment Type Refill - Lab Monitoring Lab Monitoring Lab Monitoring Other Lab Monitoring   Diagnosis Code Non-Hodgkin Lymphoma (NHL) - Non-Hodgkin Lymphoma (NHL) Non-Hodgkin Lymphoma (NHL) Non-Hodgkin Lymphoma (NHL) Non-Hodgkin Lymphoma (NHL) Non-Hodgkin Lymphoma (NHL)   Providers Dr. Redding - Dr. Miladis Redding   [facility]   Drug Name Venclexta (Venetoclax) Imbruvica (Ibrutinib) Imbruvica (Ibrutinib) Venclexta (Venetoclax) Venclexta (Venetoclax) Venclexta (Venetoclax) Venclexta (Venetoclax)   Dose 400 mg 560 mg 560 mg 400 mg 400 mg 400 mg 400 mg   Current Schedule Daily Daily Daily Daily Daily Daily Daily   Cycle Details Continuous Continuous Continuous Continuous Continuous Continuous Continuous   Start Date of Last Cycle - - 10/8/2020 10/8/2020 - - -   Planned next cycle start date - - 11/5/2020 11/5/2020 - - -   Doses missed in last 2 weeks - - 0 1 - - -   Adherence Assessment - - Adherent Adherent - - -   Adverse Effects - - Nausea Nausea - - No AE identified during assessment   Nausea - - Grade 1 Grade 1 - - -   Pharmacist Intervention(nausea) - - Yes Yes - - -   Intervention(s) - - OTC recommendation OTC recommendation - - -   Home BPs - - Not applicable Not applicable - - -   Any new drug interactions? - - No No - - -   Is the dose as ordered appropriate for the patient? - - Yes Yes Yes - -   Is the patient currently in pain? - - No No - - -   Has the patient been assessed within the past 6 months for depression? - - Yes Yes - - -   Has the patient missed any days of school, work, or other routine activity? - - No No - - -   Since the last time we talked, have you been hospitalized or used  "the emergency room? - - No No - - -       Labs:  No results found for NA within last 30 days.     No results found for K within last 30 days.     No results found for CA within last 30 days.     No results found for Mag within last 30 days.     No results found for Phos within last 30 days.     No results found for ALBUMIN within last 30 days.     No results found for BUN within last 30 days.     No results found for CR within last 30 days.     No results found for AST within last 30 days.     No results found for ALT within last 30 days.     No results found for BILITOTAL within last 30 days.     No results found for WBC within last 30 days.     No results found for HGB within last 30 days.     No results found for PLT within last 30 days.     No results found for ANC within last 30 days.       Assessment & Plan:  No concerning abnormalities seen other than his WBC and ANC are slightly lower than usual for patient. His WBC was 3.4 and ANC was 1.2. Patient was already aware of these values and concerned. I assured his they are lower but still okay. I told him I would make sure Dr. Logan was aware of the labs and to see if he wants labs earlier than in 1 months. Slava was fine hearing this information. 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 He is fatigued but due to resuming work. He denies any signs of fever/infection and I told him to practice good hand washing and to try and stay away from sick people. Slava understands. Slava also states in the past few weeks he has felt like his arms have been falling asleep while watching TV or sleeping. He states it is both arms equally and it only takes about 15-30 seconds to \"wake\" them up again. He wants Dr. VÁSQUEZ aware of this as well.    All other questions answered to patient's satisfaction.    Follow-Up:  Will follow-up with patient in 1 week from his dose decrease of ibrutinib (12/11) or sooner if Dr. VÁSQUEZ wants any " changes due to lab results.    Phyllis Noonan, Pharm.D., [facility]  [phone]  12/04/20

## 2020-12-23 DIAGNOSIS — C83.18 MANTLE CELL LYMPHOMA OF LYMPH NODES OF MULTIPLE SITES (H): Primary | ICD-10-CM

## 2020-12-29 ENCOUNTER — TELEPHONE (OUTPATIENT)
Dept: ONCOLOGY | Facility: CLINIC | Age: 50
End: 2020-12-29

## 2020-12-29 NOTE — ORAL ONC MGMT
Oral Chemotherapy Monitoring Program    Subjective/Objective:  Slava Lane is a 50 year old male contacted by phone for a follow-up visit for oral chemotherapy.  We could only speak briefly as he was at work. He reported increasing brittleness in his nails, and dry scalp, and that he is hopeful that he can reduce his dose again.    ORAL CHEMOTHERAPY 11/3/2020 11/13/2020 11/27/2020 12/4/2020 12/23/2020 12/29/2020 12/29/2020   Assessment Type Lab Monitoring Lab Monitoring Other Lab Monitoring Refill Monthly Follow up Monthly Follow up   Diagnosis Code Non-Hodgkin Lymphoma (NHL) Non-Hodgkin Lymphoma (NHL) Non-Hodgkin Lymphoma (NHL) Non-Hodgkin Lymphoma (NHL) Non-Hodgkin Lymphoma (NHL) Non-Hodgkin Lymphoma (NHL) Non-Hodgkin Lymphoma (NHL)   Providers Dr. Miladis Redding   [facility]   Drug Name Venclexta (Venetoclax) Venclexta (Venetoclax) Venclexta (Venetoclax) Venclexta (Venetoclax) Venclexta (Venetoclax) Venclexta (Venetoclax) Imbruvica (Ibrutinib)   Dose 400 mg 400 mg 400 mg 400 mg 400 mg 400 mg 420 mg   Current Schedule Daily Daily Daily Daily Daily Daily Daily   Cycle Details Continuous Continuous Continuous Continuous Continuous Continuous Continuous   Start Date of Last Cycle 10/8/2020 - - - - - -   Planned next cycle start date 11/5/2020 - - - - - -   Doses missed in last 2 weeks 1 - - - - - -   Adherence Assessment Adherent - - - - - -   Adverse Effects Nausea - - No AE identified during assessment - - -   Nausea Grade 1 - - - - - -   Pharmacist Intervention(nausea) Yes - - - - - -   Intervention(s) OTC recommendation - - - - - -   Home BPs Not applicable - - - - - -   Any new drug interactions? No - - - - - -   Is the dose as ordered appropriate for the patient? Yes Yes - - - - -   Is the patient currently in pain? No - - - - - -   Has the patient been assessed  "within the past 6 months for depression? Yes - - - - - -   Has the patient missed any days of school, work, or other routine activity? No - - - - - -   Since the last time we talked, have you been hospitalized or used the emergency room? No - - - - - -       Last PHQ-2 Score on record: No flowsheet data found.    Vitals:  BP:   BP Readings from Last 1 Encounters:   04/17/20 99/59     Wt Readings from Last 1 Encounters:   04/17/20 76.2 kg (167 lb 15.9 oz)     Estimated body surface area is 1.9 meters squared as calculated from the following:    Height as of 4/17/20: 1.702 m (5' 7\").    Weight as of 4/17/20: 76.2 kg (167 lb 15.9 oz).    Labs:  No results found for NA within last 30 days.     No results found for K within last 30 days.     No results found for CA within last 30 days.     No results found for Mag within last 30 days.     No results found for Phos within last 30 days.     No results found for ALBUMIN within last 30 days.     No results found for BUN within last 30 days.     No results found for CR within last 30 days.     No results found for AST within last 30 days.     No results found for ALT within last 30 days.     No results found for BILITOTAL within last 30 days.     No results found for WBC within last 30 days.     No results found for HGB within last 30 days.     No results found for PLT within last 30 days.     No results found for ANC within last 30 days.         Assessment/Plan:  Patient tolerating therapy with expected side effects.  Continue Current Therapy.    Follow-Up:  Phone call in one month.    Refill Due:  Patient unsure, will follow QNR.    Guido Owens, Pharm D.  Clinical Oncology Pharmacist- Oral Chemotherapy Monitoring Program  [phone]    December 29, 2020        "

## 2020-12-31 ENCOUNTER — TELEPHONE (OUTPATIENT)
Dept: ONCOLOGY | Facility: CLINIC | Age: 50
End: 2020-12-31

## 2020-12-31 NOTE — ORAL ONC MGMT
Oral Chemotherapy Monitoring Program     Received call from Slava's wifeTacolumba in follow up of lab work done in [place] for monitoring of oral chemotherapy. No concerning abnormalities seen in labs from [facility] drawn on 12/31/2020. All of Ya's questions were answered to her stated satisfaction and she thanked me for the call and care.     Idris ErnstD  [facility]  [phone]  December 31, 2020

## 2021-01-13 ENCOUNTER — HOSPITAL ENCOUNTER (OUTPATIENT)
Dept: PET IMAGING | Facility: CLINIC | Age: 51
Discharge: HOME | End: 2021-01-13
Attending: INTERNAL MEDICINE | Admitting: INTERNAL MEDICINE
Payer: COMMERCIAL

## 2021-01-13 DIAGNOSIS — C83.18 MANTLE CELL LYMPHOMA OF LYMPH NODES OF MULTIPLE SITES (H): ICD-10-CM

## 2021-01-13 LAB
ALBUMIN SERPL-MCNC: 3.4 G/DL (ref 3.4–5)
ALP SERPL-CCNC: 57 U/L (ref 40–150)
ALT SERPL W P-5'-P-CCNC: 27 U/L (ref 0–70)
ANION GAP SERPL CALCULATED.3IONS-SCNC: 4 MMOL/L (ref 3–14)
AST SERPL W P-5'-P-CCNC: 19 U/L (ref 0–45)
BASOPHILS # BLD AUTO: 0 10E9/L (ref 0–0.2)
BASOPHILS NFR BLD AUTO: 0.9 %
BILIRUB SERPL-MCNC: 1.3 MG/DL (ref 0.2–1.3)
BUN SERPL-MCNC: 20 MG/DL (ref 7–30)
CALCIUM SERPL-MCNC: 8.5 MG/DL (ref 8.5–10.1)
CHLORIDE SERPL-SCNC: 108 MMOL/L (ref 94–109)
CO2 SERPL-SCNC: 27 MMOL/L (ref 20–32)
CREAT SERPL-MCNC: 0.84 MG/DL (ref 0.66–1.25)
DIFFERENTIAL METHOD BLD: ABNORMAL
EOSINOPHIL # BLD AUTO: 0 10E9/L (ref 0–0.7)
EOSINOPHIL NFR BLD AUTO: 0.3 %
ERYTHROCYTE [DISTWIDTH] IN BLOOD BY AUTOMATED COUNT: 13.5 % (ref 10–15)
GFR SERPL CREATININE-BSD FRML MDRD: >90 ML/MIN/{1.73_M2}
GLUCOSE SERPL-MCNC: 90 MG/DL (ref 70–99)
HCT VFR BLD AUTO: 42 % (ref 40–53)
HGB BLD-MCNC: 13.9 G/DL (ref 13.3–17.7)
IMM GRANULOCYTES # BLD: 0 10E9/L (ref 0–0.4)
IMM GRANULOCYTES NFR BLD: 0.3 %
LYMPHOCYTES # BLD AUTO: 1.4 10E9/L (ref 0.8–5.3)
LYMPHOCYTES NFR BLD AUTO: 39 %
MCH RBC QN AUTO: 33.2 PG (ref 26.5–33)
MCHC RBC AUTO-ENTMCNC: 33.1 G/DL (ref 31.5–36.5)
MCV RBC AUTO: 100 FL (ref 78–100)
MONOCYTES # BLD AUTO: 0.6 10E9/L (ref 0–1.3)
MONOCYTES NFR BLD AUTO: 17.6 %
NEUTROPHILS # BLD AUTO: 1.5 10E9/L (ref 1.6–8.3)
NEUTROPHILS NFR BLD AUTO: 41.9 %
NRBC # BLD AUTO: 0 10*3/UL
NRBC BLD AUTO-RTO: 0 /100
PHOSPHATE SERPL-MCNC: 2.9 MG/DL (ref 2.5–4.5)
PLATELET # BLD AUTO: 147 10E9/L (ref 150–450)
POTASSIUM SERPL-SCNC: 4 MMOL/L (ref 3.4–5.3)
PROT SERPL-MCNC: 6.3 G/DL (ref 6.8–8.8)
RBC # BLD AUTO: 4.19 10E12/L (ref 4.4–5.9)
SODIUM SERPL-SCNC: 139 MMOL/L (ref 133–144)
URATE SERPL-MCNC: 3.6 MG/DL (ref 3.5–7.2)
WBC # BLD AUTO: 3.5 10E9/L (ref 4–11)

## 2021-01-13 PROCEDURE — 250N000011 HC RX IP 250 OP 636: Performed by: INTERNAL MEDICINE

## 2021-01-13 PROCEDURE — 80053 COMPREHEN METABOLIC PANEL: CPT | Performed by: INTERNAL MEDICINE

## 2021-01-13 PROCEDURE — 78816 PET IMAGE W/CT FULL BODY: CPT | Mod: 26 | Performed by: RADIOLOGY

## 2021-01-13 PROCEDURE — 85025 COMPLETE CBC W/AUTO DIFF WBC: CPT | Performed by: INTERNAL MEDICINE

## 2021-01-13 PROCEDURE — 78816 PET IMAGE W/CT FULL BODY: CPT | Mod: PS

## 2021-01-13 PROCEDURE — 84550 ASSAY OF BLOOD/URIC ACID: CPT | Performed by: INTERNAL MEDICINE

## 2021-01-13 PROCEDURE — 343N000001 HC RX 343: Performed by: INTERNAL MEDICINE

## 2021-01-13 PROCEDURE — 84100 ASSAY OF PHOSPHORUS: CPT | Performed by: INTERNAL MEDICINE

## 2021-01-13 PROCEDURE — A9552 F18 FDG: HCPCS | Performed by: INTERNAL MEDICINE

## 2021-01-13 PROCEDURE — 36592 COLLECT BLOOD FROM PICC: CPT | Performed by: INTERNAL MEDICINE

## 2021-01-13 RX ADMIN — FLUDEOXYGLUCOSE F-18 10.23 MCI.: 500 INJECTION, SOLUTION INTRAVENOUS at 09:30

## 2021-01-13 RX ADMIN — Medication 500 UNITS: at 10:16

## 2021-01-20 DIAGNOSIS — C83.18 MANTLE CELL LYMPHOMA OF LYMPH NODES OF MULTIPLE SITES (H): Primary | ICD-10-CM

## 2021-01-26 ENCOUNTER — TELEPHONE (OUTPATIENT)
Dept: GASTROENTEROLOGY | Facility: CLINIC | Age: 51
End: 2021-01-26

## 2021-01-26 NOTE — TELEPHONE ENCOUNTER
LVM for patient in regards to scheduling procedure with Dr. Sharpe. Left direct line for patient to call to go over scheduling details.

## 2021-01-27 ENCOUNTER — VIRTUAL VISIT (OUTPATIENT)
Dept: TRANSPLANT | Facility: CLINIC | Age: 51
End: 2021-01-27
Attending: INTERNAL MEDICINE
Payer: COMMERCIAL

## 2021-01-27 ENCOUNTER — PATIENT OUTREACH (OUTPATIENT)
Dept: GASTROENTEROLOGY | Facility: CLINIC | Age: 51
End: 2021-01-27

## 2021-01-27 DIAGNOSIS — C83.18 MANTLE CELL LYMPHOMA OF LYMPH NODES OF MULTIPLE SITES (H): Primary | ICD-10-CM

## 2021-01-27 PROBLEM — F10.20 ALCOHOL DEPENDENCE (H): Status: ACTIVE | Noted: 2021-01-27

## 2021-01-27 PROCEDURE — 99214 OFFICE O/P EST MOD 30 MIN: CPT | Mod: 95 | Performed by: INTERNAL MEDICINE

## 2021-01-27 PROCEDURE — 999N001193 HC VIDEO/TELEPHONE VISIT; NO CHARGE

## 2021-01-27 RX ORDER — FAMOTIDINE 10 MG
10 TABLET ORAL DAILY
Status: ON HOLD | COMMUNITY
End: 2021-03-26

## 2021-01-27 NOTE — PROGRESS NOTES
"Slava is a 50 year old who is being evaluated via a billable video visit.      How would you like to obtain your AVS? MyChart  If the video visit is dropped, the invitation should be resent by: Send to e-mail at: [email or web address]  Will anyone else be joining your video visit? No    Vitals - Patient Reported  Weight (Patient Reported): 78 kg (172 lb)  Height (Patient Reported): 170.2 cm (5' 7\")  BMI (Based on Pt Reported Ht/Wt): 26.94  Pain Score: No Pain (0)      Video-Visit Details    Type of service:  Video Visit      Video-Visit Details    Type of service:  Video Visit    Duration of the video visit: 30 minute     Originating Location (pt. Location): Home    Distant Location (provider location):  [facility] BLOOD AND MARROW TRANSPLANT     Platform used for Video Visit: Blane Lane is a 49 year old male who is being evaluated via a billable video visit.         Hematologic history:  Mantle cell lymphoma.  Mantle cell lymphoma, Stage IV with colon, BM involvement Ki67 -30%, TP53 mutations - Negative.  MCL MIPI - 5.9- Intermediate   .     Date Treatment Response Toxicities/Complications   9/18/19 - 1/25/20 R-CHOP/R-DHAP x3 each [place], Progressive disease      5/21/2020  Ibrutinib/Venetoclax  CR after 6,9 cycles  GI symptoms and he doesn't like to be on meds                                Interval History:  Video interview was conducted today. He is doing relatively well, He is very active. Their family is fine. Amarjit is getting  in December :). Tamm is slowly returning to work.  He has gastritis symptoms and I have advised pepcid  He has good and bad days with his treatment    Physical Exam:  No physical was performed due to COVID pandemic. Appropriate mood. NAD. Voice is appropriate.     Labs: In care everywhere and Imaging reviewed           ASSESSMENT AND PLAN:  50 y/o with MCL Stage IV s/p completion of the Nordic regimen and  with progressive disease within 6 months of primary therapy " and hence this is primary refractory disease. His repeat PET is showing progressive disease and higher SUV and a new lesion. He was started on ibrutinib and venetoclax regimen(Ibrutinib plus Venetoclax for the Treatment of Mantle-Cell Lymphoma).   His PET at the end of 6 cycles is showing a CR      Continue Ibrutinib/Venetoclax for 6 further cycles till progression  I agree with decreasing ibrutinib to 420 mg from C8.  PET in 4 months between C12- C14  I will see him after PET  Continue allopurinol has he is feeling gout like symptoms  Labs every month  Postpone colonoscopy to March Advised COVID 19 vaccination    Three Year Update of the Phase II ABT-199 (Venetoclax) and Ibrutinib in Mantle Cell Lymphoma (AIM) Study    Ibrutinib Plus Venetoclax in Patients With Relapsed/Refractory Mantle Cell Lymphoma: Results From the Safety Run-In Period of the Phase 3 Sympatico Study    These are the studies to follow in the future to guide treatment. Interstingly in the Phase 3 Sympatico study which is ongoing and they continue the combination for 2 yrs and then stop the ibrutinib        Mark ROMERO MS  Attending Physician  Pager - [phone]  Email - [email or web address]      Video call duration, including review of materials sent by patient if present, prior history, review of  Labs, imaging as performed : 40 minutes

## 2021-01-27 NOTE — LETTER
"    1/27/2021         RE: Slava Lane  [address]  [facility] [place] [facility] [address]        Dear Colleague,    Thank you for referring your patient, Slava Lane, to the [facility] BLOOD AND MARROW TRANSPLANT PROGRAM [place]. Please see a copy of my visit note below.    Slava is a 50 year old who is being evaluated via a billable video visit.      How would you like to obtain your AVS? MyChart  If the video visit is dropped, the invitation should be resent by: Send to e-mail at: [email or web address]  Will anyone else be joining your video visit? No    Vitals - Patient Reported  Weight (Patient Reported): 78 kg (172 lb)  Height (Patient Reported): 170.2 cm (5' 7\")  BMI (Based on Pt Reported Ht/Wt): 26.94  Pain Score: No Pain (0)      Video-Visit Details    Type of service:  Video Visit      Video-Visit Details    Type of service:  Video Visit    Duration of the video visit: 30 minute     Originating Location (pt. Location): Home    Distant Location (provider location):  [facility] BLOOD AND MARROW TRANSPLANT     Platform used for Video Visit: Blane Lane is a 49 year old male who is being evaluated via a billable video visit.         Hematologic history:  Mantle cell lymphoma.  Mantle cell lymphoma, Stage IV with colon, BM involvement Ki67 -30%, TP53 mutations - Negative.  MCL MIPI - 5.9- Intermediate   .     Date Treatment Response Toxicities/Complications   9/18/19 - 1/25/20 R-CHOP/R-DHAP x3 each [place], Progressive disease      5/21/2020  Ibrutinib/Venetoclax  CR after 6,9 cycles  GI symptoms and he doesn't like to be on meds                                Interval History:  Video interview was conducted today. He is doing relatively well, He is very active. Their family is fine. Amarjit is getting  in December :). Maylin is slowly returning to work.  He has gastritis symptoms and I have advised pepcid  He has good and bad days with his treatment    Physical Exam:  No physical was performed due to " COVID pandemic. Appropriate mood. NAD. Voice is appropriate.     Labs: In care everywhere and Imaging reviewed           ASSESSMENT AND PLAN:  50 y/o with MCL Stage IV s/p completion of the Nordic regimen and  with progressive disease within 6 months of primary therapy and hence this is primary refractory disease. His repeat PET is showing progressive disease and higher SUV and a new lesion. He was started on ibrutinib and venetoclax regimen(Ibrutinib plus Venetoclax for the Treatment of Mantle-Cell Lymphoma).   His PET at the end of 6 cycles is showing a CR      Continue Ibrutinib/Venetoclax for 6 further cycles till progression  I agree with decreasing ibrutinib to 420 mg from C8.  PET in 4 months between C12- C14  I will see him after PET  Continue allopurinol has he is feeling gout like symptoms  Labs every month  Postpone colonoscopy to March Advised COVID 19 vaccination    Three Year Update of the Phase II ABT-199 (Venetoclax) and Ibrutinib in Mantle Cell Lymphoma (AIM) Study    Ibrutinib Plus Venetoclax in Patients With Relapsed/Refractory Mantle Cell Lymphoma: Results From the Safety Run-In Period of the Phase 3 Sympatico Study    These are the studies to follow in the future to guide treatment. Interstingly in the Phase 3 Sympatico study which is ongoing and they continue the combination for 2 yrs and then stop the ibrutinib        Mark ROMERO MS  Attending Physician  Pager - [phone]  Email - [email or web address]      Video call duration, including review of materials sent by patient if present, prior history, review of  Labs, imaging as performed : 40 minutes      Again, thank you for allowing me to participate in the care of your patient.        Sincerely,        Mark Redding MD

## 2021-01-27 NOTE — PROGRESS NOTES
Care Coordination Telephone Call  [facility]     Called patient's wife to discuss procedure for next week.  They had appt with BMT today and per oncologist ok to move procedure to end of March.      I have asked the patient to call with any additional questions or concerns and have provided my contact information.    Eduarda GARCIAN, HNBC, STAR-T  RN Care Coordinator  [facility]  Ph: [phone]  FAX: [phone]

## 2021-02-04 ENCOUNTER — TELEPHONE (OUTPATIENT)
Dept: ONCOLOGY | Facility: CLINIC | Age: 51
End: 2021-02-04

## 2021-02-04 NOTE — ORAL ONC MGMT
"Oral Chemotherapy Monitoring Program    Subjective/Objective:  Slava Lane is a 50 year old male contacted by phone for a follow-up visit for oral chemotherapy. Slava is doing well on imbruvica and venetoclax. He takes them both with his other medications at 5 am in the morning. He works about an hour and a half away and works 6 days a week for about 10 hours a day. He is glad to be back at work and getting paychecks. He said he was having mid-morning nausea that pepcid is helping. He denied diarrhea, nausea, edema, or skin changes recently. He noted he has brittle finger nails and \"chemo brain\" and is forgetful. He said he can leave work tomorrow to get labs because it will be cold out.     ORAL CHEMOTHERAPY 12/23/2020 12/29/2020 12/29/2020 12/31/2020 12/31/2020 2/4/2021 2/4/2021   Assessment Type Refill Monthly Follow up Monthly Follow up Incoming phone call;Lab Monitoring Incoming phone call;Lab Monitoring Monthly Follow up Monthly Follow up   Diagnosis Code Non-Hodgkin Lymphoma (NHL) Non-Hodgkin Lymphoma (NHL) Non-Hodgkin Lymphoma (NHL) Non-Hodgkin Lymphoma (NHL) Non-Hodgkin Lymphoma (NHL) Non-Hodgkin Lymphoma (NHL) Non-Hodgkin Lymphoma (NHL)   Providers Dr. Miladis Redding   [facility]   Drug Name Venclexta (Venetoclax) Venclexta (Venetoclax) Imbruvica (Ibrutinib) Imbruvica (Ibrutinib) Venclexta (Venetoclax) Imbruvica (Ibrutinib) Venclexta (Venetoclax)   Dose 400 mg 400 mg 420 mg - - 420 mg 400 mg   Current Schedule Daily Daily Daily - - Daily Daily   Cycle Details Continuous Continuous Continuous - - Continuous Continuous   Start Date of Last Cycle - - - - - - -   Planned next cycle start date - - - - - - -   Doses missed in last 2 weeks - - - - - 1 1   Adherence Assessment - - - - - Adherent Adherent   Adverse Effects - - - - - No AE identified during assessment No " "AE identified during assessment   Nausea - - - - - Resolved due to intervention Resolved due to intervention   Pharmacist Intervention(nausea) - - - - - - -   Intervention(s) - - - - - - -   Home BPs - - - - - - -   Any new drug interactions? - - - - - No No   Is the dose as ordered appropriate for the patient? - - - - - - -   Is the patient currently in pain? - - - - - - -   Has the patient been assessed within the past 6 months for depression? - - - - - - -   Has the patient missed any days of school, work, or other routine activity? - - - - - - -   Since the last time we talked, have you been hospitalized or used the emergency room? - - - - - - -       Last PHQ-2 Score on record: No flowsheet data found.    Vitals:  BP:   BP Readings from Last 1 Encounters:   04/17/20 99/59     Wt Readings from Last 1 Encounters:   04/17/20 76.2 kg (167 lb 15.9 oz)     Estimated body surface area is 1.9 meters squared as calculated from the following:    Height as of 4/17/20: 1.702 m (5' 7\").    Weight as of 4/17/20: 76.2 kg (167 lb 15.9 oz).    Labs:  _  Result Component Current Result Ref Range   Sodium 139 (1/13/2021) 133 - 144 mmol/L     _  Result Component Current Result Ref Range   Potassium 4.0 (1/13/2021) 3.4 - 5.3 mmol/L     _  Result Component Current Result Ref Range   Calcium 8.5 (1/13/2021) 8.5 - 10.1 mg/dL     No results found for Mag within last 30 days.     _  Result Component Current Result Ref Range   Phosphorus 2.9 (1/13/2021) 2.5 - 4.5 mg/dL     _  Result Component Current Result Ref Range   Albumin 3.4 (1/13/2021) 3.4 - 5.0 g/dL     _  Result Component Current Result Ref Range   Urea Nitrogen 20 (1/13/2021) 7 - 30 mg/dL     _  Result Component Current Result Ref Range   Creatinine 0.84 (1/13/2021) 0.66 - 1.25 mg/dL     _  Result Component Current Result Ref Range   AST 19 (1/13/2021) 0 - 45 U/L     _  Result Component Current Result Ref Range   ALT 27 (1/13/2021) 0 - 70 U/L     _  Result Component Current " Result Ref Range   Bilirubin Total 1.3 (1/13/2021) 0.2 - 1.3 mg/dL     _  Result Component Current Result Ref Range   WBC 3.5 (L) (1/13/2021) 4.0 - 11.0 10e9/L     _  Result Component Current Result Ref Range   Hemoglobin 13.9 (1/13/2021) 13.3 - 17.7 g/dL     _  Result Component Current Result Ref Range   Platelet Count 147 (L) (1/13/2021) 150 - 450 10e9/L     _  Result Component Current Result Ref Range   Absolute Neutrophil 1.5 (L) (1/13/2021) 1.6 - 8.3 10e9/L       Assessment/Plan:  Slava is doing well on Imbruvica and Venetoclax    Follow-Up:  Review labs tomorrow morning    Kanwal Jerez  Pharmacy Intern  [facility]  [phone]

## 2021-02-05 ENCOUNTER — DOCUMENTATION ONLY (OUTPATIENT)
Dept: ONCOLOGY | Facility: CLINIC | Age: 51
End: 2021-02-05

## 2021-02-05 NOTE — PROGRESS NOTES
Oral Chemotherapy Monitoring Program  Lab Follow Up    Reviewed lab results from today and sent a Chemayi message to the patient on the results.     ORAL CHEMOTHERAPY 12/29/2020 12/29/2020 12/31/2020 12/31/2020 2/4/2021 2/4/2021 2/5/2021   Assessment Type Monthly Follow up Monthly Follow up Incoming phone call;Lab Monitoring Incoming phone call;Lab Monitoring Monthly Follow up Monthly Follow up Lab Monitoring   Diagnosis Code Non-Hodgkin Lymphoma (NHL) Non-Hodgkin Lymphoma (NHL) Non-Hodgkin Lymphoma (NHL) Non-Hodgkin Lymphoma (NHL) Non-Hodgkin Lymphoma (NHL) Non-Hodgkin Lymphoma (NHL) Non-Hodgkin Lymphoma (NHL)   Providers Dr. Miladis Redding   [facility]   Drug Name Venclexta (Venetoclax) Imbruvica (Ibrutinib) Imbruvica (Ibrutinib) Venclexta (Venetoclax) Imbruvica (Ibrutinib) Venclexta (Venetoclax) Venclexta (Venetoclax)   Dose 400 mg 420 mg - - 420 mg 400 mg 400 mg   Current Schedule Daily Daily - - Daily Daily Daily   Cycle Details Continuous Continuous - - Continuous Continuous Continuous   Start Date of Last Cycle - - - - - - -   Planned next cycle start date - - - - - - -   Doses missed in last 2 weeks - - - - 1 1 -   Adherence Assessment - - - - Adherent Adherent -   Adverse Effects - - - - No AE identified during assessment No AE identified during assessment -   Nausea - - - - Resolved due to intervention Resolved due to intervention -   Pharmacist Intervention(nausea) - - - - - - -   Intervention(s) - - - - - - -   Home BPs - - - - - - -   Any new drug interactions? - - - - No No -   Is the dose as ordered appropriate for the patient? - - - - - - -   Is the patient currently in pain? - - - - - - -   Has the patient been assessed within the past 6 months for depression? - - - - - - -   Has the patient missed any days of school, work, or other routine activity? - - -  - - - -   Since the last time we talked, have you been hospitalized or used the emergency room? - - - - - - -       Labs:        Assessment & Plan:  No concerning abnormalities. Continue Venclexta and Imbruvica therapy as planned.     Follow-Up:  Beginning of March for monthly labs      Prema Cesar, PharmD, BCACP  Oral Chemotherapy Monitoring Program  [facility]  [phone]

## 2021-02-18 DIAGNOSIS — C83.18 MANTLE CELL LYMPHOMA OF LYMPH NODES OF MULTIPLE SITES (H): Primary | ICD-10-CM

## 2021-03-01 ENCOUNTER — MYC MEDICAL ADVICE (OUTPATIENT)
Dept: TRANSPLANT | Facility: CLINIC | Age: 51
End: 2021-03-01

## 2021-03-01 DIAGNOSIS — C83.18 MANTLE CELL LYMPHOMA OF LYMPH NODES OF MULTIPLE SITES (H): ICD-10-CM

## 2021-03-04 RX ORDER — ACYCLOVIR 400 MG/1
400 TABLET ORAL 2 TIMES DAILY
Qty: 60 TABLET | Refills: 3 | Status: SHIPPED | OUTPATIENT
Start: 2021-03-04 | End: 2022-03-21

## 2021-03-10 ENCOUNTER — TELEPHONE (OUTPATIENT)
Dept: ONCOLOGY | Facility: CLINIC | Age: 51
End: 2021-03-10

## 2021-03-10 NOTE — TELEPHONE ENCOUNTER
Oral Chemotherapy Monitoring Program  Lab Follow Up    Reviewed lab results from 3/9/21.    ORAL CHEMOTHERAPY 2/4/2021 2/5/2021 2/5/2021 2/18/2021 2/18/2021 3/10/2021 3/10/2021   Assessment Type Monthly Follow up Lab Monitoring Lab Monitoring Refill Refill Lab Monitoring;Monthly Follow up Lab Monitoring;Monthly Follow up   Diagnosis Code Non-Hodgkin Lymphoma (NHL) Non-Hodgkin Lymphoma (NHL) Non-Hodgkin Lymphoma (NHL) Non-Hodgkin Lymphoma (NHL) Non-Hodgkin Lymphoma (NHL) Non-Hodgkin Lymphoma (NHL) Non-Hodgkin Lymphoma (NHL)   Providers Dr. Miladis Redding   [facility]   Drug Name Venclexta (Venetoclax) Venclexta (Venetoclax) Imbruvica (Ibrutinib) Imbruvica (Ibrutinib) Venclexta (Venetoclax) Venclexta (Venetoclax) Imbruvica (Ibrutinib)   Dose 400 mg 400 mg 420 mg 420 mg 400 mg 400 mg 420 mg   Current Schedule Daily Daily Daily Daily Daily Daily Daily   Cycle Details Continuous Continuous Continuous Continuous Continuous Continuous Continuous   Start Date of Last Cycle - - - - - - -   Planned next cycle start date - - - - - - -   Doses missed in last 2 weeks 1 - - - - - -   Adherence Assessment Adherent - - - - - -   Adverse Effects No AE identified during assessment - - - - - -   Nausea Resolved due to intervention - - - - - -   Pharmacist Intervention(nausea) - - - - - - -   Intervention(s) - - - - - - -   Home BPs - - - - - - -   Any new drug interactions? No - - - - - -   Is the dose as ordered appropriate for the patient? - - - - - - -   Is the patient currently in pain? - - - - - - -   Has the patient been assessed within the past 6 months for depression? - - - - - - -   Has the patient missed any days of school, work, or other routine activity? - - - - - - -   Since the last time we talked, have you been hospitalized or used the emergency room? - - - - - - -        Labs:  No results found for NA within last 30 days.     No results found for K within last 30 days.     No results found for CA within last 30 days.     No results found for Mag within last 30 days.     No results found for Phos within last 30 days.     No results found for ALBUMIN within last 30 days.     No results found for BUN within last 30 days.     No results found for CR within last 30 days.     No results found for AST within last 30 days.     No results found for ALT within last 30 days.     No results found for BILITOTAL within last 30 days.     No results found for WBC within last 30 days.     No results found for HGB within last 30 days.     No results found for PLT within last 30 days.     No results found for ANC within last 30 days.       Assessment & Plan:  No concerning abnormalities. Continue therapy as planned    Questions answered to patient's satisfaction.    Follow-Up:  Look for labs ~4/9    Han Henderson  Pharmacy Intern  [facility]  [phone]

## 2021-03-11 ENCOUNTER — PATIENT OUTREACH (OUTPATIENT)
Dept: GASTROENTEROLOGY | Facility: CLINIC | Age: 51
End: 2021-03-11

## 2021-03-11 DIAGNOSIS — Z12.11 ENCOUNTER FOR SCREENING COLONOSCOPY: Primary | ICD-10-CM

## 2021-03-11 RX ORDER — BISACODYL 5 MG
15 TABLET, DELAYED RELEASE (ENTERIC COATED) ORAL ONCE
Qty: 3 TABLET | Refills: 0 | Status: SHIPPED | OUTPATIENT
Start: 2021-03-11 | End: 2021-03-11

## 2021-03-11 NOTE — PROGRESS NOTES
Returned patient call about needing prep for upcoming colonoscopy on 3/26. Prep ordered and letter sent for education via [employer]  Pt also inquired about several meds he takes for lymphoma that he thinks he needs to stop prior to the procedure. Will advise him to speak with his oncology provider regarding these meds.     Mary Fuentes RN, BSN,   [facility]  Care coordinator   [phone]  Fax [phone]     No

## 2021-03-12 DIAGNOSIS — Z11.59 ENCOUNTER FOR SCREENING FOR OTHER VIRAL DISEASES: ICD-10-CM

## 2021-03-15 DIAGNOSIS — Z11.59 ENCOUNTER FOR SCREENING FOR OTHER VIRAL DISEASES: Primary | ICD-10-CM

## 2021-03-16 DIAGNOSIS — C83.18 MANTLE CELL LYMPHOMA OF LYMPH NODES OF MULTIPLE SITES (H): Primary | ICD-10-CM

## 2021-03-18 DIAGNOSIS — C83.18 MANTLE CELL LYMPHOMA OF LYMPH NODES OF MULTIPLE SITES (H): Primary | ICD-10-CM

## 2021-03-19 ENCOUNTER — PATIENT OUTREACH (OUTPATIENT)
Dept: GASTROENTEROLOGY | Facility: CLINIC | Age: 51
End: 2021-03-19

## 2021-03-19 NOTE — PROGRESS NOTES
Pt's wife called with questions about medications that patient takes that may need to be held prior to procedure on 3/26. As previously advised I told patient to ask oncologist about these medications since they are specific to his lymphoma diagnosis.  Pt will do so and has his Pre op physical and COVID scheduled for 3/23. Told him to also ask his PCP about the medications he is currently taking and whether any of them need to be held.    Mary Fuentes RN, BSN,   [facility]  Care coordinator   [phone]  Fax [phone]

## 2021-03-24 NOTE — BRIEF OP NOTE
"[facility] Brief Operative Note    Pre-operative diagnosis: History of colonic polyps [Z86.010]   Post-operative diagnosis Stomach ulcer   Procedure: Procedure(s):  COLONOSCOPY  ESOPHAGOGASTRODUODENOSCOPY, WITH BIOPSY   Surgeon: Ahsan Sharpe MD   Assistants(s): Amarjit Quezada MD   Estimated blood loss: Minimal    Specimens: Yes       Findings:  - LA grade B esophagitis at GE junction.  - Clean-based ulcer in gastric fundus; biopsies performed.  - Gastritis in gastric cardia.  - Stomach biopsies obtained to rule-out H pylori infection.  - Colon polypectomy site completely healed, without evidence of residual polyp.    Recommendations:  - Discontinued home famotidine (Pepcid) 10 mg PO BID.  - Prescribed omeprazole 40 mg PO daily to discharge pharmacy.  - Observe in PACU and discharge.    Amarjit Quezada MD on 3/26/2021 at 8:26 AM    Brief:  51 yo M w/ h/o mantle cell lympha (MCL) stage IV s/p completion of \"[place] regimen,\" now w/ primary refractory (progressive) disease. Now, patient started on ibrutinib + venetoclax regimen. S/P colonoscopy w/ 4-cm ascending colon tubulovillous adenoma (Christiana IIa + IIC; NICE 2) s/p piecemeal EMR w/ hot-biopsy avulsion of fibrotic polyp center + snare tip soft coagulation of EMR margins; incomplete hemoclip closure (4/17/20). Today (3/26/21), patient presents for surveillance colonoscopy.  "

## 2021-03-26 ENCOUNTER — HOSPITAL ENCOUNTER (OUTPATIENT)
Facility: CLINIC | Age: 51
Discharge: HOME | End: 2021-03-26
Attending: INTERNAL MEDICINE | Admitting: INTERNAL MEDICINE
Payer: COMMERCIAL

## 2021-03-26 ENCOUNTER — ANESTHESIA EVENT (OUTPATIENT)
Dept: SURGERY | Facility: CLINIC | Age: 51
End: 2021-03-26
Payer: COMMERCIAL

## 2021-03-26 ENCOUNTER — ANESTHESIA (OUTPATIENT)
Dept: SURGERY | Facility: CLINIC | Age: 51
End: 2021-03-26
Payer: COMMERCIAL

## 2021-03-26 VITALS
HEIGHT: 67 IN | OXYGEN SATURATION: 98 % | HEART RATE: 73 BPM | WEIGHT: 171.52 LBS | SYSTOLIC BLOOD PRESSURE: 124 MMHG | BODY MASS INDEX: 26.92 KG/M2 | TEMPERATURE: 98.2 F | RESPIRATION RATE: 16 BRPM | DIASTOLIC BLOOD PRESSURE: 82 MMHG

## 2021-03-26 DIAGNOSIS — K27.9 PEPTIC ULCER DISEASE: Primary | ICD-10-CM

## 2021-03-26 DIAGNOSIS — Z86.0100 HISTORY OF COLONIC POLYPS: ICD-10-CM

## 2021-03-26 LAB — GLUCOSE BLDC GLUCOMTR-MCNC: 125 MG/DL (ref 70–99)

## 2021-03-26 PROCEDURE — 82962 GLUCOSE BLOOD TEST: CPT

## 2021-03-26 PROCEDURE — 250N000009 HC RX 250: Performed by: NURSE ANESTHETIST, CERTIFIED REGISTERED

## 2021-03-26 PROCEDURE — 88342 IMHCHEM/IMCYTCHM 1ST ANTB: CPT | Mod: TC | Performed by: INTERNAL MEDICINE

## 2021-03-26 PROCEDURE — 272N000001 HC OR GENERAL SUPPLY STERILE: Performed by: INTERNAL MEDICINE

## 2021-03-26 PROCEDURE — 250N000009 HC RX 250: Performed by: INTERNAL MEDICINE

## 2021-03-26 PROCEDURE — 258N000003 HC RX IP 258 OP 636: Performed by: NURSE ANESTHETIST, CERTIFIED REGISTERED

## 2021-03-26 PROCEDURE — 370N000017 HC ANESTHESIA TECHNICAL FEE, PER MIN: Performed by: INTERNAL MEDICINE

## 2021-03-26 PROCEDURE — 250N000011 HC RX IP 250 OP 636: Performed by: STUDENT IN AN ORGANIZED HEALTH CARE EDUCATION/TRAINING PROGRAM

## 2021-03-26 PROCEDURE — 360N000076 HC SURGERY LEVEL 3, PER MIN: Performed by: INTERNAL MEDICINE

## 2021-03-26 PROCEDURE — 710N000012 HC RECOVERY PHASE 2, PER MINUTE: Performed by: INTERNAL MEDICINE

## 2021-03-26 PROCEDURE — 88342 IMHCHEM/IMCYTCHM 1ST ANTB: CPT | Mod: 26 | Performed by: PATHOLOGY

## 2021-03-26 PROCEDURE — 250N000011 HC RX IP 250 OP 636: Performed by: NURSE ANESTHETIST, CERTIFIED REGISTERED

## 2021-03-26 PROCEDURE — 999N000141 HC STATISTIC PRE-PROCEDURE NURSING ASSESSMENT: Performed by: INTERNAL MEDICINE

## 2021-03-26 PROCEDURE — 88305 TISSUE EXAM BY PATHOLOGIST: CPT | Mod: TC | Performed by: INTERNAL MEDICINE

## 2021-03-26 PROCEDURE — 88305 TISSUE EXAM BY PATHOLOGIST: CPT | Mod: 26 | Performed by: PATHOLOGY

## 2021-03-26 RX ORDER — PROCHLORPERAZINE MALEATE 5 MG
10 TABLET ORAL EVERY 6 HOURS PRN
Status: DISCONTINUED | OUTPATIENT
Start: 2021-03-26 | End: 2021-03-26 | Stop reason: HOSPADM

## 2021-03-26 RX ORDER — SODIUM CHLORIDE, SODIUM LACTATE, POTASSIUM CHLORIDE, CALCIUM CHLORIDE 600; 310; 30; 20 MG/100ML; MG/100ML; MG/100ML; MG/100ML
INJECTION, SOLUTION INTRAVENOUS CONTINUOUS
Status: DISCONTINUED | OUTPATIENT
Start: 2021-03-26 | End: 2021-03-26 | Stop reason: HOSPADM

## 2021-03-26 RX ORDER — OMEPRAZOLE 40 MG/1
40 CAPSULE, DELAYED RELEASE ORAL DAILY
Qty: 90 CAPSULE | Refills: 3 | Status: SHIPPED | OUTPATIENT
Start: 2021-03-26 | End: 2021-09-22

## 2021-03-26 RX ORDER — ONDANSETRON 2 MG/ML
4 INJECTION INTRAMUSCULAR; INTRAVENOUS EVERY 30 MIN PRN
Status: DISCONTINUED | OUTPATIENT
Start: 2021-03-26 | End: 2021-03-26 | Stop reason: HOSPADM

## 2021-03-26 RX ORDER — ONDANSETRON 2 MG/ML
4 INJECTION INTRAMUSCULAR; INTRAVENOUS EVERY 6 HOURS PRN
Status: DISCONTINUED | OUTPATIENT
Start: 2021-03-26 | End: 2021-03-26 | Stop reason: HOSPADM

## 2021-03-26 RX ORDER — PROPOFOL 10 MG/ML
INJECTION, EMULSION INTRAVENOUS PRN
Status: DISCONTINUED | OUTPATIENT
Start: 2021-03-26 | End: 2021-03-26

## 2021-03-26 RX ORDER — NALOXONE HYDROCHLORIDE 0.4 MG/ML
0.4 INJECTION, SOLUTION INTRAMUSCULAR; INTRAVENOUS; SUBCUTANEOUS
Status: DISCONTINUED | OUTPATIENT
Start: 2021-03-26 | End: 2021-03-26 | Stop reason: HOSPADM

## 2021-03-26 RX ORDER — PROPOFOL 10 MG/ML
INJECTION, EMULSION INTRAVENOUS CONTINUOUS PRN
Status: DISCONTINUED | OUTPATIENT
Start: 2021-03-26 | End: 2021-03-26

## 2021-03-26 RX ORDER — LIDOCAINE 40 MG/G
CREAM TOPICAL
Status: DISCONTINUED | OUTPATIENT
Start: 2021-03-26 | End: 2021-03-26 | Stop reason: HOSPADM

## 2021-03-26 RX ORDER — NALOXONE HYDROCHLORIDE 0.4 MG/ML
0.2 INJECTION, SOLUTION INTRAMUSCULAR; INTRAVENOUS; SUBCUTANEOUS
Status: DISCONTINUED | OUTPATIENT
Start: 2021-03-26 | End: 2021-03-26 | Stop reason: HOSPADM

## 2021-03-26 RX ORDER — FENTANYL CITRATE 50 UG/ML
INJECTION, SOLUTION INTRAMUSCULAR; INTRAVENOUS PRN
Status: DISCONTINUED | OUTPATIENT
Start: 2021-03-26 | End: 2021-03-26

## 2021-03-26 RX ORDER — ONDANSETRON 4 MG/1
4 TABLET, ORALLY DISINTEGRATING ORAL EVERY 30 MIN PRN
Status: DISCONTINUED | OUTPATIENT
Start: 2021-03-26 | End: 2021-03-26 | Stop reason: HOSPADM

## 2021-03-26 RX ORDER — ONDANSETRON 4 MG/1
4 TABLET, ORALLY DISINTEGRATING ORAL EVERY 6 HOURS PRN
Status: DISCONTINUED | OUTPATIENT
Start: 2021-03-26 | End: 2021-03-26 | Stop reason: HOSPADM

## 2021-03-26 RX ORDER — BISACODYL 5 MG/1
TABLET, DELAYED RELEASE ORAL
COMMUNITY
Start: 2021-03-11 | End: 2021-09-22

## 2021-03-26 RX ORDER — LIDOCAINE HYDROCHLORIDE 20 MG/ML
INJECTION, SOLUTION INFILTRATION; PERINEURAL PRN
Status: DISCONTINUED | OUTPATIENT
Start: 2021-03-26 | End: 2021-03-26

## 2021-03-26 RX ORDER — SODIUM CHLORIDE, SODIUM LACTATE, POTASSIUM CHLORIDE, CALCIUM CHLORIDE 600; 310; 30; 20 MG/100ML; MG/100ML; MG/100ML; MG/100ML
INJECTION, SOLUTION INTRAVENOUS CONTINUOUS PRN
Status: DISCONTINUED | OUTPATIENT
Start: 2021-03-26 | End: 2021-03-26

## 2021-03-26 RX ORDER — FLUMAZENIL 0.1 MG/ML
0.2 INJECTION, SOLUTION INTRAVENOUS
Status: DISCONTINUED | OUTPATIENT
Start: 2021-03-26 | End: 2021-03-26 | Stop reason: HOSPADM

## 2021-03-26 RX ORDER — MEPERIDINE HYDROCHLORIDE 25 MG/ML
12.5 INJECTION INTRAMUSCULAR; INTRAVENOUS; SUBCUTANEOUS
Status: DISCONTINUED | OUTPATIENT
Start: 2021-03-26 | End: 2021-03-26 | Stop reason: HOSPADM

## 2021-03-26 RX ORDER — ONDANSETRON 2 MG/ML
4 INJECTION INTRAMUSCULAR; INTRAVENOUS
Status: COMPLETED | OUTPATIENT
Start: 2021-03-26 | End: 2021-03-26

## 2021-03-26 RX ORDER — POLYETHYLENE GLYCOL 3350 17 G/17G
POWDER, FOR SOLUTION ORAL
COMMUNITY
Start: 2021-03-11 | End: 2021-09-22

## 2021-03-26 RX ADMIN — PROPOFOL 150 MCG/KG/MIN: 10 INJECTION, EMULSION INTRAVENOUS at 07:31

## 2021-03-26 RX ADMIN — SODIUM CHLORIDE, POTASSIUM CHLORIDE, SODIUM LACTATE AND CALCIUM CHLORIDE: 600; 310; 30; 20 INJECTION, SOLUTION INTRAVENOUS at 07:18

## 2021-03-26 RX ADMIN — LIDOCAINE HYDROCHLORIDE 60 MG: 20 INJECTION, SOLUTION INFILTRATION; PERINEURAL at 07:25

## 2021-03-26 RX ADMIN — FENTANYL CITRATE 50 MCG: 50 INJECTION, SOLUTION INTRAMUSCULAR; INTRAVENOUS at 07:25

## 2021-03-26 RX ADMIN — MIDAZOLAM 2 MG: 1 INJECTION INTRAMUSCULAR; INTRAVENOUS at 07:18

## 2021-03-26 RX ADMIN — ONDANSETRON 4 MG: 2 INJECTION INTRAMUSCULAR; INTRAVENOUS at 07:30

## 2021-03-26 RX ADMIN — PROPOFOL 20 MG: 10 INJECTION, EMULSION INTRAVENOUS at 07:31

## 2021-03-26 ASSESSMENT — MIFFLIN-ST. JEOR: SCORE: 1586.63

## 2021-03-26 NOTE — ANESTHESIA CARE TRANSFER NOTE
Patient: Slava Patch    Procedure(s):  COLONOSCOPY  ESOPHAGOGASTRODUODENOSCOPY, WITH BIOPSY    Diagnosis: History of colonic polyps [Z86.010]  Diagnosis Additional Information: No value filed.    Anesthesia Type:   General     Note:      Level of Consciousness: awake  Oxygen Supplementation: nasal cannula  Level of Supplemental Oxygen (L/min / FiO2): 3  Independent Airway: airway patency satisfactory and stable  Dentition: dentition unchanged  Vital Signs Stable: post-procedure vital signs reviewed and stable  Report to RN Given: handoff report given  Patient transferred to: Phase II    Handoff Report: Identifed the Patient, Identified the Reponsible Provider, Reviewed the pertinent medical history, Discussed the surgical course, Reviewed Intra-OP anesthesia mangement and issues during anesthesia, Set expectations for post-procedure period and Allowed opportunity for questions and acknowledgement of understanding      Vitals: (Last set prior to Anesthesia Care Transfer)  CRNA VITALS  3/26/2021 0747 - 3/26/2021 0823      3/26/2021             Pulse:  88    Ht Rate:  87    SpO2:  98 %    Resp Rate (observed):  22        Electronically Signed By: BEBO Santiago CRNA  March 26, 2021  8:23 AM

## 2021-03-26 NOTE — ANESTHESIA POSTPROCEDURE EVALUATION
Patient: Slava Patch    Procedure(s):  COLONOSCOPY  ESOPHAGOGASTRODUODENOSCOPY, WITH BIOPSY    Diagnosis:History of colonic polyps [Z86.010]  Diagnosis Additional Information: No value filed.    Anesthesia Type:  General    Note:  Disposition: Outpatient   Postop Pain Control: Uneventful            Sign Out: Well controlled pain   PONV: No   Neuro/Psych: Uneventful            Sign Out: Acceptable/Baseline neuro status   Airway/Respiratory: Uneventful            Sign Out: Acceptable/Baseline resp. status   CV/Hemodynamics: Uneventful            Sign Out: Acceptable CV status   Other NRE: NONE   DID A NON-ROUTINE EVENT OCCUR? No         Last vitals:  Vitals:    03/26/21 0613 03/26/21 0825 03/26/21 0830   BP: 115/82 98/67 105/70   Pulse: 75  68   Resp:  16    Temp: 36.9  C (98.5  F) 36.4  C (97.6  F)    SpO2: 97% 98% 97%       Last vitals prior to Anesthesia Care Transfer:  CRNA VITALS  3/26/2021 0747 - 3/26/2021 0847      3/26/2021             Pulse:  88    Ht Rate:  87    SpO2:  98 %    Resp Rate (observed):  22          Electronically Signed By: Stacie Lopez MD  March 26, 2021  8:48 AM

## 2021-03-26 NOTE — OR NURSING
Discharge instructions provided to patient; instructions reviewed with patient and patients wife, Ya, via the phone. Questions asked and answered appropriately. Patient and Ya verbalized understanding.

## 2021-03-26 NOTE — DISCHARGE INSTRUCTIONS
[facility], [place]  Same-Day EGD Procedure  Adult Discharge Orders & Instructions     You had a procedure known as an Esophagogastroduodenoscopy (EGD) of either the upper gastrointestinal (GI) tract. An UPPER EGD will place a camera into either your mouth or nose to examine your esophagus, stomach, and/or small intestines. Biopsies, small samples of tissue, are often taken to help diagnose and/or classify stages of disease growth. The EGD is also used to help locate areas of the GI tract that may require further treatment (dilation, stenting, clipping, removal, etc.) or medical interventions (medication specific).      After your procedure   1. Make sure to clarify with your healthcare provider any diet restrictions (For example, clear liquid, low fat, no caffeine, etc.)   2. Do NOT take aspirin containing medications or any other blood-thinning medicines (anticoagulants) until your healthcare provider says it's OK.   3. You MAY be prescribed antibiotics, depending on what was done and/or found during your EUS, make sure to take antibiotics as prescribed by your healthcare provider    For 24 hours after surgery  1. Get plenty of rest.  A responsible adult must stay with you for at least 24 hours after you leave the hospital.   2. Do not drive or use heavy equipment.  If you have weakness or tingling, don't drive or use heavy equipment until this feeling goes away.  3. Do not drink alcohol.  4. Avoid strenuous or risky activities (gym, yoga, cycling, etc.).  Ask for help when climbing stairs.   5. You may feel lightheaded.  IF so, sit for a few minutes before standing.  Have someone help you get up.   6. If you have nausea (feel sick to your stomach): Drink only clear liquids such as apple juice, ginger ale, broth or 7-Up.  Rest may also help.  Be sure to drink enough fluids.  Move to a regular diet as you feel able.  7. If you feel bloated or have too much gas, use a heating pad on your  belly to help reduce the discomfort. This should help you feel better.   8. You may have a slight fever. This is normal for the first 24 hours.   9. You may have a dry mouth, a sore throat, muscle aches or trouble sleeping.  These are normal and will go away after 24 hours. A sore throat is most common. Use lozenges or gargle with salt water to ease the discomfort.   10. Do not make important or legal decisions.      Call your doctor for any of the followin. Chest pain, and/or shortness of breath  2. Abdominal  pain, bloating or cramping that has not improved or does not respond to pain reliving medications (Tylenol or narcotics if prescribed)   3. Difficulty swallowing or feeling as though food or liquids are stuck in your throat   4. Sore throat lasting more than 2 days or pain that has worsened over time   5. Black or tarry stools   6. Nausea and/or vomiting that is not resolving or has not responded to anti-nausea medications prescribed to you   7. It has been over 8 to 10 hours since surgery and you are still not able to urinate (pass water)   8. Headache for over 24 hours   9. Fever over 100.5 F (38 C) lasting more than 24 hours after the procedure   10. Signs of jaundice or blockage (fever, chills, abdominal pain, yellowing of the whites of your eyes, yellowing of your skin, and/or passing darker than normal urine)     To contact a doctor, call:   [ ] Dr. Sharpe's Office at [phone]  (Monday thru Friday 8:00am to 4:30pm)   [ ] [phone] and ask for the Gastroenterology resident on call (answered 24 hours a day)   [ ] Emergency Department: [facility]: [phone]   Take it easy when you get home.  Remember, same day surgery DOES NOT MEAN SAME DAY RECOVERY!  Healing is a gradual process.  You will need some time to recover - you may be more tired than you realize at first.  Rest and relax for at least the first 24 hours at home.  You'll feel better and heal faster if you take good care of  yourself.

## 2021-03-26 NOTE — ANESTHESIA PREPROCEDURE EVALUATION
"Anesthesia Pre-Procedure Evaluation    Patient: Slava Lane   MRN:     [id number] Gender:   male   Age:    49 year old :      1970        Preoperative Diagnosis: History of colonic polyps [Z86.010]   Procedure(s):  COLONOSCOPY, WITH ENDOSCOPIC MUCOSAL RESECTION     LABS:  CBC:   Lab Results   Component Value Date    WBC 3.5 (L) 2021    WBC 4.2 10/28/2020    HGB 13.9 2021    HGB 15.1 10/28/2020    HCT 42.0 2021    HCT 45.1 10/28/2020     (L) 2021     10/28/2020     BMP:   Lab Results   Component Value Date     2021     10/28/2020    POTASSIUM 4.0 2021    POTASSIUM 4.2 10/28/2020    CHLORIDE 108 2021    CHLORIDE 104 10/28/2020    CO2 27 2021    CO2 27 10/28/2020    BUN 20 2021    BUN 19 10/28/2020    CR 0.84 2021    CR 0.82 10/28/2020    GLC 90 2021    GLC 92 10/28/2020     COAGS:   Lab Results   Component Value Date    INR 1.08 2020     POC:   Lab Results   Component Value Date     (H) 2021     OTHER:   Lab Results   Component Value Date    PIEDAD 8.5 2021    PHOS 2.9 2021    MAG 1.9 11/15/2019    ALBUMIN 3.4 2021    PROTTOTAL 6.3 (L) 2021    ALT 27 2021    AST 19 2021    ALKPHOS 57 2021    BILITOTAL 1.3 2021        Preop Vitals    BP Readings from Last 3 Encounters:   21 115/82   20 99/59   20 114/74    Pulse Readings from Last 3 Encounters:   21 75   20 84   20 72      Resp Readings from Last 3 Encounters:   20 14   20 18   20 16    SpO2 Readings from Last 3 Encounters:   21 97%   20 97%   20 96%      Temp Readings from Last 1 Encounters:   21 36.9  C (98.5  F) (Oral)    Ht Readings from Last 1 Encounters:   21 1.702 m (5' 7\")      Wt Readings from Last 1 Encounters:   21 77.8 kg (171 lb 8.3 oz)    Estimated body mass index is 26.86 kg/m  as calculated from the " "following:    Height as of an earlier encounter on 3/26/21: 1.702 m (5' 7\").    Weight as of an earlier encounter on 3/26/21: 77.8 kg (171 lb 8.3 oz).     LDA:  Port A Cath Single 10/03/19 Right Chest wall (Active)   Number of days: 540        Past Medical History:   Diagnosis Date     Mantle cell lymphoma (H) 06/2019    noted incidentally on colon polyp pathology      Uncomplicated asthma     When exposed to enviromental allergies      Past Surgical History:   Procedure Laterality Date     AS SKIN PINCH GRAFT PROCEDURE <2CM Right 2017     BIOPSY      bone marrow     COLONOSCOPY       EYE SURGERY      surgeries at age 5-6     INSERT PORT VASCULAR ACCESS Right 10/3/2019    Procedure: Chest Port Placement;  Surgeon: July Simpson PA-C;  Location: [facility] OR     IR CHEST PORT PLACEMENT > 5 YRS OF AGE  10/3/2019     LITHOTRIPSY N/A 06/2019    unknown side      No Known Allergies     Anesthesia Evaluation     . Pt has had prior anesthetic. Type: General and MAC.           ROS/MED HX    ENT/Pulmonary:     (+) asthma     Neurologic:  - neg neurologic ROS     Cardiovascular:  - neg cardiovascular ROS       METS/Exercise Tolerance:  >4 METS   Hematologic:  - neg hematologic  ROS       Musculoskeletal:  - neg musculoskeletal ROS       GI/Hepatic: Comment: Colon polyp        Renal/Genitourinary:  - neg Renal ROS       Endo:  - neg endo ROS       Psychiatric:         Infectious Disease:  - neg infectious disease ROS       Malignancy: (+) Malignancy, History of Lymphoma/Leukemia.      Other:                         PHYSICAL EXAM:   Mental Status/Neuro:    Airway: Facies: Feasible  Mouth/Opening: Full  TM distance: > 6 cm  Neck ROM: Full   Respiratory: Auscultation: CTAB     Resp. Rate: Normal     Resp. Effort: Normal      CV: Rhythm: Regular  Rate: Age appropriate  Heart: Normal Sounds  Edema: None   Comments:                      Assessment:   ASA SCORE: 3    H&P: Unable to attach H&P to encounter due to EHR limitations. H&P " Update: appropriate H&P reviewed, patient examined. No interval changes since H&P (within 30 days).  Smoking Status:  Non-Smoker/Unknown   NPO Status: NPO Appropriate     Plan:   Anes. Type:  MAC   Pre-Medication: None   Induction:  N/a   Airway: Native Airway   Access/Monitoring: PIV   Maintenance: Propofol Sedation     Postop Plan:   Postop Pain: None  Postop Sedation/Airway: Not planned  Disposition: Outpatient     PONV Management:  NO PONV Prophylaxis Required     CONSENT: Direct conversation   Plan and risks discussed with: Patient                      Stacie Lopez MD    Anesthesia Evaluation   Pt has had prior anesthetic. Type: General and MAC.        ROS/MED HX  ENT/Pulmonary:     (+) asthma     Neurologic:  - neg neurologic ROS     Cardiovascular:  - neg cardiovascular ROS     METS/Exercise Tolerance: >4 METS    Hematologic:  - neg hematologic  ROS     Musculoskeletal:  - neg musculoskeletal ROS     GI/Hepatic: Comment: Colon polyp      Renal/Genitourinary:  - neg Renal ROS     Endo:  - neg endo ROS     Psychiatric/Substance Use:       Infectious Disease:  - neg infectious disease ROS     Malignancy:   (+) Malignancy, History of Lymphoma/Leukemia.    Other:                   Anesthesia Plan    ASA Status:  3   NPO Status:  NPO Appropriate    Anesthesia Type: MAC.   Induction: Intravenous, Propofol.   Maintenance: TIVA.        Consents    Anesthesia Plan(s) and associated risks, benefits, and realistic alternatives discussed. Questions answered and patient/representative(s) expressed understanding.     - Discussed with:  Patient      - Extended Intubation/Ventilatory Support Discussed: No.      - Patient is DNR/DNI Status: No    Use of blood products discussed: No .     Postoperative Care    Pain management: IV analgesics.   PONV prophylaxis: Ondansetron (or other 5HT-3)     Comments:         H&P reviewed: Unable to attach H&P to encounter due to EHR limitations. H&P Update: appropriate H&P reviewed,  patient examined. No interval changes since H&P (within 30 days).

## 2021-03-29 LAB
COLONOSCOPY: NORMAL
UPPER GI ENDOSCOPY: NORMAL

## 2021-03-30 LAB — COPATH REPORT: NORMAL

## 2021-03-30 NOTE — RESULT ENCOUNTER NOTE
Pathology from EGD reviewed. See procedure report for details. Suspect gastric ulcer is due to chemotherapy agent. Omeprazole was prescribed and should help heal ulcer.    Ahsan Sharpe MD  [facility]  Division of Gastroenterology and Hepatology  [facility] [phone] [place], [place] [address]

## 2021-04-14 DIAGNOSIS — C83.18 MANTLE CELL LYMPHOMA OF LYMPH NODES OF MULTIPLE SITES (H): Primary | ICD-10-CM

## 2021-04-26 ENCOUNTER — TELEPHONE (OUTPATIENT)
Dept: ONCOLOGY | Facility: CLINIC | Age: 51
End: 2021-04-26

## 2021-04-26 NOTE — ORAL ONC MGMT
Oral Chemotherapy Monitoring Program  Lab Follow Up    Reviewed lab results from 4/26 (drawn at [place] - shown below).    Assessment & Plan:  No concerning abnormalities.    Questions answered to patient's satisfaction.    Follow-Up:  6/2: Dr Miladis Rey, PharmD, [facility]  Hematology/Oncology Clinical Pharmacist  [facility]  [facility]  [phone]

## 2021-05-04 DIAGNOSIS — C83.10 MANTLE CELL LYMPHOMA, UNSPECIFIED BODY REGION (H): ICD-10-CM

## 2021-05-06 RX ORDER — ALLOPURINOL 300 MG/1
300 TABLET ORAL DAILY
Qty: 90 TABLET | Refills: 1 | Status: SHIPPED | OUTPATIENT
Start: 2021-05-06 | End: 2022-12-14

## 2021-05-10 DIAGNOSIS — C83.18 MANTLE CELL LYMPHOMA OF LYMPH NODES OF MULTIPLE SITES (H): Primary | ICD-10-CM

## 2021-05-12 DIAGNOSIS — C83.18 MANTLE CELL LYMPHOMA OF LYMPH NODES OF MULTIPLE SITES (H): Primary | ICD-10-CM

## 2021-05-24 ENCOUNTER — ANCILLARY PROCEDURE (OUTPATIENT)
Dept: PET IMAGING | Facility: CLINIC | Age: 51
End: 2021-05-24
Attending: INTERNAL MEDICINE
Payer: COMMERCIAL

## 2021-05-24 DIAGNOSIS — C83.18 MANTLE CELL LYMPHOMA OF LYMPH NODES OF MULTIPLE SITES (H): ICD-10-CM

## 2021-05-24 LAB — GLUCOSE SERPL-MCNC: 89 MG/DL (ref 70–99)

## 2021-05-24 RX ORDER — HEPARIN SODIUM (PORCINE) LOCK FLUSH IV SOLN 100 UNIT/ML 100 UNIT/ML
500 SOLUTION INTRAVENOUS ONCE
Status: COMPLETED | OUTPATIENT
Start: 2021-05-24 | End: 2021-05-24

## 2021-05-24 RX ADMIN — HEPARIN SODIUM (PORCINE) LOCK FLUSH IV SOLN 100 UNIT/ML 500 UNITS: 100 SOLUTION at 07:24

## 2021-05-26 ENCOUNTER — TELEPHONE (OUTPATIENT)
Dept: ONCOLOGY | Facility: CLINIC | Age: 51
End: 2021-05-26

## 2021-05-26 NOTE — TELEPHONE ENCOUNTER
Oral Chemotherapy Monitoring Program  Lab Follow Up    Reviewed lab results from 5/24/21. He notes he had scans that morning, he thinks the injection he received for the scans could be why his bilirubin was high. He also thinks this injection may have caused a rash/ pimples on his right arm that developed the day of the scane. This was slightly itchy. Resolved after a few hours and a shower but then appeared again the next day. They have resolved today.     Slava confirms taking the appropriate dose of Imbruvica 420 mg once daily and Venclexta 400 mg once daily. Denies new or worsening side effects, missed doses, and recent hospital or ED visits. Patient has not had any recent medication changes.  He occasionally will have worsening of his diarrhea for 1-2 days at a time, he is not interested in trying any OTC medication to treat this and he does drink plenty of water to maintain hydration.      ORAL CHEMOTHERAPY 3/10/2021 3/10/2021 3/18/2021 4/14/2021 5/12/2021 5/26/2021 5/26/2021   Assessment Type Lab Monitoring;Monthly Follow up Lab Monitoring;Monthly Follow up Refill Refill Refill Lab Monitoring;Monthly Follow up Lab Monitoring;Monthly Follow up   Diagnosis Code Non-Hodgkin Lymphoma (NHL) Non-Hodgkin Lymphoma (NHL) Non-Hodgkin Lymphoma (NHL) Non-Hodgkin Lymphoma (NHL) Non-Hodgkin Lymphoma (NHL) Non-Hodgkin Lymphoma (NHL) Non-Hodgkin Lymphoma (NHL)   Providers Dr. Miladis Redding   [facility]   Drug Name Venclexta (Venetoclax) Imbruvica (Ibrutinib) Imbruvica (ibrutinib) Imbruvica (ibrutinib) Imbruvica (ibrutinib) Imbruvica (ibrutinib) Venclexta (venetoclax)   Dose 400 mg 420 mg 420 mg 420 mg 420 mg 420 mg 400 mg   Current Schedule Daily Daily Daily Daily Daily Daily Daily   Cycle Details Continuous Continuous Continuous Continuous Continuous Continuous Continuous   Start  Date of Last Cycle - - - - - - -   Planned next cycle start date - - - - - - -   Doses missed in last 2 weeks - - - - - 0 0   Adherence Assessment - - - - - Adherent Adherent   Adverse Effects - - - - - Diarrhea Diarrhea   Nausea - - - - - - -   Pharmacist Intervention(nausea) - - - - - - -   Intervention(s) - - - - - - -   Diarrhea - - - - - Grade 1 Grade 1   Pharmacist Intervention(diarrhea) - - - - - Yes Yes   Intervention(s) - - - - - Patient education Patient education   Home [facility] - - - - - - -   Any new drug interactions? - - - - - No No   Is the dose as ordered appropriate for the patient? - - - - - Yes Yes   Is the patient currently in pain? - - - - - - -   Has the patient been assessed within the past 6 months for depression? - - - - - - -   Has the patient missed any days of school, work, or other routine activity? - - - - - - -   Since the last time we talked, have you been hospitalized or used the emergency room? - - - - - - -       Labs:          Assessment & Plan:  Results are concerning for slightly elevated bilirubin. Pt attributes this to medication he received for scans earlier that day, unable to see in chart or care everywhere what he was given.     He is tolerating Venclexta and Imbruvica well. Questions answered to patient's satisfaction. Advised him to monitor rash and let us know if it worsens or returns.     Follow-Up:  6/02 Dr. Miladis Cho, Hira  Hematology/Oncology Clinical Pharmacist  [facility]  [facility]  [phone]

## 2021-06-02 ENCOUNTER — VIRTUAL VISIT (OUTPATIENT)
Dept: TRANSPLANT | Facility: CLINIC | Age: 51
End: 2021-06-02
Attending: INTERNAL MEDICINE
Payer: COMMERCIAL

## 2021-06-02 DIAGNOSIS — C83.18 MANTLE CELL LYMPHOMA OF LYMPH NODES OF MULTIPLE SITES (H): Primary | ICD-10-CM

## 2021-06-02 PROCEDURE — 99417 PROLNG OP E/M EACH 15 MIN: CPT | Performed by: INTERNAL MEDICINE

## 2021-06-02 PROCEDURE — 99215 OFFICE O/P EST HI 40 MIN: CPT | Mod: 95 | Performed by: INTERNAL MEDICINE

## 2021-06-02 NOTE — PROGRESS NOTES
"Slava is a 50 year old who is being evaluated via a billable video visit.      How would you like to obtain your AVS? MyChart  If the video visit is dropped, the invitation should be resent by: Send to e-mail at: [email or web address]  Will anyone else be joining your video visit? No     Vitals - Patient Reported  Weight (Patient Reported): 78 kg (172 lb)  Height (Patient Reported): 170.2 cm (5' 7\")  BMI (Based on Pt Reported Ht/Wt): 26.94  Pain Score: No Pain (0)    Lali Mckeon [facility] June 2, 2021  10:26 AM       Slava is a 50 year old who is being evaluated via a billable video visit.      How would you like to obtain your AVS? MyChart  If the video visit is dropped, the invitation should be resent by: Send to e-mail at: [email or web address]  Will anyone else be joining your video visit? No    Vitals - Patient Reported  Weight (Patient Reported): 78 kg (172 lb)  Height (Patient Reported): 170.2 cm (5' 7\")  BMI (Based on Pt Reported Ht/Wt): 26.94  Pain Score: No Pain (0)          Video-Visit Details    Type of service:  Video Visit    Duration of the video visit: 30 minute     Originating Location (pt. Location): Home    Distant Location (provider location):  [facility]     Platform used for Video Visit: Blane Lane is a 49 year old male who is being evaluated via a billable video visit.         Hematologic history:  Mantle cell lymphoma.  Mantle cell lymphoma, Stage IV with colon, BM involvement Ki67 -30%, TP53 mutations - Negative.  MCL MIPI - 5.9- Intermediate   .     Date Treatment Response Toxicities/Complications   9/18/19 - 1/25/20 R-CHOP/R-DHAP x3 each NM, Progressive disease      5/21/2020  Ibrutinib/Venetoclax  CR after 6,9 cycles  GI symptoms and he doesn't like to be on meds                                Interval History:  Video interview was conducted today. He is doing relatively well, He is very active. Their family is fine. Amarjit is getting "  in December :).   He has gastritis symptoms and I have advised pepcid. He recently underwent an EGD which showed no evidence of H pylori/MCL  He has good and bad days with his treatment  He has intermittent diarrhea    Physical Exam:  No physical was performed due to COVID pandemic. Appropriate mood. NAD. Voice is appropriate.     Labs: In care everywhere and Imaging reviewed           ASSESSMENT AND PLAN:  50 y/o with MCL Stage IV s/p completion of the Nordic regimen and  with progressive disease within 6 months of primary therapy and hence this is primary refractory disease. His repeat PET is showing progressive disease and higher SUV and a new lesion. He was started on ibrutinib and venetoclax regimen(Ibrutinib plus Venetoclax for the Treatment of Mantle-Cell Lymphoma).   His PET at the end of 6, 9, 14 cycles is showing a CR      Continue Ibrutinib/Venetoclax for 6 further cycles till progression  I agree with decreasing ibrutinib to 280 mg from C15 as he has diarrhea and he is starting to experience significant side effects impacting his QOL  PET in 4 -6 months between C19-20  I will see him after PET  Continue allopurinol has he is feeling gout like symptoms  Labs every month  Advised COVID 19 vaccination    F/v with BRANDY in 3 months  F/v with me in 6 months  PET scan in 6 months before my appointment    Three Year Update of the Phase II ABT-199 (Venetoclax) and Ibrutinib in Mantle Cell Lymphoma (AIM) Study    Ibrutinib Plus Venetoclax in Patients With Relapsed/Refractory Mantle Cell Lymphoma: Results From the Safety Run-In Period of the Phase 3 Sympatico Study    These are the studies to follow in the future to guide treatment. Interstingly in the Phase 3 Sympatico study which is ongoing and they continue the combination for 2 yrs and then stop the ibrutinib    Review of the result(s) of each unique test - pet, cbc, cmp  I spent a total of 69 minutes on the day of the visit.   Time spent doing chart  review, history and exam, documentation and further activities per the note        Mark ROMERO MS  Attending Physician  Pager - [phone]  Email - [email or web address]      Video call duration, including review of materials sent by patient if present, prior history, review of  Labs, imaging as performed : 40 minutes

## 2021-06-02 NOTE — LETTER
"    6/2/2021         RE: Slava Lane  [address]  [facility] [address]        Dear Colleague,    Thank you for referring your patient, Slava Lane, to the [facility] BLOOD AND MARROW TRANSPLANT PROGRAM [place]. Please see a copy of my visit note below.    Slava is a 50 year old who is being evaluated via a billable video visit.      How would you like to obtain your AVS? MyChart  If the video visit is dropped, the invitation should be resent by: Send to e-mail at: [email or web address]  Will anyone else be joining your video visit? No     Vitals - Patient Reported  Weight (Patient Reported): 78 kg (172 lb)  Height (Patient Reported): 170.2 cm (5' 7\")  BMI (Based on Pt Reported Ht/Wt): 26.94  Pain Score: No Pain (0)    Lali Mckeon [facility] June 2, 2021  10:26 AM       Slava is a 50 year old who is being evaluated via a billable video visit.      How would you like to obtain your AVS? MyChart  If the video visit is dropped, the invitation should be resent by: Send to e-mail at: [email or web address]  Will anyone else be joining your video visit? No    Vitals - Patient Reported  Weight (Patient Reported): 78 kg (172 lb)  Height (Patient Reported): 170.2 cm (5' 7\")  BMI (Based on Pt Reported Ht/Wt): 26.94  Pain Score: No Pain (0)          Video-Visit Details    Type of service:  Video Visit    Duration of the video visit: 30 minute     Originating Location (pt. Location): Home    Distant Location (provider location):  [facility] BLOOD AND MARROW TRANSPLANT     Platform used for Video Visit: Blane Lane is a 49 year old male who is being evaluated via a billable video visit.         Hematologic history:  Mantle cell lymphoma.  Mantle cell lymphoma, Stage IV with colon, BM involvement Ki67 -30%, TP53 mutations - Negative.  MCL MIPI - 5.9- Intermediate   .     Date Treatment Response Toxicities/Complications   9/18/19 - 1/25/20 R-CHOP/R-DHAP x3 each CT, Progressive disease      5/21/2020 "  Ibrutinib/Venetoclax  CR after 6,9 cycles  GI symptoms and he doesn't like to be on meds                                Interval History:  Video interview was conducted today. He is doing relatively well, He is very active. Their family is fine. Amarjit is getting  in December :).   He has gastritis symptoms and I have advised pepcid. He recently underwent an EGD which showed no evidence of H pylori/MCL  He has good and bad days with his treatment  He has intermittent diarrhea    Physical Exam:  No physical was performed due to COVID pandemic. Appropriate mood. NAD. Voice is appropriate.     Labs: In care everywhere and Imaging reviewed           ASSESSMENT AND PLAN:  50 y/o with MCL Stage IV s/p completion of the Nordic regimen and  with progressive disease within 6 months of primary therapy and hence this is primary refractory disease. His repeat PET is showing progressive disease and higher SUV and a new lesion. He was started on ibrutinib and venetoclax regimen(Ibrutinib plus Venetoclax for the Treatment of Mantle-Cell Lymphoma).   His PET at the end of 6, 9, 14 cycles is showing a CR      Continue Ibrutinib/Venetoclax for 6 further cycles till progression  I agree with decreasing ibrutinib to 280 mg from C15 as he has diarrhea and he is starting to experience significant side effects impacting his QOL  PET in 4 -6 months between C19-20  I will see him after PET  Continue allopurinol has he is feeling gout like symptoms  Labs every month  Advised COVID 19 vaccination    F/v with BRANDY in 3 months  F/v with me in 6 months  PET scan in 6 months before my appointment    Three Year Update of the Phase II ABT-199 (Venetoclax) and Ibrutinib in Mantle Cell Lymphoma (AIM) Study    Ibrutinib Plus Venetoclax in Patients With Relapsed/Refractory Mantle Cell Lymphoma: Results From the Safety Run-In Period of the Phase 3 Sympatico Study    These are the studies to follow in the future to guide treatment. Interstingly  in the Phase 3 Sympatico study which is ongoing and they continue the combination for 2 yrs and then stop the ibrutinib    Review of the result(s) of each unique test - pet, cbc, cmp  I spent a total of 69 minutes on the day of the visit.   Time spent doing chart review, history and exam, documentation and further activities per the note        Mark ROMERO MS  Attending Physician  Pager - [phone]  Email - [email or web address]      Video call duration, including review of materials sent by patient if present, prior history, review of  Labs, imaging as performed : 40 minutes            Again, thank you for allowing me to participate in the care of your patient.        Sincerely,        Mark Redding MD

## 2021-06-06 ENCOUNTER — DOCUMENTATION ONLY (OUTPATIENT)
Dept: ONCOLOGY | Facility: CLINIC | Age: 51
End: 2021-06-06

## 2021-06-17 ENCOUNTER — TELEPHONE (OUTPATIENT)
Dept: PHARMACY | Facility: OTHER | Age: 51
End: 2021-06-17

## 2021-06-17 DIAGNOSIS — C83.18 MANTLE CELL LYMPHOMA OF LYMPH NODES OF MULTIPLE SITES (H): Primary | ICD-10-CM

## 2021-06-17 NOTE — TELEPHONE ENCOUNTER
PA Initiation    Medication: Imalec PARKER Initiated  Insurance Company: [employer] [place] - Phone [phone] Fax [phone]  Pharmacy Filling the Rx: [facility] - [place], [place] - [address] [place] [address]  Filling Pharmacy Phone:    Filling Pharmacy Fax:    Start Date: 6/17/2021

## 2021-06-21 DIAGNOSIS — C83.18 MANTLE CELL LYMPHOMA OF LYMPH NODES OF MULTIPLE SITES (H): ICD-10-CM

## 2021-06-22 NOTE — TELEPHONE ENCOUNTER
Prior Authorization Approval    Authorization Effective Date: 6/21/2021  Authorization Expiration Date: 6/21/2022  Medication: Imbruvica PA approved  Approved Dose/Quantity: 140mg capsules, 2 per day  Reference #:   [id number]  Insurance Company: [employer] [place] - Phone [phone] Fax [phone]  Expected CoPay: $0     CoPay Card Available: No    Foundation Assistance Needed:    Which Pharmacy is filling the prescription (Not needed for infusion/clinic administered): [facility] [place], [place] - [address] [place] [address]  Pharmacy Notified: Yes  Patient Notified: Yes

## 2021-06-25 ENCOUNTER — TELEPHONE (OUTPATIENT)
Dept: ONCOLOGY | Facility: CLINIC | Age: 51
End: 2021-06-25

## 2021-06-25 NOTE — TELEPHONE ENCOUNTER
Oral Chemotherapy Monitoring Program    Subjective/Objective:  Slava Lane is a 50 year old male contacted by phone for a follow-up visit for oral chemotherapy.  Slava and I talked about the plan for holding (not taking) his oral chemo medications until repeat labs are drawn. Per Dr. Redding, repeat labs ideally in one week. He mentioned he will be working in the [place] area and wondering if we can help coordinate care in the [facility] for a lab check. He did take his doses today 6/25/21. He will hold his doses until labs are rechecked next week.     ORAL CHEMOTHERAPY 3/18/2021 4/14/2021 5/12/2021 5/26/2021 5/26/2021 6/6/2021 6/17/2021   Assessment Type Refill Refill Refill Lab Monitoring;Monthly Follow up Lab Monitoring;Monthly Follow up Chart Review Refill   Diagnosis Code Non-Hodgkin Lymphoma (NHL) Non-Hodgkin Lymphoma (NHL) Non-Hodgkin Lymphoma (NHL) Non-Hodgkin Lymphoma (NHL) Non-Hodgkin Lymphoma (NHL) Non-Hodgkin Lymphoma (NHL) Non-Hodgkin Lymphoma (NHL)   Providers Dr. Miladis Redding   [facility]   Drug Name Imbruvica (ibrutinib) Imbruvica (ibrutinib) Imbruvica (ibrutinib) Imbruvica (ibrutinib) Venclexta (venetoclax) Venclexta (venetoclax) Imbruvica (ibrutinib)   Dose 420 mg 420 mg 420 mg 420 mg 400 mg 400 mg 280 mg   Current Schedule Daily Daily Daily Daily Daily Daily Daily   Cycle Details Continuous Continuous Continuous Continuous Continuous Continuous Continuous   Start Date of Last Cycle - - - - - - -   Planned next cycle start date - - - - - - -   Doses missed in last 2 weeks - - - 0 0 - -   Adherence Assessment - - - Adherent Adherent - -   Adverse Effects - - - Diarrhea Diarrhea - -   Nausea - - - - - - -   Pharmacist Intervention(nausea) - - - - - - -   Intervention(s) - - - - - - -   Diarrhea - - - Grade 1 Grade 1 - -   Pharmacist  "Intervention(diarrhea) - - - Yes Yes - -   Intervention(s) - - - Patient education Patient education - -   Home BPs - - - - - - -   Any new drug interactions? - - - No No - -   Is the dose as ordered appropriate for the patient? - - - Yes Yes - -   Is the patient currently in pain? - - - - - - -   Has the patient been assessed within the past 6 months for depression? - - - - - - -   Has the patient missed any days of school, work, or other routine activity? - - - - - - -   Since the last time we talked, have you been hospitalized or used the emergency room? - - - - - - -       Last PHQ-2 Score on record: No flowsheet data found.    Vitals:  BP:   BP Readings from Last 1 Encounters:   03/26/21 124/82     Wt Readings from Last 1 Encounters:   03/26/21 77.8 kg (171 lb 8.3 oz)     Estimated body surface area is 1.92 meters squared as calculated from the following:    Height as of 3/26/21: 1.702 m (5' 7\").    Weight as of 3/26/21: 77.8 kg (171 lb 8.3 oz).    Labs:  No results found for NA within last 30 days.     No results found for K within last 30 days.     No results found for CA within last 30 days.     No results found for Mag within last 30 days.     No results found for Phos within last 30 days.     No results found for ALBUMIN within last 30 days.     No results found for BUN within last 30 days.     No results found for CR within last 30 days.     No results found for AST within last 30 days.     No results found for ALT within last 30 days.     No results found for BILITOTAL within last 30 days.     No results found for WBC within last 30 days.     No results found for HGB within last 30 days.     No results found for PLT within last 30 days.     No results found for ANC within last 30 days.     Assessment/Plan:  Hold Ibrutinib and Venetoclax per Dr. Redding as the lab results from (6/24) are concerning his Tbili = 2.4 up from 1.4 on (5/24). Need to repeat labs in one week (week of 6/28, end of " week).    Follow-Up:  Needs coordination of care for a lab draw down near [place]. Will need to find a lab/clinic to have labs checked and send standing labs there as he will be working in [facility] next week. IB sent to care team to help coordinate care in [facility].     Oral Chemotherapy Monitoring Program  Aries Herrera PharmD  [facility]  [phone]  June 25, 2021

## 2021-06-30 ENCOUNTER — PATIENT OUTREACH (OUTPATIENT)
Dept: ONCOLOGY | Facility: CLINIC | Age: 51
End: 2021-06-30

## 2021-06-30 ENCOUNTER — TELEPHONE (OUTPATIENT)
Dept: ONCOLOGY | Facility: CLINIC | Age: 51
End: 2021-06-30

## 2021-06-30 DIAGNOSIS — C83.10 MANTLE CELL LYMPHOMA, UNSPECIFIED BODY REGION (H): Primary | ICD-10-CM

## 2021-06-30 NOTE — TELEPHONE ENCOUNTER
----- Message from Olive Perrin RN sent at 6/30/2021  2:01 PM CDT -----  Regarding: fax to [facility]  Need the Hepatic lab order just put in today by Miladis faxed to [facility] at [phone].    Thanks,    Olive Perrin  BSN OCN  Care Coordinator [place]

## 2021-07-02 ENCOUNTER — TELEPHONE (OUTPATIENT)
Dept: ONCOLOGY | Facility: CLINIC | Age: 51
End: 2021-07-02

## 2021-07-02 NOTE — ORAL ONC MGMT
Oral Chemotherapy Monitoring Program    Patient's wife Ya called to review patients labs done at [facility]. No labs in CareEverywhere. She reports patient had labs done at 1250 on 7/1.    Phone number [phone].     Presley Jordan, PharmD  Hematology/Oncology Clinical Pharmacist  [facility]  [facility]      Addendum:  Placed call 7/2 at 1050 to [facility] phone number [phone] to request labs faxed to [phone]. Spoke to Liam and transferred to [place] then transferred to  Katina bernard fax to [facility] fax.

## 2021-07-08 ENCOUNTER — TELEPHONE (OUTPATIENT)
Dept: ONCOLOGY | Facility: CLINIC | Age: 51
End: 2021-07-08

## 2021-07-08 NOTE — TELEPHONE ENCOUNTER
Oral Chemotherapy Monitoring Program     Placed call to patient in follow up of Venclexta/Imbruvica oral chemotherapy.     Pt confirms he is taknig Venclexta 400mg daily and holding Imbruvica for now.     Pt unable to get labs this week since he is out of town and very busy with work, will get labs first thing Monday AM when he is back at home.    Follow-up:  7/12: Look for labs from [facility].    Grace Myhre, PharmD  Hematology/Oncology Clinical Pharmacist  [facility] [place]  [phone]

## 2021-07-13 ENCOUNTER — TELEPHONE (OUTPATIENT)
Dept: PHARMACY | Facility: CLINIC | Age: 51
End: 2021-07-13

## 2021-07-13 DIAGNOSIS — C83.18 MANTLE CELL LYMPHOMA OF LYMPH NODES OF MULTIPLE SITES (H): Primary | ICD-10-CM

## 2021-07-13 NOTE — ORAL ONC MGMT
"Oral Chemotherapy Monitoring Program    Subjective/Objective:  Slava Lane is a 50 year old male contacted by phone for a follow-up visit for oral chemotherapy. Slava restarted his ibrutinib therapy on the morning of the phone call and takes 2 caps (280mg) PO every day right away in the morning after waking up. Slava has been holding his ibrutinib since 6/25 due to high bilirubin levels, but his lab values from 7/12 show that his bilirubin level, at 0.8, is now within normal ranges. As such, Slava started his reduced dose ibrutinib therapy today following the instructions of Dr. Redding, who recommended therapy be held until bilirubin levels were recorded at below 1.5. Since Slava just restarted his therapy, he reports few adverse effects. He mentions having a gurgly stomach in the morning after taking his pills, but denies nausea and vomiting. The pt also experiences diarrhea that occurs around once per day in the afternoon, but does not take medication as this diarrhea does not cause him significant trouble. Rani denies new rash and changes to his appetite and fatigue levels. He reports drinking \"a butt-ton\" of water and regularly wearing a long sleeve shirt when in the sun to avoid skin damage.     Slava was informed of his 7/12 lab results during this phone call. As mentioned above, his bilirubin levels had dropped back into acceptable ranges to continue therapy. There were no other concerning results, so the pt was informed that it is safe for him to restart his ibrutinib. The pt was informed to get labs next week and said that he would get labs drawn at [facility] on July 19 at 0730. Slava had no further questions about his labs.     ORAL CHEMOTHERAPY 5/26/2021 6/6/2021 6/17/2021 6/25/2021 7/2/2021 7/8/2021 7/13/2021   Assessment Type Lab Monitoring;Monthly Follow up Chart Review Refill Lab Monitoring Incoming phone call;Lab Monitoring;Other Other Lab Monitoring;Monthly Follow up   Diagnosis Code " Non-Hodgkin Lymphoma (NHL) Non-Hodgkin Lymphoma (NHL) Non-Hodgkin Lymphoma (NHL) Non-Hodgkin Lymphoma (NHL) Non-Hodgkin Lymphoma (NHL) Non-Hodgkin Lymphoma (NHL) Non-Hodgkin Lymphoma (NHL)   Providers Dr. Miladis Redding   [facility]   Drug Name Venclexta (venetoclax) Venclexta (venetoclax) Imbruvica (ibrutinib) Imbruvica (ibrutinib) Imbruvica (ibrutinib) Imbruvica (ibrutinib) Imbruvica (ibrutinib)   Dose 400 mg 400 mg 280 mg 280 mg 280 mg 280 mg 280 mg   Current Schedule Daily Daily Daily Daily Daily Daily Daily   Cycle Details Continuous Continuous Continuous Drug on Hold Drug on Hold Drug on Hold Continuous   Start Date of Last Cycle - - - - - - 7/13/2021   Planned next cycle start date - - - - - - -   Doses missed in last 2 weeks 0 - - - - - 0   Adherence Assessment Adherent - - - - - Adherent   Adverse Effects Diarrhea - - - - - Diarrhea   Nausea - - - - - - -   Pharmacist Intervention(nausea) - - - - - - -   Intervention(s) - - - - - - -   Diarrhea Grade 1 - - - - - Grade 1   Pharmacist Intervention(diarrhea) Yes - - - - - No   Intervention(s) Patient education - - - - - -   Home BPs - - - - - - -   Any new drug interactions? No - - - - - -   Is the dose as ordered appropriate for the patient? Yes - - - - - Yes   Is the patient currently in pain? - - - - - - -   Has the patient been assessed within the past 6 months for depression? - - - - - - -   Has the patient missed any days of school, work, or other routine activity? - - - - - - -   Since the last time we talked, have you been hospitalized or used the emergency room? - - - - - - -       Last PHQ-2 Score on record: No flowsheet data found.    Vitals:  BP:   BP Readings from Last 1 Encounters:   03/26/21 124/82     Wt Readings from Last 1 Encounters:   03/26/21 77.8 kg (171 lb 8.3 oz)     Estimated body surface area  "is 1.92 meters squared as calculated from the following:    Height as of 3/26/21: 1.702 m (5' 7\").    Weight as of 3/26/21: 77.8 kg (171 lb 8.3 oz).    Labs:  No results found for NA within last 30 days.     No results found for K within last 30 days.     No results found for CA within last 30 days.     No results found for Mag within last 30 days.     No results found for Phos within last 30 days.     No results found for ALBUMIN within last 30 days.     No results found for BUN within last 30 days.     No results found for CR within last 30 days.     No results found for AST within last 30 days.     No results found for ALT within last 30 days.     No results found for BILITOTAL within last 30 days.     No results found for WBC within last 30 days.     No results found for HGB within last 30 days.     No results found for PLT within last 30 days.     No results found for ANC within last 30 days.         Assessment/Plan:  -The pt is tolerating ibrutinib therapy with manageable adverse effects. Ibrutinib therapy should be continued as planned.   -The pt's bilirubin levels are now within appropriate ranges to continue therapy according to his 7/12 lab results. The pt restarted his ibrutinib therapy on 7/13.     Follow-Up:  Jul 19 L     Refill Due:  Aug 5     [facility]  Pharmacy Intern  Oral Chemotherapy Monitoring Program  [facility]   [phone]      "

## 2021-07-20 ENCOUNTER — TELEPHONE (OUTPATIENT)
Dept: ONCOLOGY | Facility: CLINIC | Age: 51
End: 2021-07-20

## 2021-07-20 NOTE — ORAL ONC MGMT
"Oral Chemotherapy Monitoring Program    Subjective/Objective:  Slava Lane is a 50 year old male contacted by phone for a follow-up visit for oral chemotherapy. Slava verified that he is taking Venclexta 400 mg, and Imbruvica 280 mg daily. He continues to have mild diarrhea but states that it is tolerable and he can \"just deal with it\". He said he does not need to take any medications for it because it has not been that bad. He does feel nauseous a couple hours after taking his chemo but has not needed to take anything for that either. He otherwise denies any other concerns at this time.     ORAL CHEMOTHERAPY 6/25/2021 7/2/2021 7/8/2021 7/13/2021 7/13/2021 7/20/2021 7/20/2021   Assessment Type Lab Monitoring Incoming phone call;Lab Monitoring;Other Other Lab Monitoring;Monthly Follow up Refill Lab Monitoring Lab Monitoring   Diagnosis Code Non-Hodgkin Lymphoma (NHL) Non-Hodgkin Lymphoma (NHL) Non-Hodgkin Lymphoma (NHL) Non-Hodgkin Lymphoma (NHL) Non-Hodgkin Lymphoma (NHL) Non-Hodgkin Lymphoma (NHL) Non-Hodgkin Lymphoma (NHL)   Providers Dr. Miladis Redding   [facility]   Drug Name Imbruvica (ibrutinib) Imbruvica (ibrutinib) Imbruvica (ibrutinib) Imbruvica (ibrutinib) Venclexta (venetoclax) Venclexta (venetoclax) Imbruvica (ibrutinib)   Dose 280 mg 280 mg 280 mg 280 mg 400 mg 400 mg 280 mg   Current Schedule Daily Daily Daily Daily Daily Daily Daily   Cycle Details Drug on Hold Drug on Hold Drug on Hold Continuous Continuous Continuous Continuous   Start Date of Last Cycle - - - 7/13/2021 - - -   Planned next cycle start date - - - - - - -   Doses missed in last 2 weeks - - - 0 - - -   Adherence Assessment - - - Adherent - - -   Adverse Effects - - - Diarrhea - - -   Nausea - - - - - - -   Pharmacist Intervention(nausea) - - - - - - -   Intervention(s) - - - - - - - " "  Diarrhea - - - Grade 1 - - -   Pharmacist Intervention(diarrhea) - - - No - - -   Intervention(s) - - - - - - -   Home BPs - - - - - - -   Any new drug interactions? - - - - - - -   Is the dose as ordered appropriate for the patient? - - - Yes - - -   Is the patient currently in pain? - - - - - - -   Has the patient been assessed within the past 6 months for depression? - - - - - - -   Has the patient missed any days of school, work, or other routine activity? - - - - - - -   Since the last time we talked, have you been hospitalized or used the emergency room? - - - - - - -       Last PHQ-2 Score on record: No flowsheet data found.    Vitals:  BP:   BP Readings from Last 1 Encounters:   03/26/21 124/82     Wt Readings from Last 1 Encounters:   03/26/21 77.8 kg (171 lb 8.3 oz)     Estimated body surface area is 1.92 meters squared as calculated from the following:    Height as of 3/26/21: 1.702 m (5' 7\").    Weight as of 3/26/21: 77.8 kg (171 lb 8.3 oz).    Labs:  No results found for NA within last 30 days.     No results found for K within last 30 days.     No results found for CA within last 30 days.     No results found for Mag within last 30 days.     No results found for Phos within last 30 days.     No results found for ALBUMIN within last 30 days.     No results found for BUN within last 30 days.     No results found for CR within last 30 days.     No results found for AST within last 30 days.     No results found for ALT within last 30 days.     No results found for BILITOTAL within last 30 days.     No results found for WBC within last 30 days.     No results found for HGB within last 30 days.     No results found for PLT within last 30 days.     No results found for ANC within last 30 days.         Assessment/Plan:  Slava is tolerating his therapy well after restarting the Imbruvica last week. He has mild nausea and diarrhea but it is not a concern at this time. He stated he would feel comfortable " getting labs checked again next week due to fluctuating numbers.    Follow-Up:  7/26: Check for labs.    Refill Due:  8/5/2021    Sandee Bhardwaj  Pharmacy Intern  [facility]  [phone]

## 2021-08-04 ENCOUNTER — TELEPHONE (OUTPATIENT)
Dept: PHARMACY | Facility: CLINIC | Age: 51
End: 2021-08-04

## 2021-08-04 NOTE — ORAL ONC MGMT
Oral Chemotherapy Monitoring Program     Placed call to patient in follow up of oral chemotherapy. Slava received a call from Dr. Redding this morning recommending to discontinue ibrutinib indefinitely and recheck labs next Monday or Tuesday (8/9-10). Sent a quick message to Dr. Redding to confirm as I did not see that documented in the chart. Will look out for labs again on 8/9 or 8/10.      De Anthony, PharmD, MS  Hematology/Oncology Clinical Pharmacist  [facility]  [facility]

## 2021-08-10 ENCOUNTER — DOCUMENTATION ONLY (OUTPATIENT)
Dept: ONCOLOGY | Facility: CLINIC | Age: 51
End: 2021-08-10

## 2021-08-10 NOTE — PROGRESS NOTES
Due to the increase in LFT despite the frequent dose reductions in ibrutinib I will stop this and Brannon can continue only on venetoclax  I let brannon know about this change    Mark ROMERO MS  Attending Physician

## 2021-08-11 DIAGNOSIS — C83.18 MANTLE CELL LYMPHOMA OF LYMPH NODES OF MULTIPLE SITES (H): Primary | ICD-10-CM

## 2021-08-19 ENCOUNTER — VIRTUAL VISIT (OUTPATIENT)
Dept: TRANSPLANT | Facility: CLINIC | Age: 51
End: 2021-08-19
Attending: INTERNAL MEDICINE
Payer: COMMERCIAL

## 2021-08-19 DIAGNOSIS — C83.18 MANTLE CELL LYMPHOMA OF LYMPH NODES OF MULTIPLE SITES (H): Primary | ICD-10-CM

## 2021-08-19 PROCEDURE — 99215 OFFICE O/P EST HI 40 MIN: CPT | Mod: GC | Performed by: INTERNAL MEDICINE

## 2021-08-19 NOTE — PROGRESS NOTES
Slava is a 51 year old who is being evaluated via a billable video visit.      How would you like to obtain your AVS? MyChart  If the video visit is dropped, the invitation should be resent by: Text to cell phone: [phone]  Will anyone else be joining your video visit? No     ANA MARIA Baldwin      Video-Visit Details    Type of service:  Video Visit    Video Start Time: 3:35    Video End Time:4:20    Originating Location (pt. Location): Home    Distant Location (provider location):  [facility] B[facility] AND MARROW TRANSPLANT PROGRAM [place]     Platform used for Video Visit: [facility]  Virtual Clinical Visit    Date of Visit: 08/19/2021    Reason for Visit: F/u MCL     Hematologic history:  Mantle cell lymphoma.  Mantle cell lymphoma, Stage IV with colon, BM involvement Ki67 -30%, TP53 mutations - Negative.  MCL MIPI - 5.9- Intermediate   .     Date Treatment Response Toxicities/Complications   9/18/19 - 1/25/20 R-CHOP/R-DHAP x3 each SC, Progressive disease     5/21/2020 Ibrutinib/Venetoclax    Ibrutinib stopped 7/2021 d/t hyperbilirubinemia  CR after 6,9, 14 cycles GI symptoms and he doesn't like to be on meds                                Interval History:  - Video interview was conducted today with Olayinka and his wife Ya; taking over from Dr. Redding. He was instructed to stop taking his ibrutinib 2 weeks ago due to persistent hyperbilirubinemia on decreased dose. Since stopping, he feels that his back pain/abdominal discomfort have improved somewhat.   - However, still having diarrhea daily (2-3x/day, usually all in the morning) and occasional nausea.   - Also c/o pain/stiffness/knot-like sensation between his shoulder blades (R>L) first thing in the morning. Has not tried topical therapy/massage, just chiropractor. Works long hours in construction, often outdoors. Having some heat rash/sun sensitivity.   - Otherwise no fever/chills, night sweats, lumps/bumps, SOB,  chest pain, bleeding.      Current Outpatient Medications:      acyclovir (ZOVIRAX) 400 MG tablet, Take 1 tablet (400 mg) by mouth 2 times daily, Disp: 60 tablet, Rfl: 3     allopurinol (ZYLOPRIM) 300 MG tablet, Take 1 tablet (300 mg) by mouth daily, Disp: 90 tablet, Rfl: 1     bisacodyl (DULCOLAX) 5 MG EC tablet, , Disp: , Rfl:      diphenhydrAMINE (BENADRYL) 50 MG capsule, Take 50 mg by mouth every 6 hours as needed for itching or allergies, Disp: , Rfl:      lidocaine-prilocaine (EMLA) 2.5-2.5 % external cream, Apply topically as needed for moderate pain Apply to port site 30 minutes prior to port access, Disp: 30 g, Rfl: 1     loratadine (CLARITIN) 10 MG capsule, Take 10 mg by mouth daily as needed (Take 1 day before and daily for 8 days after neulasta administration), Disp: , Rfl:      omeprazole (PRILOSEC) 40 MG DR capsule, Take 1 capsule (40 mg) by mouth daily, Disp: 90 capsule, Rfl: 3     polyethylene glycol (MIRALAX) 17 GM/Dose powder, dissolve entire bottle in 64 oz of gatorade, smart water or [employer] fitness water and drink., Disp: , Rfl:      prochlorperazine (COMPAZINE) 10 MG tablet, Take 1 tablet (10 mg) by mouth every 6 hours as needed (Nausea/Vomiting), Disp: 30 tablet, Rfl: 2     sildenafil (VIAGRA) 100 MG tablet, Take 100 mg by mouth daily as needed , Disp: , Rfl:      venetoclax (VENCLEXTA) 100 MG tablet, Take 4 tablets (400 mg) by mouth daily, Disp: 112 tablet, Rfl: 1     ondansetron (ZOFRAN) 8 MG tablet, Take 1 tablet (8 mg) by mouth every 8 hours as needed for nausea or vomiting (Patient not taking: Reported on 11/18/2020), Disp: 30 tablet, Rfl: 3    Physical Exam:  **Limited given video visit**  General: appears well, no acute distress  HEENT: normocephalic, atraumatic  Resp: normal work of breathing  Psych: pleasant, normal affect     Labs: In care everywhere and Imaging reviewed       ASSESSMENT AND PLAN:  51 year old with stage IV MCL dx 7/2019. S/p induction with [place] regimen with  progressive disease within 6 months of primary therapy; hence this is primary refractory disease. He was started on ibrutinib and venetoclax regimen 5/2020 and achieved CR after 6 cycles. Most recent PET 5/2021 after 14 cycles still in CR.      He has had a lot of GI symptoms and possibly exacerbation of his back/muscle pains since being on ibrutinib/venetoclax combination. Ibrutinib was decreased to 280 mg 6/2021 and then ultimately stopped 2 weeks ago due to elevated bilirubin. Some but not all symptoms have improved since stopping. Bilirubin is back to normal though.     We discussed that there is not much data in MCL with single-agent venetoclax, although one study suggested a median [place] of only 2 months. Thus, we recommend starting a 2nd generation BTK inhibitor in place of ibrutinib, such as zanubrutinib. ALVES with zanubrutinib was 84% in the phase 2 study of previously treated MCL and there is less incidence of GI side effects, a-fib, and cytopenias compared to ibrutinib. He and his wife were very willing to try this.     Summary of plan:  - Submit zanubrutinib 160 mg BID for insurance approval  - Continue venetoclax 400 mg daily   - Repeat PET-CT next month     Patient was seen and discussed with Dr. Briggs.    Eduarda Zamora MD  Hematology-Oncology Fellow, PGY6

## 2021-08-19 NOTE — LETTER
8/19/2021         RE: Slava Lane  [address]  [place] [address]        Dear Colleague,    Thank you for referring your patient, Slava Lane, to the [facility] BLOOD AND MARROW TRANSPLANT PROGRAM [place]. Please see a copy of my visit note below.    Slava is a 51 year old who is being evaluated via a billable video visit.      How would you like to obtain your AVS? MyChart  If the video visit is dropped, the invitation should be resent by: Text to cell phone: [phone]  Will anyone else be joining your video visit? No     ANA MARIA Baldwin      Video-Visit Details    Type of service:  Video Visit    Video Start Time: 3:35    Video End Time:4:20    Originating Location (pt. Location): Home    Distant Location (provider location):  [facility] BLOOD AND MARROW TRANSPLANT PROGRAM [place]     Platform used for Video Visit: [facility]  Virtual Clinical Visit    Date of Visit: 08/19/2021    Reason for Visit: F/u MCL     Hematologic history:  Mantle cell lymphoma.  Mantle cell lymphoma, Stage IV with colon, BM involvement Ki67 -30%, TP53 mutations - Negative.  MCL MIPI - 5.9- Intermediate   .     Date Treatment Response Toxicities/Complications   9/18/19 - 1/25/20 R-CHOP/R-DHAP x3 each HI, Progressive disease     5/21/2020 Ibrutinib/Venetoclax    Ibrutinib stopped 7/2021 d/t hyperbilirubinemia  CR after 6,9, 14 cycles GI symptoms and he doesn't like to be on meds                                Interval History:  - Video interview was conducted today with Olayinka and his wife Ya; taking over from Dr. Redding. He was instructed to stop taking his ibrutinib 2 weeks ago due to persistent hyperbilirubinemia on decreased dose. Since stopping, he feels that his back pain/abdominal discomfort have improved somewhat.   - However, still having diarrhea daily (2-3x/day, usually all in the morning) and occasional nausea.   - Also c/o pain/stiffness/knot-like sensation  between his shoulder blades (R>L) first thing in the morning. Has not tried topical therapy/massage, just chiropractor. Works long hours in construction, often outdoors. Having some heat rash/sun sensitivity.   - Otherwise no fever/chills, night sweats, lumps/bumps, SOB, chest pain, bleeding.      Current Outpatient Medications:      acyclovir (ZOVIRAX) 400 MG tablet, Take 1 tablet (400 mg) by mouth 2 times daily, Disp: 60 tablet, Rfl: 3     allopurinol (ZYLOPRIM) 300 MG tablet, Take 1 tablet (300 mg) by mouth daily, Disp: 90 tablet, Rfl: 1     bisacodyl (DULCOLAX) 5 MG EC tablet, , Disp: , Rfl:      diphenhydrAMINE (BENADRYL) 50 MG capsule, Take 50 mg by mouth every 6 hours as needed for itching or allergies, Disp: , Rfl:      lidocaine-prilocaine (EMLA) 2.5-2.5 % external cream, Apply topically as needed for moderate pain Apply to port site 30 minutes prior to port access, Disp: 30 g, Rfl: 1     loratadine (CLARITIN) 10 MG capsule, Take 10 mg by mouth daily as needed (Take 1 day before and daily for 8 days after neulasta administration), Disp: , Rfl:      omeprazole (PRILOSEC) 40 MG DR capsule, Take 1 capsule (40 mg) by mouth daily, Disp: 90 capsule, Rfl: 3     polyethylene glycol (MIRALAX) 17 GM/Dose powder, dissolve entire bottle in 64 oz of gatorade, smart water or [employer]el fitness water and drink., Disp: , Rfl:      prochlorperazine (COMPAZINE) 10 MG tablet, Take 1 tablet (10 mg) by mouth every 6 hours as needed (Nausea/Vomiting), Disp: 30 tablet, Rfl: 2     sildenafil (VIAGRA) 100 MG tablet, Take 100 mg by mouth daily as needed , Disp: , Rfl:      venetoclax (VENCLEXTA) 100 MG tablet, Take 4 tablets (400 mg) by mouth daily, Disp: 112 tablet, Rfl: 1     ondansetron (ZOFRAN) 8 MG tablet, Take 1 tablet (8 mg) by mouth every 8 hours as needed for nausea or vomiting (Patient not taking: Reported on 11/18/2020), Disp: 30 tablet, Rfl: 3    Physical Exam:  **Limited given video visit**  General: appears well, no acute  distress  HEENT: normocephalic, atraumatic  Resp: normal work of breathing  Psych: pleasant, normal affect     Labs: In care everywhere and Imaging reviewed       ASSESSMENT AND PLAN:  51 year old with stage IV MCL dx 7/2019. S/p induction with [place] regimen with progressive disease within 6 months of primary therapy; hence this is primary refractory disease. He was started on ibrutinib and venetoclax regimen 5/2020 and achieved CR after 6 cycles. Most recent PET 5/2021 after 14 cycles still in CR.      He has had a lot of GI symptoms and possibly exacerbation of his back/muscle pains since being on ibrutinib/venetoclax combination. Ibrutinib was decreased to 280 mg 6/2021 and then ultimately stopped 2 weeks ago due to elevated bilirubin. Some but not all symptoms have improved since stopping. Bilirubin is back to normal though.     We discussed that there is not much data in MCL with single-agent venetoclax, although one study suggested a median A[place]A of only 2 months. Thus, we recommend starting a 2nd generation BTK inhibitor in place of ibrutinib, such as zanubrutinib. ALVES with zanubrutinib was 84% in the phase 2 study of previously treated MCL and there is less incidence of GI side effects, a-fib, and cytopenias compared to ibrutinib. He and his wife were very willing to try this.     Summary of plan:  - Submit zanubrutinib 160 mg BID for insurance approval  - Continue venetoclax 400 mg daily   - Repeat PET-CT next month     Patient was seen and discussed with Dr. Briggs.    Eduarda Zamora MD  Hematology-Oncology Fellow, PGY6          Attestation signed by Nicholas Briggs MD at 8/20/2021  7:54 PM:  I, Nicholas Briggs MD, have personally seen this patient independently of the fellow, Dr. Zamora. I have personally reviewed vital signs, medications, labs, imaging, and hospital course. I agree with their findings and plan of care as documented in the note with the following additions/exceptions:    Key  findings:  Relapsed MCL with BM and GI involvement, now on waldo/Ibr with poor tolerance to Ibr. Feels well. Has some diarrhea. Will replace with zanubritinib. Discussed r/b/a. He was on board. We will scan in about 4 weeks. He has some back pain and we will mail him some PT exercises.    Nicholas Briggs MD  Date of Service (when I saw the patient): 08/20/2021     40 minutes spent on the date of the encounter doing chart review, interpretation of results, patient visit, documentation and coordination of care.        Again, thank you for allowing me to participate in the care of your patient.        Sincerely,        Nicholas Briggs MD

## 2021-08-23 ENCOUNTER — TELEPHONE (OUTPATIENT)
Dept: ONCOLOGY | Facility: CLINIC | Age: 51
End: 2021-08-23

## 2021-08-23 DIAGNOSIS — C83.18 MANTLE CELL LYMPHOMA OF LYMPH NODES OF MULTIPLE SITES (H): Primary | ICD-10-CM

## 2021-08-23 NOTE — TELEPHONE ENCOUNTER
PA Initiation    Medication: Brukinsa  Insurance Company: [employer] [place] - Phone [phone] Fax [phone]  Pharmacy Filling the Rx: [facility] - [place], [place] - [facility] [address]  Filling Pharmacy Phone:    Filling Pharmacy Fax:    Start Date: 8/23/2021

## 2021-08-23 NOTE — TELEPHONE ENCOUNTER
Prior Authorization Approval    Authorization Effective Date: 8/23/2021  Authorization Expiration Date: 8/23/2022  Medication: Brukinsa-approved  Approved Dose/Quantity: #120 / 30 ds  Reference #: Key: [id number]   Insurance Company: [employer] [place] - Phone [phone] Fax [phone]  Expected CoPay:       CoPay Card Available:      Foundation Assistance Needed:    Which Pharmacy is filling the prescription (Not needed for infusion/clinic administered): [facility] - [place], [place] - [address]  Pharmacy Notified: No  Patient Notified: No

## 2021-08-24 ENCOUNTER — PATIENT OUTREACH (OUTPATIENT)
Dept: ONCOLOGY | Facility: CLINIC | Age: 51
End: 2021-08-24

## 2021-08-24 NOTE — PROGRESS NOTES
OUTBOUND CALL: RNCC called patient  RNCC introduced self as RNCC caring for Dr. Briggs's patient. Calling to discuss recent visit, upcoming labs, and upcoming PET.  Pt lives over 200 miles away. Would like PET and appt on the same day if possible.  Pt states that after last PET he broke out in hives after PET scan that became blisters. RNCC to reach out to MD about premeds prior to PET  Pt would also like COVID antibody test sent to local clinic so he can have test done there.   In regards to new oral chemo- oral chemo team will be in contact with patient about education, logistics, etc.      *RNCC successfully faxed COVID Antibody test to [facility] [phone] at 0925    RNCC called patient back at 0940. PET scan rescheduled to 0900 (0830 check in) on 9/22/21 at [facility]. [facility] will mail direction to them   Pt would like [facility] [employer] message sent to him regarding plan for premedication for PET. Patient verbalizes understanding of POC and has no other questions or concerns at this time.     Patricia Little, RN, MSN, OCN   RN Care Coordinator   [facility] [address] [address] [phone]

## 2021-09-01 ENCOUNTER — TELEPHONE (OUTPATIENT)
Dept: PHARMACY | Facility: CLINIC | Age: 51
End: 2021-09-01

## 2021-09-01 ENCOUNTER — TELEPHONE (OUTPATIENT)
Dept: ONCOLOGY | Facility: CLINIC | Age: 51
End: 2021-09-01

## 2021-09-01 DIAGNOSIS — C83.18 MANTLE CELL LYMPHOMA OF LYMPH NODES OF MULTIPLE SITES (H): Primary | ICD-10-CM

## 2021-09-01 NOTE — TELEPHONE ENCOUNTER
Oral Chemotherapy Monitoring Program    Lab Monitoring Plan  CBC/CMP monthly  Labs drawn outside of [place]: Yes, [place]  Subjective/Objective:  Slava Lane is a 51 year old male contacted by phone for an initial visit for oral chemotherapy education.  Pt is starting Brukinsa 320mg daily.  Pt is continuing on Venclexta 400mg daily.    ORAL CHEMOTHERAPY 7/13/2021 7/13/2021 7/20/2021 7/20/2021 8/4/2021 9/1/2021 9/1/2021   Assessment Type Lab Monitoring;Monthly Follow up Refill Lab Monitoring Lab Monitoring Incoming phone call Left Voicemail New Teach   Diagnosis Code Non-Hodgkin Lymphoma (NHL) Non-Hodgkin Lymphoma (NHL) Non-Hodgkin Lymphoma (NHL) Non-Hodgkin Lymphoma (NHL) Non-Hodgkin Lymphoma (NHL) Non-Hodgkin Lymphoma (NHL) Non-Hodgkin Lymphoma (NHL)   Providers Dr. Miladis Redding   [facility]   Drug Name Imbruvica (ibrutinib) Venclexta (venetoclax) Venclexta (venetoclax) Imbruvica (ibrutinib) Imbruvica (ibrutinib) Brukinsa (zanubrutinib) Brukinsa (zanubrutinib)   Dose 280 mg 400 mg 400 mg 280 mg 280 mg 320 mg 320 mg   Current Schedule Daily Daily Daily Daily Daily Daily Daily   Cycle Details Continuous Continuous Continuous Continuous Drug on Hold Continuous Continuous   Start Date of Last Cycle 7/13/2021 - - - - - -   Planned next cycle start date - - - - - - -   Doses missed in last 2 weeks 0 - - - - - -   Adherence Assessment Adherent - - - - - -   Adverse Effects Diarrhea - - - - - -   Nausea - - - - - - -   Pharmacist Intervention(nausea) - - - - - - -   Intervention(s) - - - - - - -   Diarrhea Grade 1 - - - - - -   Pharmacist Intervention(diarrhea) No - - - - - -   Intervention(s) - - - - - - -   Home BPs - - - - - - -   Any new drug interactions? - - - - - - No   Is the dose as ordered appropriate for the patient? Yes - - - - - Yes   Is the patient currently in  "pain? - - - - - - -   Has the patient been assessed within the past 6 months for depression? - - - - - - -   Has the patient missed any days of school, work, or other routine activity? - - - - - - -   Since the last time we talked, have you been hospitalized or used the emergency room? - - - - - - No       Last PHQ-2 Score on record: No flowsheet data found.    Vitals:  BP:   BP Readings from Last 1 Encounters:   03/26/21 124/82     Wt Readings from Last 1 Encounters:   03/26/21 77.8 kg (171 lb 8.3 oz)     Estimated body surface area is 1.92 meters squared as calculated from the following:    Height as of 3/26/21: 1.702 m (5' 7\").    Weight as of 3/26/21: 77.8 kg (171 lb 8.3 oz).    Labs:  No results found for NA within last 30 days.     No results found for K within last 30 days.     No results found for CA within last 30 days.     No results found for Mag within last 30 days.     No results found for Phos within last 30 days.     No results found for ALBUMIN within last 30 days.     No results found for BUN within last 30 days.     No results found for CR within last 30 days.     No results found for AST within last 30 days.     No results found for ALT within last 30 days.     No results found for BILITOTAL within last 30 days.     No results found for WBC within last 30 days.     No results found for HGB within last 30 days.     No results found for PLT within last 30 days.     No results found for ANC within last 30 days.       Assessment:  Patient is appropriate to start therapy.    Plan:  Basic chemotherapy teaching was reviewed with the patient including indication, start date of therapy, dose, administration, adverse effects, missed doses, food and drug interactions, monitoring, side effect management, office contact information, and safe handling. Written materials were provided and all questions answered.    Follow-Up:  1 week following start of therapy     Grace Myhre, PharmD  Hematology/Oncology " Pharmacist  [facility]  [phone]

## 2021-09-01 NOTE — ORAL ONC MGMT
Oral Chemotherapy Monitoring Program     Placed call to patient in follow up of oral chemotherapy. Left message requesting call back. No drug names were mentioned. Will update when response received.     De Anthony, PharmD, MS  Hematology/Oncology Clinical Pharmacist  [facility]  [facility]

## 2021-09-14 ENCOUNTER — TELEPHONE (OUTPATIENT)
Dept: ONCOLOGY | Facility: CLINIC | Age: 51
End: 2021-09-14

## 2021-09-14 NOTE — ORAL ONC MGMT
Oral Chemotherapy Monitoring Program    Slava returned my phone call today and stated he started taking his Brukinsa on 9/11. He states he has taken 3 days worth of it. I told him we were calling to do an assessment of this therapy but we like for a patient to have been on it for at least a week to be able to do a good assessment. Patient understood and stated he had issues getting the medication. I told patient we could either give him a call back on Friday or Monday. Slava is requesting we call him back on Monday 9/20.     Slava did state he is experiencing the same side effects he had while he was on the Imbruvica and that since starting the Brukinsa he has had knots in his back/shoulder blades. He isn't taking anything for this but states he just has to get this area of his body moving/exercising to make it feel better. He states he is awakened at least 2 times each night by the discomfort. I told him I would report this to Dr. Briggs.    Phyllis Noonan, Pharm.D., [facility]  [phone]  09/14/21

## 2021-09-14 NOTE — ORAL ONC MGMT
Oral Chemotherapy Monitoring Program     Placed call to patient in follow up of Brukinsa recently starting to do initial assessment and for Venclexta to do monthly assessment.     Left message to please call back in follow-up of therapy. No patient or drug names were mentioned.     Phyllis Noonan, Pharm.D., [facility]  [phone]  09/14/21

## 2021-09-19 ENCOUNTER — HEALTH MAINTENANCE LETTER (OUTPATIENT)
Age: 51
End: 2021-09-19

## 2021-09-20 ENCOUNTER — TELEPHONE (OUTPATIENT)
Dept: ONCOLOGY | Facility: CLINIC | Age: 51
End: 2021-09-20

## 2021-09-20 NOTE — ORAL ONC MGMT
Oral Chemotherapy Monitoring Program    Subjective/Objective:  Slava Lane is a 51 year old male contacted by phone for a follow-up visit for oral chemotherapy.  Slava confirms taking the appropriate dose of zanabrutinib and ventoclax. Denies new or worsening side effects, missed doses, and recent hospital or ED visits. Patient has not had any recent medication changes.       ORAL CHEMOTHERAPY 9/1/2021 9/14/2021 9/14/2021 9/14/2021 9/14/2021 9/20/2021 9/20/2021   Assessment Type New Teach Left Voicemail Left Voicemail Incoming phone call Incoming phone call Initial Follow up Initial Follow up   Diagnosis Code Non-Hodgkin Lymphoma (NHL) Non-Hodgkin Lymphoma (NHL) Non-Hodgkin Lymphoma (NHL) Non-Hodgkin Lymphoma (NHL) Non-Hodgkin Lymphoma (NHL) Non-Hodgkin Lymphoma (NHL) Non-Hodgkin Lymphoma (NHL)   Providers Dr. Miladis Briggs   [facility]   Drug Name Brukinsa (zanubrutinib) Brukinsa (zanubrutinib) Venclexta (venetoclax) Venclexta (venetoclax) Brukinsa (zanubrutinib) Brukinsa (zanubrutinib) Venclexta (venetoclax)   Dose 320 mg 320 mg 400 mg 400 mg 320 mg 320 mg 400 mg   Current Schedule Daily Daily Daily Daily Daily Daily Daily   Cycle Details Continuous Continuous Continuous Continuous Continuous Continuous Continuous   Start Date of Last Cycle - - - - 9/11/2021 9/11/2021 9/11/2021   Planned next cycle start date - - - - - 10/10/2021 10/10/2021   Doses missed in last 2 weeks - - - - - 0 0   Adherence Assessment - - - - - Adherent Adherent   Adverse Effects - - - - - Diarrhea Diarrhea   Nausea - - - - - - -   Pharmacist Intervention(nausea) - - - - - - -   Intervention(s) - - - - - - -   Diarrhea - - - - - Grade 1 Grade 1   Pharmacist Intervention(diarrhea) - - - - - No No   Intervention(s) - - - - - - -   Home BPs - - - - - - -   Any new drug interactions? No - - - - No No   Is  "the dose as ordered appropriate for the patient? Yes - - - - Yes Yes   Is the patient currently in pain? - - - - - - -   Has the patient been assessed within the past 6 months for depression? - - - - - - -   Has the patient missed any days of school, work, or other routine activity? - - - - - - -   Since the last time we talked, have you been hospitalized or used the emergency room? No - - - - No No       Last PHQ-2 Score on record: No flowsheet data found.    Vitals:  BP:   BP Readings from Last 1 Encounters:   03/26/21 124/82     Wt Readings from Last 1 Encounters:   03/26/21 77.8 kg (171 lb 8.3 oz)     Estimated body surface area is 1.92 meters squared as calculated from the following:    Height as of 3/26/21: 1.702 m (5' 7\").    Weight as of 3/26/21: 77.8 kg (171 lb 8.3 oz).    Assessment/Plan:  No new or worse side effects. Diarrhea remains at baseline, about 1 episode daily and denies needing loperamide. Continue plan of care.     Updated [place] plan: change Venclexta from 112 tabs for 28 day supply to 120 tabs for 30 day supply to align with Burkinsa 30 day supply.     Follow-Up:  9/22: review appointment and labs    Refill Due:  Cycle 2 start 10/11    Presley Jordan, PharmD  Hematology/Oncology Clinical Pharmacist  [facility]  [facility]  [phone]      "

## 2021-09-21 NOTE — PROGRESS NOTES
"[facility]  In Vivo Visit    Date of Visit: 09/21/2021    Reason for Visit: F/u MCL     Hematologic history:  Mantle cell lymphoma.  Mantle cell lymphoma, Stage IV with colon, BM involvement Ki67 -30%, TP53 mutations - Negative.  MCL MIPI - 5.9- Intermediate   .     Date Treatment Response Toxicities/Complications   9/18/19 - 1/25/20 R-CHOP/R-DHAP x3 each [place], Progressive disease     5/21/2020 Ibrutinib/Venetoclax    Ibrutinib stopped 7/2021 d/t hyperbilirubinemia  CR after 6,9, 14 cycles GI symptoms and he doesn't like to be on meds      Added zanabrutinib                        Interval History:  Received Zanubrutinib about a month ago. He reported a mild headache, sinus stuffiness and ear fullness. This has started improving since about a week ago. He also reported some stomach \"upsetness\" about 2 hours after his dose. He generally takes it after coffee. No nausea or vomiting. No lumps or bumps. He still has significant back pain in his upper back that is characterized as morning stiffness that goes away later during the day. He reports that he has been working night shifts lately and reported lack of good sleep. His wife reported jerky movements during sleep and also sleep walking/talking.   No other complaints.      Current Outpatient Medications:      acyclovir (ZOVIRAX) 400 MG tablet, Take 1 tablet (400 mg) by mouth 2 times daily, Disp: 60 tablet, Rfl: 3     allopurinol (ZYLOPRIM) 300 MG tablet, Take 1 tablet (300 mg) by mouth daily, Disp: 90 tablet, Rfl: 1     bisacodyl (DULCOLAX) 5 MG EC tablet, , Disp: , Rfl:      diphenhydrAMINE (BENADRYL) 50 MG capsule, Take 50 mg by mouth every 6 hours as needed for itching or allergies, Disp: , Rfl:      lidocaine-prilocaine (EMLA) 2.5-2.5 % external cream, Apply topically as needed for moderate pain Apply to port site 30 minutes prior to port access, Disp: 30 g, Rfl: 1     loratadine (CLARITIN) 10 MG capsule, Take 10 mg by mouth daily as needed (Take 1 day " "before and daily for 8 days after neulasta administration), Disp: , Rfl:      omeprazole (PRILOSEC) 40 MG DR capsule, Take 1 capsule (40 mg) by mouth daily, Disp: 90 capsule, Rfl: 3     ondansetron (ZOFRAN) 8 MG tablet, Take 1 tablet (8 mg) by mouth every 8 hours as needed for nausea or vomiting (Patient not taking: Reported on 2020), Disp: 30 tablet, Rfl: 3     polyethylene glycol (MIRALAX) 17 GM/Dose powder, dissolve entire bottle in 64 oz of gatorade, smart water or [employer] water and drink., Disp: , Rfl:      prochlorperazine (COMPAZINE) 10 MG tablet, Take 1 tablet (10 mg) by mouth every 6 hours as needed (Nausea/Vomiting), Disp: 30 tablet, Rfl: 2     sildenafil (VIAGRA) 100 MG tablet, Take 100 mg by mouth daily as needed , Disp: , Rfl:      venetoclax (VENCLEXTA) 100 MG tablet, Take 4 tablets (400 mg) by mouth daily, Disp: 112 tablet, Rfl: 1     zanubrutinib (BRUKINSA) 80 MG capsule, Take 4 capsules (320 mg) by mouth daily, Disp: 120 capsule, Rfl: 0    Physical Exam:  /78 (BP Location: Right arm, Patient Position: Sitting, Cuff Size: Adult Regular)   Pulse 68   Temp 97.5  F (36.4  C) (Oral)   Resp 16   Ht 1.702 m (5' 7.01\")   Wt 80.1 kg (176 lb 8 oz)   SpO2 99%   BMI 27.64 kg/m    GA: NAD. Appears as stated age.  HEENT: PERRL, OP Clear  Neck: Supple, no JVD  CV: RRR, no mrg  Lungs: CTAB, no wheezes  Abd: Soft, nt, nd  Lymph: No cervical LAD, no HSM  Ext: No edema. Warm  Neuro: AAO, moving all ext. Symmetric face  Psyc: Appropriate and cooperative  MSK: back tenderness over paraspinal muscles  Access: port    ECO      Labs: In care everywhere and Imaging reviewed  Reviewed PET. CR with mild FDG avidity in the thyroid.       ASSESSMENT AND PLAN:  51 year old with stage IV MCL dx 2019. S/p induction with [place] regimen with progressive disease within 6 months of primary therapy; hence this is primary refractory disease. He was started on ibrutinib and venetoclax regimen 2020 and " achieved CR after 6 cycles.     relapsed MCL  His PET today shows continued CR and we will continue with this plan as outlined. His counts and kidney and liver function are all normal and he doesn't need any dose adjustments. We will continue with this combination until 5/2022 (or progression). After that, we will discuss holding the venetoclax.    Radiographic thyroiditis:  - I checked a TSH that came back shortly after the appointment as normal. He has no symptoms so we will continue to pay attention on subsequent studies but no other interventions.    Nicotine dependence  - He smokes and chews tobacco and we discussed this for about 2-3 minutes, impact on care, impact of back pain, impact of overall health. He has tried chantix before and has vivid dreams. He plan on cutting down.    Sleep walking/talking  - Him and his wife attributed it to night shift. I stopped his loratadine and compazine.    Back pain  - Seems to be MSK in nature. I referred him to PT and encouraged him to visit at least once    Will need port removed at this time.    40 minutes spent on the date of the encounter doing chart review, interpretation of results, patient visit, documentation and coordination of care.

## 2021-09-22 ENCOUNTER — OFFICE VISIT (OUTPATIENT)
Dept: TRANSPLANT | Facility: CLINIC | Age: 51
End: 2021-09-22
Attending: INTERNAL MEDICINE
Payer: COMMERCIAL

## 2021-09-22 ENCOUNTER — APPOINTMENT (OUTPATIENT)
Dept: LAB | Facility: CLINIC | Age: 51
End: 2021-09-22
Attending: INTERNAL MEDICINE
Payer: COMMERCIAL

## 2021-09-22 ENCOUNTER — PATIENT OUTREACH (OUTPATIENT)
Dept: ONCOLOGY | Facility: CLINIC | Age: 51
End: 2021-09-22

## 2021-09-22 ENCOUNTER — ANCILLARY PROCEDURE (OUTPATIENT)
Dept: PET IMAGING | Facility: CLINIC | Age: 51
End: 2021-09-22
Attending: INTERNAL MEDICINE
Payer: COMMERCIAL

## 2021-09-22 VITALS
RESPIRATION RATE: 16 BRPM | SYSTOLIC BLOOD PRESSURE: 128 MMHG | TEMPERATURE: 97.5 F | BODY MASS INDEX: 27.7 KG/M2 | DIASTOLIC BLOOD PRESSURE: 78 MMHG | HEIGHT: 67 IN | WEIGHT: 176.5 LBS | OXYGEN SATURATION: 99 % | HEART RATE: 68 BPM

## 2021-09-22 DIAGNOSIS — C83.18 MANTLE CELL LYMPHOMA OF LYMPH NODES OF MULTIPLE SITES (H): Primary | ICD-10-CM

## 2021-09-22 DIAGNOSIS — C83.18 MANTLE CELL LYMPHOMA OF LYMPH NODES OF MULTIPLE SITES (H): ICD-10-CM

## 2021-09-22 LAB
ALBUMIN SERPL-MCNC: 3.6 G/DL (ref 3.4–5)
ALP SERPL-CCNC: 68 U/L (ref 40–150)
ALT SERPL W P-5'-P-CCNC: 28 U/L (ref 0–70)
ANION GAP SERPL CALCULATED.3IONS-SCNC: 4 MMOL/L (ref 3–14)
AST SERPL W P-5'-P-CCNC: 21 U/L (ref 0–45)
BASOPHILS # BLD AUTO: 0 10E3/UL (ref 0–0.2)
BASOPHILS NFR BLD AUTO: 1 %
BILIRUB SERPL-MCNC: 0.8 MG/DL (ref 0.2–1.3)
BUN SERPL-MCNC: 18 MG/DL (ref 7–30)
CALCIUM SERPL-MCNC: 8.8 MG/DL (ref 8.5–10.1)
CHLORIDE BLD-SCNC: 108 MMOL/L (ref 94–109)
CO2 SERPL-SCNC: 28 MMOL/L (ref 20–32)
CREAT SERPL-MCNC: 0.91 MG/DL (ref 0.66–1.25)
EOSINOPHIL # BLD AUTO: 0 10E3/UL (ref 0–0.7)
EOSINOPHIL NFR BLD AUTO: 0 %
ERYTHROCYTE [DISTWIDTH] IN BLOOD BY AUTOMATED COUNT: 14 % (ref 10–15)
GFR SERPL CREATININE-BSD FRML MDRD: >90 ML/MIN/1.73M2
GLUCOSE BLD-MCNC: 79 MG/DL (ref 70–99)
GLUCOSE SERPL-MCNC: 86 MG/DL (ref 70–99)
HCT VFR BLD AUTO: 45 % (ref 40–53)
HGB BLD-MCNC: 15.4 G/DL (ref 13.3–17.7)
IMM GRANULOCYTES # BLD: 0 10E3/UL
IMM GRANULOCYTES NFR BLD: 0 %
LDH SERPL L TO P-CCNC: 142 U/L (ref 85–227)
LYMPHOCYTES # BLD AUTO: 1.5 10E3/UL (ref 0.8–5.3)
LYMPHOCYTES NFR BLD AUTO: 33 %
MCH RBC QN AUTO: 33.5 PG (ref 26.5–33)
MCHC RBC AUTO-ENTMCNC: 34.2 G/DL (ref 31.5–36.5)
MCV RBC AUTO: 98 FL (ref 78–100)
MONOCYTES # BLD AUTO: 0.5 10E3/UL (ref 0–1.3)
MONOCYTES NFR BLD AUTO: 12 %
NEUTROPHILS # BLD AUTO: 2.5 10E3/UL (ref 1.6–8.3)
NEUTROPHILS NFR BLD AUTO: 54 %
NRBC # BLD AUTO: 0 10E3/UL
NRBC BLD AUTO-RTO: 0 /100
PHOSPHATE SERPL-MCNC: 2.8 MG/DL (ref 2.5–4.5)
PLATELET # BLD AUTO: 147 10E3/UL (ref 150–450)
POTASSIUM BLD-SCNC: 4 MMOL/L (ref 3.4–5.3)
PROT SERPL-MCNC: 6.8 G/DL (ref 6.8–8.8)
RBC # BLD AUTO: 4.6 10E6/UL (ref 4.4–5.9)
SODIUM SERPL-SCNC: 140 MMOL/L (ref 133–144)
TSH SERPL DL<=0.005 MIU/L-ACNC: 0.74 MU/L (ref 0.4–4)
WBC # BLD AUTO: 4.5 10E3/UL (ref 4–11)

## 2021-09-22 PROCEDURE — 83615 LACTATE (LD) (LDH) ENZYME: CPT | Performed by: INTERNAL MEDICINE

## 2021-09-22 PROCEDURE — 250N000011 HC RX IP 250 OP 636: Performed by: INTERNAL MEDICINE

## 2021-09-22 PROCEDURE — 84443 ASSAY THYROID STIM HORMONE: CPT | Performed by: INTERNAL MEDICINE

## 2021-09-22 PROCEDURE — 84100 ASSAY OF PHOSPHORUS: CPT | Performed by: INTERNAL MEDICINE

## 2021-09-22 PROCEDURE — G0463 HOSPITAL OUTPT CLINIC VISIT: HCPCS

## 2021-09-22 PROCEDURE — 36591 DRAW BLOOD OFF VENOUS DEVICE: CPT | Performed by: INTERNAL MEDICINE

## 2021-09-22 PROCEDURE — 85025 COMPLETE CBC W/AUTO DIFF WBC: CPT | Performed by: INTERNAL MEDICINE

## 2021-09-22 PROCEDURE — 99215 OFFICE O/P EST HI 40 MIN: CPT | Mod: 25 | Performed by: INTERNAL MEDICINE

## 2021-09-22 PROCEDURE — 80053 COMPREHEN METABOLIC PANEL: CPT | Performed by: INTERNAL MEDICINE

## 2021-09-22 RX ORDER — HEPARIN SODIUM (PORCINE) LOCK FLUSH IV SOLN 100 UNIT/ML 100 UNIT/ML
5 SOLUTION INTRAVENOUS ONCE
Status: COMPLETED | OUTPATIENT
Start: 2021-09-22 | End: 2021-09-22

## 2021-09-22 RX ADMIN — Medication 5 ML: at 12:27

## 2021-09-22 ASSESSMENT — MIFFLIN-ST. JEOR: SCORE: 1604.35

## 2021-09-22 ASSESSMENT — PAIN SCALES - GENERAL: PAINLEVEL: NO PAIN (0)

## 2021-09-22 NOTE — PROGRESS NOTES
Face-To-Face Visit With Patient:    RNCC met with patient and wife Ya. Introduced self as RNCC working with Dr. Briggs's patients. Gave them contact info for RNCC. Also gave them exercises form [employer] for back stretches and strengthening. Pt is appreciative of care and has contact info for RNCC in the future.     Patricia Little, RN, MSN, OCN   RN Care Coordinator   [facility] [address] [address]   [phone]

## 2021-09-22 NOTE — NURSING NOTE
"Oncology Rooming Note    September 22, 2021 12:54 PM   Slava Lane is a 51 year old male who presents for:    Chief Complaint   Patient presents with     Port Draw     Labs drawn from previously accessed port. VS taken.      Oncology Clinic Visit     [facility] RETURN - MATLE CELL LYMPHOMA      Initial Vitals: /78 (BP Location: Right arm, Patient Position: Sitting, Cuff Size: Adult Regular)   Pulse 68   Temp 97.5  F (36.4  C) (Oral)   Resp 16   Ht 1.702 m (5' 7.01\")   Wt 80.1 kg (176 lb 8 oz)   SpO2 99%   BMI 27.64 kg/m   Estimated body mass index is 27.64 kg/m  as calculated from the following:    Height as of this encounter: 1.702 m (5' 7.01\").    Weight as of this encounter: 80.1 kg (176 lb 8 oz). Body surface area is 1.95 meters squared.  No Pain (0) Comment: Data Unavailable   No LMP for male patient.  Allergies reviewed: Yes  Medications reviewed: Yes    Medications: Medication refills not needed today.  Pharmacy name entered into [facility]:    [facility] - [place], [place] - [address] [place] [address] [address]  [facility] - [place], [place] - [address]    Clinical concerns: No new concerns. Brigitte was notified.      Floyd Walker LPN            "

## 2021-09-22 NOTE — LETTER
"    9/22/2021         RE: Slava Lane  [address]  [place] [address]        Dear Colleague,    Thank you for referring your patient, Slava Lane, to the [facility] [place]. Please see a copy of my visit note below.    [facility]  In Vivo Visit    Date of Visit: 09/21/2021    Reason for Visit: F/u MCL     Hematologic history:  Mantle cell lymphoma.  Mantle cell lymphoma, Stage IV with colon, BM involvement Ki67 -30%, TP53 mutations - Negative.  MCL MIPI - 5.9- Intermediate   .     Date Treatment Response Toxicities/Complications   9/18/19 - 1/25/20 R-CHOP/R-DHAP x3 each CT, Progressive disease     5/21/2020 Ibrutinib/Venetoclax    Ibrutinib stopped 7/2021 d/t hyperbilirubinemia  CR after 6,9, 14 cycles GI symptoms and he doesn't like to be on meds      Added zanabrutinib                        Interval History:  Received Zanubrutinib about a month ago. He reported a mild headache, sinus stuffiness and ear fullness. This has started improving since about a week ago. He also reported some stomach \"upsetness\" about 2 hours after his dose. He generally takes it after coffee. No nausea or vomiting. No lumps or bumps. He still has significant back pain in his upper back that is characterized as morning stiffness that goes away later during the day. He reports that he has been working night shifts lately and reported lack of good sleep. His wife reported jerky movements during sleep and also sleep walking/talking.   No other complaints.      Current Outpatient Medications:      acyclovir (ZOVIRAX) 400 MG tablet, Take 1 tablet (400 mg) by mouth 2 times daily, Disp: 60 tablet, Rfl: 3     allopurinol (ZYLOPRIM) 300 MG tablet, Take 1 tablet (300 mg) by mouth daily, Disp: 90 tablet, Rfl: 1     bisacodyl (DULCOLAX) 5 MG EC tablet, , Disp: , Rfl:      diphenhydrAMINE (BENADRYL) 50 MG capsule, Take 50 mg by mouth every 6 hours as needed for itching or allergies, Disp: , " "Rfl:      lidocaine-prilocaine (EMLA) 2.5-2.5 % external cream, Apply topically as needed for moderate pain Apply to port site 30 minutes prior to port access, Disp: 30 g, Rfl: 1     loratadine (CLARITIN) 10 MG capsule, Take 10 mg by mouth daily as needed (Take 1 day before and daily for 8 days after neulasta administration), Disp: , Rfl:      omeprazole (PRILOSEC) 40 MG DR capsule, Take 1 capsule (40 mg) by mouth daily, Disp: 90 capsule, Rfl: 3     ondansetron (ZOFRAN) 8 MG tablet, Take 1 tablet (8 mg) by mouth every 8 hours as needed for nausea or vomiting (Patient not taking: Reported on 2020), Disp: 30 tablet, Rfl: 3     polyethylene glycol (MIRALAX) 17 GM/Dose powder, dissolve entire bottle in 64 oz of gatorade, smart water or [employer] water and drink., Disp: , Rfl:      prochlorperazine (COMPAZINE) 10 MG tablet, Take 1 tablet (10 mg) by mouth every 6 hours as needed (Nausea/Vomiting), Disp: 30 tablet, Rfl: 2     sildenafil (VIAGRA) 100 MG tablet, Take 100 mg by mouth daily as needed , Disp: , Rfl:      venetoclax (VENCLEXTA) 100 MG tablet, Take 4 tablets (400 mg) by mouth daily, Disp: 112 tablet, Rfl: 1     zanubrutinib (BRUKINSA) 80 MG capsule, Take 4 capsules (320 mg) by mouth daily, Disp: 120 capsule, Rfl: 0    Physical Exam:  /78 (BP Location: Right arm, Patient Position: Sitting, Cuff Size: Adult Regular)   Pulse 68   Temp 97.5  F (36.4  C) (Oral)   Resp 16   Ht 1.702 m (5' 7.01\")   Wt 80.1 kg (176 lb 8 oz)   SpO2 99%   BMI 27.64 kg/m    GA: NAD. Appears as stated age.  HEENT: PERRL, OP Clear  Neck: Supple, no JVD  CV: RRR, no mrg  Lungs: CTAB, no wheezes  Abd: Soft, nt, nd  Lymph: No cervical LAD, no HSM  Ext: No edema. Warm  Neuro: AAO, moving all ext. Symmetric face  Psyc: Appropriate and cooperative  MSK: back tenderness over paraspinal muscles  Access: port    ECO      Labs: In care everywhere and Imaging reviewed  Reviewed PET. CR with mild FDG avidity in the " thyroid.       ASSESSMENT AND PLAN:  51 year old with stage IV MCL dx 7/2019. S/p induction with [place] regimen with progressive disease within 6 months of primary therapy; hence this is primary refractory disease. He was started on ibrutinib and venetoclax regimen 5/2020 and achieved CR after 6 cycles.     relapsed MCL  His PET today shows continued CR and we will continue with this plan as outlined. His counts and kidney and liver function are all normal and he doesn't need any dose adjustments. We will continue with this combination until 5/2022 (or progression). After that, we will discuss holding the venetoclax.    Radiographic thyroiditis:  - I checked a TSH that came back shortly after the appointment as normal. He has no symptoms so we will continue to pay attention on subsequent studies but no other interventions.    Nicotine dependence  - He smokes and chews tobacco and we discussed this for about 2-3 minutes, impact on care, impact of back pain, impact of overall health. He has tried chantix before and has vivid dreams. He plan on cutting down.    Sleep walking/talking  - Him and his wife attributed it to night shift. I stopped his loratadine and compazine.    Back pain  - Seems to be MSK in nature. I referred him to PT and encouraged him to visit at least once    Will need port removed at this time.    40 minutes spent on the date of the encounter doing chart review, interpretation of results, patient visit, documentation and coordination of care.        Again, thank you for allowing me to participate in the care of your patient.        Sincerely,        Nicholas Briggs MD

## 2021-09-22 NOTE — NURSING NOTE
Chief Complaint   Patient presents with     Port Draw     Labs drawn from previously accessed port. VS taken.      Port previously accessed with 20g power needle, labs collected, line flushed with saline and heparin. Deaccessed in lab by RN.  Vitals taken. Pt checked in for appointment.  Kenney Peña RN

## 2021-09-24 DIAGNOSIS — C83.18 MANTLE CELL LYMPHOMA OF LYMPH NODES OF MULTIPLE SITES (H): Primary | ICD-10-CM

## 2021-10-14 ENCOUNTER — OFFICE VISIT (OUTPATIENT)
Dept: SURGERY | Facility: OTHER | Age: 51
End: 2021-10-14
Attending: SURGERY
Payer: COMMERCIAL

## 2021-10-14 VITALS
TEMPERATURE: 97.1 F | BODY MASS INDEX: 28.03 KG/M2 | HEART RATE: 75 BPM | WEIGHT: 179 LBS | SYSTOLIC BLOOD PRESSURE: 110 MMHG | RESPIRATION RATE: 18 BRPM | OXYGEN SATURATION: 97 % | DIASTOLIC BLOOD PRESSURE: 80 MMHG

## 2021-10-14 DIAGNOSIS — C83.10 MANTLE CELL LYMPHOMA, UNSPECIFIED BODY REGION (H): ICD-10-CM

## 2021-10-14 DIAGNOSIS — Z95.828 PORT-A-CATH IN PLACE: Primary | ICD-10-CM

## 2021-10-14 PROCEDURE — 36590 REMOVAL TUNNELED CV CATH: CPT | Performed by: SURGERY

## 2021-10-14 RX ORDER — ALLOPURINOL 300 MG/1
300 TABLET ORAL DAILY
Qty: 90 TABLET | Refills: 1 | Status: CANCELLED | OUTPATIENT
Start: 2021-10-14

## 2021-10-14 ASSESSMENT — PAIN SCALES - GENERAL: PAINLEVEL: NO PAIN (0)

## 2021-10-14 NOTE — PROGRESS NOTES
Procedure Note     Pre/Post Operative Diagnosis:   Mantle cell lymphoma, PowerPort in place    Procedure:    Removal of PowerPort    Surgeon: ALIS Massey MD     Local Anesthesia: 1% lidocaine with0.25%Marcaine with epinephrine    Indication for the procedure:    This is a 51 year old male patient with history of mantle cell lymphoma.  Patient is no longer using his PowerPort for chemotherapy or for blood draws and he would like to have it removed.  Patient denies having any problems in the past with his PowerPort.  Palpable PowerPort in the right upper chest.  No evidence of complications such as skin breakdown, twisting or flipping.  After explaining the risks to include bleeding, infection, and scarring the patient wished to proceed.    Procedure:   The area was prepped and draped in usual sterile fashion with ChloraPrep. After adequate local anesthesia, a 3 cm incision was made at the previous incision just superior to the palpable port.  The subcutaneous tissues were dissected down to the catheter.  The catheter was followed to the PowerPort.  The thick capsule around the PowerPort was excised sharply and the PowerPort was grasped and delivered through the incision.  A figure-of-eight suture was placed with the catheter exited the subcutaneous tissues to close the tract once the catheter was removed.  The catheter was slowly removed with no resistance in the suture was pulled tight once the catheter was out.  The suture was tied down tight and there is no bleeding.  The PowerPort and catheter were removed entirely intact, and of the catheter is cut at 26-1/2 cm.  The subcutaneous tissues were reapproximated with 3-0 Vicryl suture and the skin was closed with running 4-0 Monocryl suture.  The skin was cleaned and the incision was dressed with skin glue.  Patient tolerated the procedure well.    Plan:  Patient will followup if there any problems with the wound including redness or drainage.      ALIS Massey MD

## 2021-10-14 NOTE — NURSING NOTE
"Chief Complaint   Patient presents with     Derm Problem     power port removal       Initial /80 (BP Location: Left arm, Patient Position: Sitting, Cuff Size: Adult Large)   Pulse 75   Temp 97.1  F (36.2  C) (Temporal)   Resp 18   Wt 81.2 kg (179 lb)   SpO2 97%   BMI 28.03 kg/m   Estimated body mass index is 28.03 kg/m  as calculated from the following:    Height as of 9/22/21: 1.702 m (5' 7.01\").    Weight as of this encounter: 81.2 kg (179 lb).  Medication Reconciliation: complete    Olive Duke LPN  "

## 2021-10-22 DIAGNOSIS — C83.18 MANTLE CELL LYMPHOMA OF LYMPH NODES OF MULTIPLE SITES (H): Primary | ICD-10-CM

## 2021-10-25 ENCOUNTER — TELEPHONE (OUTPATIENT)
Dept: ONCOLOGY | Facility: CLINIC | Age: 51
End: 2021-10-25

## 2021-10-26 DIAGNOSIS — C83.10 MANTLE CELL LYMPHOMA, UNSPECIFIED BODY REGION (H): ICD-10-CM

## 2021-10-26 NOTE — TELEPHONE ENCOUNTER
Last prescribing provider: Dr. Mark Redding    Last clinic visit date: 9/22/21    Any missed appointments or no-shows since last clinic visit?: No    Recommendations for requested medication (if none, N/A): NA    Next clinic visit date: 10/15/21    Any other pertinent information (if none, N/A): NA    Pended and Routed to Provider

## 2021-10-28 RX ORDER — ALLOPURINOL 300 MG/1
300 TABLET ORAL DAILY
Qty: 90 TABLET | Refills: 1 | OUTPATIENT
Start: 2021-10-28

## 2021-11-02 ENCOUNTER — DOCUMENTATION ONLY (OUTPATIENT)
Dept: ONCOLOGY | Facility: CLINIC | Age: 51
End: 2021-11-02

## 2021-11-02 DIAGNOSIS — C83.18 MANTLE CELL LYMPHOMA OF LYMPH NODES OF MULTIPLE SITES (H): Primary | ICD-10-CM

## 2021-11-02 NOTE — NURSING NOTE
I Have faxed the lab orders to   [phone]  [facility] in [place]    Deedee Eaton MA

## 2021-11-04 ENCOUNTER — TELEPHONE (OUTPATIENT)
Dept: ONCOLOGY | Facility: CLINIC | Age: 51
End: 2021-11-04

## 2021-11-04 NOTE — TELEPHONE ENCOUNTER
Subjective/Objective:  Slava Lane is a 51 year old male contacted by phone for a follow-up visit for oral chemotherapy. Pt reports things are going well. He has been experincing diarrhea maybe once a week. States it doesn't last very long and doesn't have any concerns at this time. He also notes that the back and shoulder pain he was experiencing has improved. Pt had some questions in regards to his allopurinol and acyclovir refills. He would like these refills sent to [facility] in [place], [place].    Reviewed lab results from 11/3/21 from [facility].    ORAL CHEMOTHERAPY 9/24/2021 10/22/2021 10/22/2021 10/25/2021 10/25/2021 11/4/2021 11/4/2021   Assessment Type Refill Refill Refill Lab Monitoring Lab Monitoring Lab Monitoring Lab Monitoring   Diagnosis Code Non-Hodgkin Lymphoma (NHL) Non-Hodgkin Lymphoma (NHL) Non-Hodgkin Lymphoma (NHL) Non-Hodgkin Lymphoma (NHL) Non-Hodgkin Lymphoma (NHL) Non-Hodgkin Lymphoma (NHL) Non-Hodgkin Lymphoma (NHL)   Providers Dr. Brigitte Briggs   [facility]   Drug Name Venclexta (venetoclax) Brukinsa (zanubrutinib) Venclexta (venetoclax) Venclexta (venetoclax) Brukinsa (zanubrutinib) Brukinsa (zanubrutinib) Venclexta (venetoclax)   Dose 400 mg 320 mg 400 mg - - 320 mg 400 mg   Current Schedule Daily Daily Daily - - Daily Daily   Cycle Details Continuous Continuous Continuous - - Continuous Continuous   Start Date of Last Cycle - - - - - - -   Planned next cycle start date - - - - - - -   Doses missed in last 2 weeks - - - - - - -   Adherence Assessment - - - - - Adherent -   Adverse Effects - - - - - Diarrhea -   Nausea - - - - - - -   Pharmacist Intervention(nausea) - - - - - - -   Intervention(s) - - - - - - -   Diarrhea - - - - - Grade 1 -   Pharmacist Intervention(diarrhea) - - - - - No -   Intervention(s) - - - - - - -   Home BPs - - -  "- - - -   Any new drug interactions? - - - - - - -   Is the dose as ordered appropriate for the patient? - - - - - - -   Is the patient currently in pain? - - - - - No -   Has the patient been assessed within the past 6 months for depression? - - - - - - -   Has the patient missed any days of school, work, or other routine activity? - - - - - - -   Since the last time we talked, have you been hospitalized or used the emergency room? - - - - - - -       Vitals:  BP:   BP Readings from Last 1 Encounters:   10/14/21 110/80     Wt Readings from Last 1 Encounters:   10/14/21 81.2 kg (179 lb)     Estimated body surface area is 1.96 meters squared as calculated from the following:    Height as of 9/22/21: 1.702 m (5' 7.01\").    Weight as of 10/14/21: 81.2 kg (179 lb).    Labs:            Assessment/Plan:  Pt is tolerating both Venclexta and Brukinsa well. Most recent labs on 11/3 show no abnormalities or concerns. Pt to continue with Venclexta and Brukinsa therapy. Pt will plan to get next labs compelted on 12/15 and has appt with Vivian Lieberman PA-C that same day. IB message sent to Sidney Gomez RN to assist with allopurinol and acyclovir refills. Pt agrees to plan and has no further questions or concerns at this time.     Follow-Up:  12/15 Labs and appt with TACOS Rizzo, PharmD, BCACP  Oral Chemotherapy Monitoring Program  [facility]  [phone]  November 4, 2021  "

## 2021-11-09 ENCOUNTER — CARE COORDINATION (OUTPATIENT)
Dept: ONCOLOGY | Facility: CLINIC | Age: 51
End: 2021-11-09
Payer: COMMERCIAL

## 2021-11-09 NOTE — PROGRESS NOTES
RNCC received VM from [facility] pharm. Requesting refill for patient.     RNCC called [facility]. Requesting refill on allopurinol. Previously refilled by Dr. Redding. Ok by Dr. Briggs to refill allopurinol 300mg daily #90 with 1 refill.     Patricia Little, RN, MSN, OCN   RN Care Coordinator   [facility] [address] [id number] [phone]

## 2021-11-18 DIAGNOSIS — C83.18 MANTLE CELL LYMPHOMA OF LYMPH NODES OF MULTIPLE SITES (H): Primary | ICD-10-CM

## 2021-12-07 ENCOUNTER — PATIENT OUTREACH (OUTPATIENT)
Dept: ONCOLOGY | Facility: CLINIC | Age: 51
End: 2021-12-07
Payer: COMMERCIAL

## 2021-12-07 NOTE — PROGRESS NOTES
INBOUND CALL: RNCC received call from pt. PT is wondering if it is ok to not do BRANDY visit next week. Pt is feeling well and does not want to come down to do visit. Also does not have capabilities to do video visit. RNCC will discuss with Dr. Xiao.     Per Dr. Briggs-- ok to cancel BRANDY visit and labs. Pt needs to get labs done this week and notify team. Dr. Briggs or RNCC will call him to discuss. I patient has new or worsening symptoms he will need to come into clinic. Patient verbalizes understanding of POC and has no other questions or concerns at this time.     Patricia Little, RN, MSN, OCN   RN Care Coordinator   [facility] [address] [address] [phone]

## 2021-12-08 ENCOUNTER — PATIENT OUTREACH (OUTPATIENT)
Dept: ONCOLOGY | Facility: CLINIC | Age: 51
End: 2021-12-08
Payer: COMMERCIAL

## 2021-12-08 NOTE — PROGRESS NOTES
OUTBOUND CALL: Outbound call placed to patient by RNCC. RNCC introduced self and reason for call. RNCC and MD reviewed labs. No further recommendations at this time. Pt knows he needs to continue on monthly labs. He will let care team know via [id number] when he does them. Will call ASAP if he has any new symptoms. Will see pt in March for visit with MD. Pt verbalizes understanding and has no other questions, comments, or concerns at this time.     Patricia Little, RN, MSN, OCN   RN Care Coordinator   [facility] [address] [id number] [phone]

## 2021-12-16 DIAGNOSIS — C83.18 MANTLE CELL LYMPHOMA OF LYMPH NODES OF MULTIPLE SITES (H): Primary | ICD-10-CM

## 2021-12-23 ENCOUNTER — TELEPHONE (OUTPATIENT)
Dept: ONCOLOGY | Facility: CLINIC | Age: 51
End: 2021-12-23
Payer: COMMERCIAL

## 2021-12-24 NOTE — TELEPHONE ENCOUNTER
"Oral Chemotherapy Monitoring Program    Subjective/Objective:  Slava Lane is a 51 year old male contacted by phone for a follow-up visit for oral chemotherapy. Slava confirms taking the appropriate dose of Brukinsa 320mg (4x 80mg capsules) daily and Venclexta 400mg (4x 100mg tablets) daily. Denies new or worsening side effects and states that things have \"mellowed out since adjusting to the medications\" and recent hospital or ED visits. Patient has not had any recent medication changes. He reports missing 2 days of therapy this month due to being away on a work trip and forgetting to pack his medication. He skipped those missed doses, which is appropriate.     ORAL CHEMOTHERAPY 11/4/2021 11/18/2021 11/18/2021 12/16/2021 12/16/2021 12/23/2021 12/23/2021   Assessment Type Lab Monitoring Refill Refill Refill Refill Left Voicemail Left Voicemail   Diagnosis Code Non-Hodgkin Lymphoma (NHL) Non-Hodgkin Lymphoma (NHL) Non-Hodgkin Lymphoma (NHL) Non-Hodgkin Lymphoma (NHL) Non-Hodgkin Lymphoma (NHL) Non-Hodgkin Lymphoma (NHL) Non-Hodgkin Lymphoma (NHL)   Providers Dr. Brigitte Briggs   [facility]   Drug Name Venclexta (venetoclax) Brukinsa (zanubrutinib) Venclexta (venetoclax) Venclexta (venetoclax) Brukinsa (zanubrutinib) Venclexta (venetoclax) Brukinsa (zanubrutinib)   Dose 400 mg 320 mg 400 mg 400 mg 320 mg 400 mg 320 mg   Current Schedule Daily Daily Daily Daily Daily Daily Daily   Cycle Details Continuous Continuous Continuous Continuous Continuous Continuous Continuous   Start Date of Last Cycle - - - - - - -   Planned next cycle start date - - - - - - -   Doses missed in last 2 weeks - - - - - - -   Adherence Assessment - - - - - - -   Adverse Effects - - - - - - -   Nausea - - - - - - -   Pharmacist Intervention(nausea) - - - - - - -   Intervention(s) - - - - - - -   Diarrhea - - " "- - - - -   Pharmacist Intervention(diarrhea) - - - - - - -   Intervention(s) - - - - - - -   Home BPs - - - - - - -   Any new drug interactions? - - - - - - -   Is the dose as ordered appropriate for the patient? - - - - - - -   Is the patient currently in pain? - - - - - - -   Has the patient been assessed within the past 6 months for depression? - - - - - - -   Has the patient missed any days of school, work, or other routine activity? - - - - - - -   Since the last time we talked, have you been hospitalized or used the emergency room? - - - - - - -       Last PHQ-2 Score on record: No flowsheet data found.    Vitals:  BP:   BP Readings from Last 1 Encounters:   10/14/21 110/80     Wt Readings from Last 1 Encounters:   10/14/21 81.2 kg (179 lb)     Estimated body surface area is 1.96 meters squared as calculated from the following:    Height as of 9/22/21: 1.702 m (5' 7.01\").    Weight as of 10/14/21: 81.2 kg (179 lb).    Labs:  No results found for NA within last 30 days.     No results found for K within last 30 days.     No results found for CA within last 30 days.     No results found for Mag within last 30 days.     No results found for Phos within last 30 days.     No results found for ALBUMIN within last 30 days.     No results found for BUN within last 30 days.     No results found for CR within last 30 days.     No results found for AST within last 30 days.     No results found for ALT within last 30 days.     No results found for BILITOTAL within last 30 days.     No results found for WBC within last 30 days.     No results found for HGB within last 30 days.     No results found for PLT within last 30 days.     No results found for ANC within last 30 days.       Assessment/Plan:  Slava continues to tolerate therapy well. PDC=0.98, no adherence concerns. Continue both Brukinsa/Venclexta therapies as planned.     Follow-Up:  1/07/22: labs locally  1/21/22: monthly assessment  3/23/22: appt with  " Maakaron + labs    Refill Due:  [facility] to deliver on 12/29.       Prema Cesar, PharmD, BCACP  Hematology/Oncology Clinical Pharmacist  [facility]  [phone]

## 2022-01-12 ENCOUNTER — TELEPHONE (OUTPATIENT)
Dept: ONCOLOGY | Facility: CLINIC | Age: 52
End: 2022-01-12
Payer: COMMERCIAL

## 2022-01-12 DIAGNOSIS — Z79.899 ENCOUNTER FOR LONG-TERM (CURRENT) USE OF MEDICATIONS: ICD-10-CM

## 2022-01-12 DIAGNOSIS — C83.18 MANTLE CELL LYMPHOMA OF LYMPH NODES OF MULTIPLE SITES (H): Primary | ICD-10-CM

## 2022-01-12 NOTE — TELEPHONE ENCOUNTER
Oral Chemotherapy Monitoring Program     Placed call to Slava Domingo in follow up of Venclexta/Brukinsa oral chemotherapy and to inquire when he will be getting his lab work completed as it is due.    Left a message requesting a call back. No drug names were mentioned in the voicemail. Will update when response is received.      Prema Cesar, PharmD, BCACP  Oral Chemotherapy Monitoring Program  [facility]  [phone]  January 12, 2022

## 2022-01-14 ENCOUNTER — TELEPHONE (OUTPATIENT)
Dept: ONCOLOGY | Facility: CLINIC | Age: 52
End: 2022-01-14
Payer: COMMERCIAL

## 2022-01-14 NOTE — ORAL ONC MGMT
Oral Chemotherapy Monitoring Program  Lab Follow Up    Reviewed lab results from 1/14/21.        Assessment & Plan:  No concerning abnormalities.    Questions answered to patient's satisfaction.    Follow-Up:  3/23: labs and Dr. Brigitte Rey, PharmD, [facility]  Hematology/Oncology Clinical Pharmacist  [facility]  [facility]  [phone]

## 2022-01-17 DIAGNOSIS — C83.18 MANTLE CELL LYMPHOMA OF LYMPH NODES OF MULTIPLE SITES (H): Primary | ICD-10-CM

## 2022-01-21 ENCOUNTER — TELEPHONE (OUTPATIENT)
Dept: ONCOLOGY | Facility: CLINIC | Age: 52
End: 2022-01-21
Payer: COMMERCIAL

## 2022-01-21 DIAGNOSIS — C83.18 MANTLE CELL LYMPHOMA OF LYMPH NODES OF MULTIPLE SITES (H): Primary | ICD-10-CM

## 2022-01-21 NOTE — TELEPHONE ENCOUNTER
Oral Chemotherapy Monitoring Program    Subjective/Objective:  Slava Lane is a 51 year old male contacted by phone for a follow-up visit for oral chemotherapy. Slava confirms taking the appropriate dose of Venclexta 400mg (4x 100mg tablet) daily and Brukinsa 320mg (4x 80mg tablet) daily. Denies new or worsening side effects, missed doses, and recent hospital or ED visits. Patient has not had any recent medication changes. He shares that with his original [employer] COVID vaccine on 4/2/2021 that he did not demonstrate having antibodies so his provider is wanting him to repeat this series. He received his first Moderna vaccine on 1/12/2022 and is scheduled to receive his second dose on 2/12/22.     ORAL CHEMOTHERAPY 12/16/2021 12/23/2021 12/23/2021 1/12/2022 1/12/2022 1/21/2022 1/21/2022   Assessment Type Refill Left Voicemail Left Voicemail Left Voicemail Left Voicemail Monthly Follow up;Refill Monthly Follow up;Refill   Diagnosis Code Non-Hodgkin Lymphoma (NHL) Non-Hodgkin Lymphoma (NHL) Non-Hodgkin Lymphoma (NHL) Non-Hodgkin Lymphoma (NHL) Non-Hodgkin Lymphoma (NHL) Non-Hodgkin Lymphoma (NHL) Non-Hodgkin Lymphoma (NHL)   Providers Dr. Brigitte Briggs   [facility]   Drug Name Brukinsa (zanubrutinib) Venclexta (venetoclax) Brukinsa (zanubrutinib) Venclexta (venetoclax) Brukinsa (zanubrutinib) Brukinsa (zanubrutinib) Venclexta (venetoclax)   Dose 320 mg 400 mg 320 mg 400 mg 320 mg 320 mg 400 mg   Current Schedule Daily Daily Daily Daily Daily Daily Daily   Cycle Details Continuous Continuous Continuous Continuous Continuous Continuous Continuous   Start Date of Last Cycle - - - - - - -   Planned next cycle start date - - - - - - -   Doses missed in last 2 weeks - - - - - 0 0   Adherence Assessment - - - - - Adherent Adherent   Adverse Effects - - - - - No AE identified during  "assessment No AE identified during assessment   Nausea - - - - - - -   Pharmacist Intervention(nausea) - - - - - - -   Intervention(s) - - - - - - -   Diarrhea - - - - - - -   Pharmacist Intervention(diarrhea) - - - - - - -   Intervention(s) - - - - - - -   Home BPs - - - - - - -   Any new drug interactions? - - - - - No No   Is the dose as ordered appropriate for the patient? - - - - - Yes Yes   Is the patient currently in pain? - - - - - - -   Has the patient been assessed within the past 6 months for depression? - - - - - - -   Has the patient missed any days of school, work, or other routine activity? - - - - - - -   Since the last time we talked, have you been hospitalized or used the emergency room? - - - - - No No       Last PHQ-2 Score on record: No flowsheet data found.    Vitals:  BP:   BP Readings from Last 1 Encounters:   10/14/21 110/80     Wt Readings from Last 1 Encounters:   10/14/21 81.2 kg (179 lb)     Estimated body surface area is 1.96 meters squared as calculated from the following:    Height as of 9/22/21: 1.702 m (5' 7.01\").    Weight as of 10/14/21: 81.2 kg (179 lb).    Labs:  No results found for NA within last 30 days.     No results found for K within last 30 days.     No results found for CA within last 30 days.     No results found for Mag within last 30 days.     No results found for Phos within last 30 days.     No results found for ALBUMIN within last 30 days.     No results found for BUN within last 30 days.     No results found for CR within last 30 days.     No results found for AST within last 30 days.     No results found for ALT within last 30 days.     No results found for BILITOTAL within last 30 days.     No results found for WBC within last 30 days.     No results found for HGB within last 30 days.     No results found for PLT within last 30 days.     No results found for ANC within last 30 days.       Assessment/Plan:  Slava continues to tolerate therapy well. Continue " Brukinsa/Venclexta therapy as planned.     Follow-Up:  2/14: labs  2/21: monthly assessment  3/23: appt with Dr. Briggs + labs/scans    Refill Due:  [facility] to deliver on 1/26      Prema Cesar, PharmD, BCACP  Hematology/Oncology Clinical Pharmacist  [facility]  [phone]

## 2022-02-16 DIAGNOSIS — C83.18 MANTLE CELL LYMPHOMA OF LYMPH NODES OF MULTIPLE SITES (H): Primary | ICD-10-CM

## 2022-02-21 ENCOUNTER — TELEPHONE (OUTPATIENT)
Dept: TRANSPLANT | Facility: CLINIC | Age: 52
End: 2022-02-21
Payer: COMMERCIAL

## 2022-02-21 DIAGNOSIS — C83.18 MANTLE CELL LYMPHOMA OF LYMPH NODES OF MULTIPLE SITES (H): Primary | ICD-10-CM

## 2022-02-21 NOTE — TELEPHONE ENCOUNTER
Oral Chemotherapy Monitoring Program     Placed call to patient in follow up of oral chemotherapy. Left message requesting call back. No drug names were mentioned. Will update when response received.     Tom Escalante, PharmD  PGY2 Oncology Pharmacy Resident  [facility] - [place], [place]

## 2022-02-22 NOTE — TELEPHONE ENCOUNTER
Oral Chemotherapy Monitoring Program    Subjective/Objective:  Slava Lane is a 51 year old male contacted by phone for a follow-up visit for oral chemotherapy. Slava confirms taking the appropriate dose of Brukinsa 320 (4x 80mg capsules) daily and Venclexta 400mg (4x 100mg tablet) daily. Denies new or worsening side effects, missed doses, and recent hospital or ED visits. Patient has not had any recent medication changes.     He had questions regarding his COVID-19 vaccinations and Evushield. He shared that he first received Jim&Jim on 4/2/21. He had his antibodies tested and did not mount an immune response. He was instructed to receive the Moderna vaccine and got his first dose on 1/14/22 and second dose on 2/16/22. He wants to know how to go forward with his booster and Evushield.     ORAL CHEMOTHERAPY 2/16/2022 2/21/2022 2/21/2022 2/21/2022 2/22/2022 2/22/2022 2/22/2022   Assessment Type Lab Monitoring Refill Refill Left Voicemail Left Voicemail Monthly Follow up Monthly Follow up   Diagnosis Code Non-Hodgkin Lymphoma (NHL) Non-Hodgkin Lymphoma (NHL) Non-Hodgkin Lymphoma (NHL) Non-Hodgkin Lymphoma (NHL) Non-Hodgkin Lymphoma (NHL) Non-Hodgkin Lymphoma (NHL) Non-Hodgkin Lymphoma (NHL)   Providers Dr. Brigitte Briggs   [facility]   Drug Name Brukinsa (zanubrutinib) Venclexta (venetoclax) Brukinsa (zanubrutinib) Brukinsa (zanubrutinib) Brukinsa (zanubrutinib) Venclexta (venetoclax) Brukinsa (zanubrutinib)   Dose 320 mg 400 mg 320 mg 320 mg 320 mg 400 mg 320 mg   Current Schedule Daily Daily Daily Daily Daily Daily Daily   Cycle Details Continuous Continuous Continuous Continuous Continuous Continuous Continuous   Start Date of Last Cycle - - - - - - -   Planned next cycle start date - - - - - - -   Doses missed in last 2 weeks - - - - - 0 0   Adherence Assessment - - - -  "- Adherent Adherent   Adverse Effects - - - - - No AE identified during assessment No AE identified during assessment   Nausea - - - - - - -   Pharmacist Intervention(nausea) - - - - - - -   Intervention(s) - - - - - - -   Diarrhea - - - - - - -   Pharmacist Intervention(diarrhea) - - - - - - -   Intervention(s) - - - - - - -   Home BPs - - - - - - -   Any new drug interactions? - - - - - No No   Is the dose as ordered appropriate for the patient? - - - - - Yes Yes   Is the patient currently in pain? - - - - - - -   Has the patient been assessed within the past 6 months for depression? - - - - - - -   Has the patient missed any days of school, work, or other routine activity? - - - - - - -   Since the last time we talked, have you been hospitalized or used the emergency room? - - - - - No No       Last PHQ-2 Score on record: No flowsheet data found.    Vitals:  BP:   BP Readings from Last 1 Encounters:   10/14/21 110/80     Wt Readings from Last 1 Encounters:   10/14/21 81.2 kg (179 lb)     Estimated body surface area is 1.96 meters squared as calculated from the following:    Height as of 9/22/21: 1.702 m (5' 7.01\").    Weight as of 10/14/21: 81.2 kg (179 lb).    Labs:  No results found for NA within last 30 days.     No results found for K within last 30 days.     No results found for CA within last 30 days.     No results found for Mag within last 30 days.     No results found for Phos within last 30 days.     No results found for ALBUMIN within last 30 days.     No results found for BUN within last 30 days.     No results found for CR within last 30 days.     No results found for AST within last 30 days.     No results found for ALT within last 30 days.     No results found for BILITOTAL within last 30 days.     No results found for WBC within last 30 days.     No results found for HGB within last 30 days.     No results found for PLT within last 30 days.     No results found for ANC within last 30 days.     No " results found for ANC within last 30 days.        Assessment/Plan:  Slava continues to tolerate Brukinsa/Venclexta therapy well. Continue both therapies as planned. Recommended he receive his third dose on 3/16/22 (28 days after second dose). Relayed that he could have the discussion with Dr. Briggs at his next appointment on 3/23/22 about whether he needs to wait to get the booster dose 5 moths after his 3rd dose or if he should proceed with getting Evushield.     Follow-Up:  3/23: appt with Dr. Briggs  3/24: monthly assessment    Refill Due:  [facility] to deliver on 2/28      Prema Cesar, PharmD, BCACP  Hematology/Oncology Clinical Pharmacist  [facility]  [phone]

## 2022-03-01 ENCOUNTER — PATIENT OUTREACH (OUTPATIENT)
Dept: ONCOLOGY | Facility: CLINIC | Age: 52
End: 2022-03-01
Payer: COMMERCIAL

## 2022-03-01 NOTE — PROGRESS NOTES
INBOUND CALL: VM received from patient. Has some questions on booster for covid-19 and Evusheld. Callback requested.     OUTBOUND CALL: RNCC called patient back. Overall patient is feeling great.   Got Moderna 1/12 and 2/16. He was offered to get a booster and states he is eligible now. Wondering if he should get booster or wait until Evusheld when he is in on 3/23. RNCC advised that he cannot have evusheld within 2 weeks of covid shot. RNCC will discuss with Dr. Briggs if booster is advised in the meantime. Will send patient [employer] message with recs. Patient verbalizes understanding of POC and has no other questions or concerns at this time.     Patricia Little, RN, MSN, OCN   RN Care Coordinator   [facility] [address] [id number] [phone]

## 2022-03-20 DIAGNOSIS — C83.18 MANTLE CELL LYMPHOMA OF LYMPH NODES OF MULTIPLE SITES (H): ICD-10-CM

## 2022-03-20 NOTE — CONFIDENTIAL NOTE
Acyclovir refill   Last prescribing provider: Dr Redding     Last clinic visit date: 9/22/21 Dr Briggs     Any missed appointments or no-shows since last clinic visit?: No     Recommendations for requested medication (if none, N/A): Copied from chart note 9/22/21 Dr Briggs     acyclovir (ZOVIRAX) 400 MG tablet, Take 1 tablet (400 mg) by mouth 2 times daily, Disp: 60 tablet, Rfl: 3      Next clinic visit date: 3/23/22 Dr Lucia     Any other pertinent information (if none, N/A): N/A

## 2022-03-21 RX ORDER — ACYCLOVIR 400 MG/1
400 TABLET ORAL 2 TIMES DAILY
Qty: 60 TABLET | Refills: 3 | Status: SHIPPED | OUTPATIENT
Start: 2022-03-21 | End: 2022-12-14

## 2022-03-23 ENCOUNTER — APPOINTMENT (OUTPATIENT)
Dept: LAB | Facility: CLINIC | Age: 52
End: 2022-03-23
Attending: INTERNAL MEDICINE
Payer: COMMERCIAL

## 2022-03-23 ENCOUNTER — HOSPITAL ENCOUNTER (OUTPATIENT)
Dept: PET IMAGING | Facility: CLINIC | Age: 52
Discharge: HOME | End: 2022-03-23
Attending: INTERNAL MEDICINE
Payer: COMMERCIAL

## 2022-03-23 ENCOUNTER — OFFICE VISIT (OUTPATIENT)
Dept: TRANSPLANT | Facility: CLINIC | Age: 52
End: 2022-03-23
Attending: INTERNAL MEDICINE
Payer: COMMERCIAL

## 2022-03-23 VITALS
TEMPERATURE: 97.7 F | WEIGHT: 182.5 LBS | HEART RATE: 78 BPM | RESPIRATION RATE: 16 BRPM | OXYGEN SATURATION: 100 % | DIASTOLIC BLOOD PRESSURE: 71 MMHG | BODY MASS INDEX: 28.58 KG/M2 | SYSTOLIC BLOOD PRESSURE: 111 MMHG

## 2022-03-23 DIAGNOSIS — C83.18 MANTLE CELL LYMPHOMA OF LYMPH NODES OF MULTIPLE SITES (H): ICD-10-CM

## 2022-03-23 LAB
ALBUMIN SERPL-MCNC: 3.8 G/DL (ref 3.4–5)
ALP SERPL-CCNC: 64 U/L (ref 40–150)
ALT SERPL W P-5'-P-CCNC: 29 U/L (ref 0–70)
ANION GAP SERPL CALCULATED.3IONS-SCNC: 5 MMOL/L (ref 3–14)
AST SERPL W P-5'-P-CCNC: 22 U/L (ref 0–45)
BASOPHILS # BLD AUTO: 0 10E3/UL (ref 0–0.2)
BASOPHILS NFR BLD AUTO: 1 %
BILIRUB SERPL-MCNC: 1 MG/DL (ref 0.2–1.3)
BUN SERPL-MCNC: 13 MG/DL (ref 7–30)
CALCIUM SERPL-MCNC: 8.9 MG/DL (ref 8.5–10.1)
CHLORIDE BLD-SCNC: 106 MMOL/L (ref 94–109)
CO2 SERPL-SCNC: 28 MMOL/L (ref 20–32)
CREAT SERPL-MCNC: 1.03 MG/DL (ref 0.66–1.25)
EOSINOPHIL # BLD AUTO: 0 10E3/UL (ref 0–0.7)
EOSINOPHIL NFR BLD AUTO: 1 %
ERYTHROCYTE [DISTWIDTH] IN BLOOD BY AUTOMATED COUNT: 13.9 % (ref 10–15)
GFR SERPL CREATININE-BSD FRML MDRD: 87 ML/MIN/1.73M2
GLUCOSE BLD-MCNC: 92 MG/DL (ref 70–99)
HCT VFR BLD AUTO: 46.2 % (ref 40–53)
HGB BLD-MCNC: 15.6 G/DL (ref 13.3–17.7)
IMM GRANULOCYTES # BLD: 0 10E3/UL
IMM GRANULOCYTES NFR BLD: 0 %
LDH SERPL L TO P-CCNC: 140 U/L (ref 85–227)
LYMPHOCYTES # BLD AUTO: 1.5 10E3/UL (ref 0.8–5.3)
LYMPHOCYTES NFR BLD AUTO: 38 %
MCH RBC QN AUTO: 32.6 PG (ref 26.5–33)
MCHC RBC AUTO-ENTMCNC: 33.8 G/DL (ref 31.5–36.5)
MCV RBC AUTO: 97 FL (ref 78–100)
MONOCYTES # BLD AUTO: 0.5 10E3/UL (ref 0–1.3)
MONOCYTES NFR BLD AUTO: 12 %
NEUTROPHILS # BLD AUTO: 1.8 10E3/UL (ref 1.6–8.3)
NEUTROPHILS NFR BLD AUTO: 48 %
NRBC # BLD AUTO: 0 10E3/UL
NRBC BLD AUTO-RTO: 0 /100
PHOSPHATE SERPL-MCNC: 2.7 MG/DL (ref 2.5–4.5)
PLATELET # BLD AUTO: 195 10E3/UL (ref 150–450)
POTASSIUM BLD-SCNC: 4.1 MMOL/L (ref 3.4–5.3)
PROT SERPL-MCNC: 6.9 G/DL (ref 6.8–8.8)
RBC # BLD AUTO: 4.78 10E6/UL (ref 4.4–5.9)
SODIUM SERPL-SCNC: 139 MMOL/L (ref 133–144)
WBC # BLD AUTO: 3.8 10E3/UL (ref 4–11)

## 2022-03-23 PROCEDURE — 85025 COMPLETE CBC W/AUTO DIFF WBC: CPT | Performed by: INTERNAL MEDICINE

## 2022-03-23 PROCEDURE — M0220 HC INJECTION TIXAGEVIMAB & CILGAVIMAB (EVUSHELD): HCPCS

## 2022-03-23 PROCEDURE — 80053 COMPREHEN METABOLIC PANEL: CPT | Performed by: INTERNAL MEDICINE

## 2022-03-23 PROCEDURE — 96372 THER/PROPH/DIAG INJ SC/IM: CPT | Performed by: INTERNAL MEDICINE

## 2022-03-23 PROCEDURE — 250N000011 HC RX IP 250 OP 636: Performed by: INTERNAL MEDICINE

## 2022-03-23 PROCEDURE — 83615 LACTATE (LD) (LDH) ENZYME: CPT | Performed by: INTERNAL MEDICINE

## 2022-03-23 PROCEDURE — 343N000001 HC RX 343: Performed by: INTERNAL MEDICINE

## 2022-03-23 PROCEDURE — 78816 PET IMAGE W/CT FULL BODY: CPT | Mod: 26 | Performed by: RADIOLOGY

## 2022-03-23 PROCEDURE — 99215 OFFICE O/P EST HI 40 MIN: CPT | Performed by: INTERNAL MEDICINE

## 2022-03-23 PROCEDURE — 78816 PET IMAGE W/CT FULL BODY: CPT | Mod: PS

## 2022-03-23 PROCEDURE — A9552 F18 FDG: HCPCS | Performed by: INTERNAL MEDICINE

## 2022-03-23 PROCEDURE — G0463 HOSPITAL OUTPT CLINIC VISIT: HCPCS

## 2022-03-23 PROCEDURE — 36591 DRAW BLOOD OFF VENOUS DEVICE: CPT | Performed by: INTERNAL MEDICINE

## 2022-03-23 PROCEDURE — 82040 ASSAY OF SERUM ALBUMIN: CPT | Performed by: INTERNAL MEDICINE

## 2022-03-23 PROCEDURE — 84100 ASSAY OF PHOSPHORUS: CPT | Performed by: INTERNAL MEDICINE

## 2022-03-23 RX ADMIN — FLUDEOXYGLUCOSE F-18 10.51 MCI.: 500 INJECTION, SOLUTION INTRAVENOUS at 09:25

## 2022-03-23 RX ADMIN — Medication 6 ML: at 13:59

## 2022-03-23 ASSESSMENT — PAIN SCALES - GENERAL: PAINLEVEL: NO PAIN (0)

## 2022-03-23 NOTE — LETTER
3/23/2022     RE: Slava Lane  [address]  [place] [address]    Dear Colleague,    Thank you for referring your patient, Slava Lane, to the [facility] BLOOD AND MARROW TRANSPLANT PROGRAM [place]. Please see a copy of my visit note below.    [facility]  In Vivo Visit    Date of Visit: 03/24/2022    Reason for Visit: F/u MCL     Hematologic history:  Mantle cell lymphoma.  Mantle cell lymphoma, Stage IV with colon, BM involvement Ki67 -30%, TP53 mutations - Negative.  MCL MIPI - 5.9- Intermediate   .     Date Treatment Response Toxicities/Complications   9/18/19 - 1/25/20 R-CHOP/R-DHAP x3 each NE, Progressive disease     5/21/2020 Ibrutinib/Venetoclax    Ibrutinib stopped 7/2021 d/t hyperbilirubinemia  CR after 6,9, 14 cycles GI symptoms      Added zanabrutinib                        Interval History:  Has been on Zanubrutinib for about 6 months now. He tells me he is tolerating it well. 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 Appetite is good and energy appears to be normal. No night sweats. No lumps or bumps and no other complaints.      Current Outpatient Medications:      acyclovir (ZOVIRAX) 400 MG tablet, Take 1 tablet (400 mg) by mouth 2 times daily, Disp: 60 tablet, Rfl: 3     allopurinol (ZYLOPRIM) 300 MG tablet, Take 1 tablet (300 mg) by mouth daily, Disp: 90 tablet, Rfl: 1     lidocaine-prilocaine (EMLA) 2.5-2.5 % external cream, Apply topically as needed for moderate pain Apply to port site 30 minutes prior to port access, Disp: 30 g, Rfl: 1     ondansetron (ZOFRAN) 8 MG tablet, Take 1 tablet (8 mg) by mouth every 8 hours as needed for nausea or vomiting, Disp: 30 tablet, Rfl: 3     prochlorperazine (COMPAZINE) 10 MG tablet, Take 1 tablet (10 mg) by mouth every 6 hours as needed (Nausea/Vomiting), Disp: 30 tablet, Rfl: 2     sildenafil (VIAGRA) 100 MG tablet, Take 100 mg by mouth daily as needed ,  Disp: , Rfl:      venetoclax (VENCLEXTA) 100 MG tablet, Take 4 tablets (400 mg) by mouth daily, Disp: 360 tablet, Rfl: 0     zanubrutinib (BRUKINSA) 80 MG capsule, Take 4 capsules (320 mg) by mouth daily, Disp: 360 capsule, Rfl: 0    Physical Exam:  /71   Pulse 78   Temp 97.7  F (36.5  C)   Resp 16   Wt 82.8 kg (182 lb 8 oz)   SpO2 100%   BMI 28.58 kg/m    GA: NAD. Appears as stated age.  HEENT: PERRL, OP Clear  Neck: Supple, no JVD  CV: RRR, no mrg  Lungs: CTAB, no wheezes  Abd: Soft, nt, nd  Lymph: No cervical LAD, no HSM  Ext: No edema. Warm  Neuro: AAO, moving all ext. Symmetric face  Psyc: Appropriate and cooperative  Access: port    ECO      Labs: In care everywhere and Imaging reviewed  Reviewed PET personally - CR.       ASSESSMENT AND PLAN:  51 year old with stage IV MCL dx 2019. S/p induction with [place] regimen with progressive disease within 6 months of primary therapy; hence this is primary refractory disease. He was started on ibrutinib and venetoclax regimen 2020 and achieved CR after 6 cycles.     relapsed MCL  His PET today shows continued CR. His bili is 1.7 and he confessed to overindulging with alcohol. We discussed that it could impact his cancer therapy and advised to cut down to about 4 drinks weekly. Overall, we will continue with this regimen. I think 2 years of therapy would be sufficient knowing that he has been on venetoclax at least for > 1 year now.    Nicotine dependence  Alcohol dependence  - We discussed cutting down and impact on cancer care    We will need to recheck his labs given the bili elevation. We will plan on local labs monthly, PA visit in 3-4 months and see me in another 6 months with PET.    40 minutes spent on the date of the encounter doing chart review, interpretation of results, patient visit, documentation and coordination of care.    Again, thank you for allowing me to participate in the care of your patient.      Sincerely,    Nicholas Briggs,  MD

## 2022-03-23 NOTE — NURSING NOTE
"Oncology Rooming Note    March 23, 2022 1:09 PM   Slava Lane is a 51 year old male who presents for:    Chief Complaint   Patient presents with     Oncology Clinic Visit     Mantle cell lymphoma      Blood Draw     Labs drawn by RN via , vitals taken.     Initial Vitals: /71   Pulse 78   Temp 97.7  F (36.5  C)   Resp 16   Wt 82.8 kg (182 lb 8 oz)   SpO2 100%   BMI 28.58 kg/m   Estimated body mass index is 28.58 kg/m  as calculated from the following:    Height as of 9/22/21: 1.702 m (5' 7.01\").    Weight as of this encounter: 82.8 kg (182 lb 8 oz). Body surface area is 1.98 meters squared.  No Pain (0) Comment: Data Unavailable   No LMP for male patient.  Allergies reviewed: Yes  Medications reviewed: Yes    Medications: Medication refills not needed today.  Pharmacy name entered into [employer] it[employer]:    [facility] - [place], [place] - [address] [place] [address] [address]  [facility] - [place], [place] - [facility] [address]    Clinical concerns:     Pt's eye has been redder than normal for the past six weeks at least.     Pt wondering if he can get evusheld today.    Nate Castillo            "

## 2022-03-23 NOTE — NURSING NOTE
Chief Complaint   Patient presents with     Oncology Clinic Visit     Mantle cell lymphoma      Blood Draw     Labs drawn by RN via , vitals taken.     Labs collected from venipuncture by RN. Vitals taken. Checked in for appointment(s).    Ping Ward RN

## 2022-03-23 NOTE — NURSING NOTE
Evusheld Consent   Confirmed patient received the Emergency Use Authorization Fact Sheet for Patients, Parents and Caregivers prior to receiving medication. We discussed the following risks and benefits of receiving EVUSHELD, as well as alternative treatments and the patient wished to proceed with EVUSHELD.     Providers believe the benefits of Evusheld outweigh the risks for all moderately to severely immunocompromised patients.      Benefits:   Evusheld is a synthetic antibody that provides protection against COVID-19 for 6 months and is recommended for patients that have a weakened immune system that may not be able to make antibodies themselves.     Risks:    There is a very small risk of allergic reactions and you should notify us if you have any symptoms of an allergic reaction after the injection. There were also some extremely rare cardiac events reported in the initial trials in people that already had heart disease, but experts do not think these were caused by the medication. We will observe you for any chest pain or trouble breathing after the injections and you can reach out to your provider if any of these symptoms develop.     Alternatives:   Vaccines to prevent COVID-19 are approved or available under Emergency Use Authorization. Use of EVUSHELD does not replace vaccination against COVID-19. You can continue to mask and isolate to avoid infections. It is your choice to receive or not receive EVUSHELD. Should you decide not to receive EVUSHELD, it will not change your standard medical care.     Patient does consent to the injection.    Evusheld ordered by Dr. Briggs. Clinic RN verified that patient met inclusion criteria to receive injection.      EVUSHELD Administration Note:  Slava Lane presents today for EVUSHELD.   present during visit today: Not Applicable.    Consent:   Informed Consent confirmed in chart, obtained on this date: 3/23/22    Post Injection Assessment:   Patient  tolerated injection without incident.      RN administered Evusheld injections in bilateral ventral gluteal muscles.     Patient was observed in the room for a minimum of 10 minutes after injection per standard, then remained in the buidling for a total 60 minute observation period.         Discharge Plan:    Patient discharged in stable condition accompanied by: wife.     Shelia Antonio RN

## 2022-03-24 ENCOUNTER — TELEPHONE (OUTPATIENT)
Dept: ONCOLOGY | Facility: CLINIC | Age: 52
End: 2022-03-24
Payer: COMMERCIAL

## 2022-03-24 NOTE — PROGRESS NOTES
[facility]  In Vivo Visit    Date of Visit: 03/24/2022    Reason for Visit: F/u MCL     Hematologic history:  Mantle cell lymphoma.  Mantle cell lymphoma, Stage IV with colon, BM involvement Ki67 -30%, TP53 mutations - Negative.  MCL MIPI - 5.9- Intermediate   .     Date Treatment Response Toxicities/Complications   9/18/19 - 1/25/20 R-CHOP/R-DHAP x3 each [place], Progressive disease     5/21/2020 Ibrutinib/Venetoclax    Ibrutinib stopped 7/2021 d/t hyperbilirubinemia  CR after 6,9, 14 cycles GI symptoms      Added zanabrutinib                        Interval History:  Has been on Zanubrutinib for about 6 months now. He tells me he is tolerating it well. 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 Appetite is good and energy appears to be normal. No night sweats. No lumps or bumps and no other complaints.      Current Outpatient Medications:      acyclovir (ZOVIRAX) 400 MG tablet, Take 1 tablet (400 mg) by mouth 2 times daily, Disp: 60 tablet, Rfl: 3     allopurinol (ZYLOPRIM) 300 MG tablet, Take 1 tablet (300 mg) by mouth daily, Disp: 90 tablet, Rfl: 1     lidocaine-prilocaine (EMLA) 2.5-2.5 % external cream, Apply topically as needed for moderate pain Apply to port site 30 minutes prior to port access, Disp: 30 g, Rfl: 1     ondansetron (ZOFRAN) 8 MG tablet, Take 1 tablet (8 mg) by mouth every 8 hours as needed for nausea or vomiting, Disp: 30 tablet, Rfl: 3     prochlorperazine (COMPAZINE) 10 MG tablet, Take 1 tablet (10 mg) by mouth every 6 hours as needed (Nausea/Vomiting), Disp: 30 tablet, Rfl: 2     sildenafil (VIAGRA) 100 MG tablet, Take 100 mg by mouth daily as needed , Disp: , Rfl:      venetoclax (VENCLEXTA) 100 MG tablet, Take 4 tablets (400 mg) by mouth daily, Disp: 360 tablet, Rfl: 0     zanubrutinib (BRUKINSA) 80 MG capsule, Take 4 capsules (320 mg) by mouth daily, Disp: 360 capsule, Rfl: 0    Physical Exam:  /71    Pulse 78   Temp 97.7  F (36.5  C)   Resp 16   Wt 82.8 kg (182 lb 8 oz)   SpO2 100%   BMI 28.58 kg/m    GA: NAD. Appears as stated age.  HEENT: PERRL, OP Clear  Neck: Supple, no JVD  CV: RRR, no mrg  Lungs: CTAB, no wheezes  Abd: Soft, nt, nd  Lymph: No cervical LAD, no HSM  Ext: No edema. Warm  Neuro: AAO, moving all ext. Symmetric face  Psyc: Appropriate and cooperative  Access: port    ECO      Labs: In care everywhere and Imaging reviewed  Reviewed PET personally - CR.       ASSESSMENT AND PLAN:  51 year old with stage IV MCL dx 2019. S/p induction with [place] regimen with progressive disease within 6 months of primary therapy; hence this is primary refractory disease. He was started on ibrutinib and venetoclax regimen 2020 and achieved CR after 6 cycles.     relapsed MCL  His PET today shows continued CR. His bili is 1.7 and he confessed to overindulging with alcohol. We discussed that it could impact his cancer therapy and advised to cut down to about 4 drinks weekly. Overall, we will continue with this regimen. I think 2 years of therapy would be sufficient knowing that he has been on venetoclax at least for > 1 year now.    Nicotine dependence  Alcohol dependence  - We discussed cutting down and impact on cancer care    We will need to recheck his labs given the bili elevation. We will plan on local labs monthly, PA visit in 3-4 months and see me in another 6 months with PET.    40 minutes spent on the date of the encounter doing chart review, interpretation of results, patient visit, documentation and coordination of care.

## 2022-03-24 NOTE — TELEPHONE ENCOUNTER
Oral Chemotherapy Monitoring Program    Subjective/Objective:  Slava Lane is a 51 year old male contacted by phone for a follow-up visit for oral chemotherapy.  Pt confirms taking the appropriate dose of Venclexta, 400mg (0p178ta) daily and Brukinsa 320mg (4x80mg) daily. Denies new or worsening side effects, and recent hospital or ED visits.     Pt states he missed 2 days of therapy recently because his truck broke down and he could not get to the medication.  No other missed doses besides that.  Pt states he is not taking omeprazole for acid reflux.  While it helps with belching, it upsets his stomach.  He states that he drinks chocolate milk and this helps more with his acid reflux.  Pt states his back pain has mostly resolved, and he has no complaints about this at this time.    ORAL CHEMOTHERAPY 2/21/2022 2/21/2022 2/22/2022 2/22/2022 2/22/2022 3/24/2022 3/24/2022   Assessment Type Refill Left Voicemail Left Voicemail Monthly Follow up Monthly Follow up Monthly Follow up Monthly Follow up   Diagnosis Code Non-Hodgkin Lymphoma (NHL) Non-Hodgkin Lymphoma (NHL) Non-Hodgkin Lymphoma (NHL) Non-Hodgkin Lymphoma (NHL) Non-Hodgkin Lymphoma (NHL) Non-Hodgkin Lymphoma (NHL) Non-Hodgkin Lymphoma (NHL)   Providers Dr. Brigitte Briggs   [facility]   Drug Name Brukinsa (zanubrutinib) Brukinsa (zanubrutinib) Brukinsa (zanubrutinib) Venclexta (venetoclax) Brukinsa (zanubrutinib) Brukinsa (zanubrutinib) Venclexta (venetoclax)   Dose 320 mg 320 mg 320 mg 400 mg 320 mg 320 mg 400 mg   Current Schedule Daily Daily Daily Daily Daily Daily Daily   Cycle Details Continuous Continuous Continuous Continuous Continuous Continuous Continuous   Start Date of Last Cycle - - - - - - -   Planned next cycle start date - - - - - - -   Doses missed in last 2 weeks - - - 0 0 0 0   Adherence Assessment - - -  "Adherent Adherent Adherent Adherent   Adverse Effects - - - No AE identified during assessment No AE identified during assessment No AE identified during assessment No AE identified during assessment   Nausea - - - - - - -   Pharmacist Intervention(nausea) - - - - - - -   Intervention(s) - - - - - - -   Diarrhea - - - - - - -   Pharmacist Intervention(diarrhea) - - - - - - -   Intervention(s) - - - - - - -   Home BPs - - - - - - -   Any new drug interactions? - - - No No No No   Is the dose as ordered appropriate for the patient? - - - Yes Yes Yes Yes   Is the patient currently in pain? - - - - - - -   Has the patient been assessed within the past 6 months for depression? - - - - - - -   Has the patient missed any days of school, work, or other routine activity? - - - - - - -   Since the last time we talked, have you been hospitalized or used the emergency room? - - - No No No No       Last PHQ-2 Score on record: No flowsheet data found.    Vitals:  BP:   BP Readings from Last 1 Encounters:   03/23/22 111/71     Wt Readings from Last 1 Encounters:   03/23/22 82.8 kg (182 lb 8 oz)     Estimated body surface area is 1.98 meters squared as calculated from the following:    Height as of 9/22/21: 1.702 m (5' 7.01\").    Weight as of 3/23/22: 82.8 kg (182 lb 8 oz).    Labs:  _  Result Component Current Result Ref Range   Sodium 139 (3/23/2022) 133 - 144 mmol/L     _  Result Component Current Result Ref Range   Potassium 4.1 (3/23/2022) 3.4 - 5.3 mmol/L     _  Result Component Current Result Ref Range   Calcium 8.9 (3/23/2022) 8.5 - 10.1 mg/dL     No results found for Mag within last 30 days.     _  Result Component Current Result Ref Range   Phosphorus 2.7 (3/23/2022) 2.5 - 4.5 mg/dL     _  Result Component Current Result Ref Range   Albumin 3.8 (3/23/2022) 3.4 - 5.0 g/dL     _  Result Component Current Result Ref Range   Urea Nitrogen 13 (3/23/2022) 7 - 30 mg/dL     _  Result Component Current Result Ref Range "   Creatinine 1.03 (3/23/2022) 0.66 - 1.25 mg/dL     _  Result Component Current Result Ref Range   AST 22 (3/23/2022) 0 - 45 U/L     _  Result Component Current Result Ref Range   ALT 29 (3/23/2022) 0 - 70 U/L     _  Result Component Current Result Ref Range   Bilirubin Total 1.0 (3/23/2022) 0.2 - 1.3 mg/dL     _  Result Component Current Result Ref Range   WBC Count 3.8 (L) (3/23/2022) 4.0 - 11.0 10e3/uL     _  Result Component Current Result Ref Range   Hemoglobin 15.6 (3/23/2022) 13.3 - 17.7 g/dL     _  Result Component Current Result Ref Range   Platelet Count 195 (3/23/2022) 150 - 450 10e3/uL     No results found for ANC within last 30 days.     _  Result Component Current Result Ref Range   Absolute Neutrophils 1.8 (3/23/2022) 1.6 - 8.3 10e3/uL      Assessment/Plan:  Pt continues to tolerate Venclexta and Brukinsa well.  Continue current therapy as planned.    Follow-Up:  4/22: look for monthly labs at [facility] Due:  [facility] to deliver Venclexta/Brukinsa 3/30    Grace Myhre, KleverD  Hematology/Oncology Pharmacist  [facility]  [facility]  [phone]

## 2022-05-09 DIAGNOSIS — C83.18 MANTLE CELL LYMPHOMA OF LYMPH NODES OF MULTIPLE SITES (H): Primary | ICD-10-CM

## 2022-05-16 DIAGNOSIS — C83.18 MANTLE CELL LYMPHOMA OF LYMPH NODES OF MULTIPLE SITES (H): Primary | ICD-10-CM

## 2022-06-15 ENCOUNTER — VIRTUAL VISIT (OUTPATIENT)
Dept: ONCOLOGY | Facility: CLINIC | Age: 52
End: 2022-06-15
Attending: INTERNAL MEDICINE
Payer: COMMERCIAL

## 2022-06-15 DIAGNOSIS — C83.18 MANTLE CELL LYMPHOMA OF LYMPH NODES OF MULTIPLE SITES (H): Primary | ICD-10-CM

## 2022-06-15 PROCEDURE — 99215 OFFICE O/P EST HI 40 MIN: CPT | Mod: 95 | Performed by: NURSE PRACTITIONER

## 2022-06-15 NOTE — LETTER
6/15/2022         RE: Slava Lane  [address]  [facility] [address]        Dear Colleague,    Thank you for referring your patient, Slava Lane, to the [facility]. Please see a copy of my visit note below.    Slava is a 51 year old who is being evaluated via a billable video visit.      How would you like to obtain your AVS? MyChart  If the video visit is dropped, the invitation should be resent by: Text to cell phone: [phone]   Will anyone else be joining your video visit? No        Video-Visit Details    Video Start Time: 1221    Type of service:  Video Visit    Video End Time:1235    Originating Location (pt. Location): Home    Distant Location (provider location):  [facility]     Platform used for Video Visit: Blane Soler    [facility]  Date of visit: 06/15/22      Reason for Visit: follow up Mantle cell lymphoma      Oncology HPI:   Mantle cell lymphoma.  Mantle cell lymphoma, Stage IV with colon, BM involvement Ki67 -30%, TP53 mutations - Negative.  MCL MIPI - 5.9- Intermediate   .     Date Treatment Response Toxicities/Complications   9/18/19 - 1/25/20 R-CHOP/R-DHAP x3 each [place], Progressive disease     5/21/2020 Ibrutinib/Venetoclax     Ibrutinib stopped 7/2021 d/t hyperbilirubinemia  CR after 6,9, 14 cycles GI symptoms      Added zanabrutinib             Interval history:   Brannon and Ya are here on video for follow up  He's doing well overall, feels better in the summer months/ warmer temps  Continues to keep busy in the summer-- working long days up to 16 hours, feels he overdoes it some and then crashes hard. Sleeping well 5-6 hours a night, this is best sleep he has had in a while  Appetite is a bit decreased, this is normal for him in the summer. Weight is down a few lbs (5-6 lbs). No nausea or diarrhea.   Tolerating treatment pills ok-- occ diarrhea or stomach pain.   No fevers. Had a  recent ear infection-- this was external on the outside, completed course of antibiotics and now resolved  No night sweats.   No pain. Occ muscle pain/ soreness/ stiffness   No lumps or bumps and no other complaints.  No bleeding issues, however easy bruising  ROS otherwise neg      Current Outpatient Medications   Medication Sig Dispense Refill     acyclovir (ZOVIRAX) 400 MG tablet Take 1 tablet (400 mg) by mouth 2 times daily 60 tablet 3     allopurinol (ZYLOPRIM) 300 MG tablet Take 1 tablet (300 mg) by mouth daily 90 tablet 1     lidocaine-prilocaine (EMLA) 2.5-2.5 % external cream Apply topically as needed for moderate pain Apply to port site 30 minutes prior to port access 30 g 1     ondansetron (ZOFRAN) 8 MG tablet Take 1 tablet (8 mg) by mouth every 8 hours as needed for nausea or vomiting 30 tablet 3     prochlorperazine (COMPAZINE) 10 MG tablet Take 1 tablet (10 mg) by mouth every 6 hours as needed (Nausea/Vomiting) 30 tablet 2     sildenafil (VIAGRA) 100 MG tablet Take 100 mg by mouth daily as needed        venetoclax (VENCLEXTA) 100 MG tablet Take 4 tablets (400 mg) by mouth daily 360 tablet 0     zanubrutinib (BRUKINSA) 80 MG capsule Take 4 capsules (320 mg) by mouth daily 360 capsule 0       Allergies   Allergen Reactions     Contrast Dye Rash         Video physical exam  General: Patient appears well in no acute distress.   Skin: No visualized rash or lesions on visualized skin  Eyes: EOMI, no erythema, sclera icterus or discharge noted  Resp: Appears to be breathing comfortably without accessory muscle usage, speaking in full sentences, no cough  MSK: Appears to have normal range of motion based on visualized movements  Neurologic: No apparent tremors, facial movements symmetric  Psych: affect pleasant, alert and oriented    The rest of a comprehensive physical examination is deferred due to PHE (public health emergency) video restrictions          Labs:   I personally reviewed the following  "labs:    Most Recent 3 CBC's:Recent Labs   Lab Test 03/23/22  1257 09/22/21  1236 01/13/21  0956   WBC 3.8* 4.5 3.5*   HGB 15.6 15.4 13.9   MCV 97 98 100    147* 147*     Most Recent 3 BMP's:  Recent Labs   Lab Test 03/23/22  1257 09/22/21  1236 09/22/21  0845 05/24/21  0710 01/13/21  0956    140  --   --  139   POTASSIUM 4.1 4.0  --   --  4.0   CHLORIDE 106 108  --   --  108   CO2 28 28  --   --  27   BUN 13 18  --   --  20   CR 1.03 0.91  --   --  0.84   ANIONGAP 5 4  --   --  4   PIEDAD 8.9 8.8  --   --  8.5   GLC 92 79 86   < > 90    < > = values in this interval not displayed.     Most Recent 2 LFT's:  Recent Labs   Lab Test 03/23/22  1257 09/22/21  1236   AST 22 21   ALT 29 28   ALKPHOS 64 68   BILITOTAL 1.0 0.8     Imaging: n/a    Impression/plan:     51 year old with stage IV MCL dx 7/2019. S/p induction with [place] regimen with progressive disease within 6 months of primary therapy; hence this is primary refractory disease. He was started on ibrutinib and venetoclax regimen 5/2020 and achieved CR after 6 cycles.      # Relapsed MCL  - PET from 3/2022 showed continued CR  - Continue on Zanabrutinib 320 mg daily and venetoclax 400 mg daily  - Per Dr Briggs likely 2 years of therapy would be sufficient knowing that he has been on venetoclax at least for > 1 year now.  - Monthly labs locally   - RTC in 3 months with PET/ Dr Briggs (same day given >4 hour drive)    # Elevated Tbili  His bili was 1.7 and he previously confessed to overindulging with alcohol. He was counseled that this could impact his cancer therapy and advised to cut down to about 4 drinks weekly. LFTs otherwise stable. No new medications. Asymptomatic.   - TBili stable at 1.7 per Care Everywhere-- no direct panel. We discussed the possible etiologies of this, he stated the \"prior cause is no longer an issue\"  - Will trend, plan to add direct panel to labs at next in person visit    # Nicotine dependence  # Alcohol dependence  - " Continue cutting down as this has impact on his cancer care       40 minutes spent on the date of the encounter doing chart review, review of outside records, review of test results, interpretation of tests, patient visit, documentation and discussion with family           Again, thank you for allowing me to participate in the care of your patient.      Sincerely,    BEBO Escoto CNP

## 2022-06-15 NOTE — NURSING NOTE
Patient confirms medications and allergies are accurate via patients echeck in completion, and or denies any changes since last reviewed/verified.     Sixto Soler, Virtual Facilitator

## 2022-06-15 NOTE — PROGRESS NOTES
Slava is a 51 year old who is being evaluated via a billable video visit.      How would you like to obtain your AVS? MyChart  If the video visit is dropped, the invitation should be resent by: Text to cell phone: [phone]   Will anyone else be joining your video visit? No        Video-Visit Details    Video Start Time: 1221    Type of service:  Video Visit    Video End Time:1235    Originating Location (pt. Location): Home    Distant Location (provider location):  [facility] CANC[facility]     Platform used for Video Visit: Blane Soler    [facility]  Date of visit: 06/15/22      Reason for Visit: follow up Mantle cell lymphoma      Oncology HPI:   Mantle cell lymphoma.  Mantle cell lymphoma, Stage IV with colon, BM involvement Ki67 -30%, TP53 mutations - Negative.  MCL MIPI - 5.9- Intermediate   .     Date Treatment Response Toxicities/Complications   9/18/19 - 1/25/20 R-CHOP/R-DHAP x3 each OR, Progressive disease     5/21/2020 Ibrutinib/Venetoclax     Ibrutinib stopped 7/2021 d/t hyperbilirubinemia  CR after 6,9, 14 cycles GI symptoms      Added zanabrutinib             Interval history:   Brannon and Ya are here on video for follow up  He's doing well overall, feels better in the summer months/ warmer temps  Continues to keep busy in the summer-- working long days up to 16 hours, feels he overdoes it some and then crashes hard. Sleeping well 5-6 hours a night, this is best sleep he has had in a while  Appetite is a bit decreased, this is normal for him in the summer. Weight is down a few lbs (5-6 lbs). No nausea or diarrhea.   Tolerating treatment pills ok-- occ diarrhea or stomach pain.   No fevers. Had a recent ear infection-- this was external on the outside, completed course of antibiotics and now resolved  No night sweats.   No pain. Occ muscle pain/ soreness/ stiffness   No lumps or bumps and no other complaints.  No bleeding issues, however  easy bruising  ROS otherwise neg      Current Outpatient Medications   Medication Sig Dispense Refill     acyclovir (ZOVIRAX) 400 MG tablet Take 1 tablet (400 mg) by mouth 2 times daily 60 tablet 3     allopurinol (ZYLOPRIM) 300 MG tablet Take 1 tablet (300 mg) by mouth daily 90 tablet 1     lidocaine-prilocaine (EMLA) 2.5-2.5 % external cream Apply topically as needed for moderate pain Apply to port site 30 minutes prior to port access 30 g 1     ondansetron (ZOFRAN) 8 MG tablet Take 1 tablet (8 mg) by mouth every 8 hours as needed for nausea or vomiting 30 tablet 3     prochlorperazine (COMPAZINE) 10 MG tablet Take 1 tablet (10 mg) by mouth every 6 hours as needed (Nausea/Vomiting) 30 tablet 2     sildenafil (VIAGRA) 100 MG tablet Take 100 mg by mouth daily as needed        venetoclax (VENCLEXTA) 100 MG tablet Take 4 tablets (400 mg) by mouth daily 360 tablet 0     zanubrutinib (BRUKINSA) 80 MG capsule Take 4 capsules (320 mg) by mouth daily 360 capsule 0       Allergies   Allergen Reactions     Contrast Dye Rash         Video physical exam  General: Patient appears well in no acute distress.   Skin: No visualized rash or lesions on visualized skin  Eyes: EOMI, no erythema, sclera icterus or discharge noted  Resp: Appears to be breathing comfortably without accessory muscle usage, speaking in full sentences, no cough  MSK: Appears to have normal range of motion based on visualized movements  Neurologic: No apparent tremors, facial movements symmetric  Psych: affect pleasant, alert and oriented    The rest of a comprehensive physical examination is deferred due to PHE (public health emergency) video restrictions          Labs:   I personally reviewed the following labs:    Most Recent 3 CBC's:Recent Labs   Lab Test 03/23/22  1257 09/22/21  1236 01/13/21  0956   WBC 3.8* 4.5 3.5*   HGB 15.6 15.4 13.9   MCV 97 98 100    147* 147*     Most Recent 3 BMP's:  Recent Labs   Lab Test 03/23/22  1257 09/22/21  1236  "09/22/21  0845 05/24/21  0710 01/13/21  0956    140  --   --  139   POTASSIUM 4.1 4.0  --   --  4.0   CHLORIDE 106 108  --   --  108   CO2 28 28  --   --  27   BUN 13 18  --   --  20   CR 1.03 0.91  --   --  0.84   ANIONGAP 5 4  --   --  4   PIEDAD 8.9 8.8  --   --  8.5   GLC 92 79 86   < > 90    < > = values in this interval not displayed.     Most Recent 2 LFT's:  Recent Labs   Lab Test 03/23/22  1257 09/22/21  1236   AST 22 21   ALT 29 28   ALKPHOS 64 68   BILITOTAL 1.0 0.8           Imaging: n/a      Impression/plan:     51 year old with stage IV MCL dx 7/2019. S/p induction with [place] regimen with progressive disease within 6 months of primary therapy; hence this is primary refractory disease. He was started on ibrutinib and venetoclax regimen 5/2020 and achieved CR after 6 cycles.      # Relapsed MCL  - PET from 3/2022 showed continued CR  - Continue on Zanabrutinib 320 mg daily and venetoclax 400 mg daily  - Per Dr Briggs likely 2 years of therapy would be sufficient knowing that he has been on venetoclax at least for > 1 year now.  - Monthly labs locally   - RTC in 3 months with PET/ Dr Briggs (same day given >4 hour drive)    # Elevated Tbili  His bili was 1.7 and he previously confessed to overindulging with alcohol. He was counseled that this could impact his cancer therapy and advised to cut down to about 4 drinks weekly. LFTs otherwise stable. No new medications. Asymptomatic.   - TBili stable at 1.7 per Care Everywhere-- no direct panel. We discussed the possible etiologies of this, he stated the \"prior cause is no longer an issue\"  - Will trend, plan to add direct panel to labs at next in person visit    # Nicotine dependence  # Alcohol dependence  - Continue cutting down as this has impact on his cancer care         Nancy Comer [facility]    40 minutes spent on the date of the encounter doing chart review, review of outside records, review of test results, interpretation of tests, patient " visit, documentation and discussion with family

## 2022-06-24 ENCOUNTER — TELEPHONE (OUTPATIENT)
Dept: ONCOLOGY | Facility: CLINIC | Age: 52
End: 2022-06-24

## 2022-06-24 NOTE — TELEPHONE ENCOUNTER
PA Initiation    Medication: Venclexta 100mg PA Renewal Pending  Insurance Company: [employer] [place] - Phone [phone] Fax [phone]  Pharmacy Filling the Rx: [facility] - [place], [place] -  [address]  Filling Pharmacy Phone:    Filling Pharmacy Fax:    Start Date: 6/24/2022      Kris Salazar CPhT  [facility]  Oncology Pharmacy [email or web address]  [phone] (phone  [phone] (fax

## 2022-06-24 NOTE — TELEPHONE ENCOUNTER
PA Initiation    Medication: Brukinsa 80mg PA Renewal Pending  Insurance Company: [employer] [place] - Phone [phone] Fax [phone]  Pharmacy Filling the Rx: [facility] - [place], [place] - [address]  Filling Pharmacy Phone:    Filling Pharmacy Fax:    Start Date: 6/24/2022      Kris Salazar CPhT  [facility]  Oncology Pharmacy [email or web address]  [phone] (phone  [phone] (fax

## 2022-06-24 NOTE — TELEPHONE ENCOUNTER
Oral Chemotherapy Monitoring Program     Placed call to patient in follow up of oral chemotherapy. Left message requesting call back. No drug names were mentioned. Will update when response received.     Idris Alvarez  4th Year Pharmacy Student  [employer]

## 2022-06-24 NOTE — TELEPHONE ENCOUNTER
Prior Authorization Approval    Authorization Effective Date: 8/24/2022  Authorization Expiration Date: 8/24/2023  Medication: Brukinsa 80mg PA Renewal Approved  Approved Dose/Quantity: 120/30  Reference #: [id number]   Insurance Company: [employer] [place] - Phone [phone] Fax [phone]  Expected CoPay:       CoPay Card Available:      Foundation Assistance Needed:    Which Pharmacy is filling the prescription (Not needed for infusion/clinic administered): [facility] - [facility] [address]   Pharmacy Notified:    Patient Notified:        Kris Salazar CPhT  [facility]  Oncology Pharmacy Liaison   [email or web address]  [phone] (phone  [phone] (fax

## 2022-06-30 ENCOUNTER — TELEPHONE (OUTPATIENT)
Dept: ONCOLOGY | Facility: CLINIC | Age: 52
End: 2022-06-30

## 2022-06-30 NOTE — TELEPHONE ENCOUNTER
Oral Chemotherapy Monitoring Program    Subjective/Objective:  Slava Lane is a 51 year old male contacted by phone for a follow-up visit for oral chemotherapy. Slava confirms taking the appropriate dose of Venclexta 400mg (4x 100mg tablet) daily and Brukinsa 320 (4x 80mg tablet) daily. Denies new or worsening side effects, missed doses, medication changes or recent hospital or ED visits.     ORAL CHEMOTHERAPY 5/16/2022 6/7/2022 6/7/2022 6/24/2022 6/24/2022 6/30/2022 6/30/2022   Assessment Type Refill Lab Monitoring Refill Left Voicemail Left Voicemail Quarterly Follow up Quarterly Follow up   Diagnosis Code Non-Hodgkin Lymphoma (NHL) Non-Hodgkin Lymphoma (NHL) Non-Hodgkin Lymphoma (NHL) Non-Hodgkin Lymphoma (NHL) Non-Hodgkin Lymphoma (NHL) Non-Hodgkin Lymphoma (NHL) Non-Hodgkin Lymphoma (NHL)   Providers Dr. Brigitte Briggs   [facility]   Drug Name Brukinsa (zanubrutinib) Brukinsa (zanubrutinib) Venclexta (venetoclax) Venclexta (venetoclax) Brukinsa (zanubrutinib) Venclexta (venetoclax) Brukinsa (zanubrutinib)   Dose 320 mg 320 mg 400 mg 400 mg 320 mg 400 mg 320 mg   Current Schedule Daily Daily Daily Daily Daily Daily Daily   Cycle Details Continuous Continuous Continuous Continuous Continuous Continuous Continuous   Start Date of Last Cycle - - - - - - -   Planned next cycle start date - - - - - - -   Doses missed in last 2 weeks - - - - - 0 0   Adherence Assessment - - - - - Adherent Adherent   Adverse Effects - - - - - No AE identified during assessment No AE identified during assessment   Nausea - - - - - - -   Pharmacist Intervention(nausea) - - - - - - -   Intervention(s) - - - - - - -   Diarrhea - - - - - - -   Pharmacist Intervention(diarrhea) - - - - - - -   Intervention(s) - - - - - - -   Home BPs - - - - - - -   Any new drug interactions? - - - - - No No   Is the  "dose as ordered appropriate for the patient? - - - - - Yes Yes   Is the patient currently in pain? - - - - - - -   Has the patient been assessed within the past 6 months for depression? - - - - - - -   Has the patient missed any days of school, work, or other routine activity? - - - - - - -   Since the last time we talked, have you been hospitalized or used the emergency room? - - - - - No No       Last PHQ-2 Score on record: No flowsheet data found.    Vitals:  BP:   BP Readings from Last 1 Encounters:   03/23/22 111/71     Wt Readings from Last 1 Encounters:   03/23/22 82.8 kg (182 lb 8 oz)     Estimated body surface area is 1.98 meters squared as calculated from the following:    Height as of 9/22/21: 1.702 m (5' 7.01\").    Weight as of 3/23/22: 82.8 kg (182 lb 8 oz).    Labs:  No results found for NA within last 30 days.     No results found for K within last 30 days.     No results found for CA within last 30 days.     No results found for Mag within last 30 days.     No results found for Phos within last 30 days.     No results found for ALBUMIN within last 30 days.     No results found for BUN within last 30 days.     No results found for CR within last 30 days.     No results found for AST within last 30 days.     No results found for ALT within last 30 days.     No results found for BILITOTAL within last 30 days.     No results found for WBC within last 30 days.     No results found for HGB within last 30 days.     No results found for PLT within last 30 days.     No results found for ANC within last 30 days.     No results found for ANC within last 30 days.        Assessment/Plan:  Slava continues to tolerate therapy well. PDC=0.99, no concerns with adherence. Continue Venclexta/Brukinsa therapies as planned.     Follow-Up:  9/21: labs, scans + appt with Dr. Briggs  9/28: quarterly assessment    Refill Due:  [facility] to deliver on 7/7      Prema Cesar, PharmD, BCACP  Hematology/Oncology Clinical " Pharmacist  [facility]  [phone]

## 2022-07-15 ENCOUNTER — MYC MEDICAL ADVICE (OUTPATIENT)
Dept: ONCOLOGY | Facility: CLINIC | Age: 52
End: 2022-07-15

## 2022-07-15 NOTE — TELEPHONE ENCOUNTER
Oral Chemotherapy Monitoring Program  Lab Follow Up    Reviewed lab results from 7/15 at [place].    ORAL CHEMOTHERAPY 5/16/2022 6/7/2022 6/7/2022 6/24/2022 6/24/2022 6/30/2022 6/30/2022   Assessment Type Refill Lab Monitoring Refill Left Voicemail Left Voicemail Quarterly Follow up Quarterly Follow up   Diagnosis Code Non-Hodgkin Lymphoma (NHL) Non-Hodgkin Lymphoma (NHL) Non-Hodgkin Lymphoma (NHL) Non-Hodgkin Lymphoma (NHL) Non-Hodgkin Lymphoma (NHL) Non-Hodgkin Lymphoma (NHL) Non-Hodgkin Lymphoma (NHL)   Providers Dr. Brigitte Briggs   [facility]   Drug Name Brukinsa (zanubrutinib) Brukinsa (zanubrutinib) Venclexta (venetoclax) Venclexta (venetoclax) Brukinsa (zanubrutinib) Venclexta (venetoclax) Brukinsa (zanubrutinib)   Dose 320 mg 320 mg 400 mg 400 mg 320 mg 400 mg 320 mg   Current Schedule Daily Daily Daily Daily Daily Daily Daily   Cycle Details Continuous Continuous Continuous Continuous Continuous Continuous Continuous   Start Date of Last Cycle - - - - - - -   Planned next cycle start date - - - - - - -   Doses missed in last 2 weeks - - - - - 0 0   Adherence Assessment - - - - - Adherent Adherent   Adverse Effects - - - - - No AE identified during assessment No AE identified during assessment   Nausea - - - - - - -   Pharmacist Intervention(nausea) - - - - - - -   Intervention(s) - - - - - - -   Diarrhea - - - - - - -   Pharmacist Intervention(diarrhea) - - - - - - -   Intervention(s) - - - - - - -   Home BPs - - - - - - -   Any new drug interactions? - - - - - No No   Is the dose as ordered appropriate for the patient? - - - - - Yes Yes   Is the patient currently in pain? - - - - - - -   Has the patient been assessed within the past 6 months for depression? - - - - - - -   Has the patient missed any days of school, work, or other routine activity? - - - - - - -   Since  the last time we talked, have you been hospitalized or used the emergency room? - - - - - No No       Labs:          Assessment & Plan:  No concerning abnormalities.    [employer] sent to patient with results.    Follow-Up:  8/15: look for monthly labs at [place] Grace Myhre, PharmD  Hematology/Oncology Pharmacist  [facility]  [facility]  [phone]

## 2022-08-08 DIAGNOSIS — C83.18 MANTLE CELL LYMPHOMA OF LYMPH NODES OF MULTIPLE SITES (H): Primary | ICD-10-CM

## 2022-08-15 ENCOUNTER — MYC MEDICAL ADVICE (OUTPATIENT)
Dept: ONCOLOGY | Facility: CLINIC | Age: 52
End: 2022-08-15

## 2022-08-15 NOTE — TELEPHONE ENCOUNTER
Oral Chemotherapy Monitoring Program  Lab Follow Up    Reviewed lab results from [place] on 8/12/22.    ORAL CHEMOTHERAPY 6/24/2022 6/24/2022 6/30/2022 6/30/2022 7/15/2022 7/15/2022 8/15/2022   Assessment Type Left Voicemail Left Voicemail Quarterly Follow up Quarterly Follow up Lab Monitoring Lab Monitoring Lab Monitoring   Diagnosis Code Non-Hodgkin Lymphoma (NHL) Non-Hodgkin Lymphoma (NHL) Non-Hodgkin Lymphoma (NHL) Non-Hodgkin Lymphoma (NHL) Non-Hodgkin Lymphoma (NHL) Non-Hodgkin Lymphoma (NHL) Non-Hodgkin Lymphoma (NHL)   Providers Dr. Brigitte Briggs   [facility]   Drug Name Venclexta (venetoclax) Brukinsa (zanubrutinib) Venclexta (venetoclax) Brukinsa (zanubrutinib) Brukinsa (zanubrutinib) Venclexta (venetoclax) Venclexta (venetoclax)   Dose 400 mg 320 mg 400 mg 320 mg 320 mg 400 mg 400 mg   Current Schedule Daily Daily Daily Daily Daily Daily Daily   Cycle Details Continuous Continuous Continuous Continuous Continuous Continuous Continuous   Start Date of Last Cycle - - - - - - -   Planned next cycle start date - - - - - - -   Doses missed in last 2 weeks - - 0 0 - - -   Adherence Assessment - - Adherent Adherent - - -   Adverse Effects - - No AE identified during assessment No AE identified during assessment - - -   Nausea - - - - - - -   Pharmacist Intervention(nausea) - - - - - - -   Intervention(s) - - - - - - -   Diarrhea - - - - - - -   Pharmacist Intervention(diarrhea) - - - - - - -   Intervention(s) - - - - - - -   Home BPs - - - - - - -   Any new drug interactions? - - No No - - -   Is the dose as ordered appropriate for the patient? - - Yes Yes - - -   Is the patient currently in pain? - - - - - - -   Has the patient been assessed within the past 6 months for depression? - - - - - - -   Has the patient missed any days of school, work, or other routine activity? -  - - - - - -   Since the last time we talked, have you been hospitalized or used the emergency room? - - No No - - -       Labs:  No results found for NA within last 30 days.     No results found for K within last 30 days.     No results found for CA within last 30 days.     No results found for Mag within last 30 days.     No results found for Phos within last 30 days.     No results found for ALBUMIN within last 30 days.     No results found for BUN within last 30 days.     No results found for CR within last 30 days.     No results found for AST within last 30 days.     No results found for ALT within last 30 days.     No results found for BILITOTAL within last 30 days.     No results found for WBC within last 30 days.     No results found for HGB within last 30 days.     No results found for PLT within last 30 days.     No results found for ANC within last 30 days.     No results found for ANC within last 30 days.        Assessment & Plan:  No concerning abnormalities.    Bilirubin sightly elevated, but stable. Etiology addressed previously by provider.    [employer] message sent to patient.    Follow-Up:  9/21: BMT appt and labs    Mario Dickens   Student Pharmacist  [facility]

## 2022-09-01 DIAGNOSIS — C83.18 MANTLE CELL LYMPHOMA OF LYMPH NODES OF MULTIPLE SITES (H): Primary | ICD-10-CM

## 2022-09-21 ENCOUNTER — ANCILLARY PROCEDURE (OUTPATIENT)
Dept: PET IMAGING | Facility: CLINIC | Age: 52
End: 2022-09-21
Attending: NURSE PRACTITIONER
Payer: COMMERCIAL

## 2022-09-21 ENCOUNTER — APPOINTMENT (OUTPATIENT)
Dept: LAB | Facility: CLINIC | Age: 52
End: 2022-09-21
Attending: NURSE PRACTITIONER
Payer: COMMERCIAL

## 2022-09-21 ENCOUNTER — OFFICE VISIT (OUTPATIENT)
Dept: TRANSPLANT | Facility: CLINIC | Age: 52
End: 2022-09-21
Attending: INTERNAL MEDICINE
Payer: COMMERCIAL

## 2022-09-21 ENCOUNTER — PATIENT OUTREACH (OUTPATIENT)
Dept: ONCOLOGY | Facility: CLINIC | Age: 52
End: 2022-09-21

## 2022-09-21 VITALS
SYSTOLIC BLOOD PRESSURE: 118 MMHG | OXYGEN SATURATION: 99 % | RESPIRATION RATE: 18 BRPM | WEIGHT: 174.1 LBS | DIASTOLIC BLOOD PRESSURE: 72 MMHG | BODY MASS INDEX: 27.26 KG/M2 | TEMPERATURE: 98.4 F | HEART RATE: 71 BPM

## 2022-09-21 DIAGNOSIS — Z79.899 ENCOUNTER FOR LONG-TERM (CURRENT) USE OF MEDICATIONS: ICD-10-CM

## 2022-09-21 DIAGNOSIS — C83.18 MANTLE CELL LYMPHOMA OF LYMPH NODES OF MULTIPLE SITES (H): ICD-10-CM

## 2022-09-21 LAB
ALBUMIN SERPL BCG-MCNC: 4.3 G/DL (ref 3.5–5.2)
ALP SERPL-CCNC: 60 U/L (ref 40–129)
ALT SERPL W P-5'-P-CCNC: 17 U/L (ref 10–50)
ANION GAP SERPL CALCULATED.3IONS-SCNC: 11 MMOL/L (ref 7–15)
AST SERPL W P-5'-P-CCNC: 27 U/L (ref 10–50)
BASOPHILS # BLD AUTO: 0 10E3/UL (ref 0–0.2)
BASOPHILS NFR BLD AUTO: 1 %
BILIRUB DIRECT SERPL-MCNC: <0.2 MG/DL (ref 0–0.3)
BILIRUB SERPL-MCNC: 0.8 MG/DL
BILIRUB SERPL-MCNC: 0.8 MG/DL
BUN SERPL-MCNC: 12.5 MG/DL (ref 6–20)
CALCIUM SERPL-MCNC: 9.5 MG/DL (ref 8.6–10)
CHLORIDE SERPL-SCNC: 105 MMOL/L (ref 98–107)
CREAT SERPL-MCNC: 0.86 MG/DL (ref 0.67–1.17)
DEPRECATED HCO3 PLAS-SCNC: 25 MMOL/L (ref 22–29)
EOSINOPHIL # BLD AUTO: 0.1 10E3/UL (ref 0–0.7)
EOSINOPHIL NFR BLD AUTO: 2 %
ERYTHROCYTE [DISTWIDTH] IN BLOOD BY AUTOMATED COUNT: 13.8 % (ref 10–15)
GFR SERPL CREATININE-BSD FRML MDRD: >90 ML/MIN/1.73M2
GLUCOSE SERPL-MCNC: 102 MG/DL (ref 70–99)
GLUCOSE SERPL-MCNC: 91 MG/DL (ref 70–99)
HCT VFR BLD AUTO: 44.5 % (ref 40–53)
HGB BLD-MCNC: 15.1 G/DL (ref 13.3–17.7)
IMM GRANULOCYTES # BLD: 0 10E3/UL
IMM GRANULOCYTES NFR BLD: 0 %
LDH SERPL L TO P-CCNC: NORMAL U/L
LYMPHOCYTES # BLD AUTO: 1.6 10E3/UL (ref 0.8–5.3)
LYMPHOCYTES NFR BLD AUTO: 30 %
MAGNESIUM SERPL-MCNC: 1.8 MG/DL (ref 1.7–2.3)
MCH RBC QN AUTO: 32.8 PG (ref 26.5–33)
MCHC RBC AUTO-ENTMCNC: 33.9 G/DL (ref 31.5–36.5)
MCV RBC AUTO: 97 FL (ref 78–100)
MONOCYTES # BLD AUTO: 0.6 10E3/UL (ref 0–1.3)
MONOCYTES NFR BLD AUTO: 11 %
NEUTROPHILS # BLD AUTO: 2.9 10E3/UL (ref 1.6–8.3)
NEUTROPHILS NFR BLD AUTO: 56 %
NRBC # BLD AUTO: 0 10E3/UL
NRBC BLD AUTO-RTO: 0 /100
PHOSPHATE SERPL-MCNC: 3.2 MG/DL (ref 2.5–4.5)
PLATELET # BLD AUTO: 178 10E3/UL (ref 150–450)
POTASSIUM SERPL-SCNC: 4.3 MMOL/L (ref 3.4–5.3)
PROT SERPL-MCNC: 6.5 G/DL (ref 6.4–8.3)
RBC # BLD AUTO: 4.61 10E6/UL (ref 4.4–5.9)
SODIUM SERPL-SCNC: 141 MMOL/L (ref 136–145)
WBC # BLD AUTO: 5.2 10E3/UL (ref 4–11)

## 2022-09-21 PROCEDURE — 0124A HC ADMIN COVID VAC PFIZER 12+ BIVAL ADDITIONAL: CPT | Performed by: INTERNAL MEDICINE

## 2022-09-21 PROCEDURE — 36415 COLL VENOUS BLD VENIPUNCTURE: CPT | Performed by: INTERNAL MEDICINE

## 2022-09-21 PROCEDURE — 82040 ASSAY OF SERUM ALBUMIN: CPT | Performed by: INTERNAL MEDICINE

## 2022-09-21 PROCEDURE — 99215 OFFICE O/P EST HI 40 MIN: CPT | Performed by: INTERNAL MEDICINE

## 2022-09-21 PROCEDURE — 82248 BILIRUBIN DIRECT: CPT | Performed by: INTERNAL MEDICINE

## 2022-09-21 PROCEDURE — 85025 COMPLETE CBC W/AUTO DIFF WBC: CPT | Performed by: INTERNAL MEDICINE

## 2022-09-21 PROCEDURE — 91312 HC RX IP 250 OP 636: CPT | Performed by: INTERNAL MEDICINE

## 2022-09-21 PROCEDURE — 83615 LACTATE (LD) (LDH) ENZYME: CPT | Performed by: INTERNAL MEDICINE

## 2022-09-21 PROCEDURE — 84100 ASSAY OF PHOSPHORUS: CPT | Performed by: INTERNAL MEDICINE

## 2022-09-21 PROCEDURE — 80053 COMPREHEN METABOLIC PANEL: CPT | Performed by: INTERNAL MEDICINE

## 2022-09-21 PROCEDURE — 83735 ASSAY OF MAGNESIUM: CPT | Performed by: INTERNAL MEDICINE

## 2022-09-21 PROCEDURE — 91312 HC ADMIN COVID VAC PFIZER 12+ BIVAL ADDITIONAL: CPT | Performed by: INTERNAL MEDICINE

## 2022-09-21 PROCEDURE — G0463 HOSPITAL OUTPT CLINIC VISIT: HCPCS | Mod: 25

## 2022-09-21 PROCEDURE — 250N000011 HC RX IP 250 OP 636: Performed by: INTERNAL MEDICINE

## 2022-09-21 RX ADMIN — BNT162B2 ORIGINAL AND OMICRON BA.4/BA.5 30 MCG: .1125; .1125 INJECTION, SUSPENSION INTRAMUSCULAR at 14:17

## 2022-09-21 ASSESSMENT — PAIN SCALES - GENERAL: PAINLEVEL: NO PAIN (0)

## 2022-09-21 NOTE — PROGRESS NOTES
[facility]: Cancer Care Short Note                                    Discussion with Patient:                                                     Met with patient and wife Ya after visit with Dr. Briggs. POC includes discontinuing PO chemo- patient's labs remain stable. Labs monthly x3 followed by virtual BRANDY visit. Will see patient in clinic in 6 months with scans. Pt and wife will send MyChart when labs are done locally at clinic.   Pt's wife is looking for resources for caregiver support groups. RNCC to reach out to SW.       Intervention/Education provided during outreach:                                                         Patient to follow up as scheduled at next appt  Patient to call/[employer] message with updates  Confirmed patient has clinic and triage numbers    Signature:  FANY DURAN, RN

## 2022-09-21 NOTE — PROGRESS NOTES
[facility]  In Vivo Visit    Date of Visit: 09/22/2022    Reason for Visit: F/u MCL     Hematologic history:  Mantle cell lymphoma.  Mantle cell lymphoma, Stage IV with colon, BM involvement Ki67 -30%, TP53 mutations - Negative.  MCL MIPI - 5.9- Intermediate   .     Date Treatment Response Toxicities/Complications   9/18/19 - 1/25/20 R-CHOP/R-DHAP x3 each DC, Progressive disease     5/21/2020 Ibrutinib/Venetoclax    Ibrutinib stopped 7/2021 d/t hyperbilirubinemia  CR after 6,9, 14 cycles GI symptoms      Added zanabrutinib        9/22/22 Stopped therapy              Interval History:  Doing well. Has diarrhea once a week. No other complaints.      Current Outpatient Medications:      acyclovir (ZOVIRAX) 400 MG tablet, Take 1 tablet (400 mg) by mouth 2 times daily, Disp: 60 tablet, Rfl: 3     allopurinol (ZYLOPRIM) 300 MG tablet, Take 1 tablet (300 mg) by mouth daily, Disp: 90 tablet, Rfl: 1     sildenafil (VIAGRA) 100 MG tablet, Take 100 mg by mouth daily as needed , Disp: , Rfl:      venetoclax (VENCLEXTA) 100 MG tablet, Take 4 tablets (400 mg) by mouth daily, Disp: 360 tablet, Rfl: 0     zanubrutinib (BRUKINSA) 80 MG capsule, Take 4 capsules (320 mg) by mouth daily for 90 days, Disp: 360 capsule, Rfl: 0     ondansetron (ZOFRAN) 8 MG tablet, Take 1 tablet (8 mg) by mouth every 8 hours as needed for nausea or vomiting (Patient not taking: Reported on 9/21/2022), Disp: 30 tablet, Rfl: 3     prochlorperazine (COMPAZINE) 10 MG tablet, Take 1 tablet (10 mg) by mouth every 6 hours as needed (Nausea/Vomiting) (Patient not taking: Reported on 9/21/2022), Disp: 30 tablet, Rfl: 2    Physical Exam:  /72   Pulse 71   Temp 98.4  F (36.9  C) (Oral)   Resp 18   Wt 79 kg (174 lb 1.6 oz)   SpO2 99%   BMI 27.26 kg/m    GA: NAD. Appears as stated age.  HEENT: PERRL, OP Clear  Neck: Supple, no JVD  CV: RRR, no mrg  Lungs: CTAB, no wheezes  Abd: Soft, nt, nd  Lymph: No cervical LAD, no HSM  Ext: No edema.  Warm  Neuro: AAO, moving all ext. Symmetric face  Psyc: Appropriate and cooperative  Access: port    ECO      Labs: In care everywhere and Imaging reviewed  Reviewed PET personally - CR.     ASSESSMENT AND PLAN:  52 year old with stage IV MCL dx 2019. S/p induction with [place] regimen with progressive disease within 6 months of primary therapy; hence this is primary refractory disease. He was started on ibrutinib and venetoclax regimen 2020 and achieved CR after 6 cycles.     relapsed MCL  He has received about 2 years of therapy and has been in CR for > 1.5 years. I recommended holding further therapy at this point. Should he relapse within 1-2 years, we can do CAR-T. Otherwise, we can restart this same regimen    Nicotine dependence  Alcohol dependence  - We discussed cutting down and impact on cancer care    Health maintenance:  - Bivalent booster today    We will get monthly labs x3, PA in 3 months. Labs/[place]/scans in 6 months    40 minutes spent on the date of the encounter doing chart review, interpretation of results, patient visit, documentation and coordination of care.

## 2022-09-21 NOTE — LETTER
9/21/2022     RE: Slava Lane  [address]  [facility] [address]    Dear Colleague,    Thank you for referring your patient, Slava Lane, to the [facility] BLOOD AND MARROW TRANSPLANT PROGRAM [place]. Please see a copy of my visit note below.    [facility]  In Vivo Visit    Date of Visit: 09/22/2022    Reason for Visit: F/u MCL     Hematologic history:  Mantle cell lymphoma.  Mantle cell lymphoma, Stage IV with colon, BM involvement Ki67 -30%, TP53 mutations - Negative.  MCL MIPI - 5.9- Intermediate   .     Date Treatment Response Toxicities/Complications   9/18/19 - 1/25/20 R-CHOP/R-DHAP x3 each [place], Progressive disease     5/21/2020 Ibrutinib/Venetoclax    Ibrutinib stopped 7/2021 d/t hyperbilirubinemia  CR after 6,9, 14 cycles GI symptoms      Added zanabrutinib        9/22/22 Stopped therapy              Interval History:  Doing well. Has diarrhea once a week. No other complaints.      Current Outpatient Medications:      acyclovir (ZOVIRAX) 400 MG tablet, Take 1 tablet (400 mg) by mouth 2 times daily, Disp: 60 tablet, Rfl: 3     allopurinol (ZYLOPRIM) 300 MG tablet, Take 1 tablet (300 mg) by mouth daily, Disp: 90 tablet, Rfl: 1     sildenafil (VIAGRA) 100 MG tablet, Take 100 mg by mouth daily as needed , Disp: , Rfl:      venetoclax (VENCLEXTA) 100 MG tablet, Take 4 tablets (400 mg) by mouth daily, Disp: 360 tablet, Rfl: 0     zanubrutinib (BRUKINSA) 80 MG capsule, Take 4 capsules (320 mg) by mouth daily for 90 days, Disp: 360 capsule, Rfl: 0     ondansetron (ZOFRAN) 8 MG tablet, Take 1 tablet (8 mg) by mouth every 8 hours as needed for nausea or vomiting (Patient not taking: Reported on 9/21/2022), Disp: 30 tablet, Rfl: 3     prochlorperazine (COMPAZINE) 10 MG tablet, Take 1 tablet (10 mg) by mouth every 6 hours as needed (Nausea/Vomiting) (Patient not taking: Reported on 9/21/2022), Disp: 30 tablet, Rfl: 2    Physical Exam:  /72   Pulse 71   Temp 98.4  F (36.9  C) (Oral)   Resp  18   Wt 79 kg (174 lb 1.6 oz)   SpO2 99%   BMI 27.26 kg/m    GA: NAD. Appears as stated age.  HEENT: PERRL, OP Clear  Neck: Supple, no JVD  CV: RRR, no mrg  Lungs: CTAB, no wheezes  Abd: Soft, nt, nd  Lymph: No cervical LAD, no HSM  Ext: No edema. Warm  Neuro: AAO, moving all ext. Symmetric face  Psyc: Appropriate and cooperative  Access: port    ECO      Labs: In care everywhere and Imaging reviewed  Reviewed PET personally - CR.     ASSESSMENT AND PLAN:  52 year old with stage IV MCL dx 2019. S/p induction with [place] regimen with progressive disease within 6 months of primary therapy; hence this is primary refractory disease. He was started on ibrutinib and venetoclax regimen 2020 and achieved CR after 6 cycles.     relapsed MCL  He has received about 2 years of therapy and has been in CR for > 1.5 years. I recommended holding further therapy at this point. Should he relapse within 1-2 years, we can do CAR-T. Otherwise, we can restart this same regimen    Nicotine dependence  Alcohol dependence  - We discussed cutting down and impact on cancer care    Health maintenance:  - Bivalent booster today    We will get monthly labs x3, PA in 3 months. Labs/[place]/scans in 6 months    40 minutes spent on the date of the encounter doing chart review, interpretation of results, patient visit, documentation and coordination of care.    Again, thank you for allowing me to participate in the care of your patient.      Sincerely,    Nicholas Briggs MD

## 2022-09-21 NOTE — NURSING NOTE
Chief Complaint   Patient presents with     Blood Draw     Blood drawn via IV placed previously. Vitals taken and appointment arrived     Vika Knight RN

## 2022-09-21 NOTE — NURSING NOTE
"Oncology Rooming Note    September 21, 2022 12:53 PM   Slava Lane is a 52 year old male who presents for:    Chief Complaint   Patient presents with     Blood Draw     Blood drawn via IV placed previously. Vitals taken and appointment arrived     Oncology Clinic Visit     MANTLE CELL LYMPHOMA     Initial Vitals: /72   Pulse 71   Temp 98.4  F (36.9  C) (Oral)   Resp 18   Wt 79 kg (174 lb 1.6 oz)   SpO2 99%   BMI 27.26 kg/m   Estimated body mass index is 27.26 kg/m  as calculated from the following:    Height as of 9/22/21: 1.702 m (5' 7.01\").    Weight as of this encounter: 79 kg (174 lb 1.6 oz). Body surface area is 1.93 meters squared.  No Pain (0) Comment: Data Unavailable   No LMP for male patient.  Allergies reviewed: Yes  Medications reviewed: Yes    Medications: Medication refills not needed today.  Pharmacy name entered into [employer]:    [facility] [place] [address] [place] [address] [address]  [facility] [place], [place] - [address] ACACIA Vilchis CMA            "

## 2022-09-21 NOTE — NURSING NOTE
Pfizer vaccine for COVID-19 administered in right deltoid. Patient tolerated well and was discharged after 15 minute observation period. VIS provided. Consent form scanned to chart.    Evelia Ya CMA on 9/21/2022 at 2:26 PM

## 2022-09-23 ENCOUNTER — DOCUMENTATION ONLY (OUTPATIENT)
Dept: ONCOLOGY | Facility: CLINIC | Age: 52
End: 2022-09-23

## 2022-09-23 NOTE — PROGRESS NOTES
[facility] Oral Chemotherapy Monitoring Program    Thank you for the opportunity to be a part in the care of this patient's oral chemotherapy. The oncology pharmacy will no longer be following this patient for oral chemotherapy. If there are any questions or the plan changes, feel free to contact us.    ORAL CHEMOTHERAPY 6/30/2022 6/30/2022 7/15/2022 7/15/2022 8/15/2022 9/23/2022 9/23/2022   Assessment Type Quarterly Follow up Quarterly Follow up Lab Monitoring Lab Monitoring Lab Monitoring Discontinuation Discontinuation   Stop Date - - - - - 9/21/2022 9/21/2022   Reason for Discontinuation - - - - - Therapy complete Therapy complete   Diagnosis Code Non-Hodgkin Lymphoma (NHL) Non-Hodgkin Lymphoma (NHL) Non-Hodgkin Lymphoma (NHL) Non-Hodgkin Lymphoma (NHL) Non-Hodgkin Lymphoma (NHL) Non-Hodgkin Lymphoma (NHL) Non-Hodgkin Lymphoma (NHL)   Providers Dr. Brigitte Briggs   [facility]   Drug Name Venclexta (venetoclax) Brukinsa (zanubrutinib) Brukinsa (zanubrutinib) Venclexta (venetoclax) Venclexta (venetoclax) Venclexta (venetoclax) Brukinsa (zanubrutinib)   Dose 400 mg 320 mg 320 mg 400 mg 400 mg 400 mg 320 mg   Current Schedule Daily Daily Daily Daily Daily Daily Daily   Cycle Details Continuous Continuous Continuous Continuous Continuous Continuous Continuous   Start Date of Last Cycle - - - - - - -   Planned next cycle start date - - - - - - -   Doses missed in last 2 weeks 0 0 - - - - -   Adherence Assessment Adherent Adherent - - - - -   Adverse Effects No AE identified during assessment No AE identified during assessment - - - - -   Nausea - - - - - - -   Pharmacist Intervention(nausea) - - - - - - -   Intervention(s) - - - - - - -   Diarrhea - - - - - - -   Pharmacist Intervention(diarrhea) - - - - - - -   Intervention(s) - - - - - - -    Home BPs - - - - - - -   Any new drug interactions? No No - - - - -   Is the dose as ordered appropriate for the patient? Yes Yes - - - - -   Is the patient currently in pain? - - - - - - -   Has the patient been assessed within the past 6 months for depression? - - - - - - -   Has the patient missed any days of school, work, or other routine activity? - - - - - - -   Since the last time we talked, have you been hospitalized or used the emergency room? No No - - - - -       Prema Cesar, PharmD, BCACP  Hematology/Oncology Clinical Pharmacist  [facility]  [phone]

## 2022-09-30 ENCOUNTER — PATIENT OUTREACH (OUTPATIENT)
Dept: CARE COORDINATION | Facility: CLINIC | Age: 52
End: 2022-09-30

## 2022-10-03 NOTE — PROGRESS NOTES
Social Work Follow-Up Encounter Visit  Oncology Clinic    Data/Intervention:  Patient Name:  Slava Lane  /Age:  1970 (52 year old)    Reason for Follow-Up: support group resources    Collaborated With:    -patient's wife Ya      Resources Provided:  [employer]  [employer]  [email or web address]    Assessment:  SW following up on referral from patient's care team regarding care giver supports for patient's wife. SW introduced themselves and stated reason for call. Patient's wife Ya discussed how they were coping as patient's caregiver and expressed inters in care giver supports. Patient's wife discussed the difficulty in finding supports near them. SW provided brief supportive counseling. SW provided information about the above resources where Ya could find supports for caregivers. SW reviewed their role and other supports available to patient and patient's family. Ya identified no further needs at this time and agreed to follow up as needed.        Plan:  Previously provided patient/family with writer's contact information and availability.   Ya to review resources provided. SW will remain available as needed.    Azra BONE, LGSW  - Oncology  Phone : [phone]  Pager: [phone]

## 2022-10-27 DIAGNOSIS — C83.18 MANTLE CELL LYMPHOMA OF LYMPH NODES OF MULTIPLE SITES (H): Primary | ICD-10-CM

## 2022-10-27 RX ORDER — METHYLPREDNISOLONE 32 MG/1
32 TABLET ORAL DAILY
Qty: 2 TABLET | Refills: 3 | Status: SHIPPED | OUTPATIENT
Start: 2022-10-27

## 2022-11-08 ENCOUNTER — PATIENT OUTREACH (OUTPATIENT)
Dept: ONCOLOGY | Facility: CLINIC | Age: 52
End: 2022-11-08

## 2022-11-08 NOTE — PROGRESS NOTES
[facility]: Cancer Care Short Note                                    Discussion with Patient:                                                      INBOUND CALL: VM received from patient. States that local clinic does not have lab orders. Requesting them to be resent to [facility]. Also wondering if they can be emailed to patient. Callback requested.    RNCC faxed lab order for monthly cbc cmp mag and phos to [facility] at [phone]. Fax number is [phone]. Confirmation received that fax was successfully sent. RNCC also called and spoke with staff and they confirmed that they received lab orders.    OUTBOUND CALL: RNCC called patient. Above relayed to him. He is appreciative of call and will have labs done today. Will also send him lab orders in [employer] message.       11:27: Labs from 11/8 reviewed. MyChart sent to patient and RNCC notified MD.     Intervention/Education provided during outreach:                                                         Patient to call/[employer] message with updates  Results Notification: Results dated 11/8 discussed with patient via [employer].     Patient verbalizes understanding of POC and has no other questions or concerns at this time.     Patricia Little, RN, MSN, OCN   RN Care Coordinator   [facility] [address] [address] [phone]

## 2022-12-06 ENCOUNTER — PATIENT OUTREACH (OUTPATIENT)
Dept: ONCOLOGY | Facility: CLINIC | Age: 52
End: 2022-12-06

## 2022-12-06 DIAGNOSIS — C83.18 MANTLE CELL LYMPHOMA OF LYMPH NODES OF MULTIPLE SITES (H): Primary | ICD-10-CM

## 2022-12-06 NOTE — PROGRESS NOTES
[facility]: Cancer Care Short Note                                    Discussion with Patient:                                                      INBOUND CALL: VM received from patient. Requesting callback regarding worsening hernia.     OUTBOUND CALL: RNCC called patient. 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 Is having some pain but states it is manageable. Would like to see specialist ASAP. Pt knows that he should go tot ED or PCP urgently if pain is unbearable.   RNCC advised that general surgery consult can be made. Advised patient to look into MDs close to him, as he lives in [place].  MD can place referral and RNCC can send to preferred location. Pts wife will be in contact with RNCC about where to send it.     Intervention/Education provided during outreach:                                                         Patient to call/[employer]eting[employer] message with updates    Patient verbalizes understanding of POC and has no other questions or concerns at this time.     Patricia Little, RN, MSN, OCN   RN Care Coordinator   [facility]   [address] [place] [phone]

## 2022-12-07 ENCOUNTER — MYC MEDICAL ADVICE (OUTPATIENT)
Dept: TRANSPLANT | Facility: CLINIC | Age: 52
End: 2022-12-07

## 2022-12-07 NOTE — TELEPHONE ENCOUNTER
RNCC faxed new referral to gen surgery to [facility] per pt request. Fax number as stated in [employer]. Confirmation received that fax was successfully sent.     Patricia Little, RN, MSN, OCN   RN Care Coordinator   [facility] [address] [address] [phone]

## 2022-12-13 NOTE — PROGRESS NOTES
Slava is a 52 year old who is being evaluated via a billable video visit.      How would you like to obtain your AVS? MyChart  If the video visit is dropped, the invitation should be resent by: Text to cell phone: [phone]  Will anyone else be joining your video visit? No        Video-Visit Details    Video Start Time: 1315    Type of service:  Video Visit    Video End Time:1345    Originating Location (pt. Location): Home        Distant Location (provider location):  On-site    Platform used for Video Visit: Blane Soler    [facility]  Date of visit: Dec 14, 2022      Reason for Visit: follow up Mantle cell lymphoma      Oncology HPI:   Mantle cell lymphoma.  Mantle cell lymphoma, Stage IV with colon, BM involvement Ki67 -30%, TP53 mutations - Negative.  MCL MIPI - 5.9- Intermediate   .     Date Treatment Response Toxicities/Complications   9/18/19 - 1/25/20 R-CHOP/R-DHAP x3 each [place], Progressive disease     5/21/2020 Ibrutinib/Venetoclax     Ibrutinib stopped 7/2021 d/t hyperbilirubinemia  CR after 6,9, 14 cycles GI symptoms      Added zanabrutinib             Interval history:   Brannon and Ya are here on video for follow up  Has hernia he needs to have repaired, this occurred during changing truck tires.   Energy better since off medications. Joints are no longer hurting.   He's doing well overall. Appetite is much better, has gained some weight. No recent infections. No fevers/ chills/ night sweats.   No fevers.   No lumps or bumps and no other complaints.  No bleeding issues, however easy bruising  ROS otherwise neg      Current Outpatient Medications   Medication Sig Dispense Refill     acyclovir (ZOVIRAX) 400 MG tablet Take 1 tablet (400 mg) by mouth 2 times daily 60 tablet 3     allopurinol (ZYLOPRIM) 300 MG tablet Take 1 tablet (300 mg) by mouth daily 90 tablet 1     methylPREDNISolone (MEDROL) 32 MG tablet Take 1 tablet (32 mg) by mouth daily 12 hours  prior to the procedure with IV contrast 2 tablet 3     ondansetron (ZOFRAN) 8 MG tablet Take 1 tablet (8 mg) by mouth every 8 hours as needed for nausea or vomiting (Patient not taking: Reported on 9/21/2022) 30 tablet 3     sildenafil (VIAGRA) 100 MG tablet Take 100 mg by mouth daily as needed          Allergies   Allergen Reactions     Contrast Dye Rash         Video physical exam  General: Patient appears well in no acute distress.   Skin: No visualized rash or lesions on visualized skin  Eyes: EOMI, no erythema, sclera icterus or discharge noted  Resp: Appears to be breathing comfortably without accessory muscle usage, speaking in full sentences, no cough  MSK: Appears to have normal range of motion based on visualized movements  Neurologic: No apparent tremors, facial movements symmetric  Psych: affect pleasant, alert and oriented    The rest of a comprehensive physical examination is deferred due to PHE (public health emergency) video restrictions          Labs:   I personally reviewed the following labs:    Most Recent 3 CBC's:  Recent Labs   Lab Test 09/21/22  1212 03/23/22  1257 09/22/21  1236   WBC 5.2 3.8* 4.5   HGB 15.1 15.6 15.4   MCV 97 97 98    195 147*     Most Recent 3 BMP's:  Recent Labs   Lab Test 09/21/22  1212 09/21/22  0750 08/12/22  0919 03/23/22  1257 09/22/21  1236     --   --  139 140   POTASSIUM 4.3  --   --  4.1 4.0   CHLORIDE 105  --  102 106 108   CO2 25  --   --  28 28   BUN 12.5  --   --  13 18   CR 0.86  --   --  1.03 0.91   ANIONGAP 11  --   --  5 4   PIEDAD 9.5  --   --  8.9 8.8   GLC 91 102*  --  92 79     Most Recent 2 LFT's:  Recent Labs   Lab Test 09/21/22  1212 03/23/22  1257   AST 27 22   ALT 17 29   ALKPHOS 60 64   BILITOTAL 0.8  0.8 1.0           Imaging: n/a      Impression/plan:     51 year old with stage IV MCL dx 7/2019. S/p induction with [place] regimen with progressive disease within 6 months of primary therapy; hence this is primary refractory disease. He  "was started on ibrutinib and venetoclax regimen 5/2020 and achieved CR after 6 cycles.      # Relapsed MCL  - PET from 3/2022 showed continued CR  - Continue on Zanabrutinib 320 mg daily and venetoclax 400 mg daily  - Per Dr Briggs likely 2 years of therapy would be sufficient knowing that he has been on venetoclax at least for > 1 year now. Holding further therapy since 9/2022 at this point, has been in CR for > 1.5 years  - Monthly labs locally until Jan, then will recheck in March when he is here to see Dr Briggs  - RTC in 3 months with PET/ Dr Briggs (same day given >4 hour drive)      # Elevated Tbili-- resolved   His bili was 1.7 and he previously confessed to overindulging with alcohol. He was counseled that this could impact his cancer therapy and advised to cut down to about 4 drinks weekly. LFTs otherwise stable. No new medications. Asymptomatic.   - TBili stable at 1.7 per Care Everywhere-- no direct panel. We discussed the possible etiologies of this, he stated the \"prior cause is no longer an issue\"  - Now normal      # Nicotine dependence  # Alcohol dependence  - Continue cutting down as this has impact on his cancer care         Nancy Comer [facility]-BC    35 minutes spent on the date of the encounter doing chart review, review of outside records, review of test results, interpretation of tests, patient visit, documentation and discussion with family     "

## 2022-12-14 ENCOUNTER — VIRTUAL VISIT (OUTPATIENT)
Dept: ONCOLOGY | Facility: CLINIC | Age: 52
End: 2022-12-14
Attending: INTERNAL MEDICINE
Payer: COMMERCIAL

## 2022-12-14 DIAGNOSIS — C83.10 MANTLE CELL LYMPHOMA, UNSPECIFIED BODY REGION (H): ICD-10-CM

## 2022-12-14 PROCEDURE — G0463 HOSPITAL OUTPT CLINIC VISIT: HCPCS | Mod: PN,GT | Performed by: NURSE PRACTITIONER

## 2022-12-14 PROCEDURE — 99214 OFFICE O/P EST MOD 30 MIN: CPT | Mod: 95 | Performed by: NURSE PRACTITIONER

## 2022-12-14 RX ORDER — ALLOPURINOL 300 MG/1
300 TABLET ORAL PRN
Qty: 90 TABLET | Refills: 1 | COMMUNITY
Start: 2022-12-14 | End: 2023-03-29

## 2022-12-14 NOTE — LETTER
12/14/2022         RE: Slava Lane  [address]  [facility] [address]        Dear Colleague,    Thank you for referring your patient, Slava Lane, to the [facility]. Please see a copy of my visit note below.    Slava is a 52 year old who is being evaluated via a billable video visit.      How would you like to obtain your AVS? MyChart  If the video visit is dropped, the invitation should be resent by: Text to cell phone: [phone]  Will anyone else be joining your video visit? No        Video-Visit Details    Video Start Time: 1315    Type of service:  Video Visit    Video End Time:1345    Originating Location (pt. Location): Home        Distant Location (provider location):  On-site    Platform used for Video Visit: Blane Soler    [facility]  Date of visit: Dec 14, 2022      Reason for Visit: follow up Mantle cell lymphoma      Oncology HPI:   Mantle cell lymphoma.  Mantle cell lymphoma, Stage IV with colon, BM involvement Ki67 -30%, TP53 mutations - Negative.  MCL MIPI - 5.9- Intermediate   .     Date Treatment Response Toxicities/Complications   9/18/19 - 1/25/20 R-CHOP/R-DHAP x3 each [place], Progressive disease     5/21/2020 Ibrutinib/Venetoclax     Ibrutinib stopped 7/2021 d/t hyperbilirubinemia  CR after 6,9, 14 cycles GI symptoms      Added zanabrutinib             Interval history:   Lidia are here on video for follow up  Has hernia he needs to have repaired, this occurred during changing truck tires.   Energy better since off medications. Joints are no longer hurting.   He's doing well overall. Appetite is much better, has gained some weight. No recent infections. No fevers/ chills/ night sweats.   No fevers.   No lumps or bumps and no other complaints.  No bleeding issues, however easy bruising  ROS otherwise neg      Current Outpatient Medications   Medication Sig Dispense Refill     acyclovir (ZOVIRAX) 400 MG tablet  Take 1 tablet (400 mg) by mouth 2 times daily 60 tablet 3     allopurinol (ZYLOPRIM) 300 MG tablet Take 1 tablet (300 mg) by mouth daily 90 tablet 1     methylPREDNISolone (MEDROL) 32 MG tablet Take 1 tablet (32 mg) by mouth daily 12 hours prior to the procedure with IV contrast 2 tablet 3     ondansetron (ZOFRAN) 8 MG tablet Take 1 tablet (8 mg) by mouth every 8 hours as needed for nausea or vomiting (Patient not taking: Reported on 9/21/2022) 30 tablet 3     sildenafil (VIAGRA) 100 MG tablet Take 100 mg by mouth daily as needed          Allergies   Allergen Reactions     Contrast Dye Rash         Video physical exam  General: Patient appears well in no acute distress.   Skin: No visualized rash or lesions on visualized skin  Eyes: EOMI, no erythema, sclera icterus or discharge noted  Resp: Appears to be breathing comfortably without accessory muscle usage, speaking in full sentences, no cough  MSK: Appears to have normal range of motion based on visualized movements  Neurologic: No apparent tremors, facial movements symmetric  Psych: affect pleasant, alert and oriented    The rest of a comprehensive physical examination is deferred due to PHE (public health emergency) video restrictions          Labs:   I personally reviewed the following labs:    Most Recent 3 CBC's:  Recent Labs   Lab Test 09/21/22  1212 03/23/22  1257 09/22/21  1236   WBC 5.2 3.8* 4.5   HGB 15.1 15.6 15.4   MCV 97 97 98    195 147*     Most Recent 3 BMP's:  Recent Labs   Lab Test 09/21/22  1212 09/21/22  0750 08/12/22  0919 03/23/22  1257 09/22/21  1236     --   --  139 140   POTASSIUM 4.3  --   --  4.1 4.0   CHLORIDE 105  --  102 106 108   CO2 25  --   --  28 28   BUN 12.5  --   --  13 18   CR 0.86  --   --  1.03 0.91   ANIONGAP 11  --   --  5 4   PIEDAD 9.5  --   --  8.9 8.8   GLC 91 102*  --  92 79     Most Recent 2 LFT's:  Recent Labs   Lab Test 09/21/22  1212 03/23/22  1257   AST 27 22   ALT 17 29   ALKPHOS 60 64   BILITOTAL 0.8  " 0.8 1.0           Imaging: n/a      Impression/plan:     51 year old with stage IV MCL dx 7/2019. S/p induction with [place] regimen with progressive disease within 6 months of primary therapy; hence this is primary refractory disease. He was started on ibrutinib and venetoclax regimen 5/2020 and achieved CR after 6 cycles.      # Relapsed MCL  - PET from 3/2022 showed continued CR  - Continue on Zanabrutinib 320 mg daily and venetoclax 400 mg daily  - Per Dr Briggs likely 2 years of therapy would be sufficient knowing that he has been on venetoclax at least for > 1 year now. Holding further therapy since 9/2022 at this point, has been in CR for > 1.5 years  - Monthly labs locally until Jan, then will recheck in March when he is here to see Dr Briggs  - RTC in 3 months with PET/ Dr Briggs (same day given >4 hour drive)      # Elevated Tbili-- resolved   His bili was 1.7 and he previously confessed to overindulging with alcohol. He was counseled that this could impact his cancer therapy and advised to cut down to about 4 drinks weekly. LFTs otherwise stable. No new medications. Asymptomatic.   - TBili stable at 1.7 per Care Everywhere-- no direct panel. We discussed the possible etiologies of this, he stated the \"prior cause is no longer an issue\"  - Now normal      # Nicotine dependence  # Alcohol dependence  - Continue cutting down as this has impact on his cancer care         Nancy Comer [facility]    35 minutes spent on the date of the encounter doing chart review, review of outside records, review of test results, interpretation of tests, patient visit, documentation and discussion with family     "

## 2023-01-16 NOTE — TELEPHONE ENCOUNTER
Prior Authorization Approval    Authorization Effective Date: 7/10/2022  Authorization Expiration Date: 7/10/2023  Medication: Venclexta 100mg PA Renewal Approved  Approved Dose/Quantity: 120/30  Reference #: [id number]   Insurance Company: [employer] [place] - Phone [phone] Fax [phone]  Expected CoPay:       CoPay Card Available:      Foundation Assistance Needed:    Which Pharmacy is filling the prescription (Not needed for infusion/clinic administered): [facility] - [facility] [address]   Pharmacy Notified:    Patient Notified:      Kris Salazar CPhT  [facility]  Oncology Pharmacy Liaison   [email or web address]  [phone] (phone  [phone] (fax   Biopsy Method: Personna blade

## 2023-03-29 ENCOUNTER — ONCOLOGY VISIT (OUTPATIENT)
Dept: TRANSPLANT | Facility: CLINIC | Age: 53
End: 2023-03-29
Attending: INTERNAL MEDICINE
Payer: COMMERCIAL

## 2023-03-29 ENCOUNTER — LAB (OUTPATIENT)
Dept: LAB | Facility: CLINIC | Age: 53
End: 2023-03-29
Attending: INTERNAL MEDICINE
Payer: COMMERCIAL

## 2023-03-29 ENCOUNTER — HOSPITAL ENCOUNTER (OUTPATIENT)
Dept: PET IMAGING | Facility: CLINIC | Age: 53
Discharge: HOME | End: 2023-03-29
Attending: INTERNAL MEDICINE
Payer: COMMERCIAL

## 2023-03-29 VITALS
BODY MASS INDEX: 26.23 KG/M2 | TEMPERATURE: 97.5 F | HEART RATE: 68 BPM | DIASTOLIC BLOOD PRESSURE: 79 MMHG | SYSTOLIC BLOOD PRESSURE: 123 MMHG | WEIGHT: 167.5 LBS | RESPIRATION RATE: 16 BRPM | OXYGEN SATURATION: 98 %

## 2023-03-29 DIAGNOSIS — C83.18 MANTLE CELL LYMPHOMA OF LYMPH NODES OF MULTIPLE SITES (H): ICD-10-CM

## 2023-03-29 DIAGNOSIS — C83.18 MANTLE CELL LYMPHOMA OF LYMPH NODES OF MULTIPLE SITES (H): Primary | ICD-10-CM

## 2023-03-29 DIAGNOSIS — Z79.899 ENCOUNTER FOR LONG-TERM (CURRENT) USE OF MEDICATIONS: ICD-10-CM

## 2023-03-29 LAB
ALBUMIN SERPL BCG-MCNC: 3.7 G/DL (ref 3.5–5.2)
ALP SERPL-CCNC: 50 U/L (ref 40–129)
ALT SERPL W P-5'-P-CCNC: 11 U/L (ref 10–50)
ANION GAP SERPL CALCULATED.3IONS-SCNC: 8 MMOL/L (ref 7–15)
AST SERPL W P-5'-P-CCNC: 18 U/L (ref 10–50)
BASOPHILS # BLD AUTO: 0.1 10E3/UL (ref 0–0.2)
BASOPHILS NFR BLD AUTO: 1 %
BILIRUB SERPL-MCNC: 0.6 MG/DL
BUN SERPL-MCNC: 12 MG/DL (ref 6–20)
CALCIUM SERPL-MCNC: 7.4 MG/DL (ref 8.6–10)
CHLORIDE SERPL-SCNC: 111 MMOL/L (ref 98–107)
CREAT SERPL-MCNC: 0.69 MG/DL (ref 0.67–1.17)
DEPRECATED HCO3 PLAS-SCNC: 22 MMOL/L (ref 22–29)
EOSINOPHIL # BLD AUTO: 0.2 10E3/UL (ref 0–0.7)
EOSINOPHIL NFR BLD AUTO: 4 %
ERYTHROCYTE [DISTWIDTH] IN BLOOD BY AUTOMATED COUNT: 13.2 % (ref 10–15)
GFR SERPL CREATININE-BSD FRML MDRD: >90 ML/MIN/1.73M2
GLUCOSE SERPL-MCNC: 81 MG/DL (ref 70–99)
HCT VFR BLD AUTO: 43.3 % (ref 40–53)
HGB BLD-MCNC: 14.7 G/DL (ref 13.3–17.7)
IMM GRANULOCYTES # BLD: 0 10E3/UL
IMM GRANULOCYTES NFR BLD: 1 %
LYMPHOCYTES # BLD AUTO: 1.8 10E3/UL (ref 0.8–5.3)
LYMPHOCYTES NFR BLD AUTO: 39 %
MAGNESIUM SERPL-MCNC: 1.9 MG/DL (ref 1.7–2.3)
MCH RBC QN AUTO: 32.5 PG (ref 26.5–33)
MCHC RBC AUTO-ENTMCNC: 33.9 G/DL (ref 31.5–36.5)
MCV RBC AUTO: 96 FL (ref 78–100)
MONOCYTES # BLD AUTO: 0.5 10E3/UL (ref 0–1.3)
MONOCYTES NFR BLD AUTO: 12 %
NEUTROPHILS # BLD AUTO: 2 10E3/UL (ref 1.6–8.3)
NEUTROPHILS NFR BLD AUTO: 43 %
NRBC # BLD AUTO: 0 10E3/UL
NRBC BLD AUTO-RTO: 0 /100
PHOSPHATE SERPL-MCNC: 2.2 MG/DL (ref 2.5–4.5)
PLATELET # BLD AUTO: 211 10E3/UL (ref 150–450)
POTASSIUM SERPL-SCNC: 3.6 MMOL/L (ref 3.4–5.3)
PROT SERPL-MCNC: 5.6 G/DL (ref 6.4–8.3)
RBC # BLD AUTO: 4.53 10E6/UL (ref 4.4–5.9)
SODIUM SERPL-SCNC: 141 MMOL/L (ref 136–145)
WBC # BLD AUTO: 4.6 10E3/UL (ref 4–11)

## 2023-03-29 PROCEDURE — 343N000001 HC RX 343: Performed by: INTERNAL MEDICINE

## 2023-03-29 PROCEDURE — 84100 ASSAY OF PHOSPHORUS: CPT

## 2023-03-29 PROCEDURE — 99215 OFFICE O/P EST HI 40 MIN: CPT | Performed by: INTERNAL MEDICINE

## 2023-03-29 PROCEDURE — G0463 HOSPITAL OUTPT CLINIC VISIT: HCPCS | Mod: 25 | Performed by: INTERNAL MEDICINE

## 2023-03-29 PROCEDURE — 85025 COMPLETE CBC W/AUTO DIFF WBC: CPT

## 2023-03-29 PROCEDURE — 80053 COMPREHEN METABOLIC PANEL: CPT

## 2023-03-29 PROCEDURE — 78816 PET IMAGE W/CT FULL BODY: CPT | Mod: PS

## 2023-03-29 PROCEDURE — 36591 DRAW BLOOD OFF VENOUS DEVICE: CPT

## 2023-03-29 PROCEDURE — 78816 PET IMAGE W/CT FULL BODY: CPT | Mod: 26 | Performed by: RADIOLOGY

## 2023-03-29 PROCEDURE — 83735 ASSAY OF MAGNESIUM: CPT

## 2023-03-29 PROCEDURE — A9552 F18 FDG: HCPCS | Performed by: INTERNAL MEDICINE

## 2023-03-29 RX ADMIN — FLUDEOXYGLUCOSE F-18 10.41 MILLICURIE: 500 INJECTION, SOLUTION INTRAVENOUS at 10:10

## 2023-03-29 ASSESSMENT — PAIN SCALES - GENERAL: PAINLEVEL: NO PAIN (0)

## 2023-03-29 NOTE — NURSING NOTE
"Oncology Rooming Note    March 29, 2023 1:23 PM   Slava Lane is a 52 year old male who presents for:    Chief Complaint   Patient presents with     Lab Only     Labs drawn via previously placed PIV by RN. Vitals taken.     Oncology Clinic Visit     Mantle cell lymphoma        Initial Vitals: /79 (BP Location: Right arm, Patient Position: Sitting, Cuff Size: Adult Regular)   Pulse 68   Temp 97.5  F (36.4  C) (Oral)   Resp 16   Wt 76 kg (167 lb 8 oz)   SpO2 98%   BMI 26.23 kg/m   Estimated body mass index is 26.23 kg/m  as calculated from the following:    Height as of 9/22/21: 1.702 m (5' 7.01\").    Weight as of this encounter: 76 kg (167 lb 8 oz). Body surface area is 1.9 meters squared.  No Pain (0) Comment: Data Unavailable   No LMP for male patient.  Allergies reviewed: Yes  Medications reviewed: Yes    Medications: Medication refills not needed today.  Pharmacy name entered into EPIC:    [facility] - [place], [place] - [address] [place] [address] [address]  [facility] - [place], [place] - [address]    Clinical concerns: Left ear swelling. Slight pain associated with it, fluid drainage.       Ariadne Vilchis, VIKASH            "

## 2023-03-29 NOTE — LETTER
3/29/2023         RE: Slava Lane  [address]  [facility] [address]        Dear Colleague,    Thank you for referring your patient, Slava Lane, to the [facility] BLOOD AND MARROW TRANSPLANT PROGRAM [place]. Please see a copy of my visit note below.    [facility]  In Vivo Visit    Date of Visit: 03/30/2023    Reason for Visit: F/u MCL     Hematologic history:  Mantle cell lymphoma.  Mantle cell lymphoma, Stage IV with colon, BM involvement Ki67 -30%, TP53 mutations - Negative.  MCL MIPI - 5.9- Intermediate   .     Date Treatment Response Toxicities/Complications   9/18/19 - 1/25/20 R-CHOP/R-DHAP x3 each MI, Progressive disease     5/21/2020 Ibrutinib/Venetoclax    Ibrutinib stopped 7/2021 d/t hyperbilirubinemia  CR after 6,9, 14 cycles GI symptoms      Added zanabrutinib        9/22/22 Stopped therapy              Interval History:  Caught a viral illness from his grandson. Had some coryzal symptoms but also ear swelling and drainage. No pain or fevers. No other complaints.      Current Outpatient Medications:      methylPREDNISolone (MEDROL) 32 MG tablet, Take 1 tablet (32 mg) by mouth daily 12 hours prior to the procedure with IV contrast (Patient not taking: Reported on 3/29/2023), Disp: 2 tablet, Rfl: 3     sildenafil (VIAGRA) 100 MG tablet, Take 100 mg by mouth daily as needed  (Patient not taking: Reported on 3/29/2023), Disp: , Rfl:     Physical Exam:  /79 (BP Location: Right arm, Patient Position: Sitting, Cuff Size: Adult Regular)   Pulse 68   Temp 97.5  F (36.4  C) (Oral)   Resp 16   Wt 76 kg (167 lb 8 oz)   SpO2 98%   BMI 26.23 kg/m    GA: NAD. Appears as stated age.  HEENT: PERRL, OP Clear. Left ear with periaurical edema. TM injected.  Neck: Supple, no JVD  CV: RRR, no mrg  Lungs: CTAB, no wheezes  Abd: Soft, nt, nd  Lymph: No cervical LAD, no HSM  Ext: No edema. Warm  Neuro: AAO, moving all ext. Symmetric face  Psyc: Appropriate and cooperative  Access:  port    ECO      Labs: In care everywhere and Imaging reviewed  Reviewed PET personally - CR.     ASSESSMENT AND PLAN:  52 year old with stage IV MCL dx 2019. S/p induction with [place] regimen with progressive disease within 6 months of primary therapy; hence this is primary refractory disease. He was started on ibrutinib and venetoclax regimen 2020 and achieved CR after 6 cycles.     relapsed MCL  He has received about 2 years of therapy and has been in CR for > 2 years. Off therapy since . Follow up in 6 months.    Viral illness and aurical edema  - Recommended ENT if does not resolve in 1 week or if worsening symptoms such as fevers, etc develop    Nicotine dependence  Alcohol dependence  - We discussed cutting down and impact on cancer care  - PET showing an ulcer at the site of previous ulcer. Given the above history, will repeat EGD    Follow up in 6 months    40 minutes spent on the date of the encounter doing chart review, interpretation of results, patient visit, documentation and coordination of care.        Nicholas Briggs MD

## 2023-03-29 NOTE — NURSING NOTE
Chief Complaint   Patient presents with     Lab Only     Labs drawn via previously placed PIV by RN. Vitals taken.     Labs drawn via previously placed PIV by RN. Pt tolerated well. PIV removed. Vitals taken. Pt checked into next appointment.    Patricia Reynolds RN

## 2023-03-30 ENCOUNTER — PATIENT OUTREACH (OUTPATIENT)
Dept: ONCOLOGY | Facility: CLINIC | Age: 53
End: 2023-03-30
Payer: COMMERCIAL

## 2023-03-30 NOTE — PROGRESS NOTES
[facility]: Cancer Care Short Note                                    Discussion with Patient:                                                        OUTBOUND CALL: RNCC called patient to discuss PET. PET shows ulcer at the site of prevcious ulcer. Pt has a history of nicotine and alcohol use. Dr. Briggs placed referral forI to have GD and biopsies done. Pt is agreeable to this and will await call from scheduling.   Pt verbalizes understanding and has no other questions, comments, or concerns at this time.     Patricia Little, RN, MSN, OCN   RN Care Coordinator   [facility]   [address] [place] [id number]   [phone]

## 2023-03-30 NOTE — PROGRESS NOTES
[facility]  In Vivo Visit    Date of Visit: 2023    Reason for Visit: F/u MCL     Hematologic history:  Mantle cell lymphoma.  Mantle cell lymphoma, Stage IV with colon, BM involvement Ki67 -30%, TP53 mutations - Negative.  MCL MIPI - 5.9- Intermediate   .     Date Treatment Response Toxicities/Complications   19 - 20 R-CHOP/R-DHAP x3 each MT, Progressive disease     2020 Ibrutinib/Venetoclax    Ibrutinib stopped 2021 d/t hyperbilirubinemia  CR after 6,9, 14 cycles GI symptoms      Added zanabrutinib        22 Stopped therapy              Interval History:  Caught a viral illness from his grandson. Had some coryzal symptoms but also ear swelling and drainage. No pain or fevers. No other complaints.      Current Outpatient Medications:      methylPREDNISolone (MEDROL) 32 MG tablet, Take 1 tablet (32 mg) by mouth daily 12 hours prior to the procedure with IV contrast (Patient not taking: Reported on 3/29/2023), Disp: 2 tablet, Rfl: 3     sildenafil (VIAGRA) 100 MG tablet, Take 100 mg by mouth daily as needed  (Patient not taking: Reported on 3/29/2023), Disp: , Rfl:     Physical Exam:  /79 (BP Location: Right arm, Patient Position: Sitting, Cuff Size: Adult Regular)   Pulse 68   Temp 97.5  F (36.4  C) (Oral)   Resp 16   Wt 76 kg (167 lb 8 oz)   SpO2 98%   BMI 26.23 kg/m    GA: NAD. Appears as stated age.  HEENT: PERRL, OP Clear. Left ear with periaurical edema. TM injected.  Neck: Supple, no JVD  CV: RRR, no mrg  Lungs: CTAB, no wheezes  Abd: Soft, nt, nd  Lymph: No cervical LAD, no HSM  Ext: No edema. Warm  Neuro: AAO, moving all ext. Symmetric face  Psyc: Appropriate and cooperative  Access: port    ECO      Labs: In care everywhere and Imaging reviewed  Reviewed PET personally - CR.     ASSESSMENT AND PLAN:  52 year old with stage IV MCL dx 2019. S/p induction with [place] regimen with progressive disease within 6 months of primary therapy; hence this is  primary refractory disease. He was started on ibrutinib and venetoclax regimen 5/2020 and achieved CR after 6 cycles.     relapsed MCL  He has received about 2 years of therapy and has been in CR for > 2 years. Off therapy since 9/22. Follow up in 6 months.    Viral illness and aurical edema  - Recommended ENT if does not resolve in 1 week or if worsening symptoms such as fevers, etc develop    Nicotine dependence  Alcohol dependence  - We discussed cutting down and impact on cancer care  - PET showing an ulcer at the site of previous ulcer. Given the above history, will repeat EGD    Follow up in 6 months    40 minutes spent on the date of the encounter doing chart review, interpretation of results, patient visit, documentation and coordination of care.

## 2023-03-31 ENCOUNTER — TELEPHONE (OUTPATIENT)
Dept: GASTROENTEROLOGY | Facility: CLINIC | Age: 53
End: 2023-03-31
Payer: COMMERCIAL

## 2023-03-31 ENCOUNTER — TELEPHONE (OUTPATIENT)
Dept: GASTROENTEROLOGY | Facility: CLINIC | Age: 53
End: 2023-03-31

## 2023-03-31 NOTE — TELEPHONE ENCOUNTER
Screening Questions  BLUE  KIND OF PREP RED  LOCATION [review exclusion criteria] GREEN  SEDATION TYPE        Y Are you active on mychart?       DAVIN MURILLO Ordering/Referring Provider?        MEDICA What type of coverage do you have?      N Have you had a positive covid test in the last 14 days?     26.6 1. BMI  [BMI 40+ - review exclusion criteria]    Y  2. Are you able to give consent for your medical care? [IF NO,RN REVIEW]          N  3. Are you taking any prescription pain medications on a routine schedule   (ex narcotics: oxycodone, roxicodone, oxycontin,  and percocet)? [RN Review]          3a. EXTENDED PREP What kind of prescription?     N 4. Do you have any chemical dependencies such as alcohol, street drugs, or methadone?        **If yes 3- 5 , please schedule with MAC sedation.**          IF YES TO ANY 6 - 10 - HOSPITAL SETTING ONLY.     N 6.   Do you need assistance transferring?     N 7.   Have you had a heart or lung transplant?    N 8.   Are you currently on dialysis?   N 9.   Do you use daily home oxygen?   N 10. Do you take nitroglycerin?   10a.  If yes, how often?     11. [FEMALES]   Are you currently pregnant?    11a.  If yes, how many weeks? [ Greater than 12 weeks, OR NEEDED]    N 12. Do you have Pulmonary Hypertension? *NEED PAC APPT AT [facility] w/ MAC*     N 13. [review exclusion criteria]  Do you have any implantable devices in your body (pacemaker, defib, LVAD)?    N 14. In the past 6 months, have you had any heart related issues including cardiomyopathy or heart attack?     14a.  If yes, did it require cardiac stenting if so when?     N 15. Have you had a stroke or Transient ischemic attack (TIA - aka  mini stroke ) within 6 months?      N 16. Do you have mod to severe Obstructive Sleep Apnea?  [Hospital only]    N 17. Do you have SEVERE AND UNCONTROLLED asthma? *NEED PAC APPT AT [facility] w/MAC*     18. Are you currently taking any blood thinners?     18a. No. Continue to 19.   18b.  "Yes/no Blood Thinner: No [CONTINUE TO #19]    N 19. Do you take the medication Phentermine?    19a. If yes, \"Hold for 7 days before procedure.  Please consult your prescribing provider if you have questions about holding this medication.\"     N  20. Do you have chronic kidney disease?      N  21. Do you have a diagnosis of diabetes?       23. Preferred LOCAL Pharmacy for Pre Prescription    [ LIST ONLY ONE PHARMACY]        [facility] - [place], [place] - [address] [place] [address] [address]    - CLOSING REMINDERS -    Informed patient they will need an adult    Cannot take any type of public or medical transportation alone    Conscious Sedation- Needs  for 6 hours after the procedure       MAC/General-Needs  for 24 hours after procedure    Pre-Procedure Covid test to be completed [ESSC PCR Testing Required]    Confirmed Nurse will call to complete assessment       - SCHEDULING DETAILS -  NO Hospital Setting Required? If yes, what is the exclusion?:    YISSEL  Surgeon    4/3/2023  Date of Procedure  Upper Endoscopy [EGD]  Type of Procedure Scheduled  [facility]   MAC Sedation Type     NO PAC / Pre-op Required                 "

## 2023-03-31 NOTE — TELEPHONE ENCOUNTER
Pre assessment questions completed for upcoming Upper endoscopy (EGD) procedure scheduled on 4.3.23    COVID policy reviewed.     Reviewed procedural arrival time 1000 and facility location [facility]; [address] [place] [address], [address] Floor, [place] [address]    Designated  policy reviewed. Instructed to have someone stay 24 hours post procedure.     Anticoagulation/blood thinners? No    Electronic implanted devices? No    Diabetic? No    Reviewed EGD procedure prep instructions.     Patient verbalized understanding and had no questions or concerns at this time.    Gerda Butcher RN  Endoscopy Procedure Pre Assessment RN

## 2023-04-03 ENCOUNTER — ANESTHESIA EVENT (OUTPATIENT)
Dept: SURGERY | Facility: AMBULATORY SURGERY CENTER | Age: 53
End: 2023-04-03
Payer: COMMERCIAL

## 2023-04-03 ENCOUNTER — HOSPITAL ENCOUNTER (OUTPATIENT)
Facility: AMBULATORY SURGERY CENTER | Age: 53
Discharge: HOME | End: 2023-04-03
Attending: INTERNAL MEDICINE
Payer: COMMERCIAL

## 2023-04-03 ENCOUNTER — ANESTHESIA (OUTPATIENT)
Dept: SURGERY | Facility: AMBULATORY SURGERY CENTER | Age: 53
End: 2023-04-03
Payer: COMMERCIAL

## 2023-04-03 VITALS
TEMPERATURE: 97.3 F | HEIGHT: 67 IN | WEIGHT: 167 LBS | RESPIRATION RATE: 16 BRPM | DIASTOLIC BLOOD PRESSURE: 69 MMHG | SYSTOLIC BLOOD PRESSURE: 101 MMHG | HEART RATE: 61 BPM | BODY MASS INDEX: 26.21 KG/M2 | OXYGEN SATURATION: 98 %

## 2023-04-03 VITALS — HEART RATE: 78 BPM

## 2023-04-03 LAB — UPPER GI ENDOSCOPY: NORMAL

## 2023-04-03 PROCEDURE — 88305 TISSUE EXAM BY PATHOLOGIST: CPT | Mod: 26 | Performed by: PATHOLOGY

## 2023-04-03 PROCEDURE — 88342 IMHCHEM/IMCYTCHM 1ST ANTB: CPT | Mod: 26 | Performed by: PATHOLOGY

## 2023-04-03 PROCEDURE — 43239 EGD BIOPSY SINGLE/MULTIPLE: CPT

## 2023-04-03 PROCEDURE — 88305 TISSUE EXAM BY PATHOLOGIST: CPT | Mod: TC | Performed by: INTERNAL MEDICINE

## 2023-04-03 PROCEDURE — 88184 FLOWCYTOMETRY/ TC 1 MARKER: CPT | Performed by: INTERNAL MEDICINE

## 2023-04-03 PROCEDURE — 88189 FLOWCYTOMETRY/READ 16 & >: CPT | Performed by: STUDENT IN AN ORGANIZED HEALTH CARE EDUCATION/TRAINING PROGRAM

## 2023-04-03 PROCEDURE — 88341 IMHCHEM/IMCYTCHM EA ADD ANTB: CPT | Mod: 26 | Performed by: PATHOLOGY

## 2023-04-03 PROCEDURE — 88184 FLOWCYTOMETRY/ TC 1 MARKER: CPT | Performed by: STUDENT IN AN ORGANIZED HEALTH CARE EDUCATION/TRAINING PROGRAM

## 2023-04-03 RX ORDER — GLYCOPYRROLATE 0.2 MG/ML
INJECTION, SOLUTION INTRAMUSCULAR; INTRAVENOUS PRN
Status: DISCONTINUED | OUTPATIENT
Start: 2023-04-03 | End: 2023-04-03

## 2023-04-03 RX ORDER — NALOXONE HYDROCHLORIDE 0.4 MG/ML
0.2 INJECTION, SOLUTION INTRAMUSCULAR; INTRAVENOUS; SUBCUTANEOUS
Status: CANCELLED | OUTPATIENT
Start: 2023-04-03

## 2023-04-03 RX ORDER — PROPOFOL 10 MG/ML
INJECTION, EMULSION INTRAVENOUS PRN
Status: DISCONTINUED | OUTPATIENT
Start: 2023-04-03 | End: 2023-04-03

## 2023-04-03 RX ORDER — ONDANSETRON 4 MG/1
4 TABLET, ORALLY DISINTEGRATING ORAL EVERY 6 HOURS PRN
Status: CANCELLED | OUTPATIENT
Start: 2023-04-03

## 2023-04-03 RX ORDER — SODIUM CHLORIDE, SODIUM LACTATE, POTASSIUM CHLORIDE, CALCIUM CHLORIDE 600; 310; 30; 20 MG/100ML; MG/100ML; MG/100ML; MG/100ML
INJECTION, SOLUTION INTRAVENOUS CONTINUOUS PRN
Status: DISCONTINUED | OUTPATIENT
Start: 2023-04-03 | End: 2023-04-03

## 2023-04-03 RX ORDER — PROPOFOL 10 MG/ML
INJECTION, EMULSION INTRAVENOUS CONTINUOUS PRN
Status: DISCONTINUED | OUTPATIENT
Start: 2023-04-03 | End: 2023-04-03

## 2023-04-03 RX ORDER — LIDOCAINE HYDROCHLORIDE 20 MG/ML
INJECTION, SOLUTION INFILTRATION; PERINEURAL PRN
Status: DISCONTINUED | OUTPATIENT
Start: 2023-04-03 | End: 2023-04-03

## 2023-04-03 RX ORDER — LIDOCAINE 40 MG/G
CREAM TOPICAL
Status: DISCONTINUED | OUTPATIENT
Start: 2023-04-03 | End: 2023-04-04 | Stop reason: HOSPADM

## 2023-04-03 RX ORDER — ONDANSETRON 2 MG/ML
4 INJECTION INTRAMUSCULAR; INTRAVENOUS EVERY 6 HOURS PRN
Status: CANCELLED | OUTPATIENT
Start: 2023-04-03

## 2023-04-03 RX ORDER — PROCHLORPERAZINE MALEATE 10 MG
10 TABLET ORAL EVERY 6 HOURS PRN
Status: CANCELLED | OUTPATIENT
Start: 2023-04-03

## 2023-04-03 RX ORDER — NALOXONE HYDROCHLORIDE 0.4 MG/ML
0.4 INJECTION, SOLUTION INTRAMUSCULAR; INTRAVENOUS; SUBCUTANEOUS
Status: CANCELLED | OUTPATIENT
Start: 2023-04-03

## 2023-04-03 RX ORDER — ONDANSETRON 2 MG/ML
4 INJECTION INTRAMUSCULAR; INTRAVENOUS
Status: DISCONTINUED | OUTPATIENT
Start: 2023-04-03 | End: 2023-04-04 | Stop reason: HOSPADM

## 2023-04-03 RX ORDER — FLUMAZENIL 0.1 MG/ML
0.2 INJECTION, SOLUTION INTRAVENOUS
Status: CANCELLED | OUTPATIENT
Start: 2023-04-03 | End: 2023-04-04

## 2023-04-03 RX ADMIN — LIDOCAINE HYDROCHLORIDE 100 MG: 20 INJECTION, SOLUTION INFILTRATION; PERINEURAL at 11:18

## 2023-04-03 RX ADMIN — GLYCOPYRROLATE 0.2 MG: 0.2 INJECTION, SOLUTION INTRAMUSCULAR; INTRAVENOUS at 11:18

## 2023-04-03 RX ADMIN — PROPOFOL 50 MG: 10 INJECTION, EMULSION INTRAVENOUS at 11:18

## 2023-04-03 RX ADMIN — PROPOFOL 50 MG: 10 INJECTION, EMULSION INTRAVENOUS at 11:20

## 2023-04-03 RX ADMIN — PROPOFOL 150 MCG/KG/MIN: 10 INJECTION, EMULSION INTRAVENOUS at 11:20

## 2023-04-03 RX ADMIN — SODIUM CHLORIDE, SODIUM LACTATE, POTASSIUM CHLORIDE, CALCIUM CHLORIDE: 600; 310; 30; 20 INJECTION, SOLUTION INTRAVENOUS at 11:18

## 2023-04-03 NOTE — ANESTHESIA PREPROCEDURE EVALUATION
Anesthesia Pre-Procedure Evaluation    Patient: Slava Lane   MRN: [id number] : 1970        Procedure : Procedure(s):  Esophagoscopy, gastroscopy, duodenoscopy (EGD), combined          Past Medical History:   Diagnosis Date     Mantle cell lymphoma (H) 2019    noted incidentally on colon polyp pathology      Uncomplicated asthma     When exposed to enviromental allergies      Past Surgical History:   Procedure Laterality Date     AS SKIN PINCH GRAFT PROCEDURE <2CM Right 2017     BIOPSY      bone marrow     COLONOSCOPY       COLONOSCOPY N/A 3/26/2021    Procedure: COLONOSCOPY;  Surgeon: Ahsan Sharpe MD;  Location: [facility] OR     ESOPHAGOSCOPY, GASTROSCOPY, DUODENOSCOPY (EGD), COMBINED N/A 3/26/2021    Procedure: ESOPHAGOGASTRODUODENOSCOPY, WITH BIOPSY;  Surgeon: Ahsan Sharpe MD;  Location: [facility] OR     EYE SURGERY      surgeries at age 5-6     INSERT PORT VASCULAR ACCESS Right 10/3/2019    Procedure: Chest Port Placement;  Surgeon: July Simpson PA-C;  Location: [facility] OR     IR CHEST PORT PLACEMENT > 5 YRS OF AGE  10/3/2019     LITHOTRIPSY N/A 2019    unknown side      Allergies   Allergen Reactions     Contrast Dye Rash      Social History     Tobacco Use     Smoking status: Every Day     Packs/day: 0.33     Years: 30.00     Pack years: 9.90     Types: Cigarettes     Smokeless tobacco: Current     Types: Chew     Tobacco comments:     A couple of cigarettes a day    Vaping Use     Vaping status: Not on file   Substance Use Topics     Alcohol use: Yes     Alcohol/week: 21.0 standard drinks of alcohol     Types: 21 Cans of beer per week     Comment: 1-2 beers per day      Wt Readings from Last 1 Encounters:   23 75.8 kg (167 lb)        Anesthesia Evaluation            ROS/MED HX  ENT/Pulmonary:  - neg pulmonary ROS   (+) asthma     Neurologic:  - neg neurologic ROS     Cardiovascular:  - neg cardiovascular ROS     METS/Exercise Tolerance: >4 METS    Hematologic:  - neg hematologic  ROS      Musculoskeletal:  - neg musculoskeletal ROS     GI/Hepatic:  - neg GI/hepatic ROS     Renal/Genitourinary:  - neg Renal ROS     Endo:  - neg endo ROS     Psychiatric/Substance Use:  - neg psychiatric ROS     Infectious Disease:  - neg infectious disease ROS     Malignancy:   (+) Malignancy,     Other:  - neg other ROS          Physical Exam    Airway  airway exam normal           Respiratory Devices and Support         Dental       (+) Modest Abnormalities - crowns, retainers, 1 or 2 missing teeth      Cardiovascular   cardiovascular exam normal          Pulmonary   pulmonary exam normal                OUTSIDE LABS:  CBC:   Lab Results   Component Value Date    WBC 4.6 03/29/2023    WBC 5.2 09/21/2022    HGB 14.7 03/29/2023    HGB 15.1 09/21/2022    HCT 43.3 03/29/2023    HCT 44.5 09/21/2022     03/29/2023     09/21/2022     BMP:   Lab Results   Component Value Date     03/29/2023     09/21/2022    POTASSIUM 3.6 03/29/2023    POTASSIUM 4.3 09/21/2022    CHLORIDE 111 (H) 03/29/2023    CHLORIDE 105 09/21/2022    CO2 22 03/29/2023    CO2 25 09/21/2022    BUN 12.0 03/29/2023    BUN 12.5 09/21/2022    CR 0.69 03/29/2023    CR 0.86 09/21/2022    GLC 81 03/29/2023    GLC 91 09/21/2022     COAGS:   Lab Results   Component Value Date    INR 1.08 03/03/2020     POC:   Lab Results   Component Value Date     (H) 03/26/2021     HEPATIC:   Lab Results   Component Value Date    ALBUMIN 3.7 03/29/2023    PROTTOTAL 5.6 (L) 03/29/2023    ALT 11 03/29/2023    AST 18 03/29/2023    ALKPHOS 50 03/29/2023    BILITOTAL 0.6 03/29/2023     OTHER:   Lab Results   Component Value Date    PIEDAD 7.4 (L) 03/29/2023    PHOS 2.2 (L) 03/29/2023    MAG 1.9 03/29/2023    TSH 0.74 09/22/2021       Anesthesia Plan    ASA Status:  3   NPO Status:  NPO Appropriate    Anesthesia Type: MAC.     - Reason for MAC: straight local not clinically adequate   Induction: Intravenous.   Maintenance: TIVA.         Consents    Anesthesia Plan(s) and associated risks, benefits, and realistic alternatives discussed. Questions answered and patient/representative(s) expressed understanding.     - Discussed: Risks, Benefits and Alternatives for BOTH SEDATION and the PROCEDURE were discussed     - Discussed with:  Patient         Postoperative Care    Pain management: Oral pain medications.   PONV prophylaxis: Ondansetron (or other 5HT-3), Dexamethasone or Solumedrol     Comments:                Lionel Roman MD, MD

## 2023-04-03 NOTE — ANESTHESIA POSTPROCEDURE EVALUATION
Patient: Slava J Patch    Procedure: Procedure(s):  Esophagoscopy, gastroscopy, duodenoscopy (EGD), combined       Anesthesia Type:  MAC    Note:  Disposition: Outpatient   Postop Pain Control: Uneventful            Sign Out: Well controlled pain   PONV: No   Neuro/Psych: Uneventful            Sign Out: Acceptable/Baseline neuro status   Airway/Respiratory: Uneventful            Sign Out: Acceptable/Baseline resp. status   CV/Hemodynamics: Uneventful            Sign Out: Acceptable CV status; No obvious hypovolemia; No obvious fluid overload   Other NRE: NONE   DID A NON-ROUTINE EVENT OCCUR? No           Last vitals:  Vitals Value Taken Time   /69 04/03/23 1230   Temp 36.3  C (97.3  F) 04/03/23 1230   Pulse     Resp 16 04/03/23 1230   SpO2 98 % 04/03/23 1230       Electronically Signed By: Lionel Roman MD, MD  April 3, 2023  12:37 PM

## 2023-04-03 NOTE — ANESTHESIA CARE TRANSFER NOTE
Patient: Slava J Patch    Procedure: Procedure(s):  Esophagoscopy, gastroscopy, duodenoscopy (EGD), combined       Diagnosis: Mantle cell lymphoma of lymph nodes of multiple sites (H) [C83.18]  Diagnosis Additional Information: No value filed.    Anesthesia Type:   MAC     Note:    Oropharynx: oropharynx clear of all foreign objects  Level of Consciousness: drowsy  Oxygen Supplementation: room air    Independent Airway: airway patency satisfactory and stable  Dentition: dentition unchanged  Vital Signs Stable: post-procedure vital signs reviewed and stable  Report to RN Given: handoff report given  Patient transferred to: Phase II    Handoff Report: Identifed the Patient, Identified the Reponsible Provider, Reviewed the pertinent medical history, Discussed the surgical course, Reviewed Intra-OP anesthesia mangement and issues during anesthesia, Set expectations for post-procedure period and Allowed opportunity for questions and acknowledgement of understanding      Vitals:  Vitals Value Taken Time   BP 86/55 04/03/23 1202   Temp 36.2  C (97.2  F) 04/03/23 1202   Pulse     Resp 14 04/03/23 1202   SpO2 93 % 04/03/23 1202       Electronically Signed By: BEBO De Leon CRNA  April 3, 2023  12:04 PM

## 2023-04-03 NOTE — H&P
Slava THALIA Patch  [id number]  male  52 year old      Reason for procedure/surgery: positive pet scan, evaluate gastric ulcer    Patient Active Problem List   Diagnosis     Mantle cell lymphoma of lymph nodes of multiple sites (H)     Corneal opacity     Disorder of refraction and accommodation     Personal history of tobacco use, presenting hazards to health     Admission for chemotherapy     Mantle cell lymphoma (H)     History of colonic polyps     Alcohol dependence (H)       Past Surgical History:    Past Surgical History:   Procedure Laterality Date     AS SKIN PINCH GRAFT PROCEDURE <2CM Right 2017     BIOPSY      bone marrow     COLONOSCOPY       COLONOSCOPY N/A 3/26/2021    Procedure: COLONOSCOPY;  Surgeon: Ahsan Sharpe MD;  Location: [facility]     ESOPHAGOSCOPY, GASTROSCOPY, DUODENOSCOPY (EGD), COMBINED N/A 3/26/2021    Procedure: ESOPHAGOGASTRODUODENOSCOPY, WITH BIOPSY;  Surgeon: Ahsan Sharpe MD;  Location: [facility] OR     EYE SURGERY      surgeries at age 5-6     INSERT PORT VASCULAR ACCESS Right 10/3/2019    Procedure: Chest Port Placement;  Surgeon: July Simpson PA-C;  [facility]     IR CHEST PORT PLACEMENT > 5 YRS OF AGE  10/3/2019     LITHOTRIPSY N/A 06/2019    unknown side       Past Medical History:   Past Medical History:   Diagnosis Date     Mantle cell lymphoma (H) 06/2019    noted incidentally on colon polyp pathology      Uncomplicated asthma     When exposed to enviromental allergies       Social History:   Social History     Tobacco Use     Smoking status: Every Day     Packs/day: 0.33     Years: 30.00     Pack years: 9.90     Types: Cigarettes     Smokeless tobacco: Current     Types: Chew     Tobacco comments:     A couple of cigarettes a day    Vaping Use     Vaping status: Not on file   Substance Use Topics     Alcohol use: Yes     Alcohol/week: 21.0 standard drinks of alcohol     Types: 21 Cans of beer per week     Comment: 1-2 beers per day       Family History:   Family History  "  Problem Relation Age of Onset     Cancer No family hx of      Bleeding Disorder No family hx of      Clotting Disorder No family hx of        Allergies:   Allergies   Allergen Reactions     Contrast Dye Rash       Active Medications:   Current Outpatient Medications   Medication Sig Dispense Refill     methylPREDNISolone (MEDROL) 32 MG tablet Take 1 tablet (32 mg) by mouth daily 12 hours prior to the procedure with IV contrast (Patient not taking: Reported on 3/29/2023) 2 tablet 3     sildenafil (VIAGRA) 100 MG tablet Take 100 mg by mouth daily as needed  (Patient not taking: Reported on 3/29/2023)         Systemic Review:   CONSTITUTIONAL: NEGATIVE for fever, chills, change in weight  ENT/MOUTH: NEGATIVE for ear, mouth and throat problems  RESP: NEGATIVE for significant cough or SOB  CV: NEGATIVE for chest pain, palpitations or peripheral edema    Physical Examination:   Vital Signs: /70 (BP Location: Right arm)   Pulse 61   Temp 98.3  F (36.8  C) (Temporal)   Resp (!) 62   Ht 1.702 m (5' 7\")   Wt 75.8 kg (167 lb)   SpO2 98%   BMI 26.16 kg/m    GENERAL: healthy, alert and no distress  NECK: no adenopathy, no asymmetry, masses, or scars  RESP: lungs clear to auscultation - no rales, rhonchi or wheezes  CV: regular rate and rhythm, normal S1 S2, no S3 or S4, no murmur, click or rub, no peripheral edema and peripheral pulses strong  ABDOMEN: soft, nontender, no hepatosplenomegaly, no masses and bowel sounds normal  MS: no gross musculoskeletal defects noted, no edema    Plan: Appropriate to proceed as scheduled.      Leland Maza MD  4/3/2023    PCP:  Christa Nagel    " Postop Diagnosis: same

## 2023-04-05 LAB

## 2023-04-06 ENCOUNTER — PATIENT OUTREACH (OUTPATIENT)
Dept: ONCOLOGY | Facility: CLINIC | Age: 53
End: 2023-04-06
Payer: COMMERCIAL

## 2023-04-06 NOTE — PROGRESS NOTES
[facility]: Cancer Care Short Note                                    Discussion with Patient:                                                      INBOUND CALL: VM received from patient. Calling RNCC-- doesn't have great service where he is and is wondering if RNCC can message him and he will callback RNCC.     OUTBOUND CALL: RNCC called patient and LVM. Returning call from pt. Will send a mychart per his request.         Patricia Little, RN, MSN, OCN   RN Care Coordinator   [facility]   [address] [place] [id number] [phone]

## 2023-06-11 ENCOUNTER — MYC MEDICAL ADVICE (OUTPATIENT)
Dept: ONCOLOGY | Facility: CLINIC | Age: 53
End: 2023-06-11
Payer: COMMERCIAL

## 2023-06-12 NOTE — TELEPHONE ENCOUNTER
[facility]: Cancer Care Short Note                                    Discussion with Patient:                                                      RNCC faxed new lab order for CBC, CMP, Mag and Phos to at [facility] at 1040. Fax number is [phone]. Confirmation received that fax was successfully sent.        Patricia Little, RN, MSN, OCN   RN Care Coordinator   [facility] [place] [id number] [phone]

## 2023-06-13 NOTE — PROGRESS NOTES
Virtual Visit Details    Type of service:  Video Visit     Originating Location (pt. Location): Home    Distant Location (provider location):  On-site  Platform used for Video Visit: Blane  Video duration: 22 mins        [facility]  Date of visit: Jun 14, 2023      Reason for Visit: follow up Mantle cell lymphoma      Oncology HPI:   Mantle cell lymphoma.  Mantle cell lymphoma, Stage IV with colon, BM involvement Ki67 -30%, TP53 mutations - Negative.  MCL MIPI - 5.9- Intermediate   .     Date Treatment Response Toxicities/Complications   9/18/19 - 1/25/20 R-CHOP/R-DHAP x3 each NC, Progressive disease     5/21/2020 Ibrutinib/Venetoclax     Ibrutinib stopped 7/2021 d/t hyperbilirubinemia  CR after 6,9, 14 cycles GI symptoms      Added zanabrutinib             Interval history:   Lidia are here on video for follow up    Energy improved, just went on a 10 day trip in [place], [place].   Appetite is a bit lower in the summer, this is normal for him.   New grandbaby ~5.5 months   No recent infections. No fevers/ chills/ night sweats.   No fevers.   No lumps or bumps and no other complaints.  No bleeding issues, however easy bruising  ROS otherwise neg      Current Outpatient Medications   Medication Sig Dispense Refill     methylPREDNISolone (MEDROL) 32 MG tablet Take 1 tablet (32 mg) by mouth daily 12 hours prior to the procedure with IV contrast (Patient not taking: Reported on 3/29/2023) 2 tablet 3     sildenafil (VIAGRA) 100 MG tablet Take 100 mg by mouth daily as needed  (Patient not taking: Reported on 3/29/2023)         Allergies   Allergen Reactions     Contrast Dye Rash         Video physical exam  General: Patient appears well in no acute distress.   Skin: No visualized rash or lesions on visualized skin  Eyes: EOMI, no erythema, sclera icterus or discharge noted  Resp: Appears to be breathing comfortably without accessory muscle usage, speaking in full sentences, no  cough  MSK: Appears to have normal range of motion based on visualized movements  Neurologic: No apparent tremors, facial movements symmetric  Psych: affect pleasant, alert and oriented    The rest of a comprehensive physical examination is deferred due to PHE (public health emergency) video restrictions          Labs:   I personally reviewed the following labs:    Most Recent 3 CBC's:  Recent Labs   Lab Test 03/29/23  1302 09/21/22  1212 03/23/22  1257   WBC 4.6 5.2 3.8*   HGB 14.7 15.1 15.6   MCV 96 97 97    178 195     Most Recent 3 BMP's:  Recent Labs   Lab Test 03/29/23  1302 09/21/22  1212 09/21/22  0750 08/12/22  0919 03/23/22  1257    141  --   --  139   POTASSIUM 3.6 4.3  --   --  4.1   CHLORIDE 111* 105  --  102 106   CO2 22 25  --   --  28   BUN 12.0 12.5  --   --  13   CR 0.69 0.86  --   --  1.03   ANIONGAP 8 11  --   --  5   PIEDAD 7.4* 9.5  --   --  8.9   GLC 81 91 102*  --  92     Most Recent 2 LFT's:  Recent Labs   Lab Test 03/29/23  1302 09/21/22  1212   AST 18 27   ALT 11 17   ALKPHOS 50 60   BILITOTAL 0.6 0.8  0.8           Imaging: n/a      Impression/plan:     51 year old with stage IV MCL dx 7/2019. S/p induction with [place] regimen with progressive disease within 6 months of primary therapy; hence this is primary refractory disease. He was started on ibrutinib and venetoclax regimen 5/2020 and achieved CR after 6 cycles.      # Relapsed MCL  - PET from 3/2023 showed continued CR  - Previously on Zanabrutinib 320 mg daily and venetoclax 400 mg daily  - Per Dr Briggs likely 2 years of therapy would be sufficient knowing that he has been on venetoclax at least for > 1 year now. Holding further therapy since 9/2022 at this point, has been in CR for > 1.5 years  - Monthly labs locally until Jan, then will recheck in March when he is here to see Dr Briggs  - [facility] in 3 months with PET/ Dr Maakaron (same day given >4 hour drive)       # Elevated Tbili-- resolved   His bili was 1.7 and he  "previously confessed to overindulging with alcohol. He was counseled that this could impact his cancer therapy and advised to cut down to about 4 drinks weekly. LFTs otherwise stable. No new medications. Asymptomatic.   - TBili stable at 1.7 per Care Everywhere-- no direct panel. We discussed the possible etiologies of this, he stated the \"prior cause is no longer an issue\"  - Now normal      # Nicotine dependence  # Alcohol dependence  - Continue cutting down as this has impact on his cancer care         Nancy Comer [facility]    30 minutes spent on the date of the encounter doing chart review, review of outside records, review of test results, interpretation of tests, patient visit, documentation and discussion with family   "

## 2023-06-14 ENCOUNTER — VIRTUAL VISIT (OUTPATIENT)
Dept: ONCOLOGY | Facility: CLINIC | Age: 53
End: 2023-06-14
Attending: NURSE PRACTITIONER
Payer: COMMERCIAL

## 2023-06-14 DIAGNOSIS — C83.10 MANTLE CELL LYMPHOMA, UNSPECIFIED BODY REGION (H): Primary | ICD-10-CM

## 2023-06-14 PROCEDURE — 99213 OFFICE O/P EST LOW 20 MIN: CPT | Mod: VID | Performed by: NURSE PRACTITIONER

## 2023-06-14 NOTE — NURSING NOTE
Is the patient currently in the state of [place]? YES    Visit mode:VIDEO    If the visit is dropped, the patient can be reconnected by: VIDEO VISIT: Send to e-mail at: [email or web address]    Will anyone else be joining the visit? NO      How would you like to obtain your AVS? MyChart    Are changes needed to the allergy or medication list? NO    Reason for visit: DAY [facility]

## 2023-06-14 NOTE — LETTER
6/14/2023         RE: Slava Lane  [address]  [facility] [address]        Dear Colleague,    Thank you for referring your patient, Slava Lane, to the [facility]. Please see a copy of my visit note below.    Virtual Visit Details    Type of service:  Video Visit     Originating Location (pt. Location): Home    Distant Location (provider location):  On-site  Platform used for Video Visit: [employer]  Video duration: 22 mins        [facility]  Date of visit: Jun 14, 2023      Reason for Visit: follow up Mantle cell lymphoma      Oncology HPI:   Mantle cell lymphoma.  Mantle cell lymphoma, Stage IV with colon, BM involvement Ki67 -30%, TP53 mutations - Negative.  MCL MIPI - 5.9- Intermediate   .     Date Treatment Response Toxicities/Complications   9/18/19 - 1/25/20 R-CHOP/R-DHAP x3 each SC, Progressive disease     5/21/2020 Ibrutinib/Venetoclax     Ibrutinib stopped 7/2021 d/t hyperbilirubinemia  CR after 6,9, 14 cycles GI symptoms      Added zanabrutinib             Interval history:   Lidia are here on video for follow up    Energy improved, just went on a 10 day trip in [place], [place].   Appetite is a bit lower in the summer, this is normal for him.   New grandbaby ~5.5 months   No recent infections. No fevers/ chills/ night sweats.   No fevers.   No lumps or bumps and no other complaints.  No bleeding issues, however easy bruising  ROS otherwise neg      Current Outpatient Medications   Medication Sig Dispense Refill    methylPREDNISolone (MEDROL) 32 MG tablet Take 1 tablet (32 mg) by mouth daily 12 hours prior to the procedure with IV contrast (Patient not taking: Reported on 3/29/2023) 2 tablet 3    sildenafil (VIAGRA) 100 MG tablet Take 100 mg by mouth daily as needed  (Patient not taking: Reported on 3/29/2023)         Allergies   Allergen Reactions    Contrast Dye Rash         Video physical exam  General: Patient appears well  in no acute distress.   Skin: No visualized rash or lesions on visualized skin  Eyes: EOMI, no erythema, sclera icterus or discharge noted  Resp: Appears to be breathing comfortably without accessory muscle usage, speaking in full sentences, no cough  MSK: Appears to have normal range of motion based on visualized movements  Neurologic: No apparent tremors, facial movements symmetric  Psych: affect pleasant, alert and oriented    The rest of a comprehensive physical examination is deferred due to PHE (public health emergency) video restrictions          Labs:   I personally reviewed the following labs:    Most Recent 3 CBC's:  Recent Labs   Lab Test 03/29/23  1302 09/21/22  1212 03/23/22  1257   WBC 4.6 5.2 3.8*   HGB 14.7 15.1 15.6   MCV 96 97 97    178 195     Most Recent 3 BMP's:  Recent Labs   Lab Test 03/29/23  1302 09/21/22  1212 09/21/22  0750 08/12/22  0919 03/23/22  1257    141  --   --  139   POTASSIUM 3.6 4.3  --   --  4.1   CHLORIDE 111* 105  --  102 106   CO2 22 25  --   --  28   BUN 12.0 12.5  --   --  13   CR 0.69 0.86  --   --  1.03   ANIONGAP 8 11  --   --  5   PIEDAD 7.4* 9.5  --   --  8.9   GLC 81 91 102*  --  92     Most Recent 2 LFT's:  Recent Labs   Lab Test 03/29/23  1302 09/21/22  1212   AST 18 27   ALT 11 17   ALKPHOS 50 60   BILITOTAL 0.6 0.8  0.8           Imaging: n/a      Impression/plan:     51 year old with stage IV MCL dx 7/2019. S/p induction with [place] regimen with progressive disease within 6 months of primary therapy; hence this is primary refractory disease. He was started on ibrutinib and venetoclax regimen 5/2020 and achieved CR after 6 cycles.      # Relapsed MCL  - PET from 3/2023 showed continued CR  - Previously on Zanabrutinib 320 mg daily and venetoclax 400 mg daily  - Per Dr Briggs likely 2 years of therapy would be sufficient knowing that he has been on venetoclax at least for > 1 year now. Holding further therapy since 9/2022 at this point, has been in CR  "for > 1.5 years  - Monthly labs locally until Jan, then will recheck in March when he is here to see Dr Briggs  - C in 3 months with PET/ Dr Briggs (same day given >4 hour drive)       # Elevated Tbili-- resolved   His bili was 1.7 and he previously confessed to overindulging with alcohol. He was counseled that this could impact his cancer therapy and advised to cut down to about 4 drinks weekly. LFTs otherwise stable. No new medications. Asymptomatic.   - TBili stable at 1.7 per Care Everywhere-- no direct panel. We discussed the possible etiologies of this, he stated the \"prior cause is no longer an issue\"  - Now normal      # Nicotine dependence  # Alcohol dependence  - Continue cutting down as this has impact on his cancer care         Nancy Comer [facility]    30 minutes spent on the date of the encounter doing chart review, review of outside records, review of test results, interpretation of tests, patient visit, documentation and discussion with family     "

## 2023-10-30 ENCOUNTER — PATIENT OUTREACH (OUTPATIENT)
Dept: ONCOLOGY | Facility: CLINIC | Age: 53
End: 2023-10-30
Payer: COMMERCIAL

## 2023-10-30 DIAGNOSIS — C83.18 MANTLE CELL LYMPHOMA OF LYMPH NODES OF MULTIPLE SITES (H): Primary | ICD-10-CM

## 2023-10-30 RX ORDER — METHYLPREDNISOLONE 32 MG/1
32 TABLET ORAL DAILY
Qty: 1 TABLET | Refills: 0 | Status: SHIPPED | OUTPATIENT
Start: 2023-10-30 | End: 2023-10-31

## 2023-10-30 NOTE — PROGRESS NOTES
[facility]: Cancer Care Short Note                                    Discussion with Patient:                                                        OUTBOUND CALL: RNCC called patient to discuss upcoming appts. Pet scan was denied by insurance-- MD reviewed and is requesting CT CAP in julius of PET.   Imaging still requires contrast, which pt has an allergy to. Md to prescribe 32mg methylprednisolone PO 1x 1 hour prior to CT. Will send to [facility] pharmacy.  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   States he has been battling an on and off cough for a few months. He has some clear nasal drainage that was responsive to claritin but now he states it doesn't help as much.  He states the cough and drainage get better after waking up and having warm liquid (coffee).   Denies fever, sobs, fatigue, etc.   Will let MD know of above.     Will have the scheduling team call pt with time for CT in julius of PET. Patient verbalizes understanding of POC and has no other questions or concerns at this time.     Patricia Little, RN, MSN, OCN   RN Care Coordinator   [facility]   [address] [place] [id number]   [phone]

## 2023-11-01 ENCOUNTER — PATIENT OUTREACH (OUTPATIENT)
Dept: ONCOLOGY | Facility: CLINIC | Age: 53
End: 2023-11-01

## 2023-11-01 ENCOUNTER — HOSPITAL ENCOUNTER (OUTPATIENT)
Dept: CT IMAGING | Facility: CLINIC | Age: 53
Discharge: HOME | End: 2023-11-01
Attending: INTERNAL MEDICINE
Payer: COMMERCIAL

## 2023-11-01 ENCOUNTER — ONCOLOGY VISIT (OUTPATIENT)
Dept: TRANSPLANT | Facility: CLINIC | Age: 53
End: 2023-11-01
Attending: INTERNAL MEDICINE
Payer: COMMERCIAL

## 2023-11-01 ENCOUNTER — APPOINTMENT (OUTPATIENT)
Dept: LAB | Facility: CLINIC | Age: 53
End: 2023-11-01
Attending: INTERNAL MEDICINE
Payer: COMMERCIAL

## 2023-11-01 VITALS
OXYGEN SATURATION: 99 % | DIASTOLIC BLOOD PRESSURE: 78 MMHG | TEMPERATURE: 98.4 F | WEIGHT: 172 LBS | HEART RATE: 73 BPM | BODY MASS INDEX: 26.94 KG/M2 | SYSTOLIC BLOOD PRESSURE: 119 MMHG

## 2023-11-01 DIAGNOSIS — Z23 NEED FOR PROPHYLACTIC VACCINATION AND INOCULATION AGAINST INFLUENZA: Primary | ICD-10-CM

## 2023-11-01 DIAGNOSIS — Z23 HIGH PRIORITY FOR 2019-NCOV VACCINE: Primary | ICD-10-CM

## 2023-11-01 DIAGNOSIS — C83.18 MANTLE CELL LYMPHOMA OF LYMPH NODES OF MULTIPLE SITES (H): ICD-10-CM

## 2023-11-01 DIAGNOSIS — Z79.899 ENCOUNTER FOR LONG-TERM (CURRENT) USE OF MEDICATIONS: ICD-10-CM

## 2023-11-01 LAB
ALBUMIN SERPL BCG-MCNC: 4.5 G/DL (ref 3.5–5.2)
ALP SERPL-CCNC: 56 U/L (ref 40–129)
ALT SERPL W P-5'-P-CCNC: 11 U/L (ref 0–70)
ANION GAP SERPL CALCULATED.3IONS-SCNC: 10 MMOL/L (ref 7–15)
AST SERPL W P-5'-P-CCNC: 23 U/L (ref 0–45)
BASOPHILS # BLD AUTO: 0.1 10E3/UL (ref 0–0.2)
BASOPHILS NFR BLD AUTO: 1 %
BILIRUB SERPL-MCNC: 1.1 MG/DL
BUN SERPL-MCNC: 16.3 MG/DL (ref 6–20)
CALCIUM SERPL-MCNC: 9.4 MG/DL (ref 8.6–10)
CHLORIDE SERPL-SCNC: 103 MMOL/L (ref 98–107)
CREAT SERPL-MCNC: 0.84 MG/DL (ref 0.67–1.17)
DEPRECATED HCO3 PLAS-SCNC: 23 MMOL/L (ref 22–29)
EGFRCR SERPLBLD CKD-EPI 2021: >90 ML/MIN/1.73M2
EOSINOPHIL # BLD AUTO: 0.1 10E3/UL (ref 0–0.7)
EOSINOPHIL NFR BLD AUTO: 1 %
ERYTHROCYTE [DISTWIDTH] IN BLOOD BY AUTOMATED COUNT: 13.3 % (ref 10–15)
GLUCOSE SERPL-MCNC: 99 MG/DL (ref 70–99)
HCT VFR BLD AUTO: 46.6 % (ref 40–53)
HGB BLD-MCNC: 16 G/DL (ref 13.3–17.7)
IMM GRANULOCYTES # BLD: 0 10E3/UL
IMM GRANULOCYTES NFR BLD: 0 %
LYMPHOCYTES # BLD AUTO: 1.1 10E3/UL (ref 0.8–5.3)
LYMPHOCYTES NFR BLD AUTO: 17 %
MAGNESIUM SERPL-MCNC: 1.9 MG/DL (ref 1.7–2.3)
MCH RBC QN AUTO: 32.5 PG (ref 26.5–33)
MCHC RBC AUTO-ENTMCNC: 34.3 G/DL (ref 31.5–36.5)
MCV RBC AUTO: 95 FL (ref 78–100)
MONOCYTES # BLD AUTO: 0.3 10E3/UL (ref 0–1.3)
MONOCYTES NFR BLD AUTO: 4 %
NEUTROPHILS # BLD AUTO: 4.7 10E3/UL (ref 1.6–8.3)
NEUTROPHILS NFR BLD AUTO: 77 %
NRBC # BLD AUTO: 0 10E3/UL
NRBC BLD AUTO-RTO: 0 /100
PHOSPHATE SERPL-MCNC: 2.6 MG/DL (ref 2.5–4.5)
PLATELET # BLD AUTO: 191 10E3/UL (ref 150–450)
POTASSIUM SERPL-SCNC: 4.3 MMOL/L (ref 3.4–5.3)
PROT SERPL-MCNC: 6.9 G/DL (ref 6.4–8.3)
RBC # BLD AUTO: 4.93 10E6/UL (ref 4.4–5.9)
SODIUM SERPL-SCNC: 136 MMOL/L (ref 135–145)
WBC # BLD AUTO: 6.2 10E3/UL (ref 4–11)

## 2023-11-01 PROCEDURE — 74177 CT ABD & PELVIS W/CONTRAST: CPT

## 2023-11-01 PROCEDURE — 83735 ASSAY OF MAGNESIUM: CPT | Performed by: INTERNAL MEDICINE

## 2023-11-01 PROCEDURE — 99215 OFFICE O/P EST HI 40 MIN: CPT | Performed by: INTERNAL MEDICINE

## 2023-11-01 PROCEDURE — 250N000011 HC RX IP 250 OP 636: Performed by: INTERNAL MEDICINE

## 2023-11-01 PROCEDURE — 90480 ADMN SARSCOV2 VAC 1/ONLY CMP: CPT | Performed by: INTERNAL MEDICINE

## 2023-11-01 PROCEDURE — 71260 CT THORAX DX C+: CPT | Mod: 26 | Performed by: RADIOLOGY

## 2023-11-01 PROCEDURE — 85014 HEMATOCRIT: CPT | Performed by: INTERNAL MEDICINE

## 2023-11-01 PROCEDURE — 90682 RIV4 VACC RECOMBINANT DNA IM: CPT | Performed by: INTERNAL MEDICINE

## 2023-11-01 PROCEDURE — 84450 TRANSFERASE (AST) (SGOT): CPT | Performed by: INTERNAL MEDICINE

## 2023-11-01 PROCEDURE — 84100 ASSAY OF PHOSPHORUS: CPT | Performed by: INTERNAL MEDICINE

## 2023-11-01 PROCEDURE — 99213 OFFICE O/P EST LOW 20 MIN: CPT | Mod: 25 | Performed by: INTERNAL MEDICINE

## 2023-11-01 PROCEDURE — G0008 ADMIN INFLUENZA VIRUS VAC: HCPCS | Performed by: INTERNAL MEDICINE

## 2023-11-01 PROCEDURE — 91320 SARSCV2 VAC 30MCG TRS-SUC IM: CPT | Performed by: INTERNAL MEDICINE

## 2023-11-01 PROCEDURE — 36415 COLL VENOUS BLD VENIPUNCTURE: CPT | Performed by: INTERNAL MEDICINE

## 2023-11-01 PROCEDURE — 82310 ASSAY OF CALCIUM: CPT | Performed by: INTERNAL MEDICINE

## 2023-11-01 PROCEDURE — 74177 CT ABD & PELVIS W/CONTRAST: CPT | Mod: 26 | Performed by: RADIOLOGY

## 2023-11-01 RX ORDER — DIPHENHYDRAMINE HCL 25 MG
50 CAPSULE ORAL
Status: DISCONTINUED | OUTPATIENT
Start: 2023-11-01 | End: 2023-11-02 | Stop reason: HOSPADM

## 2023-11-01 RX ORDER — IOPAMIDOL 755 MG/ML
103 INJECTION, SOLUTION INTRAVASCULAR ONCE
Status: COMPLETED | OUTPATIENT
Start: 2023-11-01 | End: 2023-11-01

## 2023-11-01 RX ORDER — EPINEPHRINE 1 MG/ML
0.3 INJECTION, SOLUTION INTRAMUSCULAR; SUBCUTANEOUS EVERY 5 MIN PRN
Status: DISCONTINUED | OUTPATIENT
Start: 2023-11-01 | End: 2023-11-02 | Stop reason: HOSPADM

## 2023-11-01 RX ORDER — DIPHENHYDRAMINE HYDROCHLORIDE 50 MG/ML
50 INJECTION INTRAMUSCULAR; INTRAVENOUS
Status: DISCONTINUED | OUTPATIENT
Start: 2023-11-01 | End: 2023-11-02 | Stop reason: HOSPADM

## 2023-11-01 RX ADMIN — IOPAMIDOL 103 ML: 755 INJECTION, SOLUTION INTRAVENOUS at 12:04

## 2023-11-01 RX ADMIN — INFLUENZA A VIRUS A/WEST VIRGINIA/30/2022 (H1N1) RECOMBINANT HEMAGGLUTININ ANTIGEN, INFLUENZA A VIRUS A/DARWIN/6/2021 (H3N2) RECOMBINANT HEMAGGLUTININ ANTIGEN, INFLUENZA B VIRUS B/AUSTRIA/1359417/2021 RECOMBINANT HEMAGGLUTININ ANTIGEN, AND INFLUENZA B VIRUS B/PHUKET/3073/2013 RECOMBINANT HEMAGGLUTININ ANTIGEN 0.5 ML: 45; 45; 45; 45 INJECTION INTRAMUSCULAR at 14:48

## 2023-11-01 RX ADMIN — COVID-19 VACCINE, MRNA 30 MCG: 0.05 INJECTION, SUSPENSION INTRAMUSCULAR at 14:47

## 2023-11-01 ASSESSMENT — PAIN SCALES - GENERAL: PAINLEVEL: NO PAIN (0)

## 2023-11-01 NOTE — LETTER
11/1/2023         RE: Slava Lane  [address]  [place] [address]        Dear Colleague,    Thank you for referring your patient, Slava Lane, to the [facility] [place]. Please see a copy of my visit note below.    [facility]  In Vivo Visit    Date of Visit: 11/02/2023    Reason for Visit: F/u MCL     Hematologic history:  Mantle cell lymphoma.  Mantle cell lymphoma, Stage IV with colon, BM involvement Ki67 -30%, TP53 mutations - Negative.  MCL MIPI - 5.9- Intermediate   .     Date Treatment Response Toxicities/Complications   9/18/19 - 1/25/20 R-CHOP/R-DHAP x3 each TN, Progressive disease     5/21/2020 Ibrutinib/Venetoclax    Ibrutinib stopped 7/2021 d/t hyperbilirubinemia  CR after 6,9, 14 cycles GI symptoms      Added zanabrutinib        9/22/22 Stopped therapy              Interval History:  Continues to do well overall. Feels like his sinuses are blocked up these days and they wax and wane. No fevers. No other complaints.      Current Outpatient Medications:     methylPREDNISolone (MEDROL) 32 MG tablet, Take 1 tablet (32 mg) by mouth daily 12 hours prior to the procedure with IV contrast (Patient not taking: Reported on 3/29/2023), Disp: 2 tablet, Rfl: 3    sildenafil (VIAGRA) 100 MG tablet, Take 100 mg by mouth daily as needed  (Patient not taking: Reported on 3/29/2023), Disp: , Rfl:     Current Facility-Administered Medications:     diphenhydrAMINE (BENADRYL) capsule 50 mg, 50 mg, Oral, Once PRN **OR** diphenhydrAMINE (BENADRYL) injection 50 mg, 50 mg, IV/IM, Once PRN, Nicholas Briggs MD    EPINEPHrine (Anaphylaxis) (ADRENALIN) injection (vial) 0.3 mg, 0.3 mg, Intramuscular, Q5 Min PRN, Nicholas Briggs MD    Physical Exam:  /78 (BP Location: Right arm, Patient Position: Sitting, Cuff Size: Adult Regular)   Pulse 73   Temp 98.4  F (36.9  C) (Oral)   Wt 78 kg (172 lb)   SpO2 99%   BMI 26.94 kg/m    GA: NAD. Appears as stated  age.  HEENT: PERRL, OP Clear. Left ear with periaurical edema. TM injected.  Neck: Supple, no JVD  CV: RRR, no mrg  Lungs: CTAB, no wheezes  Abd: Soft, nt, nd  Lymph: No cervical LAD, no HSM  Ext: No edema. Warm  Neuro: AAO, moving all ext. Symmetric face  Psyc: Appropriate and cooperative  Access: port    ECO      Labs: In care everywhere and Imaging reviewed  Reviewed PET personally - CR.     ASSESSMENT AND PLAN:  53 year old with stage IV MCL dx 2019. S/p induction with [place] regimen with progressive disease within 6 months of primary therapy; hence this is primary refractory disease. He was started on ibrutinib and venetoclax regimen 2020 and achieved CR after 6 cycles.     relapsed MCL  He has received about 2 years of therapy and has been in CR for > 2 years. Off therapy since . Follow up in 12 months.    Possible allergic sinusitis  - Flonase, claritin, nasal spray    Nicotine dependence  Alcohol dependence  - We discussed cutting down and impact on cancer care    Follow up in 12 months    40 minutes spent on the date of the encounter doing chart review, interpretation of results, patient visit, documentation and coordination of care.    Sincerely,        Nicholas Briggs MD

## 2023-11-01 NOTE — NURSING NOTE
Chief Complaint   Patient presents with    Blood Draw     Labs drawn with  by RN. Vitals taken. Pt checked into next appointment.       Lali Cormier RN

## 2023-11-01 NOTE — NURSING NOTE
"Oncology Rooming Note    November 1, 2023 12:53 PM   Slava Lane is a 53 year old male who presents for:    Chief Complaint   Patient presents with    Blood Draw     Labs drawn with  by RN. Vitals taken. Pt checked into next appointment.      Oncology Clinic Visit     Mantle cell lymphoma of lymph nodes of multiple sites      Initial Vitals: /78 (BP Location: Right arm, Patient Position: Sitting, Cuff Size: Adult Regular)   Pulse 73   Temp 98.4  F (36.9  C) (Oral)   Wt 78 kg (172 lb)   SpO2 99%   BMI 26.94 kg/m   Estimated body mass index is 26.94 kg/m  as calculated from the following:    Height as of 4/3/23: 1.702 m (5' 7\").    Weight as of this encounter: 78 kg (172 lb). Body surface area is 1.92 meters squared.  No Pain (0) Comment: Data Unavailable   No LMP for male patient.  Allergies reviewed: Yes  Medications reviewed: Yes    Medications: Medication refills not needed today.  Pharmacy name entered into EPIC:    [facility] - [place], [place] - [address]  [facility] - [place], [place] - [address]    Clinical concerns: none       Sofia Wei              "

## 2023-11-01 NOTE — NURSING NOTE
I verified name and date of birth. Pt completed and signed the consent form for vaccine. Pt was given 5th  Dose of the Pfizer Vaccine today with no concerns.    See MAR.     Deedee Eaton, OSMANA

## 2023-11-02 RX ORDER — METHYLPREDNISOLONE 32 MG/1
32 TABLET ORAL DAILY
Qty: 1 TABLET | Refills: 0 | Status: SHIPPED | OUTPATIENT
Start: 2023-11-02

## 2023-11-02 NOTE — PROGRESS NOTES
[facility]  In Vivo Visit    Date of Visit: 11/02/2023    Reason for Visit: F/u MCL     Hematologic history:  Mantle cell lymphoma.  Mantle cell lymphoma, Stage IV with colon, BM involvement Ki67 -30%, TP53 mutations - Negative.  MCL MIPI - 5.9- Intermediate   .     Date Treatment Response Toxicities/Complications   9/18/19 - 1/25/20 R-CHOP/R-DHAP x3 each CT, Progressive disease     5/21/2020 Ibrutinib/Venetoclax    Ibrutinib stopped 7/2021 d/t hyperbilirubinemia  CR after 6,9, 14 cycles GI symptoms      Added zanabrutinib        9/22/22 Stopped therapy              Interval History:  Continues to do well overall. Feels like his sinuses are blocked up these days and they wax and wane. No fevers. No other complaints.      Current Outpatient Medications:     methylPREDNISolone (MEDROL) 32 MG tablet, Take 1 tablet (32 mg) by mouth daily 12 hours prior to the procedure with IV contrast (Patient not taking: Reported on 3/29/2023), Disp: 2 tablet, Rfl: 3    sildenafil (VIAGRA) 100 MG tablet, Take 100 mg by mouth daily as needed  (Patient not taking: Reported on 3/29/2023), Disp: , Rfl:     Current Facility-Administered Medications:     diphenhydrAMINE (BENADRYL) capsule 50 mg, 50 mg, Oral, Once PRN **OR** diphenhydrAMINE (BENADRYL) injection 50 mg, 50 mg, IV/IM, Once PRN, Nicholas Briggs MD    EPINEPHrine (Anaphylaxis) (ADRENALIN) injection (vial) 0.3 mg, 0.3 mg, Intramuscular, Q5 Min PRN, Nicholas Briggs MD    Physical Exam:  /78 (BP Location: Right arm, Patient Position: Sitting, Cuff Size: Adult Regular)   Pulse 73   Temp 98.4  F (36.9  C) (Oral)   Wt 78 kg (172 lb)   SpO2 99%   BMI 26.94 kg/m    GA: NAD. Appears as stated age.  HEENT: PERRL, OP Clear. Left ear with periaurical edema. TM injected.  Neck: Supple, no JVD  CV: RRR, no mrg  Lungs: CTAB, no wheezes  Abd: Soft, nt, nd  Lymph: No cervical LAD, no HSM  Ext: No edema. Warm  Neuro: AAO, moving all ext. Symmetric face  Psyc: Appropriate  and cooperative  Access: port    ECO      Labs: In care everywhere and Imaging reviewed  Reviewed PET personally - CR.     ASSESSMENT AND PLAN:  53 year old with stage IV MCL dx 2019. S/p induction with [place] regimen with progressive disease within 6 months of primary therapy; hence this is primary refractory disease. He was started on ibrutinib and venetoclax regimen 2020 and achieved CR after 6 cycles.     relapsed MCL  He has received about 2 years of therapy and has been in CR for > 2 years. Off therapy since . Follow up in 12 months.    Possible allergic sinusitis  - Flonase, claritin, nasal spray    Nicotine dependence  Alcohol dependence  - We discussed cutting down and impact on cancer care    Follow up in 12 months    40 minutes spent on the date of the encounter doing chart review, interpretation of results, patient visit, documentation and coordination of care.

## 2023-11-15 ENCOUNTER — PATIENT OUTREACH (OUTPATIENT)
Dept: ONCOLOGY | Facility: CLINIC | Age: 53
End: 2023-11-15
Payer: COMMERCIAL

## 2023-11-15 DIAGNOSIS — C83.18 MANTLE CELL LYMPHOMA OF LYMPH NODES OF MULTIPLE SITES (H): Primary | ICD-10-CM

## 2023-11-15 NOTE — PROGRESS NOTES
[facility]: Cancer Care Short Note                                    Discussion with Patient:                                                      OUTBOUND CALL: RNCC called patient. Pt has a visit in December. Per MD, pt is doing well. We can push back visit to virtual visit in 4 mo. Pt is agreeable. He will have labs locally. RNCC to fax over labs to [place] for patient to have prior to visit.     Intervention/Education provided during outreach:                                                         Patient verbalizes understanding of POC and has no other questions or concerns at this time.     Patricia Little, RN, MSN, OCN   RN Care Coordinator   [facility]   [address] [place] [id number]   [phone]

## 2023-11-16 ENCOUNTER — PATIENT OUTREACH (OUTPATIENT)
Dept: ONCOLOGY | Facility: CLINIC | Age: 53
End: 2023-11-16
Payer: COMMERCIAL

## 2023-11-16 NOTE — PROGRESS NOTES
[facility]: Cancer Care                                                                                          RNCC faxed new lab order for CBC CMP LDH to [facility]  at 1205. Fax number is [phone]. Confirmation received that fax was successfully sent.     Patricia Little, MSN, RN, OCN   RN Care Coordinator   [facility] [place] [id number] [phone]

## 2024-02-16 DIAGNOSIS — Z86.0100 HISTORY OF COLONIC POLYPS: Primary | ICD-10-CM

## 2024-03-05 NOTE — PROGRESS NOTES
Virtual Visit Details    Type of service:  Video Visit     Originating Location (pt. Location): Home    Distant Location (provider location):  On-site  Platform used for Video Visit: Blane  Video duration: 18 mins    [facility]  Date of visit: Mar 6, 2024      Reason for Visit: follow up Mantle cell lymphoma      Oncology HPI:   Mantle cell lymphoma.  Mantle cell lymphoma, Stage IV with colon, BM involvement Ki67 -30%, TP53 mutations - Negative.  MCL MIPI - 5.9- Intermediate   .     Date Treatment Response Toxicities/Complications   9/18/19 - 1/25/20 R-CHOP/R-DHAP x3 each [place], Progressive disease     5/21/2020 Ibrutinib/Venetoclax     Ibrutinib stopped 7/2021 d/t hyperbilirubinemia  CR after 6,9, 14 cycles GI symptoms      Added zanabrutinib             Interval history:   Brannon and Ya are here on video for follow up  COVID in Jan, everyone has been sick in their family with grandson now 14 months old. Brannon recovered quickly, only felt ill for ~2 days. No further recent infections or recurrent fevers. Does not have pain or concerning lumps or bumps. Feels his skin has gotten healthier/ less thin since chemo-- no longer tears as easily. No bleeding or bruising. Weight is increased a bit-- he is traveling for work and eats a less healthy diet when he is not around Ya's cooking. Good energy, has really enjoyed spending so much time with his grandson. No acute complaints, overall feeling really good.   ROS otherwise neg.       Current Outpatient Medications   Medication Sig Dispense Refill    methylPREDNISolone (MEDROL) 32 MG tablet Take 1 tablet (32 mg) by mouth daily 12 hours prior to the procedure with IV contrast 1 tablet 0    methylPREDNISolone (MEDROL) 32 MG tablet Take 1 tablet (32 mg) by mouth daily 12 hours prior to the procedure with IV contrast (Patient not taking: Reported on 3/29/2023) 2 tablet 3    sildenafil (VIAGRA) 100 MG tablet Take 100 mg by mouth daily as  needed  (Patient not taking: Reported on 3/29/2023)         Allergies   Allergen Reactions    Contrast Dye Rash         Video physical exam  General: Patient appears well in no acute distress.   Skin: No visualized rash or lesions on visualized skin  Eyes: EOMI, no erythema, sclera icterus or discharge noted  Resp: Appears to be breathing comfortably without accessory muscle usage, speaking in full sentences, no cough  MSK: Appears to have normal range of motion based on visualized movements  Neurologic: No apparent tremors, facial movements symmetric  Psych: affect pleasant, alert and oriented    The rest of a comprehensive physical examination is deferred due to PHE (public health emergency) video restrictions          Labs:   I personally reviewed the following labs:    Most Recent 3 CBC's:  Recent Labs   Lab Test 11/01/23  1242 03/29/23  1302 09/21/22  1212   WBC 6.2 4.6 5.2   HGB 16.0 14.7 15.1   MCV 95 96 97    211 178     Most Recent 3 BMP's:  Recent Labs   Lab Test 11/01/23  1242 03/29/23  1302 09/21/22  1212    141 141   POTASSIUM 4.3 3.6 4.3   CHLORIDE 103 111* 105   CO2 23 22 25   BUN 16.3 12.0 12.5   CR 0.84 0.69 0.86   ANIONGAP 10 8 11   PIEDAD 9.4 7.4* 9.5   GLC 99 81 91     Most Recent 2 LFT's:  Recent Labs   Lab Test 11/01/23  1242 03/29/23  1302   AST 23 18   ALT 11 11   ALKPHOS 56 50   BILITOTAL 1.1 0.6           Imaging: n/a      Impression/plan:     51 year old with stage IV MCL dx 7/2019. S/p induction with [place] regimen with progressive disease within 6 months of primary therapy; hence this is primary refractory disease. He was started on ibrutinib and venetoclax regimen 5/2020 and achieved CR after 6 cycles.      # Relapsed MCL  - PET from 3/2023 showed continued CR  - Previously on Zanabrutinib 320 mg daily and venetoclax 400 mg daily  - Per Dr Briggs likely 2 years of therapy would be sufficient knowing that he has been on venetoclax at least for > 1 year now. Holding further  "therapy since 9/2022 at this point, has been in CR for > 2 years  - CT 11/2023 with ongoing CR  - CT and Dr Briggs in 10/2024      # Elevated Tbili-- resolved   His bili was 1.7 and he previously confessed to overindulging with alcohol. He was counseled that this could impact his cancer therapy and advised to cut down to about 4 drinks weekly. LFTs otherwise stable. No new medications. Asymptomatic.   - TBili stable at 1.7 per Care Everywhere-- no direct panel. We discussed the possible etiologies of this, he stated the \"prior cause is no longer an issue\"  - Now normal      # Nicotine dependence  # Alcohol dependence  - Continue cutting down as this has impact on his cancer care         Nancy Comer [facility]    35 minutes spent on the date of the encounter doing chart review, review of outside records, review of test results, interpretation of tests, patient visit, documentation and discussion with family   "

## 2024-03-06 ENCOUNTER — VIRTUAL VISIT (OUTPATIENT)
Dept: ONCOLOGY | Facility: CLINIC | Age: 54
End: 2024-03-06
Attending: NURSE PRACTITIONER
Payer: COMMERCIAL

## 2024-03-06 VITALS — BODY MASS INDEX: 26.37 KG/M2 | WEIGHT: 168 LBS | HEIGHT: 67 IN

## 2024-03-06 DIAGNOSIS — C83.18 MANTLE CELL LYMPHOMA OF LYMPH NODES OF MULTIPLE SITES (H): Primary | ICD-10-CM

## 2024-03-06 PROCEDURE — 99214 OFFICE O/P EST MOD 30 MIN: CPT | Mod: 95 | Performed by: NURSE PRACTITIONER

## 2024-03-06 ASSESSMENT — PAIN SCALES - GENERAL: PAINLEVEL: NO PAIN (0)

## 2024-03-06 NOTE — NURSING NOTE
Is the patient currently in the state of [place]? YES    Visit mode:VIDEO    If the visit is dropped, the patient can be reconnected by: VIDEO VISIT: Send to e-mail at: [email or web address]    Will anyone else be joining the visit? NO  (If patient encounters technical issues they should call [phone]    How would you like to obtain your AVS? MyChart    Are changes needed to the allergy or medication list? Pt stated no med changes    Reason for visit: RECHECK    No other vitals to report per pt    Lali HOFFMANF

## 2024-03-06 NOTE — LETTER
3/6/2024         RE: Slava Lane  [address]  [place] [address]        Dear Colleague,    Thank you for referring your patient, Slava Lane, to the [facility]. Please see a copy of my visit note below.    Virtual Visit Details    Type of service:  Video Visit     Originating Location (pt. Location): Home    Distant Location (provider location):  On-site  Platform used for Video Visit: [employer]  Video duration: 18 mins    [facility]  Date of visit: Mar 6, 2024      Reason for Visit: follow up Mantle cell lymphoma      Oncology HPI:   Mantle cell lymphoma.  Mantle cell lymphoma, Stage IV with colon, BM involvement Ki67 -30%, TP53 mutations - Negative.  MCL MIPI - 5.9- Intermediate   .     Date Treatment Response Toxicities/Complications   9/18/19 - 1/25/20 R-CHOP/R-DHAP x3 each [place], Progressive disease     5/21/2020 Ibrutinib/Venetoclax     Ibrutinib stopped 7/2021 d/t hyperbilirubinemia  CR after 6,9, 14 cycles GI symptoms      Added zanabrutinib             Interval history:   Brannon and Ya are here on video for follow up  COVID in Jan, everyone has been sick in their family with grandson now 14 months old. Brannon recovered quickly, only felt ill for ~2 days. No further recent infections or recurrent fevers. Does not have pain or concerning lumps or bumps. Feels his skin has gotten healthier/ less thin since chemo-- no longer tears as easily. No bleeding or bruising. Weight is increased a bit-- he is traveling for work and eats a less healthy diet when he is not around Ya's cooking. Good energy, has really enjoyed spending so much time with his grandson. No acute complaints, overall feeling really good.   ROS otherwise neg.       Current Outpatient Medications   Medication Sig Dispense Refill    methylPREDNISolone (MEDROL) 32 MG tablet Take 1 tablet (32 mg) by mouth daily 12 hours prior to the procedure with IV contrast 1 tablet 0     methylPREDNISolone (MEDROL) 32 MG tablet Take 1 tablet (32 mg) by mouth daily 12 hours prior to the procedure with IV contrast (Patient not taking: Reported on 3/29/2023) 2 tablet 3    sildenafil (VIAGRA) 100 MG tablet Take 100 mg by mouth daily as needed  (Patient not taking: Reported on 3/29/2023)         Allergies   Allergen Reactions    Contrast Dye Rash         Video physical exam  General: Patient appears well in no acute distress.   Skin: No visualized rash or lesions on visualized skin  Eyes: EOMI, no erythema, sclera icterus or discharge noted  Resp: Appears to be breathing comfortably without accessory muscle usage, speaking in full sentences, no cough  MSK: Appears to have normal range of motion based on visualized movements  Neurologic: No apparent tremors, facial movements symmetric  Psych: affect pleasant, alert and oriented    The rest of a comprehensive physical examination is deferred due to PHE (public health emergency) video restrictions          Labs:   I personally reviewed the following labs:    Most Recent 3 CBC's:  Recent Labs   Lab Test 11/01/23  1242 03/29/23  1302 09/21/22  1212   WBC 6.2 4.6 5.2   HGB 16.0 14.7 15.1   MCV 95 96 97    211 178     Most Recent 3 BMP's:  Recent Labs   Lab Test 11/01/23  1242 03/29/23  1302 09/21/22  1212    141 141   POTASSIUM 4.3 3.6 4.3   CHLORIDE 103 111* 105   CO2 23 22 25   BUN 16.3 12.0 12.5   CR 0.84 0.69 0.86   ANIONGAP 10 8 11   PIEDAD 9.4 7.4* 9.5   GLC 99 81 91     Most Recent 2 LFT's:  Recent Labs   Lab Test 11/01/23  1242 03/29/23  1302   AST 23 18   ALT 11 11   ALKPHOS 56 50   BILITOTAL 1.1 0.6           Imaging: n/a      Impression/plan:     51 year old with stage IV MCL dx 7/2019. S/p induction with [place] regimen with progressive disease within 6 months of primary therapy; hence this is primary refractory disease. He was started on ibrutinib and venetoclax regimen 5/2020 and achieved CR after 6 cycles.      # Relapsed MCL  - PET  "from 3/2023 showed continued CR  - Previously on Zanabrutinib 320 mg daily and venetoclax 400 mg daily  - Per Dr Briggs likely 2 years of therapy would be sufficient knowing that he has been on venetoclax at least for > 1 year now. Holding further therapy since 9/2022 at this point, has been in CR for > 2 years  - CT 11/2023 with ongoing CR  - CT and Dr Briggs in 10/2024      # Elevated Tbili-- resolved   His bili was 1.7 and he previously confessed to overindulging with alcohol. He was counseled that this could impact his cancer therapy and advised to cut down to about 4 drinks weekly. LFTs otherwise stable. No new medications. Asymptomatic.   - TBili stable at 1.7 per Care Everywhere-- no direct panel. We discussed the possible etiologies of this, he stated the \"prior cause is no longer an issue\"  - Now normal      # Nicotine dependence  # Alcohol dependence  - Continue cutting down as this has impact on his cancer care         Nancy Comer [facility]    35 minutes spent on the date of the encounter doing chart review, review of outside records, review of test results, interpretation of tests, patient visit, documentation and discussion with family     "

## 2024-04-03 NOTE — ORAL ONC MGMT
Oral Chemotherapy Monitoring Program     Placed call to patient in follow up of Venclexta and Brukinsa therapy. Slava said he started back to work this week so he is not able to go in for labs until next Monday. He said he is feeling good and has no concerns. Plans to get labs drawn at [facility] on 11/1/2021. He thanked me for the call.     Idris Finley  [facility]  [phone]  October 25, 2021    
3 = A little assistance

## 2024-04-11 ENCOUNTER — MYC MEDICAL ADVICE (OUTPATIENT)
Dept: TRANSPLANT | Facility: CLINIC | Age: 54
End: 2024-04-11
Payer: COMMERCIAL

## 2024-04-12 NOTE — CONFIDENTIAL NOTE
"Oncology Nurse Triage - Reporting Symptoms  Situation:   Slava reporting the following symptoms: fatigue, bloating, diarrhea    Background:   Treating Provider:  Dr. Nicholas Briggs    Date of last office visit: 3/6/24 w/ Nancy Comer    Recent treatments: No, not since 2022    Has patient started any new medications: No  - if yes, what is the new medication:  N/A    Is patient currently on or recently on antibiotics: No  - if yes, what is the medication:  N/A    Does patient have history of Clostridium difficile infection: No    Is patient receiving radiation to the pelvis area: No    Assessment  Onset of symptoms: 2 weeks   -had touch of sinus/flu symptoms- sinus symptoms improved, but bloating, diarrhea, fatigue continue. He is surprised symptoms are lasting so long.    -diarrhea- \"worse one day than the other\", goes any where from 1-4x/day, stools \"peanut butterish\" in color, some times is an \"explosion\" other times is a lot of water w/ some small stools, does have gas, denies signs of bleeding in stool or cramping. Bloating is improved after he has a stool, but prior bloating \"moves around\". Has not taken anything OTC for diarrhea.   Sleeping in later during the day (7-7:30am), which is abnormal for him, and ready for bed by 8pm. Has been laid off for the last week.   Spouse is a  and son goes to , said there has been a lot of stomach bugs going around.  -Heartburn- was improving as of yesterday, said today if feeling good so far, denies taking anything OTC for this.     Pt states he snacks throughout the day more than anything, typically goes for fast foods and then has a big meal at dinner. Gave example of bag of chips, cheese burger, drinking coffee and water.   Pt reports taking Claritin for daytime allergies, no other medication changes recently.     Denies N/V, fever/chills this week- had hot flashes last week a few times when he had sinus symptoms, SOB, chest pain, no signs of " bleeding.     Labs checked this AM in [facility]- only abnormal was potassium of 5.3.     Does he need a colonoscopy? He through he did not need any procedures or scans until October. He would like to talk to someone about when/where if colonoscopy is needed, hoping sooner the better if one is needed. -will send message to Dr. Sharpe's care team.     Recommendations:   This writer educated on for diarrhea:   Take two capsules of Imodium at onset of diarrhea  Take one capsule after each unformed stool (Maximum of 8 capsules in 24hour period)  Avoid fried, fatty, greasy, spicy, high fiber foods- suggested bland foods such as crackers, pretzels, bananas, applesauce, soups.  Avoid caffeine, alcohol, and milk products- suggested water, Gatorade, clear sodas    For heartburn:  Tums PRN, avoiding foods mentioned above.     Writer advised if symptoms do not improve in next a few days or if fever develops, to call triage line.

## 2024-04-25 ENCOUNTER — MYC MEDICAL ADVICE (OUTPATIENT)
Dept: TRANSPLANT | Facility: CLINIC | Age: 54
End: 2024-04-25
Payer: COMMERCIAL

## 2024-04-26 NOTE — CONFIDENTIAL NOTE
"Oncology Nurse Triage - Reporting Symptoms  Situation:   Ya, on behalf of Slava, reporting the following symptoms via MyC message: cough.  Triage RN call to Slava to follow up.     Background:   Treating Provider:  Dr. Nicholas Briggs    Date of last office visit: 3/6/24 w/ Nancy Comer    Recent treatments: No    Assessment  Onset of symptoms: 2 weeks    -productive cough interfering with sleep- worse first thing in AM and at night  -nasal congestion in nose now yellow/green color, headache in AM, after blowing his nose and coughing up sputum, congestion is \"better\".   -sore throat a few days ago  -pt has not checked temperature, but states he gets occasional hot flash followed by chills in the last 2 weeks.   Taking Claritin. Pt works outside, so change in season is always hard on him.     Recommendations:   Writer recommended PCP visit or walk in clinic, as could have sinus infection or something viral going on. Pt states he plans to go to walk in clinic this afternoon, as PCP only works a few days a week and is hard to get appts. Writer recommended pushing fluids to thin secretions, humidifier, hernando pot to flush sinuses, hot tea and honey to help sooth throat/cough. Pt voiced understanding.          "

## 2024-09-16 ENCOUNTER — PATIENT OUTREACH (OUTPATIENT)
Dept: ONCOLOGY | Facility: CLINIC | Age: 54
End: 2024-09-16
Payer: COMMERCIAL

## 2024-09-16 NOTE — PROGRESS NOTES
[facility]: Cancer Care Short Note                                    Discussion with Patient:                                                      INBOUND CALL: VM received from patient. Has questions on rescheduled MD appt and his hernia. Callback requested.     OUTBOUND CALL: RNCC called patient back. Let him know appt with Md had to be rescheduled due to being on service in hospital. Rescheduled for 12/4. He also states he thinks his hernia is getting worse-- RNCC recommended following up with PCP or GI doc (was referred to general surgeon in 2022 for this) for further evaluation.       Intervention/Education provided during outreach:                                                         Patient to follow up as scheduled at next appt  Patient to call/[employer] message with updates  Confirmed patient has clinic and triage numbers    Patient verbalizes understanding of POC and has no other questions or concerns at this time.     Patricia Little, RN, MSN, OCN   RN Care Coordinator   [facility]   [address] [place] [id number]   [phone]

## 2024-09-18 ENCOUNTER — PATIENT OUTREACH (OUTPATIENT)
Dept: ONCOLOGY | Facility: CLINIC | Age: 54
End: 2024-09-18
Payer: COMMERCIAL

## 2024-09-18 DIAGNOSIS — K40.90 INGUINAL HERNIA: Primary | ICD-10-CM

## 2024-09-18 NOTE — PROGRESS NOTES
[facility]: Cancer Care Short Note                                    Discussion with Patient:                                                        OUTBOUND CALL: RNCC called patient after receiving VM form him. He tried to make appt for hernia repair with gen surgery but states he needs a new referral. He cna get one form PCP, but they will not make referral until after he is seen (which is in 3 weeks). Pt requesting that onc team make referral (this was done in 2022 by onc team as well). RNCC will follow up with Dr. Briggs and let pt know.     Patient verbalizes understanding of POC and has no other questions or concerns at this time.     Patricia Little, RN, MSN, OCN   RN Care Coordinator   [facility] [address] [id number] [phone]       9/20: Per Dr. Briggs, ok to place gen surgery referral. RNCC faxed new gen surgery referral for outside facility to [facility] at [phone]. Fax number is . . Confirmation received that fax was successfully sent.

## 2024-12-02 ENCOUNTER — PATIENT OUTREACH (OUTPATIENT)
Dept: ONCOLOGY | Facility: CLINIC | Age: 54
End: 2024-12-02
Payer: COMMERCIAL

## 2024-12-02 DIAGNOSIS — C83.18 MANTLE CELL LYMPHOMA OF LYMPH NODES OF MULTIPLE SITES (H): ICD-10-CM

## 2024-12-02 RX ORDER — METHYLPREDNISOLONE 32 MG/1
32 TABLET ORAL DAILY
Qty: 2 TABLET | Refills: 0 | Status: SHIPPED | OUTPATIENT
Start: 2024-12-03 | End: 2024-12-02

## 2024-12-02 RX ORDER — METHYLPREDNISOLONE 32 MG/1
32 TABLET ORAL DAILY
Qty: 2 TABLET | Refills: 0 | Status: SHIPPED | OUTPATIENT
Start: 2024-12-03 | End: 2024-12-05

## 2024-12-02 NOTE — PROGRESS NOTES
"[facility]: Cancer Care Short Note                                    Discussion with Patient:                                                        OUTBOUND CALL: RNCC called patient regarding appt on 12/4. Pt has a CT scan scheduled and needs pre-meds prior to the CT, as he has a contrast allergy. Instructed pt to \"  Take methylprednisolone 32 mg (1 tab) 12 hours prior to CT scan, and another 32mg (1 tab) 2 hours prior to CT scan   Reviewed times for appts on 12/4    Future Appointments   Date Time Provider Department Center   12/4/2024 12:20 PM [id number]   12/4/2024  1:00 PM [facility] LAB DRAW [facility]L [facility]   12/4/2024  1:30 PM Nicholas Briggs MD [facility]   3/10/2025  1:00 PM Nancy Comer APRN CNP [facility]     Pt verbalizes understanding and has no other questions, comments, or concerns at this time.   Adis Little, RN, MSN, OCN   RN Care Coordinator   [facility]   [address] [place] [id number]   [phone]       "

## 2024-12-04 ENCOUNTER — ANCILLARY PROCEDURE (OUTPATIENT)
Dept: CT IMAGING | Facility: CLINIC | Age: 54
End: 2024-12-04
Attending: INTERNAL MEDICINE
Payer: COMMERCIAL

## 2024-12-04 ENCOUNTER — ONCOLOGY VISIT (OUTPATIENT)
Dept: TRANSPLANT | Facility: CLINIC | Age: 54
End: 2024-12-04
Attending: INTERNAL MEDICINE
Payer: COMMERCIAL

## 2024-12-04 VITALS
DIASTOLIC BLOOD PRESSURE: 75 MMHG | HEART RATE: 84 BPM | SYSTOLIC BLOOD PRESSURE: 116 MMHG | RESPIRATION RATE: 18 BRPM | OXYGEN SATURATION: 97 % | TEMPERATURE: 98.4 F | BODY MASS INDEX: 26.76 KG/M2 | WEIGHT: 170.85 LBS

## 2024-12-04 DIAGNOSIS — C83.18 MANTLE CELL LYMPHOMA OF LYMPH NODES OF MULTIPLE SITES (H): ICD-10-CM

## 2024-12-04 LAB
ALBUMIN SERPL BCG-MCNC: 4.4 G/DL (ref 3.5–5.2)
ALP SERPL-CCNC: 58 U/L (ref 40–150)
ALT SERPL W P-5'-P-CCNC: 13 U/L (ref 0–70)
ANION GAP SERPL CALCULATED.3IONS-SCNC: 9 MMOL/L (ref 7–15)
AST SERPL W P-5'-P-CCNC: 19 U/L (ref 0–45)
BASOPHILS # BLD AUTO: 0 10E3/UL (ref 0–0.2)
BASOPHILS NFR BLD AUTO: 0 %
BILIRUB SERPL-MCNC: 0.9 MG/DL
BUN SERPL-MCNC: 11.1 MG/DL (ref 6–20)
CALCIUM SERPL-MCNC: 8.6 MG/DL (ref 8.8–10.4)
CHLORIDE SERPL-SCNC: 104 MMOL/L (ref 98–107)
CREAT SERPL-MCNC: 0.73 MG/DL (ref 0.67–1.17)
EGFRCR SERPLBLD CKD-EPI 2021: >90 ML/MIN/1.73M2
EOSINOPHIL # BLD AUTO: 0 10E3/UL (ref 0–0.7)
EOSINOPHIL NFR BLD AUTO: 0 %
ERYTHROCYTE [DISTWIDTH] IN BLOOD BY AUTOMATED COUNT: 13.3 % (ref 10–15)
GLUCOSE SERPL-MCNC: 156 MG/DL (ref 70–99)
HCO3 SERPL-SCNC: 23 MMOL/L (ref 22–29)
HCT VFR BLD AUTO: 47.3 % (ref 40–53)
HGB BLD-MCNC: 16 G/DL (ref 13.3–17.7)
IMM GRANULOCYTES # BLD: 0 10E3/UL
IMM GRANULOCYTES NFR BLD: 0 %
LDH SERPL L TO P-CCNC: 166 U/L (ref 0–250)
LYMPHOCYTES # BLD AUTO: 0.9 10E3/UL (ref 0.8–5.3)
LYMPHOCYTES NFR BLD AUTO: 11 %
MCH RBC QN AUTO: 31.4 PG (ref 26.5–33)
MCHC RBC AUTO-ENTMCNC: 33.8 G/DL (ref 31.5–36.5)
MCV RBC AUTO: 93 FL (ref 78–100)
MONOCYTES # BLD AUTO: 0.1 10E3/UL (ref 0–1.3)
MONOCYTES NFR BLD AUTO: 2 %
NEUTROPHILS # BLD AUTO: 7 10E3/UL (ref 1.6–8.3)
NEUTROPHILS NFR BLD AUTO: 87 %
NRBC # BLD AUTO: 0 10E3/UL
NRBC BLD AUTO-RTO: 0 /100
PLATELET # BLD AUTO: 266 10E3/UL (ref 150–450)
POTASSIUM SERPL-SCNC: 4 MMOL/L (ref 3.4–5.3)
PROT SERPL-MCNC: 6.7 G/DL (ref 6.4–8.3)
RBC # BLD AUTO: 5.1 10E6/UL (ref 4.4–5.9)
SODIUM SERPL-SCNC: 136 MMOL/L (ref 135–145)
WBC # BLD AUTO: 8.1 10E3/UL (ref 4–11)

## 2024-12-04 PROCEDURE — 83615 LACTATE (LD) (LDH) ENZYME: CPT | Performed by: INTERNAL MEDICINE

## 2024-12-04 PROCEDURE — 85004 AUTOMATED DIFF WBC COUNT: CPT | Performed by: INTERNAL MEDICINE

## 2024-12-04 PROCEDURE — 36415 COLL VENOUS BLD VENIPUNCTURE: CPT | Performed by: INTERNAL MEDICINE

## 2024-12-04 PROCEDURE — 99215 OFFICE O/P EST HI 40 MIN: CPT | Performed by: INTERNAL MEDICINE

## 2024-12-04 PROCEDURE — 82435 ASSAY OF BLOOD CHLORIDE: CPT | Performed by: INTERNAL MEDICINE

## 2024-12-04 PROCEDURE — 99213 OFFICE O/P EST LOW 20 MIN: CPT | Performed by: INTERNAL MEDICINE

## 2024-12-04 PROCEDURE — 82247 BILIRUBIN TOTAL: CPT | Performed by: INTERNAL MEDICINE

## 2024-12-04 PROCEDURE — 74177 CT ABD & PELVIS W/CONTRAST: CPT | Performed by: RADIOLOGY

## 2024-12-04 PROCEDURE — 71260 CT THORAX DX C+: CPT | Performed by: RADIOLOGY

## 2024-12-04 RX ORDER — IOPAMIDOL 755 MG/ML
87 INJECTION, SOLUTION INTRAVASCULAR ONCE
Status: COMPLETED | OUTPATIENT
Start: 2024-12-04 | End: 2024-12-04

## 2024-12-04 RX ADMIN — IOPAMIDOL 87 ML: 755 INJECTION, SOLUTION INTRAVASCULAR at 11:55

## 2024-12-04 ASSESSMENT — PAIN SCALES - GENERAL: PAINLEVEL_OUTOF10: NO PAIN (0)

## 2024-12-04 NOTE — NURSING NOTE
"Oncology Rooming Note    December 4, 2024 1:44 PM   Slava Lane is a 54 year old male who presents for:    Chief Complaint   Patient presents with    Blood Draw     Labs drawn via PIV by RN. VS taken.    Oncology Clinic Visit     Mantle cell lymphoma      Initial Vitals: /75 (BP Location: Left arm, Patient Position: Sitting, Cuff Size: Adult Regular)   Pulse 84   Temp 98.4  F (36.9  C) (Oral)   Resp 18   Wt 77.5 kg (170 lb 13.6 oz)   SpO2 97%   BMI 26.76 kg/m   Estimated body mass index is 26.76 kg/m  as calculated from the following:    Height as of 3/6/24: 1.702 m (5' 7\").    Weight as of this encounter: 77.5 kg (170 lb 13.6 oz). Body surface area is 1.91 meters squared.  No Pain (0) Comment: Data Unavailable   No LMP for male patient.  Allergies reviewed: Yes  Medications reviewed: Yes    Medications: Medication refills not needed today.  Pharmacy name entered into EPIC:    [facility] - [place], [place] - [address] [place] [address] A  [facility] - [place], [place] - [address]    Frailty Screening:   Is the patient here for a new oncology consult visit in cancer care? 2. No      Clinical concerns:        Lali Montilla              "

## 2024-12-04 NOTE — NURSING NOTE
Chief Complaint   Patient presents with    Blood Draw     Labs drawn via PIV by RN. VS taken.     Labs drawn from PIV by RN. Line flushed with saline. PIV removed after labs drawn. Vitals taken. Pt checked in for appointment(s).     Marleni Salcedo RN

## 2024-12-04 NOTE — LETTER
"12/4/2024      Slava Lane  [address]  [place] [address]      Dear Colleague,    Thank you for referring your patient, Slava Lane, to the [facility] [place]. Please see a copy of my visit note below.    [facility]  In Vivo Visit    Date of Visit: 12/05/2024    Reason for Visit: F/u MCL     Hematologic history:  Mantle cell lymphoma.  Mantle cell lymphoma, Stage IV with colon, BM involvement Ki67 -30%, TP53 mutations - Negative.  MCL MIPI - 5.9- Intermediate   .     Date Treatment Response Toxicities/Complications   9/18/19 - 1/25/20 R-CHOP/R-DHAP x3 each [place], Progressive disease     5/21/2020 Ibrutinib/Venetoclax    Ibrutinib stopped 7/2021 d/t hyperbilirubinemia  CR after 6,9, 14 cycles GI symptoms      Added zanabrutinib        9/22/22 Stopped therapy              Interval History:  Continues to do well.  Still complains of recurrent sinus infections.  Not a lot in terms of complaints.  His wife reports that he feels more \"echoing\".  She also reports that he has some pursed lip breathing.  He admits to a chronic cough.  No fevers or night sweats MASS.  Appetite is good      Current Outpatient Medications:      methylPREDNISolone (MEDROL) 32 MG tablet, Take 1 tablet (32 mg) by mouth daily. Take methylprednisolone 32 mg (1 tab) 12 hours prior to CT scan, and another 32mg (1 tab) 2 hours prior to CT scan, Disp: 2 tablet, Rfl: 0     methylPREDNISolone (MEDROL) 32 MG tablet, Take 1 tablet (32 mg) by mouth daily 12 hours prior to the procedure with IV contrast (Patient not taking: Reported on 3/29/2023), Disp: 2 tablet, Rfl: 3     sildenafil (VIAGRA) 100 MG tablet, Take 100 mg by mouth daily as needed  (Patient not taking: Reported on 3/29/2023), Disp: , Rfl:   No current facility-administered medications for this visit.    Physical Exam:  /75 (BP Location: Left arm, Patient Position: Sitting, Cuff Size: Adult Regular)   Pulse 84   Temp 98.4  F " (36.9  C) (Oral)   Resp 18   Wt 77.5 kg (170 lb 13.6 oz)   SpO2 97%   BMI 26.76 kg/m    GA: NAD. Appears as stated age.  Neck: Supple, no JVD  Lungs: Breathing comfortably on room air  Lymph: No cervical LAD, no HSM  Ext: No edema.  Neuro: AAO, moving all ext. Symmetric face  Psyc: Appropriate and cooperative    ECO    Labs: In care everywhere and Imaging reviewed  Personally reviewed CT scan     ASSESSMENT AND PLAN:  54 year old with stage IV MCL dx 2019. S/p induction with [place] regimen with progressive disease within 6 months of primary therapy; hence this is primary refractory disease. He was started on ibrutinib and venetoclax regimen 2020 and achieved CR after 6 cycles.     relapsed MCL  He has received about 2 years of therapy and has been in CR for > 2 years. Off therapy since .  He continues to do well on there are no signs and symptoms of relapse.  We will continue to see him on a yearly basis.    Possible allergic sinusitis  - Flonase, claritin, nasal spray  I offered a referral to an ear nose throat doctor.  They wanted a more personal recommendation of somebody close to home and I told him that I am not familiar with any of the ENT doctors in that area.  We will both ask around and we can order a referral at that point.    Nicotine dependence  Alcohol dependence  Emphysema  At risk for lung cancer  -We discussed the implications of smoking.  He understands the risks and admitted to taking some steps in the right direction and being aware of the problem.  I discussed that the pursed lip breathing and the echo we sound is probably indicative of developing emphysema.  We discussed that there is not much in terms of therapy that can change the natural course of this aside from quitting smoking.  I also offered them the opportunity to be referred to a pulmonologist.  There was some silence after that and after a few moments I asked him to think about it and let me know.    Colon polyps  When  he was first diagnosed with mantle cell lymphoma of the GI tract, he was found to have 5 polyps.  He has since been following with GI, however does appear to be a little bit scattered in terms of the location.  We acknowledged that that was during COVID and it was difficult.  I looked at the last colonoscopy from August 2024.  He only had one 2 mm polyp with a benign pathology.  Asked him to make sure he repeats that in 5 years.  I also emphasized the importance of following up with the specialists to stay on top of things.    Follow up in 12 months    40 minutes spent on the date of the encounter doing chart review, interpretation of results, patient visit, documentation and coordination of care.      Again, thank you for allowing me to participate in the care of your patient.        Sincerely,        Nicholas Briggs MD

## 2024-12-04 NOTE — DISCHARGE INSTRUCTIONS

## 2024-12-05 RX ORDER — METHYLPREDNISOLONE 32 MG/1
TABLET ORAL
Qty: 2 TABLET | Refills: 0 | Status: SHIPPED | OUTPATIENT
Start: 2024-12-05

## 2024-12-05 NOTE — PROGRESS NOTES
"[facility]  In Vivo Visit    Date of Visit: 12/05/2024    Reason for Visit: F/u MCL     Hematologic history:  Mantle cell lymphoma.  Mantle cell lymphoma, Stage IV with colon, BM involvement Ki67 -30%, TP53 mutations - Negative.  MCL MIPI - 5.9- Intermediate   .     Date Treatment Response Toxicities/Complications   9/18/19 - 1/25/20 R-CHOP/R-DHAP x3 each [place], Progressive disease     5/21/2020 Ibrutinib/Venetoclax    Ibrutinib stopped 7/2021 d/t hyperbilirubinemia  CR after 6,9, 14 cycles GI symptoms      Added zanabrutinib        9/22/22 Stopped therapy              Interval History:  Continues to do well.  Still complains of recurrent sinus infections.  Not a lot in terms of complaints.  His wife reports that he feels more \"echoing\".  She also reports that he has some pursed lip breathing.  He admits to a chronic cough.  No fevers or night sweats MASS.  Appetite is good      Current Outpatient Medications:     methylPREDNISolone (MEDROL) 32 MG tablet, Take 1 tablet (32 mg) by mouth daily. Take methylprednisolone 32 mg (1 tab) 12 hours prior to CT scan, and another 32mg (1 tab) 2 hours prior to CT scan, Disp: 2 tablet, Rfl: 0    methylPREDNISolone (MEDROL) 32 MG tablet, Take 1 tablet (32 mg) by mouth daily 12 hours prior to the procedure with IV contrast (Patient not taking: Reported on 3/29/2023), Disp: 2 tablet, Rfl: 3    sildenafil (VIAGRA) 100 MG tablet, Take 100 mg by mouth daily as needed  (Patient not taking: Reported on 3/29/2023), Disp: , Rfl:   No current facility-administered medications for this visit.    Physical Exam:  /75 (BP Location: Left arm, Patient Position: Sitting, Cuff Size: Adult Regular)   Pulse 84   Temp 98.4  F (36.9  C) (Oral)   Resp 18   Wt 77.5 kg (170 lb 13.6 oz)   SpO2 97%   BMI 26.76 kg/m    GA: NAD. Appears as stated age.  Neck: Supple, no JVD  Lungs: Breathing comfortably on room air  Lymph: No cervical LAD, no HSM  Ext: No edema.  Neuro: AAO, moving all ext. " Symmetric face  Psyc: Appropriate and cooperative    ECO    Labs: In care everywhere and Imaging reviewed  Personally reviewed CT scan     ASSESSMENT AND PLAN:  54 year old with stage IV MCL dx 2019. S/p induction with [employer] regimen with progressive disease within 6 months of primary therapy; hence this is primary refractory disease. He was started on ibrutinib and venetoclax regimen 2020 and achieved CR after 6 cycles.     relapsed MCL  He has received about 2 years of therapy and has been in CR for > 2 years. Off therapy since .  He continues to do well on there are no signs and symptoms of relapse.  We will continue to see him on a yearly basis.    Possible allergic sinusitis  - Flonase, claritin, nasal spray  I offered a referral to an ear nose throat doctor.  They wanted a more personal recommendation of somebody close to home and I told him that I am not familiar with any of the ENT doctors in that area.  We will both ask around and we can order a referral at that point.    Nicotine dependence  Alcohol dependence  Emphysema  At risk for lung cancer  -We discussed the implications of smoking.  He understands the risks and admitted to taking some steps in the right direction and being aware of the problem.  I discussed that the pursed lip breathing and the echo we sound is probably indicative of developing emphysema.  We discussed that there is not much in terms of therapy that can change the natural course of this aside from quitting smoking.  I also offered them the opportunity to be referred to a pulmonologist.  There was some silence after that and after a few moments I asked him to think about it and let me know.    Colon polyps  When he was first diagnosed with mantle cell lymphoma of the GI tract, he was found to have 5 polyps.  He has since been following with GI, however does appear to be a little bit scattered in terms of the location.  We acknowledged that that was during COVID and it was  difficult.  I looked at the last colonoscopy from August 2024.  He only had one 2 mm polyp with a benign pathology.  Asked him to make sure he repeats that in 5 years.  I also emphasized the importance of following up with the specialists to stay on top of things.    Follow up in 12 months    40 minutes spent on the date of the encounter doing chart review, interpretation of results, patient visit, documentation and coordination of care.

## 2025-04-22 ENCOUNTER — PATIENT OUTREACH (OUTPATIENT)
Dept: ONCOLOGY | Facility: CLINIC | Age: 55
End: 2025-04-22
Payer: COMMERCIAL

## 2025-04-22 NOTE — PROGRESS NOTES
[facility]: Cancer Care Short Note                                    Discussion with Patient:                                                        OUTBOUND CALL: [facility] called patient-- let him know Dr. Briggs is leaving organization. Would like pt to follow up with CT+labs and 60 minute visit with Dr. Zamora. Will schedule for 12/5. He is agreeable to this and would like mychart with updated appt times.   Overall he is feeling well with no new or acute symptoms. Pt verbalizes understanding and has no other questions, comments, or concerns at this time.     Patricia Little, RN, MSN, OCN   RN Care Coordinator   [facility]   [address] [place] [address] [id number] [phone]

## 2025-05-10 ENCOUNTER — HEALTH MAINTENANCE LETTER (OUTPATIENT)
Age: 55
End: 2025-05-10

## (undated) DEVICE — SUCTION MANIFOLD NEPTUNE 2 SYS 1 PORT 702-025-000

## (undated) DEVICE — ENDO TUBING CO2 SMARTCAP STERILE DISP 100145CO2EXT

## (undated) DEVICE — KIT CONNECTOR FOR OLYMPUS ENDOSCOPES DEFENDO 100310

## (undated) DEVICE — KIT ENDO FIRST STEP DISINFECTANT 200ML W/POUCH EP-4

## (undated) DEVICE — PACK CENTRAL LINE INSERTION SAN32CLFCG

## (undated) DEVICE — NDL INFUSION SET POWERLOC 20GAX0.75" 0652034

## (undated) DEVICE — SNARE CAPIVATOR II POLYPECTOMY 10X240MM M00561220

## (undated) DEVICE — ENDO BITE BLOCK ADULT OMNI-BLOC

## (undated) DEVICE — DEVICE RETRIEVAL ROTH NET PLATINUM UNIV 2.5MMX230CM 00715050

## (undated) DEVICE — SU MONOCRYL 4-0 P-3 18" UND Y494G

## (undated) DEVICE — LINEN TOWEL PACK X5 5464

## (undated) DEVICE — ENDO CAP AND TUBING STERILE FOR ENDOGATOR  100130

## (undated) DEVICE — SYR 30ML SLIP TIP W/O NDL 302833

## (undated) DEVICE — WIPE PREMOIST CLEANSING WASHCLOTHS 7988

## (undated) DEVICE — COVER ULTRASOUND PROBE W/GEL FLEXI-FEEL 6"X58" LF  25-FF658

## (undated) DEVICE — KIT ENDO TURNOVER/PROCEDURE CARRY-ON 101822

## (undated) DEVICE — ATTACHMENT CAP DISTAL REVEAL 15.0MM BX00711774

## (undated) DEVICE — SOL WATER IRRIG 1000ML BOTTLE 2F7114

## (undated) DEVICE — ADH SKIN CLOSURE PREMIERPRO EXOFIN 1.0ML 3470

## (undated) DEVICE — KIT INTRODUCER FLUENT MICRO 5FRX10CM ECHO TIP KIT-038-04

## (undated) DEVICE — SOL WATER IRRIG 500ML BOTTLE 2F7113

## (undated) DEVICE — SU VICRYL 3-0 SH 27" J316H

## (undated) DEVICE — SPECIMEN TRAP POLYP 4 CHAMBER EZ710202 EZ710202

## (undated) DEVICE — PAD CHUX UNDERPAD 23X24" 7136

## (undated) DEVICE — PACK ENDOSCOPY GI CUSTOM UMMC

## (undated) DEVICE — TUBING SUCTION MEDI-VAC 1/4"X20' N620A

## (undated) DEVICE — ENDO FORCEP ENDOJAW BIOPSY 2.8MMX230CM FB-220U

## (undated) DEVICE — CLIP ENDO RESOLUTION 360 2.8,,X235CM M00521230

## (undated) DEVICE — ENDO TRAP POLYP E-TRAP 00711099

## (undated) DEVICE — GOWN IMPERVIOUS 2XL BLUE

## (undated) DEVICE — ORISE GEL

## (undated) DEVICE — GOWN XLG DISP 9545

## (undated) DEVICE — SUCTION CATH AIRLIFE TRI-FLO W/CONTROL PORT 14FR  T60C

## (undated) DEVICE — ENDO FORCEP BX CAPTURA LG SPIKE 2.4MMX230CM G53641

## (undated) DEVICE — CONNECTOR MALE TO MALE LL

## (undated) DEVICE — DEVICE RETRIEVAL ROTH NET FB PED 00711057

## (undated) DEVICE — ENDO FORCEP BX CAPTURA PRO SPIKE G50696

## (undated) DEVICE — SNARE CAPIVATOR II POLYPECTOMY 15X240MM M00561230

## (undated) DEVICE — Device

## (undated) DEVICE — NDL SCLEROTHERAPY 25GA CARR-LOCK  00711811

## (undated) DEVICE — DECANTER BAG 2002S

## (undated) DEVICE — CAPTIVATOR COLD SNARE

## (undated) DEVICE — ENDO SNARE POLYPECTOMY SPIRAL 20MM LOOP SD-230U-20

## (undated) DEVICE — SPECIMEN CONTAINER 3OZ W/FORMALIN 59901

## (undated) DEVICE — GLOVE PROTEXIS POWDER FREE SMT 6.5  2D72PT65X

## (undated) DEVICE — LINEN GOWN XLG 5407

## (undated) DEVICE — SUCTION MANIFOLD NEPTUNE 2 SYS 4 PORT 0702-020-000

## (undated) DEVICE — GLOVE EXAM NITRILE LG PF LATEX FREE 5064

## (undated) RX ORDER — ONDANSETRON 2 MG/ML
INJECTION INTRAMUSCULAR; INTRAVENOUS
Status: DISPENSED
Start: 2021-03-26

## (undated) RX ORDER — INDOMETHACIN 50 MG/1
SUPPOSITORY RECTAL
Status: DISPENSED
Start: 2021-03-26

## (undated) RX ORDER — EPHEDRINE SULFATE 50 MG/ML
INJECTION, SOLUTION INTRAMUSCULAR; INTRAVENOUS; SUBCUTANEOUS
Status: DISPENSED
Start: 2021-03-26

## (undated) RX ORDER — INDOMETHACIN 50 MG/1
SUPPOSITORY RECTAL
Status: DISPENSED
Start: 2020-04-17

## (undated) RX ORDER — LIDOCAINE HYDROCHLORIDE 10 MG/ML
INJECTION, SOLUTION EPIDURAL; INFILTRATION; INTRACAUDAL; PERINEURAL
Status: DISPENSED
Start: 2020-03-03

## (undated) RX ORDER — SIMETHICONE 40MG/0.6ML
SUSPENSION, DROPS(FINAL DOSAGE FORM)(ML) ORAL
Status: DISPENSED
Start: 2020-04-17

## (undated) RX ORDER — FENTANYL CITRATE 50 UG/ML
INJECTION, SOLUTION INTRAMUSCULAR; INTRAVENOUS
Status: DISPENSED
Start: 2020-03-03

## (undated) RX ORDER — HEPARIN SODIUM (PORCINE) LOCK FLUSH IV SOLN 100 UNIT/ML 100 UNIT/ML
SOLUTION INTRAVENOUS
Status: DISPENSED
Start: 2020-03-03

## (undated) RX ORDER — ONDANSETRON 2 MG/ML
INJECTION INTRAMUSCULAR; INTRAVENOUS
Status: DISPENSED
Start: 2020-04-17

## (undated) RX ORDER — FENTANYL CITRATE-0.9 % NACL/PF 10 MCG/ML
PLASTIC BAG, INJECTION (ML) INTRAVENOUS
Status: DISPENSED
Start: 2021-03-26

## (undated) RX ORDER — SODIUM CHLORIDE 9 MG/ML
INJECTION, SOLUTION INTRAVENOUS
Status: DISPENSED
Start: 2020-03-03

## (undated) RX ORDER — IOPAMIDOL 510 MG/ML
INJECTION, SOLUTION INTRAVASCULAR
Status: DISPENSED
Start: 2020-04-17

## (undated) RX ORDER — IOPAMIDOL 510 MG/ML
INJECTION, SOLUTION INTRAVASCULAR
Status: DISPENSED
Start: 2021-03-26

## (undated) RX ORDER — FENTANYL CITRATE 50 UG/ML
INJECTION, SOLUTION INTRAMUSCULAR; INTRAVENOUS
Status: DISPENSED
Start: 2021-03-26

## (undated) RX ORDER — PROPOFOL 10 MG/ML
INJECTION, EMULSION INTRAVENOUS
Status: DISPENSED
Start: 2021-03-26

## (undated) RX ORDER — GABAPENTIN 300 MG/1
CAPSULE ORAL
Status: DISPENSED
Start: 2019-10-03

## (undated) RX ORDER — FENTANYL CITRATE 50 UG/ML
INJECTION, SOLUTION INTRAMUSCULAR; INTRAVENOUS
Status: DISPENSED
Start: 2020-04-17

## (undated) RX ORDER — EPHEDRINE SULFATE 50 MG/ML
INJECTION, SOLUTION INTRAMUSCULAR; INTRAVENOUS; SUBCUTANEOUS
Status: DISPENSED
Start: 2020-04-17

## (undated) RX ORDER — PHENYLEPHRINE HCL IN 0.9% NACL 1 MG/10 ML
SYRINGE (ML) INTRAVENOUS
Status: DISPENSED
Start: 2020-04-17

## (undated) RX ORDER — LIDOCAINE HYDROCHLORIDE 20 MG/ML
INJECTION, SOLUTION EPIDURAL; INFILTRATION; INTRACAUDAL; PERINEURAL
Status: DISPENSED
Start: 2020-04-17

## (undated) RX ORDER — GLYCOPYRROLATE 0.2 MG/ML
INJECTION, SOLUTION INTRAMUSCULAR; INTRAVENOUS
Status: DISPENSED
Start: 2021-03-26

## (undated) RX ORDER — ACETAMINOPHEN 325 MG/1
TABLET ORAL
Status: DISPENSED
Start: 2019-10-03

## (undated) RX ORDER — DEXAMETHASONE SODIUM PHOSPHATE 4 MG/ML
INJECTION, SOLUTION INTRA-ARTICULAR; INTRALESIONAL; INTRAMUSCULAR; INTRAVENOUS; SOFT TISSUE
Status: DISPENSED
Start: 2020-04-17

## (undated) RX ORDER — SIMETHICONE 40MG/0.6ML
SUSPENSION, DROPS(FINAL DOSAGE FORM)(ML) ORAL
Status: DISPENSED
Start: 2021-03-26

## (undated) RX ORDER — PROPOFOL 10 MG/ML
INJECTION, EMULSION INTRAVENOUS
Status: DISPENSED
Start: 2020-04-17

## (undated) RX ORDER — LIDOCAINE HYDROCHLORIDE 20 MG/ML
INJECTION, SOLUTION EPIDURAL; INFILTRATION; INTRACAUDAL; PERINEURAL
Status: DISPENSED
Start: 2021-03-26

## (undated) RX ORDER — CEFAZOLIN SODIUM 1 G/3ML
INJECTION, POWDER, FOR SOLUTION INTRAMUSCULAR; INTRAVENOUS
Status: DISPENSED
Start: 2019-10-03